# Patient Record
Sex: MALE | Race: WHITE | NOT HISPANIC OR LATINO | Employment: OTHER | URBAN - METROPOLITAN AREA
[De-identification: names, ages, dates, MRNs, and addresses within clinical notes are randomized per-mention and may not be internally consistent; named-entity substitution may affect disease eponyms.]

---

## 2018-12-12 ENCOUNTER — OFFICE VISIT (OUTPATIENT)
Dept: ORTHOPEDIC SURGERY | Facility: CLINIC | Age: 83
End: 2018-12-12

## 2018-12-12 VITALS — BODY MASS INDEX: 35.4 KG/M2 | WEIGHT: 220.24 LBS | OXYGEN SATURATION: 97 % | HEART RATE: 79 BPM | HEIGHT: 66 IN

## 2018-12-12 DIAGNOSIS — M17.11 PRIMARY OSTEOARTHRITIS OF RIGHT KNEE: Primary | ICD-10-CM

## 2018-12-12 PROCEDURE — 99203 OFFICE O/P NEW LOW 30 MIN: CPT | Performed by: ORTHOPAEDIC SURGERY

## 2018-12-12 PROCEDURE — 20610 DRAIN/INJ JOINT/BURSA W/O US: CPT | Performed by: ORTHOPAEDIC SURGERY

## 2018-12-12 RX ORDER — CALCIUM CARBONATE 200(500)MG
1 TABLET,CHEWABLE ORAL DAILY PRN
COMMUNITY
End: 2022-01-17 | Stop reason: HOSPADM

## 2018-12-12 RX ORDER — IPRATROPIUM BROMIDE 42 UG/1
2 SPRAY, METERED NASAL 2 TIMES DAILY
COMMUNITY
Start: 2018-12-07

## 2018-12-12 RX ORDER — RIVASTIGMINE 4.6MG/24HR
PATCH, TRANSDERMAL 24 HOURS TRANSDERMAL
COMMUNITY
Start: 2018-12-07 | End: 2019-08-14 | Stop reason: DRUGHIGH

## 2018-12-12 RX ORDER — ROPIVACAINE HYDROCHLORIDE 5 MG/ML
4 INJECTION, SOLUTION EPIDURAL; INFILTRATION; PERINEURAL
Status: COMPLETED | OUTPATIENT
Start: 2018-12-12 | End: 2018-12-12

## 2018-12-12 RX ORDER — TAMSULOSIN HYDROCHLORIDE 0.4 MG/1
1 CAPSULE ORAL NIGHTLY
COMMUNITY
End: 2020-11-19

## 2018-12-12 RX ORDER — LEVOCETIRIZINE DIHYDROCHLORIDE 5 MG/1
5 TABLET, FILM COATED ORAL EVERY EVENING
COMMUNITY
End: 2020-06-13 | Stop reason: HOSPADM

## 2018-12-12 RX ORDER — LOSARTAN POTASSIUM 50 MG/1
50 TABLET ORAL DAILY
Status: ON HOLD | COMMUNITY
End: 2022-01-14 | Stop reason: SDUPTHER

## 2018-12-12 RX ORDER — TRIAMCINOLONE ACETONIDE 40 MG/ML
40 INJECTION, SUSPENSION INTRA-ARTICULAR; INTRAMUSCULAR
Status: COMPLETED | OUTPATIENT
Start: 2018-12-12 | End: 2018-12-12

## 2018-12-12 RX ADMIN — TRIAMCINOLONE ACETONIDE 40 MG: 40 INJECTION, SUSPENSION INTRA-ARTICULAR; INTRAMUSCULAR at 15:56

## 2018-12-12 RX ADMIN — ROPIVACAINE HYDROCHLORIDE 4 ML: 5 INJECTION, SOLUTION EPIDURAL; INFILTRATION; PERINEURAL at 15:56

## 2018-12-12 NOTE — PROGRESS NOTES
[facility] Orthopaedic Surgery Clinic Note    Subjective     Chief Complaint   Patient presents with   • Right Knee - Pain        HPI    Marcos Acuña is a 87 y.o. male.  He presents today for evaluation of right knee pain.  Pain is been present for 6 months, following no particular injury.  Pain is worse with standing, no history of trauma.  He uses a walker for ambulation.  The pain is 8 out of 10.  No recent injection in his knee, although he may have had one about 6 months ago.      There is no problem list on file for this patient.    Past Medical History:   Diagnosis Date   • Diabetes (CMS/[facility])    • H/O blood clots    • Osteoarthritis       History reviewed. No pertinent surgical history.   Family History   Problem Relation Age of Onset   • Stroke Mother    • Heart attack Father      Social History     Socioeconomic History   • Marital status:      Spouse name: Not on file   • Number of children: Not on file   • Years of education: Not on file   • Highest education level: Not on file   Social Needs   • Financial resource strain: Not on file   • Food insecurity - worry: Not on file   • Food insecurity - inability: Not on file   • Transportation needs - medical: Not on file   • Transportation needs - non-medical: Not on file   Occupational History   • Not on file   Tobacco Use   • Smoking status: Former Smoker     Packs/day: 2.00   • Smokeless tobacco: Never Used   Substance and Sexual Activity   • Alcohol use: No     Frequency: Never   • Drug use: No   • Sexual activity: Defer   Other Topics Concern   • Not on file   Social History Narrative   • Not on file      Current Outpatient Medications on File Prior to Visit   Medication Sig Dispense Refill   • calcium carbonate (TUMS) 500 MG chewable tablet Chew 1 tablet Daily.     • EXELON 4.6 MG/24HR patch      • ipratropium (ATROVENT) 0.06 % nasal spray      • levocetirizine (XYZAL) 5 MG tablet Take 5 mg by mouth Every Evening.     • losartan (COZAAR) 50 MG  tablet Take 50 mg by mouth Daily.     • metFORMIN (GLUCOPHAGE) 500 MG tablet Take 500 mg by mouth 2 (Two) Times a Day With Meals.     • MIRABEGRON ER PO Take  by mouth.     • tamsulosin (FLOMAX) 0.4 MG capsule 24 hr capsule Take 1 capsule by mouth Every Night.     • Thyroid (LEVOTHYROXINE-LIOTHYRONINE PO) Take  by mouth.       No current facility-administered medications on file prior to visit.       No Known Allergies     Review of Systems   Constitutional: Negative for activity change, appetite change, chills, diaphoresis, fatigue, fever and unexpected weight change.   HENT: Negative for congestion, dental problem, drooling, ear discharge, ear pain, facial swelling, hearing loss, mouth sores, nosebleeds, postnasal drip, rhinorrhea, sinus pressure, sneezing, sore throat, tinnitus, trouble swallowing and voice change.    Eyes: Negative for photophobia, pain, discharge, redness, itching and visual disturbance.   Respiratory: Negative for apnea, cough, choking, chest tightness, shortness of breath, wheezing and stridor.    Cardiovascular: Negative for chest pain, palpitations and leg swelling.   Gastrointestinal: Negative for abdominal distention, abdominal pain, anal bleeding, blood in stool, constipation, diarrhea, nausea, rectal pain and vomiting.   Endocrine: Negative for cold intolerance, heat intolerance, polydipsia, polyphagia and polyuria.   Genitourinary: Negative for decreased urine volume, difficulty urinating, dysuria, enuresis, flank pain, frequency, genital sores, hematuria and urgency.   Musculoskeletal: Positive for back pain and joint swelling. Negative for arthralgias, gait problem, myalgias, neck pain and neck stiffness.        Joint Pain    Skin: Negative for color change, pallor, rash and wound.   Allergic/Immunologic: Negative for environmental allergies, food allergies and immunocompromised state.   Neurological: Negative for dizziness, tremors, seizures, syncope, facial asymmetry, speech  "difficulty, weakness, light-headedness, numbness and headaches.   Hematological: Negative for adenopathy. Does not bruise/bleed easily.   Psychiatric/Behavioral: Negative for agitation, behavioral problems, confusion, decreased concentration, dysphoric mood, hallucinations, self-injury, sleep disturbance and suicidal ideas. The patient is not nervous/anxious and is not hyperactive.         Objective      Physical Exam  Pulse 79   Ht 167.6 cm (66\")   Wt 99.9 kg (220 lb 3.8 oz)   SpO2 97%   BMI 35.55 kg/m²     Body mass index is 35.55 kg/m².    General:   Mental Status:  Alert   Appearance: Cooperative, in no acute distress   Build and Nutrition: Overweight male   Orientation: Alert and oriented to person, place and time   Posture: Normal   Gait: With a walker    Integument:   Right knee: No skin lesions, no rash, no ecchymosis    Neurologic:   Sensation:    Right foot: Intact to light touch on the dorsal and plantar aspect   Motor:  Right lower extremity: 5/5 quadriceps, hamstrings, ankle dorsiflexors, and ankle plantar flexors    Vascular:   Right lower extremity: 2+ dorsalis pedis pulse, prompt capillary refill    Lower Extremities:   Right Knee:    Tenderness:  Medial and lateral joint line tenderness    Effusion:  None    Swelling:  None    Crepitus:  Positive    Atrophy:  None    Range of motion:  Extension: 0°       Flexion: 110°  Instability:  No varus laxity, no valgus laxity, negative anterior drawer  Deformities:  Mild varus        Imaging/Studies  Imaging Results (last 24 hours)     Procedure Component Value Units Date/Time    XR Knee 4+ View Right [[id number]] Resulted:  12/12/18 1528     Updated:  12/12/18 1528    Narrative:       Right Knee Radiographs  Indication: right knee pain  Views: Standing AP's and skiers of both knees, with lateral and sunrise   views of the right knee    Comparison: no prior studies available    Findings:   Advanced arthritic changes, with bone-on-bone contact medial " compartment,   bipartite patella, tricompartmental degeneration, and varus alignment.  No   acute bony abnormalities.            Assessment and Plan     Marcos was seen today for pain.    Diagnoses and all orders for this visit:    Primary osteoarthritis of right knee  -     XR Knee 4+ View Right  -     Large Joint Arthrocentesis        Reviewed my findings with patient today.  He does have advanced right knee arthritis, and is not interested in discussing surgical intervention, nor is he is surgical candidate according to his wife.  He would like to have an injection today, and this was provided.  I will see him back in 4 months, but sooner for any problems.    Of note, he did have 100% relief just a few minutes from the injection.    Return in about 4 months (around 4/12/2019).      Medical Decision Making  Management Options : prescription/IM medicine  Data/Risk: radiology tests and independent visualization of imaging, lab tests, or EMG/NCV      Lew Deng MD  12/12/18  5:10 PM

## 2018-12-12 NOTE — PROGRESS NOTES
Procedure   Large Joint Arthrocentesis  Date/Time: 12/12/2018 3:56 PM  Consent given by: patient  Site marked: site marked  Timeout: Immediately prior to procedure a time out was called to verify the correct patient, procedure, equipment, support staff and site/side marked as required   Supporting Documentation  Indications: pain   Procedure Details  Location: knee -   Preparation: Patient was prepped and draped in the usual sterile fashion  Needle size: 22 G  Medications administered: 40 mg triamcinolone acetonide 40 MG/ML; 4 mL ropivacaine 0.5 %  Patient tolerance: patient tolerated the procedure well with no immediate complications

## 2019-04-10 ENCOUNTER — OFFICE VISIT (OUTPATIENT)
Dept: ORTHOPEDIC SURGERY | Facility: CLINIC | Age: 84
End: 2019-04-10

## 2019-04-10 VITALS — HEIGHT: 66 IN | HEART RATE: 77 BPM | BODY MASS INDEX: 34.37 KG/M2 | WEIGHT: 213.85 LBS | OXYGEN SATURATION: 98 %

## 2019-04-10 DIAGNOSIS — M17.11 PRIMARY OSTEOARTHRITIS OF RIGHT KNEE: Primary | ICD-10-CM

## 2019-04-10 PROCEDURE — 20610 DRAIN/INJ JOINT/BURSA W/O US: CPT | Performed by: ORTHOPAEDIC SURGERY

## 2019-04-10 RX ORDER — ROPIVACAINE HYDROCHLORIDE 5 MG/ML
4 INJECTION, SOLUTION EPIDURAL; INFILTRATION; PERINEURAL
Status: COMPLETED | OUTPATIENT
Start: 2019-04-10 | End: 2019-04-10

## 2019-04-10 RX ORDER — TRIAMCINOLONE ACETONIDE 40 MG/ML
40 INJECTION, SUSPENSION INTRA-ARTICULAR; INTRAMUSCULAR
Status: COMPLETED | OUTPATIENT
Start: 2019-04-10 | End: 2019-04-10

## 2019-04-10 RX ORDER — MEMANTINE HYDROCHLORIDE 28 MG/1
CAPSULE, EXTENDED RELEASE ORAL
COMMUNITY
Start: 2018-07-03 | End: 2020-06-08

## 2019-04-10 RX ORDER — LEVOTHYROXINE SODIUM 0.07 MG/1
75 TABLET ORAL DAILY
COMMUNITY
Start: 2019-02-18 | End: 2020-11-03 | Stop reason: SDUPTHER

## 2019-04-10 RX ADMIN — ROPIVACAINE HYDROCHLORIDE 4 ML: 5 INJECTION, SOLUTION EPIDURAL; INFILTRATION; PERINEURAL at 15:19

## 2019-04-10 RX ADMIN — TRIAMCINOLONE ACETONIDE 40 MG: 40 INJECTION, SUSPENSION INTRA-ARTICULAR; INTRAMUSCULAR at 15:19

## 2019-04-10 NOTE — PROGRESS NOTES
Procedure   Large Joint Arthrocentesis: R knee  Date/Time: 4/10/2019 3:19 PM  Consent given by: patient  Site marked: site marked  Timeout: Immediately prior to procedure a time out was called to verify the correct patient, procedure, equipment, support staff and site/side marked as required   Supporting Documentation  Indications: pain   Procedure Details  Location: knee - R knee  Preparation: Patient was prepped and draped in the usual sterile fashion  Needle size: 22 G  Approach: anterolateral  Medications administered: 4 mL ropivacaine 0.5 %; 40 mg triamcinolone acetonide 40 MG/ML  Patient tolerance: patient tolerated the procedure well with no immediate complications

## 2019-04-10 NOTE — PROGRESS NOTES
[facility] [place] [facility] [place]    Subjective     Chief Complaint   Patient presents with   • Follow-up     4 month follow up - Primary osteoarthritis of right knee        HPI    Marcos Acuña is a 87 y.o. male.  He follows up today for both his right knee.  Knees continue to bother him, and he had brief relief with the injections on his last visit.  He has not a surgical candidate.  Pain is currently 6 out of 10, which is dull and achy.  Symptoms have been present for approximately a year.  No history of trauma.      There is no problem list on file for this patient.    Past Medical History:   Diagnosis Date   • Diabetes (CMS/[facility])    • H/O blood clots    • Osteoarthritis       No past surgical history on file.   Family History   Problem Relation Age of Onset   • Stroke Mother    • Heart attack Father      Social History     Socioeconomic History   • Marital status:      Spouse name: Not on file   • Number of children: Not on file   • Years of education: Not on file   • Highest education level: Not on file   Tobacco Use   • Smoking status: Former Smoker     Packs/day: 2.00   • Smokeless tobacco: Never Used   Substance and Sexual Activity   • Alcohol use: No     Frequency: Never   • Drug use: No   • Sexual activity: Defer      Current Outpatient Medications on File Prior to Visit   Medication Sig Dispense Refill   • memantine (NAMENDA XR) 28 MG capsule sustained-release 24 hr extended release capsule memantine 28 mg capsule sprinkle,extended release 24hr     • calcium carbonate (TUMS) 500 MG chewable tablet Chew 1 tablet Daily.     • EXELON 4.6 MG/24HR patch      • ipratropium (ATROVENT) 0.06 % nasal spray      • levocetirizine (XYZAL) 5 MG tablet Take 5 mg by mouth Every Evening.     • levothyroxine (SYNTHROID, LEVOTHROID) 75 MCG tablet      • losartan (COZAAR) 50 MG tablet Take 50 mg by mouth Daily.     • metFORMIN (GLUCOPHAGE) 500 MG tablet Take 500 mg by mouth 2 (Two) Times a Day With Meals.     •  MIRABEGRON ER PO Take  by mouth.     • rivaroxaban (XARELTO) 10 MG tablet Xarelto 10 mg tablet     • tamsulosin (FLOMAX) 0.4 MG capsule 24 hr capsule Take 1 capsule by mouth Every Night.     • Thyroid (LEVOTHYROXINE-LIOTHYRONINE PO) Take  by mouth.       No current facility-administered medications on file prior to visit.       No Known Allergies     Review of Systems   Constitutional: Negative for activity change, appetite change, chills, diaphoresis, fatigue, fever and unexpected weight change.   HENT: Negative for congestion, dental problem, drooling, ear discharge, ear pain, facial swelling, hearing loss, mouth sores, nosebleeds, postnasal drip, rhinorrhea, sinus pressure, sneezing, sore throat, tinnitus, trouble swallowing and voice change.    Eyes: Negative for photophobia, pain, discharge, redness, itching and visual disturbance.   Respiratory: Negative for apnea, cough, choking, chest tightness, shortness of breath, wheezing and stridor.    Cardiovascular: Negative for chest pain, palpitations and leg swelling.   Gastrointestinal: Negative for abdominal distention, abdominal pain, anal bleeding, blood in stool, constipation, diarrhea, nausea, rectal pain and vomiting.   Endocrine: Negative for cold intolerance, heat intolerance, polydipsia, polyphagia and polyuria.   Genitourinary: Negative for decreased urine volume, difficulty urinating, dysuria, enuresis, flank pain, frequency, genital sores, hematuria and urgency.   Musculoskeletal: Negative for arthralgias, back pain, gait problem, joint swelling, myalgias, neck pain and neck stiffness.        Primary osteoarthritis of right knee   Skin: Negative for color change, pallor, rash and wound.   Allergic/Immunologic: Negative for environmental allergies, food allergies and immunocompromised state.   Neurological: Negative for dizziness, tremors, seizures, syncope, facial asymmetry, speech difficulty, weakness, light-headedness, numbness and headaches.  "  Hematological: Negative for adenopathy. Does not bruise/bleed easily.   Psychiatric/Behavioral: Negative for agitation, behavioral problems, confusion, decreased concentration, dysphoric mood, hallucinations, self-injury, sleep disturbance and suicidal ideas. The patient is not nervous/anxious and is not hyperactive.         Objective      Physical Exam  Pulse 77   Ht 167.6 cm (65.98\")   Wt 97 kg (213 lb 13.5 oz)   SpO2 98%   BMI 34.53 kg/m²     Body mass index is 34.53 kg/m².    General:   Mental Status:  Alert   Appearance: Cooperative, in no acute distress   Build and Nutrition: Overweight male   Orientation: Alert and oriented to person, place and time   Posture: Normal   Gait: With a walker    Integument:   Right knee: no skin lesions, no rash, no ecchymosis    Lower Extremities:   Right Knee:    Tenderness:  None    Effusion:  None    Swelling:  None    Crepitus:  Positive    Atrophy:  None    Range of motion:  Extension: 0°       Flexion: 110°  Instability:  No varus laxity, no valgus laxity, negative anterior drawer  Deformities:  Varus        Assessment and Plan     Marcos was seen today for follow-up.    Diagnoses and all orders for this visit:    Primary osteoarthritis of right knee  -     Large Joint Arthrocentesis: R knee        1. Primary osteoarthritis of right knee        I reviewed my findings with the patient today.  He would like to have his right knee injected today, and this was provided.  He is not a surgical candidate.  I will see him back in 4 months, but sooner for any problems.    Of note, he had 80% relief just a few minutes following the injection.    Return in about 4 months (around 8/10/2019).      Medical Decision Making  Management Options : prescription/IM medicine    Lew Deng MD  04/10/19  3:57 PM  "

## 2019-07-27 ENCOUNTER — LAB REQUISITION (OUTPATIENT)
Dept: LAB | Facility: HOSPITAL | Age: 84
End: 2019-07-27

## 2019-07-27 DIAGNOSIS — Z00.00 ROUTINE GENERAL MEDICAL EXAMINATION AT A HEALTH CARE FACILITY: ICD-10-CM

## 2019-07-27 LAB
ALBUMIN SERPL-MCNC: 3.3 G/DL (ref 3.5–5.2)
ALBUMIN/GLOB SERPL: 1.2 G/DL
ALP SERPL-CCNC: 79 U/L (ref 39–117)
ALT SERPL W P-5'-P-CCNC: 11 U/L (ref 1–41)
ANION GAP SERPL CALCULATED.3IONS-SCNC: 13 MMOL/L (ref 5–15)
AST SERPL-CCNC: 21 U/L (ref 1–40)
BASOPHILS # BLD AUTO: 0.11 10*3/MM3 (ref 0–0.2)
BASOPHILS NFR BLD AUTO: 1.5 % (ref 0–1.5)
BILIRUB SERPL-MCNC: 0.5 MG/DL (ref 0.2–1.2)
BUN BLD-MCNC: 11 MG/DL (ref 8–23)
BUN/CREAT SERPL: 16.4 (ref 7–25)
CALCIUM SPEC-SCNC: 9.5 MG/DL (ref 8.6–10.5)
CHLORIDE SERPL-SCNC: 102 MMOL/L (ref 98–107)
CHOLEST SERPL-MCNC: 175 MG/DL (ref 0–200)
CO2 SERPL-SCNC: 25 MMOL/L (ref 22–29)
CREAT BLD-MCNC: 0.67 MG/DL (ref 0.76–1.27)
DEPRECATED RDW RBC AUTO: 63.1 FL (ref 37–54)
EOSINOPHIL # BLD AUTO: 0.5 10*3/MM3 (ref 0–0.4)
EOSINOPHIL NFR BLD AUTO: 6.7 % (ref 0.3–6.2)
ERYTHROCYTE [DISTWIDTH] IN BLOOD BY AUTOMATED COUNT: 15.8 % (ref 12.3–15.4)
GFR SERPL CREATININE-BSD FRML MDRD: 112 ML/MIN/1.73
GLOBULIN UR ELPH-MCNC: 2.7 GM/DL
GLUCOSE BLD-MCNC: 168 MG/DL (ref 65–99)
HBA1C MFR BLD: 6.8 % (ref 4.8–5.6)
HCT VFR BLD AUTO: 38 % (ref 37.5–51)
HDLC SERPL-MCNC: 63 MG/DL (ref 40–60)
HGB BLD-MCNC: 12.8 G/DL (ref 13–17.7)
IMM GRANULOCYTES # BLD AUTO: 0.02 10*3/MM3 (ref 0–0.05)
IMM GRANULOCYTES NFR BLD AUTO: 0.3 % (ref 0–0.5)
LDLC SERPL CALC-MCNC: 93 MG/DL (ref 0–100)
LDLC/HDLC SERPL: 1.47 {RATIO}
LYMPHOCYTES # BLD AUTO: 2.64 10*3/MM3 (ref 0.7–3.1)
LYMPHOCYTES NFR BLD AUTO: 35.6 % (ref 19.6–45.3)
MCH RBC QN AUTO: 36.6 PG (ref 26.6–33)
MCHC RBC AUTO-ENTMCNC: 33.7 G/DL (ref 31.5–35.7)
MCV RBC AUTO: 108.6 FL (ref 79–97)
MONOCYTES # BLD AUTO: 0.51 10*3/MM3 (ref 0.1–0.9)
MONOCYTES NFR BLD AUTO: 6.9 % (ref 5–12)
NEUTROPHILS # BLD AUTO: 3.63 10*3/MM3 (ref 1.7–7)
NEUTROPHILS NFR BLD AUTO: 49 % (ref 42.7–76)
NRBC BLD AUTO-RTO: 0 /100 WBC (ref 0–0.2)
PLATELET # BLD AUTO: 183 10*3/MM3 (ref 140–450)
PMV BLD AUTO: 9.7 FL (ref 6–12)
POTASSIUM BLD-SCNC: 4 MMOL/L (ref 3.5–5.2)
PROT SERPL-MCNC: 6 G/DL (ref 6–8.5)
RBC # BLD AUTO: 3.5 10*6/MM3 (ref 4.14–5.8)
SODIUM BLD-SCNC: 140 MMOL/L (ref 136–145)
TRIGL SERPL-MCNC: 97 MG/DL (ref 0–150)
TSH SERPL DL<=0.05 MIU/L-ACNC: 2.04 MIU/ML (ref 0.27–4.2)
VLDLC SERPL-MCNC: 19.4 MG/DL
WBC NRBC COR # BLD: 7.41 10*3/MM3 (ref 3.4–10.8)

## 2019-07-27 PROCEDURE — 83036 HEMOGLOBIN GLYCOSYLATED A1C: CPT | Performed by: PHYSICAL MEDICINE & REHABILITATION

## 2019-07-27 PROCEDURE — 85025 COMPLETE CBC W/AUTO DIFF WBC: CPT | Performed by: PHYSICAL MEDICINE & REHABILITATION

## 2019-07-27 PROCEDURE — 84443 ASSAY THYROID STIM HORMONE: CPT | Performed by: PHYSICAL MEDICINE & REHABILITATION

## 2019-07-27 PROCEDURE — 80061 LIPID PANEL: CPT | Performed by: PHYSICAL MEDICINE & REHABILITATION

## 2019-07-27 PROCEDURE — 80053 COMPREHEN METABOLIC PANEL: CPT | Performed by: PHYSICAL MEDICINE & REHABILITATION

## 2019-08-14 ENCOUNTER — OFFICE VISIT (OUTPATIENT)
Dept: ORTHOPEDIC SURGERY | Facility: CLINIC | Age: 84
End: 2019-08-14

## 2019-08-14 VITALS — WEIGHT: 210 LBS | HEART RATE: 75 BPM | HEIGHT: 66 IN | OXYGEN SATURATION: 98 % | BODY MASS INDEX: 33.75 KG/M2

## 2019-08-14 DIAGNOSIS — M17.11 PRIMARY OSTEOARTHRITIS OF RIGHT KNEE: Primary | ICD-10-CM

## 2019-08-14 PROCEDURE — 99212 OFFICE O/P EST SF 10 MIN: CPT | Performed by: ORTHOPAEDIC SURGERY

## 2019-08-14 RX ORDER — MIRABEGRON 50 MG/1
50 TABLET, FILM COATED, EXTENDED RELEASE ORAL DAILY
COMMUNITY
Start: 2019-06-14 | End: 2020-11-24

## 2019-08-14 RX ORDER — RIVASTIGMINE 9.5 MG/24H
1 PATCH, EXTENDED RELEASE TRANSDERMAL DAILY
COMMUNITY
Start: 2019-07-26

## 2019-08-14 NOTE — PROGRESS NOTES
[facility] [place] [facility] [place]    Subjective     Chief Complaint   Patient presents with   • Right Knee - Follow-up     4 month f/u  Primary Osteoarthritis of Right Knee  Cortisone Injection given 04/10/2019        HPI    Marcos Acuña is a 88 y.o. male.  Follows up today for his right knee.  Continues to have pain in the knee, but following the last injection, his blood sugars were elevated, and he got a bit confused.  They would like to avoid that today.  His wife has some questions about Visco supplementation injections at this time.      There is no problem list on file for this patient.    Past Medical History:   Diagnosis Date   • Diabetes (CMS/[facility])    • H/O blood clots    • Osteoarthritis       History reviewed. No pertinent surgical history.   Family History   Problem Relation Age of Onset   • Stroke Mother    • Heart attack Father      Social History     Socioeconomic History   • Marital status:      Spouse name: Not on file   • Number of children: Not on file   • Years of education: Not on file   • Highest education level: Not on file   Tobacco Use   • Smoking status: Former Smoker     Packs/day: 2.00   • Smokeless tobacco: Never Used   Substance and Sexual Activity   • Alcohol use: No     Frequency: Never   • Drug use: No   • Sexual activity: Defer      Current Outpatient Medications on File Prior to Visit   Medication Sig Dispense Refill   • calcium carbonate (TUMS) 500 MG chewable tablet Chew 1 tablet Daily.     • EXELON 9.5 MG/24HR patch      • ipratropium (ATROVENT) 0.06 % nasal spray      • levocetirizine (XYZAL) 5 MG tablet Take 5 mg by mouth Every Evening.     • levothyroxine (SYNTHROID, LEVOTHROID) 75 MCG tablet      • losartan (COZAAR) 50 MG tablet Take 50 mg by mouth Daily.     • memantine (NAMENDA XR) 28 MG capsule sustained-release 24 hr extended release capsule memantine 28 mg capsule sprinkle,extended release 24hr     • metFORMIN (GLUCOPHAGE) 500 MG tablet Take 500 mg by  mouth 2 (Two) Times a Day With Meals.     • MYRBETRIQ 50 MG tablet sustained-release 24 hour 24 hr tablet      • rivaroxaban (XARELTO) 10 MG tablet Xarelto 10 mg tablet     • tamsulosin (FLOMAX) 0.4 MG capsule 24 hr capsule Take 1 capsule by mouth Every Night.     • Thyroid (LEVOTHYROXINE-LIOTHYRONINE PO) Take  by mouth.     • [DISCONTINUED] EXELON 4.6 MG/24HR patch      • [DISCONTINUED] MIRABEGRON ER PO Take  by mouth.       No current facility-administered medications on file prior to visit.       No Known Allergies     Review of Systems   Constitutional: Negative for activity change, appetite change, chills, diaphoresis, fatigue, fever and unexpected weight change.   HENT: Negative for congestion, dental problem, drooling, ear discharge, ear pain, facial swelling, hearing loss, mouth sores, nosebleeds, postnasal drip, rhinorrhea, sinus pressure, sneezing, sore throat, tinnitus, trouble swallowing and voice change.    Eyes: Negative for photophobia, pain, discharge, redness, itching and visual disturbance.   Respiratory: Negative for apnea, cough, choking, chest tightness, shortness of breath, wheezing and stridor.    Cardiovascular: Negative for chest pain, palpitations and leg swelling.   Gastrointestinal: Negative for abdominal distention, abdominal pain, anal bleeding, blood in stool, constipation, diarrhea, nausea, rectal pain and vomiting.   Endocrine: Negative for cold intolerance, heat intolerance, polydipsia, polyphagia and polyuria.   Genitourinary: Negative for decreased urine volume, difficulty urinating, dysuria, enuresis, flank pain, frequency, genital sores, hematuria and urgency.   Musculoskeletal: Positive for joint swelling. Negative for arthralgias, back pain, gait problem, myalgias, neck pain and neck stiffness.   Skin: Negative for color change, pallor, rash and wound.   Allergic/Immunologic: Positive for environmental allergies. Negative for food allergies and immunocompromised state.  "  Neurological: Negative for dizziness, tremors, seizures, syncope, facial asymmetry, speech difficulty, weakness, light-headedness, numbness and headaches.   Hematological: Negative for adenopathy. Bruises/bleeds easily.   Psychiatric/Behavioral: Positive for confusion and decreased concentration. Negative for agitation, behavioral problems, dysphoric mood, hallucinations, self-injury, sleep disturbance and suicidal ideas. The patient is not nervous/anxious and is not hyperactive.         Objective      Physical Exam  Pulse 75   Ht 167.6 cm (65.98\")   Wt 95.3 kg (210 lb)   SpO2 98%   BMI 33.91 kg/m²     Body mass index is 33.91 kg/m².    General:   Mental Status:  Alert   Appearance: Cooperative, in no acute distress   Build and Nutrition: Overweight male   Orientation: Alert and oriented to person, place and time   Posture: Normal   Gait: With a walker    Integument:   Right knee: No skin lesions, no rash, no ecchymosis    Lower Extremities:   Right Knee:    Tenderness:  None    Effusion:  None    Swelling: None    Crepitus:  Positive    Range of motion:  Extension: 0°       Flexion: 120°  Instability:  No varus laxity, no valgus laxity, negative anterior drawer  Deformities:  Varus      Assessment and Plan     Marcos was seen today for follow-up.    Diagnoses and all orders for this visit:    Primary osteoarthritis of right knee        1. Primary osteoarthritis of right knee        I reviewed my findings with the patient today.  Right knee continues to bother him, but he is not a good surgical candidate.  With his bone-on-bone in the knee, I do not believe that Visco supplementation injections would be particularly helpful.  With the elevated blood sugars that he had with the last steroid injection, I would like to avoid those also, as he had some confusion associated with that.  Unfortunately, I have no other options for his knee than conservative treatment.    Return if symptoms worsen or fail to " improve.          Lew Deng MD  08/14/19  1:54 PM

## 2020-06-08 ENCOUNTER — APPOINTMENT (OUTPATIENT)
Dept: GENERAL RADIOLOGY | Facility: HOSPITAL | Age: 85
End: 2020-06-08

## 2020-06-08 ENCOUNTER — HOSPITAL ENCOUNTER (OUTPATIENT)
Facility: HOSPITAL | Age: 85
Setting detail: SURGERY ADMIT
End: 2020-06-08
Attending: ORTHOPAEDIC SURGERY | Admitting: ORTHOPAEDIC SURGERY

## 2020-06-08 ENCOUNTER — APPOINTMENT (OUTPATIENT)
Dept: CT IMAGING | Facility: HOSPITAL | Age: 85
End: 2020-06-08

## 2020-06-08 ENCOUNTER — ANESTHESIA EVENT (OUTPATIENT)
Dept: PERIOP | Facility: HOSPITAL | Age: 85
End: 2020-06-08

## 2020-06-08 ENCOUNTER — HOSPITAL ENCOUNTER (INPATIENT)
Facility: HOSPITAL | Age: 85
LOS: 5 days | Discharge: NURSING, REHAB OR LONG-TERM CARE | End: 2020-06-13
Attending: EMERGENCY MEDICINE | Admitting: INTERNAL MEDICINE
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
## 2020-06-08 DIAGNOSIS — Z78.9 IMPAIRED MOBILITY AND ADLS: ICD-10-CM

## 2020-06-08 DIAGNOSIS — Z79.01 ANTICOAGULATED: ICD-10-CM

## 2020-06-08 DIAGNOSIS — S72.001A CLOSED FRACTURE OF RIGHT HIP, INITIAL ENCOUNTER (HCC): Primary | ICD-10-CM

## 2020-06-08 DIAGNOSIS — Z74.09 IMPAIRED MOBILITY AND ADLS: ICD-10-CM

## 2020-06-08 PROBLEM — L03.116 LEFT LEG CELLULITIS: Status: ACTIVE | Noted: 2017-07-06

## 2020-06-08 PROBLEM — M17.12 PRIMARY OSTEOARTHRITIS OF LEFT KNEE: Status: ACTIVE | Noted: 2017-11-27

## 2020-06-08 PROBLEM — Z09: Status: ACTIVE | Noted: 2018-02-09

## 2020-06-08 PROBLEM — I82.409 DVT (DEEP VENOUS THROMBOSIS) (HCC): Status: ACTIVE | Noted: 2017-07-06

## 2020-06-08 PROBLEM — Z86.718: Status: ACTIVE | Noted: 2018-02-09

## 2020-06-08 LAB
ABO GROUP BLD: NORMAL
ABO GROUP BLD: NORMAL
ALBUMIN SERPL-MCNC: 3.5 G/DL (ref 3.5–5.2)
ALBUMIN/GLOB SERPL: 1.4 G/DL
ALP SERPL-CCNC: 69 U/L (ref 39–117)
ALT SERPL W P-5'-P-CCNC: 14 U/L (ref 1–41)
ANION GAP SERPL CALCULATED.3IONS-SCNC: 12 MMOL/L (ref 5–15)
APTT PPP: 38.6 SECONDS (ref 24–37)
AST SERPL-CCNC: 25 U/L (ref 1–40)
BASOPHILS # BLD AUTO: 0.13 10*3/MM3 (ref 0–0.2)
BASOPHILS NFR BLD AUTO: 1.6 % (ref 0–1.5)
BILIRUB SERPL-MCNC: 0.5 MG/DL (ref 0.2–1.2)
BLD GP AB SCN SERPL QL: NEGATIVE
BUN BLD-MCNC: 11 MG/DL (ref 8–23)
BUN/CREAT SERPL: 12.1 (ref 7–25)
CALCIUM SPEC-SCNC: 8.5 MG/DL (ref 8.6–10.5)
CHLORIDE SERPL-SCNC: 104 MMOL/L (ref 98–107)
CO2 SERPL-SCNC: 24 MMOL/L (ref 22–29)
CREAT BLD-MCNC: 0.91 MG/DL (ref 0.76–1.27)
DEPRECATED RDW RBC AUTO: 56.2 FL (ref 37–54)
EOSINOPHIL # BLD AUTO: 0.27 10*3/MM3 (ref 0–0.4)
EOSINOPHIL NFR BLD AUTO: 3.4 % (ref 0.3–6.2)
ERYTHROCYTE [DISTWIDTH] IN BLOOD BY AUTOMATED COUNT: 17.2 % (ref 12.3–15.4)
GFR SERPL CREATININE-BSD FRML MDRD: 78 ML/MIN/1.73
GLOBULIN UR ELPH-MCNC: 2.5 GM/DL
GLUCOSE BLD-MCNC: 140 MG/DL (ref 65–99)
GLUCOSE BLDC GLUCOMTR-MCNC: 147 MG/DL (ref 70–130)
HCT VFR BLD AUTO: 38.7 % (ref 37.5–51)
HGB BLD-MCNC: 12.5 G/DL (ref 13–17.7)
IMM GRANULOCYTES # BLD AUTO: 0.06 10*3/MM3 (ref 0–0.05)
IMM GRANULOCYTES NFR BLD AUTO: 0.8 % (ref 0–0.5)
INR PPP: 1.93 (ref 0.85–1.16)
LYMPHOCYTES # BLD AUTO: 1.69 10*3/MM3 (ref 0.7–3.1)
LYMPHOCYTES NFR BLD AUTO: 21.4 % (ref 19.6–45.3)
MCH RBC QN AUTO: 28.9 PG (ref 26.6–33)
MCHC RBC AUTO-ENTMCNC: 32.3 G/DL (ref 31.5–35.7)
MCV RBC AUTO: 89.4 FL (ref 79–97)
MONOCYTES # BLD AUTO: 0.46 10*3/MM3 (ref 0.1–0.9)
MONOCYTES NFR BLD AUTO: 5.8 % (ref 5–12)
NEUTROPHILS # BLD AUTO: 5.28 10*3/MM3 (ref 1.7–7)
NEUTROPHILS NFR BLD AUTO: 67 % (ref 42.7–76)
NRBC BLD AUTO-RTO: 0 /100 WBC (ref 0–0.2)
NT-PROBNP SERPL-MCNC: 65.4 PG/ML (ref 5–1800)
PLATELET # BLD AUTO: 191 10*3/MM3 (ref 140–450)
PMV BLD AUTO: 10.4 FL (ref 6–12)
POTASSIUM BLD-SCNC: 4.4 MMOL/L (ref 3.5–5.2)
PROT SERPL-MCNC: 6 G/DL (ref 6–8.5)
PROTHROMBIN TIME: 21.7 SECONDS (ref 11.5–14)
RBC # BLD AUTO: 4.33 10*6/MM3 (ref 4.14–5.8)
RH BLD: POSITIVE
RH BLD: POSITIVE
SARS-COV-2 RNA PNL SPEC NAA+PROBE: NOT DETECTED
SODIUM BLD-SCNC: 140 MMOL/L (ref 136–145)
T&S EXPIRATION DATE: NORMAL
T4 FREE SERPL-MCNC: 1.31 NG/DL (ref 0.93–1.7)
TROPONIN T SERPL-MCNC: 0.01 NG/ML (ref 0–0.03)
TSH SERPL DL<=0.05 MIU/L-ACNC: 2.26 UIU/ML (ref 0.27–4.2)
WBC NRBC COR # BLD: 7.89 10*3/MM3 (ref 3.4–10.8)

## 2020-06-08 PROCEDURE — 25010000002 HYDROMORPHONE PER 4 MG: Performed by: EMERGENCY MEDICINE

## 2020-06-08 PROCEDURE — 99285 EMERGENCY DEPT VISIT HI MDM: CPT

## 2020-06-08 PROCEDURE — 85610 PROTHROMBIN TIME: CPT | Performed by: EMERGENCY MEDICINE

## 2020-06-08 PROCEDURE — 84484 ASSAY OF TROPONIN QUANT: CPT | Performed by: EMERGENCY MEDICINE

## 2020-06-08 PROCEDURE — 87635 SARS-COV-2 COVID-19 AMP PRB: CPT | Performed by: EMERGENCY MEDICINE

## 2020-06-08 PROCEDURE — 86900 BLOOD TYPING SEROLOGIC ABO: CPT

## 2020-06-08 PROCEDURE — 86900 BLOOD TYPING SEROLOGIC ABO: CPT | Performed by: EMERGENCY MEDICINE

## 2020-06-08 PROCEDURE — 70450 CT HEAD/BRAIN W/O DYE: CPT

## 2020-06-08 PROCEDURE — 85730 THROMBOPLASTIN TIME PARTIAL: CPT | Performed by: EMERGENCY MEDICINE

## 2020-06-08 PROCEDURE — 85025 COMPLETE CBC W/AUTO DIFF WBC: CPT | Performed by: EMERGENCY MEDICINE

## 2020-06-08 PROCEDURE — 86850 RBC ANTIBODY SCREEN: CPT | Performed by: EMERGENCY MEDICINE

## 2020-06-08 PROCEDURE — 25010000002 ONDANSETRON PER 1 MG: Performed by: EMERGENCY MEDICINE

## 2020-06-08 PROCEDURE — 71045 X-RAY EXAM CHEST 1 VIEW: CPT

## 2020-06-08 PROCEDURE — 84439 ASSAY OF FREE THYROXINE: CPT | Performed by: INTERNAL MEDICINE

## 2020-06-08 PROCEDURE — 84443 ASSAY THYROID STIM HORMONE: CPT | Performed by: INTERNAL MEDICINE

## 2020-06-08 PROCEDURE — 99222 1ST HOSP IP/OBS MODERATE 55: CPT | Performed by: INTERNAL MEDICINE

## 2020-06-08 PROCEDURE — 86901 BLOOD TYPING SEROLOGIC RH(D): CPT

## 2020-06-08 PROCEDURE — 86901 BLOOD TYPING SEROLOGIC RH(D): CPT | Performed by: EMERGENCY MEDICINE

## 2020-06-08 PROCEDURE — 93005 ELECTROCARDIOGRAM TRACING: CPT | Performed by: EMERGENCY MEDICINE

## 2020-06-08 PROCEDURE — 82962 GLUCOSE BLOOD TEST: CPT

## 2020-06-08 PROCEDURE — 3E0T3BZ INTRODUCTION OF ANESTHETIC AGENT INTO PERIPHERAL NERVES AND PLEXI, PERCUTANEOUS APPROACH: ICD-10-PCS | Performed by: EMERGENCY MEDICINE

## 2020-06-08 PROCEDURE — 73502 X-RAY EXAM HIP UNI 2-3 VIEWS: CPT

## 2020-06-08 PROCEDURE — 83880 ASSAY OF NATRIURETIC PEPTIDE: CPT | Performed by: EMERGENCY MEDICINE

## 2020-06-08 PROCEDURE — 80053 COMPREHEN METABOLIC PANEL: CPT | Performed by: EMERGENCY MEDICINE

## 2020-06-08 RX ORDER — OXYBUTYNIN CHLORIDE 10 MG/1
10 TABLET, EXTENDED RELEASE ORAL DAILY
Status: DISCONTINUED | OUTPATIENT
Start: 2020-06-08 | End: 2020-06-13 | Stop reason: HOSPADM

## 2020-06-08 RX ORDER — FAMOTIDINE 20 MG/1
20 TABLET, FILM COATED ORAL ONCE
Status: CANCELLED | OUTPATIENT
Start: 2020-06-08 | End: 2020-06-08

## 2020-06-08 RX ORDER — LOSARTAN POTASSIUM 50 MG/1
50 TABLET ORAL DAILY
Status: DISCONTINUED | OUTPATIENT
Start: 2020-06-08 | End: 2020-06-13 | Stop reason: HOSPADM

## 2020-06-08 RX ORDER — BISACODYL 5 MG/1
5 TABLET, DELAYED RELEASE ORAL DAILY PRN
Status: DISCONTINUED | OUTPATIENT
Start: 2020-06-08 | End: 2020-06-09

## 2020-06-08 RX ORDER — BISACODYL 10 MG
10 SUPPOSITORY, RECTAL RECTAL DAILY PRN
Status: DISCONTINUED | OUTPATIENT
Start: 2020-06-08 | End: 2020-06-09

## 2020-06-08 RX ORDER — SODIUM CHLORIDE 0.9 % (FLUSH) 0.9 %
10 SYRINGE (ML) INJECTION EVERY 12 HOURS SCHEDULED
Status: CANCELLED | OUTPATIENT
Start: 2020-06-08

## 2020-06-08 RX ORDER — CALCIUM CARBONATE 200(500)MG
1 TABLET,CHEWABLE ORAL DAILY PRN
Status: DISCONTINUED | OUTPATIENT
Start: 2020-06-08 | End: 2020-06-09

## 2020-06-08 RX ORDER — RIVASTIGMINE 9.5 MG/24H
1 PATCH, EXTENDED RELEASE TRANSDERMAL DAILY
Status: DISCONTINUED | OUTPATIENT
Start: 2020-06-08 | End: 2020-06-13 | Stop reason: HOSPADM

## 2020-06-08 RX ORDER — HYDROMORPHONE HYDROCHLORIDE 1 MG/ML
0.25 INJECTION, SOLUTION INTRAMUSCULAR; INTRAVENOUS; SUBCUTANEOUS ONCE
Status: COMPLETED | OUTPATIENT
Start: 2020-06-08 | End: 2020-06-08

## 2020-06-08 RX ORDER — SODIUM CHLORIDE, SODIUM LACTATE, POTASSIUM CHLORIDE, CALCIUM CHLORIDE 600; 310; 30; 20 MG/100ML; MG/100ML; MG/100ML; MG/100ML
9 INJECTION, SOLUTION INTRAVENOUS CONTINUOUS
Status: CANCELLED | OUTPATIENT
Start: 2020-06-08

## 2020-06-08 RX ORDER — SODIUM CHLORIDE 0.9 % (FLUSH) 0.9 %
10 SYRINGE (ML) INJECTION AS NEEDED
Status: CANCELLED | OUTPATIENT
Start: 2020-06-08

## 2020-06-08 RX ORDER — CYANOCOBALAMIN 1000 UG/ML
1000 INJECTION, SOLUTION INTRAMUSCULAR; SUBCUTANEOUS
COMMUNITY

## 2020-06-08 RX ORDER — SODIUM CHLORIDE 0.9 % (FLUSH) 0.9 %
10 SYRINGE (ML) INJECTION EVERY 12 HOURS SCHEDULED
Status: DISCONTINUED | OUTPATIENT
Start: 2020-06-08 | End: 2020-06-09

## 2020-06-08 RX ORDER — NICOTINE POLACRILEX 4 MG
15 LOZENGE BUCCAL
Status: DISCONTINUED | OUTPATIENT
Start: 2020-06-08 | End: 2020-06-13 | Stop reason: HOSPADM

## 2020-06-08 RX ORDER — FAMOTIDINE 10 MG/ML
20 INJECTION, SOLUTION INTRAVENOUS ONCE
Status: CANCELLED | OUTPATIENT
Start: 2020-06-08 | End: 2020-06-08

## 2020-06-08 RX ORDER — DEXTROSE MONOHYDRATE 25 G/50ML
25 INJECTION, SOLUTION INTRAVENOUS
Status: DISCONTINUED | OUTPATIENT
Start: 2020-06-08 | End: 2020-06-13 | Stop reason: HOSPADM

## 2020-06-08 RX ORDER — ONDANSETRON 2 MG/ML
4 INJECTION INTRAMUSCULAR; INTRAVENOUS ONCE
Status: COMPLETED | OUTPATIENT
Start: 2020-06-08 | End: 2020-06-08

## 2020-06-08 RX ORDER — LEVOTHYROXINE SODIUM 0.07 MG/1
75 TABLET ORAL DAILY
Status: DISCONTINUED | OUTPATIENT
Start: 2020-06-08 | End: 2020-06-13 | Stop reason: HOSPADM

## 2020-06-08 RX ORDER — TAMSULOSIN HYDROCHLORIDE 0.4 MG/1
0.4 CAPSULE ORAL NIGHTLY
Status: DISCONTINUED | OUTPATIENT
Start: 2020-06-08 | End: 2020-06-13 | Stop reason: HOSPADM

## 2020-06-08 RX ORDER — SODIUM CHLORIDE 0.9 % (FLUSH) 0.9 %
10 SYRINGE (ML) INJECTION AS NEEDED
Status: DISCONTINUED | OUTPATIENT
Start: 2020-06-08 | End: 2020-06-09

## 2020-06-08 RX ORDER — LIDOCAINE HYDROCHLORIDE 10 MG/ML
0.5 INJECTION, SOLUTION EPIDURAL; INFILTRATION; INTRACAUDAL; PERINEURAL ONCE AS NEEDED
Status: CANCELLED | OUTPATIENT
Start: 2020-06-08

## 2020-06-08 RX ADMIN — HYDROMORPHONE HYDROCHLORIDE 0.25 MG: 1 INJECTION, SOLUTION INTRAMUSCULAR; INTRAVENOUS; SUBCUTANEOUS at 12:05

## 2020-06-08 RX ADMIN — SODIUM CHLORIDE, PRESERVATIVE FREE 10 ML: 5 INJECTION INTRAVENOUS at 18:06

## 2020-06-08 RX ADMIN — ONDANSETRON 4 MG: 2 INJECTION INTRAMUSCULAR; INTRAVENOUS at 12:05

## 2020-06-08 RX ADMIN — RIVASTIGMINE TRANSDERMAL SYSTEM 1 PATCH: 9.5 PATCH, EXTENDED RELEASE TRANSDERMAL at 18:05

## 2020-06-08 NOTE — CONSULTS
"Marcos Acuña    Referring Provider: No ref. provider found  Reason for Consultation: Right hip fracture    Patient Care Team:  Abdirashid Mccabe MD as PCP - General (Cardiology)    Chief complaint: Right hip pain and an inability to ambulate      Subjective .     History of present illness: 89-year-old gentleman with dementia who fell and sustained injury to his right hip unable to ambulate thereafter.  He ordinarily uses a walker    Review of Systems  Review of Systems  Constitutional: Negative for chills and fever.   HENT: Negative for sneezing, sore throat and trouble swallowing.    Eyes: Negative for photophobia and visual disturbance.   Respiratory: Negative for choking, shortness of breath and wheezing.    Cardiovascular: Negative for chest pain, palpitations and leg swelling.   Gastrointestinal: Negative for abdominal pain, constipation, diarrhea, nausea and vomiting.   Genitourinary: Negative for difficulty urinating and dysuria.   Musculoskeletal: Negative for arthralgias and joint swelling.   Skin: Negative for rash and wound.   Allergic/Immunologic: Negative for immunocompromised state.   Neurological: Negative for dizziness and headaches.   Psychiatric/Behavioral: Negative for agitation and confusion.     History  Past Medical History:   Diagnosis Date   • Dementia (CMS/HCC)    • Diabetes (CMS/HCC)    • H/O blood clots    • Osteoarthritis      Past Surgical History:   Procedure Laterality Date   • BUNIONECTOMY     • RECTAL FISTULECTOMY     • SKIN CANCER EXCISION       Family History   Problem Relation Age of Onset   • Stroke Mother    • Heart attack Father          Objective     Vital Signs   /76 (BP Location: Left arm, Patient Position: Lying)   Pulse 87   Temp 97.7 °F (36.5 °C) (Oral)   Resp 16   Ht 170.2 cm (67\")   Wt 102 kg (225 lb)   SpO2 93%   BMI 35.24 kg/m²       Physical Exam:  General Appearance:    Alert, cooperative, in no acute distress   Head:    Normocephalic, without " obvious abnormality, atraumatic   Eyes:            Lids and lashes normal, conjunctivae and sclerae normal, no   icterus,   Ears:    Ears appear intact with no abnormalities noted   Throat:   No oral lesions, no thrush, oral mucosa moist   Neck:   No adenopathy, supple, trachea midline, no thyromegaly,       Lungs:     Clear to auscultation,respirations regular, even and                   unlabored    Heart:    Regular rhythm and normal rate, normal S1 and S2, no            murmur,       Abdomen:     Normal bowel sounds, no masses, no organomegaly, soft        non-tender, non-distended,       Extremities:  Right leg is flexed abducted externally rotated.  Able to move his toes.  Has pain to any active or passive motion of the right hip   Pulses:   Pulses palpable and equal bilaterally   Skin:   No bleeding, bruising or rash   Lymph nodes:   No palpable adenopathy in the cervical region                Lab results:  Lab Results (last 24 hours)     Procedure Component Value Units Date/Time    TSH [[id number]]  (Normal) Collected:  06/08/20 1221    Specimen:  Blood Updated:  06/08/20 1502     TSH 2.260 uIU/mL     T4, Free [[id number]]  (Normal) Collected:  06/08/20 1221    Specimen:  Blood Updated:  06/08/20 1502     Free T4 1.31 ng/dL     Narrative:       Results may be falsely increased if patient taking Biotin.      COVID-19,CEPHEID,URI IN-HOUSE(OR EMERGENT/ADD-ON),NP SWAB IN TRANSPORT MEDIA 3-4 HR TAT - Swab, Nasopharynx [[id number]]  (Normal) Collected:  06/08/20 1337    Specimen:  Swab from Nasopharynx Updated:  06/08/20 1444     COVID19 Not Detected    Protime-INR [[id number]]  (Abnormal) Collected:  06/08/20 1221    Specimen:  Blood Updated:  06/08/20 1258     Protime 21.7 Seconds      INR 1.93    aPTT [[id number]]  (Abnormal) Collected:  06/08/20 1221    Specimen:  Blood Updated:  06/08/20 1258     PTT 38.6 seconds     Narrative:       PTT = The equivalent PTT values for the therapeutic range of heparin levels at  0.3 to 0.5 U/ml are 55 to 70 seconds.    Comprehensive Metabolic Panel [[id number]]  (Abnormal) Collected:  06/08/20 1221    Specimen:  Blood Updated:  06/08/20 1254     Glucose 140 mg/dL      BUN 11 mg/dL      Creatinine 0.91 mg/dL      Sodium 140 mmol/L      Potassium 4.4 mmol/L      Chloride 104 mmol/L      CO2 24.0 mmol/L      Calcium 8.5 mg/dL      Total Protein 6.0 g/dL      Albumin 3.50 g/dL      ALT (SGPT) 14 U/L      AST (SGOT) 25 U/L      Alkaline Phosphatase 69 U/L      Total Bilirubin 0.5 mg/dL      eGFR Non African Amer 78 mL/min/1.73      Globulin 2.5 gm/dL      A/G Ratio 1.4 g/dL      BUN/Creatinine Ratio 12.1     Anion Gap 12.0 mmol/L     Narrative:       GFR Normal >60  Chronic Kidney Disease <60  Kidney Failure <15      Troponin [[id number]]  (Normal) Collected:  06/08/20 1221    Specimen:  Blood Updated:  06/08/20 1253     Troponin T 0.013 ng/mL     Narrative:       Troponin T Reference Range:  <= 0.03 ng/mL-   Negative for AMI  >0.03 ng/mL-     Abnormal for myocardial necrosis.  Clinicians would have to utilize clinical acumen, EKG, Troponin and serial changes to determine if it is an Acute Myocardial Infarction or myocardial injury due to an underlying chronic condition.       Results may be falsely decreased if patient taking Biotin.      BNP [[id number]]  (Normal) Collected:  06/08/20 1221    Specimen:  Blood Updated:  06/08/20 1252     proBNP 65.4 pg/mL     Narrative:       Among patients with dyspnea, NT-proBNP is highly sensitive for the detection of acute congestive heart failure. In addition NT-proBNP of <300 pg/ml effectively rules out acute congestive heart failure with 99% negative predictive value.    Results may be falsely decreased if patient taking Biotin.      CBC & Differential [[id number]] Collected:  06/08/20 1221    Specimen:  Blood Updated:  06/08/20 1243    Narrative:       The following orders were created for panel order CBC & Differential.  Procedure                                Abnormality         Status                     ---------                               -----------         ------                     CBC Auto Differential[[id number]]        Abnormal            Final result                 Please view results for these tests on the individual orders.    CBC Auto Differential [[id number]]  (Abnormal) Collected:  06/08/20 1221    Specimen:  Blood Updated:  06/08/20 1243     WBC 7.89 10*3/mm3      RBC 4.33 10*6/mm3      Hemoglobin 12.5 g/dL      Hematocrit 38.7 %      MCV 89.4 fL      MCH 28.9 pg      MCHC 32.3 g/dL      RDW 17.2 %      RDW-SD 56.2 fl      MPV 10.4 fL      Platelets 191 10*3/mm3      Neutrophil % 67.0 %      Lymphocyte % 21.4 %      Monocyte % 5.8 %      Eosinophil % 3.4 %      Basophil % 1.6 %      Immature Grans % 0.8 %      Neutrophils, Absolute 5.28 10*3/mm3      Lymphocytes, Absolute 1.69 10*3/mm3      Monocytes, Absolute 0.46 10*3/mm3      Eosinophils, Absolute 0.27 10*3/mm3      Basophils, Absolute 0.13 10*3/mm3      Immature Grans, Absolute 0.06 10*3/mm3      nRBC 0.0 /100 WBC           Radiology:  Imaging Results (Last 72 Hours)     Procedure Component Value Units Date/Time    CT Head Without Contrast [[id number]] Collected:  06/08/20 1352     Updated:  06/08/20 1456    Narrative:       EXAMINATION: CT HEAD WO CONTRAST- 06/08/2020     INDICATION: TRAUMA; S72.001A-Fracture of unspecified part of neck of  right femur, initial encounter for closed fracture; W19.XXXA-Unspecified  fall, initial encounter; Z79.01-Long term (current) use of  anticoagulants      TECHNIQUE: CT head without intravenous contrast     The radiation dose reduction device was turned on for each scan per the  ALARA (As Low as Reasonably Achievable) protocol.     COMPARISON: NONE     FINDINGS: Midline structures are symmetric without evidence of mass,  mass effect or midline shift demonstrating prominence of the sulci  towards the vertex of generalized atrophy and/or age-related volume  loss  without intra-axial hemorrhage or extra-axial fluid collection. Globes  and orbits unremarkable. Visualized paranasal sinuses and mastoid air  cells are grossly clear and well pneumatized with mild mucosal edema of  the ethmoid air cells. Calvarium intact.       Impression:       No acute intracranial abnormality specifically no acute  intra-axial hemorrhage or extra-axial fluid collection.     D:  06/08/2020  E:  06/08/2020       XR Hip With or Without Pelvis 2 - 3 View Right [[id number]] Collected:  06/08/20 1216     Updated:  06/08/20 1234    Narrative:       EXAMINATION: XR HIP W OR WO PELVIS 2-3 VIEW RIGHT-      INDICATION: Trauma.      COMPARISON: None.     FINDINGS: Single AP view of the pelvis along with AP and crosstable  lateral views right hip reveal acute minimally comminuted and mildly  lateral angulated subtrochanteric fracture right femur with right  femoral head remaining well located at the right hip.       Impression:       Acute minimally comminuted and mildly displaced with lateral  angulation subtrochanteric fracture right proximal femur. Right femoral  head remains well located at the right hip joint.     D:  06/08/2020  E:  06/08/2020       XR Chest 1 View [[id number]] Collected:  06/08/20 1212     Updated:  06/08/20 1232    Narrative:       EXAMINATION: XR CHEST 1 VW-      INDICATION: Fall.      COMPARISON: None.     FINDINGS: Cardiac size within normal limits. Pulmonary vascularity  within normal limits. No focal opacification or consolidation. No  pneumothorax or pleural effusion. Degenerative changes of the spine.           Impression:       No acute traumatic findings of the chest.     D:  06/08/2020  E:  06/08/2020             Results Review:   I reviewed the patient's new clinical results.  I reviewed the patient's new imaging results and agree with the interpretation.  I reviewed the patient's other test results and agree with the interpretation  I personally viewed and interpreted  the patient's EKG/Telemetry data  Discussed with Patient and wife.    Medication Review:  done      Closed fracture of right hip (CMS/HCC)    Benign essential hypertension    DVT (deep venous thrombosis) (CMS/[facility])    Hypothyroidism    Type 2 diabetes mellitus without complication (CMS/[facility])      Assessment/Plan   This is an 89-year-old gentleman limited household ambulator who fell and sustained injury to his right hip.  Has a right subtrochanteric hip fracture. Plan for long trochanteric femoral nail tomorrow.            Mejia Meza MD - 06/08/20, 16:49

## 2020-06-08 NOTE — ED PROVIDER NOTES
Subjective   History of Present Illness    Review of Systems       Past Medical History:   Diagnosis Date   • Dementia (CMS/HCC)    • Diabetes (CMS/HCC)    • H/O blood clots    • Osteoarthritis        No Known Allergies    Past Surgical History:   Procedure Laterality Date   • BUNIONECTOMY     • RECTAL FISTULECTOMY     • SKIN CANCER EXCISION         Family History   Problem Relation Age of Onset   • Stroke Mother    • Heart attack Father        Social History     Socioeconomic History   • Marital status:      Spouse name: Not on file   • Number of children: Not on file   • Years of education: Not on file   • Highest education level: Not on file   Tobacco Use   • Smoking status: Former Smoker     Packs/day: 2.00   • Smokeless tobacco: Never Used   Substance and Sexual Activity   • Alcohol use: Yes     Alcohol/week: 1.0 standard drinks     Types: 1 Glasses of wine per week     Frequency: Never   • Drug use: No   • Sexual activity: Defer         Objective   Physical Exam    Procedures         ED Course  ED Course as of Jun 08 1330 Mon Jun 08, 2020   1235 I spoke with Dr. Deng, the patient's orthopedist.  He is unable to get to the patient for operative fixation until tomorrow afternoon.  He suggests I call on call orthopedist.  I have called Dr. Bansal and am awaiting reply.    I spoke with the anesthesiologist on call for hip blocks.  He will come to the emergency department to perform the procedure.    [MS]   1242 I spoke with the hospitalist who will admit.    [MS]   1328 Dr. Jauregui performed a right hip block with Jacquelyn Olmstead nurse anesthetist.     [SL]      ED Course User Index  [MS] Jose E Jauregui MD  [SL] Tima Phelps           Recent Results (from the past 24 hour(s))   Comprehensive Metabolic Panel    Collection Time: 06/08/20 12:21 PM   Result Value Ref Range    Glucose 140 (H) 65 - 99 mg/dL    BUN 11 8 - 23 mg/dL    Creatinine 0.91 0.76 - 1.27 mg/dL    Sodium 140 136 - 145 mmol/L     Potassium 4.4 3.5 - 5.2 mmol/L    Chloride 104 98 - 107 mmol/L    CO2 24.0 22.0 - 29.0 mmol/L    Calcium 8.5 (L) 8.6 - 10.5 mg/dL    Total Protein 6.0 6.0 - 8.5 g/dL    Albumin 3.50 3.50 - 5.20 g/dL    ALT (SGPT) 14 1 - 41 U/L    AST (SGOT) 25 1 - 40 U/L    Alkaline Phosphatase 69 39 - 117 U/L    Total Bilirubin 0.5 0.2 - 1.2 mg/dL    eGFR Non African Amer 78 >60 mL/min/1.73    Globulin 2.5 gm/dL    A/G Ratio 1.4 g/dL    BUN/Creatinine Ratio 12.1 7.0 - 25.0    Anion Gap 12.0 5.0 - 15.0 mmol/L   Protime-INR    Collection Time: 06/08/20 12:21 PM   Result Value Ref Range    Protime 21.7 (H) 11.5 - 14.0 Seconds    INR 1.93 (H) 0.85 - 1.16   aPTT    Collection Time: 06/08/20 12:21 PM   Result Value Ref Range    PTT 38.6 (H) 24.0 - 37.0 seconds   Troponin    Collection Time: 06/08/20 12:21 PM   Result Value Ref Range    Troponin T 0.013 0.000 - 0.030 ng/mL   BNP    Collection Time: 06/08/20 12:21 PM   Result Value Ref Range    proBNP 65.4 5.0-1,800.0 pg/mL   CBC Auto Differential    Collection Time: 06/08/20 12:21 PM   Result Value Ref Range    WBC 7.89 3.40 - 10.80 10*3/mm3    RBC 4.33 4.14 - 5.80 10*6/mm3    Hemoglobin 12.5 (L) 13.0 - 17.7 g/dL    Hematocrit 38.7 37.5 - 51.0 %    MCV 89.4 79.0 - 97.0 fL    MCH 28.9 26.6 - 33.0 pg    MCHC 32.3 31.5 - 35.7 g/dL    RDW 17.2 (H) 12.3 - 15.4 %    RDW-SD 56.2 (H) 37.0 - 54.0 fl    MPV 10.4 6.0 - 12.0 fL    Platelets 191 140 - 450 10*3/mm3    Neutrophil % 67.0 42.7 - 76.0 %    Lymphocyte % 21.4 19.6 - 45.3 %    Monocyte % 5.8 5.0 - 12.0 %    Eosinophil % 3.4 0.3 - 6.2 %    Basophil % 1.6 (H) 0.0 - 1.5 %    Immature Grans % 0.8 (H) 0.0 - 0.5 %    Neutrophils, Absolute 5.28 1.70 - 7.00 10*3/mm3    Lymphocytes, Absolute 1.69 0.70 - 3.10 10*3/mm3    Monocytes, Absolute 0.46 0.10 - 0.90 10*3/mm3    Eosinophils, Absolute 0.27 0.00 - 0.40 10*3/mm3    Basophils, Absolute 0.13 0.00 - 0.20 10*3/mm3    Immature Grans, Absolute 0.06 (H) 0.00 - 0.05 10*3/mm3    nRBC 0.0 0.0 - 0.2 /100 WBC  "    Note: In addition to lab results from this visit, the labs listed above may include labs taken at another facility or during a different encounter within the last 24 hours. Please correlate lab times with ED admission and discharge times for further clarification of the services performed during this visit.    XR Hip With or Without Pelvis 2 - 3 View Right   Preliminary Result   Acute minimally comminuted and mildly displaced with lateral   angulation subtrochanteric fracture right proximal femur. Right femoral   head remains well located at the right hip joint.       D:  06/08/2020   E:  06/08/2020          XR Chest 1 View   Preliminary Result   No acute traumatic findings of the chest.       D:  06/08/2020   E:  06/08/2020          CT Head Without Contrast    (Results Pending)     Vitals:    06/08/20 1136 06/08/20 1138   BP:  150/96   BP Location:  Left arm   Patient Position:  Lying   Pulse:  78   Resp:  16   Temp:  98.4 °F (36.9 °C)   SpO2:  98%   Weight: 102 kg (225 lb)    Height: 170.2 cm (67\")      Medications   HYDROmorphone (DILAUDID) injection 0.25 mg (0.25 mg Intravenous Given 6/8/20 1205)   ondansetron (ZOFRAN) injection 4 mg (4 mg Intravenous Given 6/8/20 1205)     ECG/EMG Results (last 24 hours)     Procedure Component Value Units Date/Time    ECG 12 Lead [[id number]] Collected:  06/08/20 1229     Updated:  06/08/20 1231        ECG 12 Lead                                                 MDM    Final diagnoses:   Closed fracture of right hip, initial encounter (CMS/[facility])   Fall, initial encounter   Anticoagulated       Documentation assistance provided by lina Phelps.  Information recorded by the lina was done at my direction and has been verified and validated by me.     Tima Phelps  06/08/20 1329       Tima Phelps  06/08/20 1332       Jose E Jauregui MD  06/10/20 1350    "

## 2020-06-08 NOTE — ED PROVIDER NOTES
EMERGENCY DEPARTMENT ENCOUNTER    Room Number:  07/07  Date of encounter:  6/8/2020  PCP: Abdirashid Mccabe MD  Historian: Patient, wife      HPI:  Chief Complaint: Right hip pain    A complete HPI/ROS/PMH/PSH/SH/FH are unobtainable due to: N/A    Context: Marcos Acuña is a 89 y.o. male who presents to the ED c/o right hip pain status post fall.  The patient reports that he was getting a piece of paper or putting a piece of paper down when he stumbled and fell to the ground.  He complains of moderate, achy, right hip pain.  Denies headache, back and neck pain, chest and abdominal pain.  The patient was unable to ambulate and 911 was called.  Paramedics transported the patient via scoop stretcher.  He has not received any medications for pain relief prior to arrival in the emergency department.  The patient does take Xarelto and is uncertain whether he struck his head but denies head pain.        PAST MEDICAL HISTORY  Active Ambulatory Problems     Diagnosis Date Noted   • No Active Ambulatory Problems     Resolved Ambulatory Problems     Diagnosis Date Noted   • No Resolved Ambulatory Problems     Past Medical History:   Diagnosis Date   • Dementia (CMS/HCC)    • Diabetes (CMS/HCC)    • H/O blood clots    • Osteoarthritis          PAST SURGICAL HISTORY  Past Surgical History:   Procedure Laterality Date   • BUNIONECTOMY     • RECTAL FISTULECTOMY     • SKIN CANCER EXCISION           FAMILY HISTORY  Family History   Problem Relation Age of Onset   • Stroke Mother    • Heart attack Father          SOCIAL HISTORY  Social History     Socioeconomic History   • Marital status:      Spouse name: Not on file   • Number of children: Not on file   • Years of education: Not on file   • Highest education level: Not on file   Tobacco Use   • Smoking status: Former Smoker     Packs/day: 2.00   • Smokeless tobacco: Never Used   Substance and Sexual Activity   • Alcohol use: Yes     Alcohol/week: 1.0 standard drinks      Types: 1 Glasses of wine per week     Frequency: Never   • Drug use: No   • Sexual activity: Defer         ALLERGIES  Patient has no known allergies.        REVIEW OF SYSTEMS  Review of Systems     All systems reviewed and negative except for those discussed in HPI.       PHYSICAL EXAM    I have reviewed the triage vital signs and nursing notes.    ED Triage Vitals [06/08/20 1138]   Temp Heart Rate Resp BP SpO2   98.4 °F (36.9 °C) 78 16 150/96 98 %      Temp src Heart Rate Source Patient Position BP Location FiO2 (%)   -- Monitor Lying Left arm --       Physical Exam  GENERAL:  Well developed.  Appears in mild to moderate pain distress.  HENT: Nares patent.  No evidence of craniofacial trauma.  EYES: No scleral icterus  CV: Regular rhythm, regular rate.  2+ right dorsalis pedis pulse.  RESPIRATORY: Normal effort.  No audible wheezes, rales or rhonchi  ABDOMEN: Soft, nontender  MUSCULOSKELETAL: Right lower extremity is shortened by a roughly 4 cm and externally rotated.  Pelvis is stable.  A thorough head to toe exam was performed and no other evidence of trauma is found to the musculoskeletal system.  NEURO: Alert, moves all extremities, follows commands.  Fine touch and motor is intact in the right lower extremity.  Patient is unable to raise right lower extremity secondary to pain.  SKIN: Warm, dry, no rash visualized        LAB RESULTS  Recent Results (from the past 24 hour(s))   Comprehensive Metabolic Panel    Collection Time: 06/08/20 12:21 PM   Result Value Ref Range    Glucose 140 (H) 65 - 99 mg/dL    BUN 11 8 - 23 mg/dL    Creatinine 0.91 0.76 - 1.27 mg/dL    Sodium 140 136 - 145 mmol/L    Potassium 4.4 3.5 - 5.2 mmol/L    Chloride 104 98 - 107 mmol/L    CO2 24.0 22.0 - 29.0 mmol/L    Calcium 8.5 (L) 8.6 - 10.5 mg/dL    Total Protein 6.0 6.0 - 8.5 g/dL    Albumin 3.50 3.50 - 5.20 g/dL    ALT (SGPT) 14 1 - 41 U/L    AST (SGOT) 25 1 - 40 U/L    Alkaline Phosphatase 69 39 - 117 U/L    Total Bilirubin 0.5 0.2 -  1.2 mg/dL    eGFR Non African Amer 78 >60 mL/min/1.73    Globulin 2.5 gm/dL    A/G Ratio 1.4 g/dL    BUN/Creatinine Ratio 12.1 7.0 - 25.0    Anion Gap 12.0 5.0 - 15.0 mmol/L   Protime-INR    Collection Time: 06/08/20 12:21 PM   Result Value Ref Range    Protime 21.7 (H) 11.5 - 14.0 Seconds    INR 1.93 (H) 0.85 - 1.16   aPTT    Collection Time: 06/08/20 12:21 PM   Result Value Ref Range    PTT 38.6 (H) 24.0 - 37.0 seconds   Troponin    Collection Time: 06/08/20 12:21 PM   Result Value Ref Range    Troponin T 0.013 0.000 - 0.030 ng/mL   BNP    Collection Time: 06/08/20 12:21 PM   Result Value Ref Range    proBNP 65.4 5.0-1,800.0 pg/mL   Type & Screen    Collection Time: 06/08/20 12:21 PM   Result Value Ref Range    ABO Type O     RH type Positive     Antibody Screen Negative     T&S Expiration Date 6/11/2020 11:59:59 PM    CBC Auto Differential    Collection Time: 06/08/20 12:21 PM   Result Value Ref Range    WBC 7.89 3.40 - 10.80 10*3/mm3    RBC 4.33 4.14 - 5.80 10*6/mm3    Hemoglobin 12.5 (L) 13.0 - 17.7 g/dL    Hematocrit 38.7 37.5 - 51.0 %    MCV 89.4 79.0 - 97.0 fL    MCH 28.9 26.6 - 33.0 pg    MCHC 32.3 31.5 - 35.7 g/dL    RDW 17.2 (H) 12.3 - 15.4 %    RDW-SD 56.2 (H) 37.0 - 54.0 fl    MPV 10.4 6.0 - 12.0 fL    Platelets 191 140 - 450 10*3/mm3    Neutrophil % 67.0 42.7 - 76.0 %    Lymphocyte % 21.4 19.6 - 45.3 %    Monocyte % 5.8 5.0 - 12.0 %    Eosinophil % 3.4 0.3 - 6.2 %    Basophil % 1.6 (H) 0.0 - 1.5 %    Immature Grans % 0.8 (H) 0.0 - 0.5 %    Neutrophils, Absolute 5.28 1.70 - 7.00 10*3/mm3    Lymphocytes, Absolute 1.69 0.70 - 3.10 10*3/mm3    Monocytes, Absolute 0.46 0.10 - 0.90 10*3/mm3    Eosinophils, Absolute 0.27 0.00 - 0.40 10*3/mm3    Basophils, Absolute 0.13 0.00 - 0.20 10*3/mm3    Immature Grans, Absolute 0.06 (H) 0.00 - 0.05 10*3/mm3    nRBC 0.0 0.0 - 0.2 /100 WBC       Ordered the above labs and independently reviewed the results.        RADIOLOGY  Xr Chest 1 View    Result Date:  6/8/2020  EXAMINATION: XR CHEST 1 VW-  INDICATION: Fall.  COMPARISON: None.  FINDINGS: Cardiac size within normal limits. Pulmonary vascularity within normal limits. No focal opacification or consolidation. No pneumothorax or pleural effusion. Degenerative changes of the spine.         No acute traumatic findings of the chest.  D:  06/08/2020 E:  06/08/2020      Xr Hip With Or Without Pelvis 2 - 3 View Right    Result Date: 6/8/2020  EXAMINATION: XR HIP W OR WO PELVIS 2-3 VIEW RIGHT-  INDICATION: Trauma.  COMPARISON: None.  FINDINGS: Single AP view of the pelvis along with AP and crosstable lateral views right hip reveal acute minimally comminuted and mildly lateral angulated subtrochanteric fracture right femur with right femoral head remaining well located at the right hip.      Acute minimally comminuted and mildly displaced with lateral angulation subtrochanteric fracture right proximal femur. Right femoral head remains well located at the right hip joint.  D:  06/08/2020 E:  06/08/2020        I ordered and reviewed the above noted radiographic studies.    I viewed images of right hip which showed sub-trochanteric hip fracture per my independent interpretation.  See radiologist's dictation for official interpretation.        PROCEDURES    Nerve Block  Date/Time: 6/8/2020 1:42 PM  Performed by: Jose E Jauregui MD  Authorized by: Jose E Jauregui MD     Consent:     Consent obtained:  Verbal    Consent given by:  Patient    Risks discussed:  Allergic reaction, infection, nerve damage, swelling, bleeding, intravenous injection, pain and unsuccessful block    Alternatives discussed:  No treatment and delayed treatment  Indications:     Indications:  Pain relief  Location:     Body area:  Lower extremity    Lower extremity nerve:  Femoral  Pre-procedure details:     Skin preparation:  2% chlorhexidine    Preparation: Patient was prepped and draped in usual sterile fashion    Skin anesthesia (see MAR for exact  dosages):     Skin anesthesia method:  Local infiltration    Local anesthetic:  Lidocaine 1% w/o epi  Procedure details (see MAR for exact dosages):     Block needle gauge:  20 G    Guidance: ultrasound      Block anesthetic: Ropivacaine, 0.25%    Steroid injected:  None    Additive injected:  None    Injection procedure:  Anatomic landmarks identified, incremental injection and negative aspiration for blood    Paresthesia:  None  Post-procedure details:     Dressing:  None    Outcome:  Pain relieved    Patient tolerance of procedure:  Tolerated well, no immediate complications  Comments:      The BBRaun 360 degree echogenic needle was introduced in plane, in a lateral to medial direction at the level of the inguinal crease.  Under ultrasound guidance, the femoral artery and vein where located.  The needle was then directed below Fascia Iliacus towards the Femoral nerve.  NS was utilized to hydro dissect and vivian needle advancement towards the target structure.   LA was injected incrementally in 3-5 ml aliquots with negative aspirate.  LA spread was visualized around the nerve, negative intraneural injection, low injection pressures.  Thank you.            MEDICATIONS GIVEN IN ER    Medications   sodium chloride 0.9 % flush 10 mL (has no administration in time range)   sodium chloride 0.9 % flush 10 mL (has no administration in time range)   bisacodyl (DULCOLAX) EC tablet 5 mg (has no administration in time range)   bisacodyl (DULCOLAX) suppository 10 mg (has no administration in time range)   dextrose (GLUTOSE) oral gel 15 g (has no administration in time range)   dextrose (D50W) 25 g/ 50mL Intravenous Solution 25 g (has no administration in time range)   glucagon (human recombinant) (GLUCAGEN DIAGNOSTIC) injection 1 mg (has no administration in time range)   insulin lispro (humaLOG) injection 0-7 Units (has no administration in time range)   calcium carbonate (TUMS) chewable tablet 500 mg (200 mg elemental) (has  no administration in time range)   losartan (COZAAR) tablet 50 mg (has no administration in time range)   tamsulosin (FLOMAX) 24 hr capsule 0.4 mg (has no administration in time range)   rivastigmine (EXELON) 9.5 MG/24HR patch 1 patch (has no administration in time range)   levothyroxine (SYNTHROID, LEVOTHROID) tablet 75 mcg (has no administration in time range)   oxybutynin XL (DITROPAN-XL) 24 hr tablet 10 mg (has no administration in time range)   HYDROmorphone (DILAUDID) injection 0.25 mg (0.25 mg Intravenous Given 6/8/20 1205)   ondansetron (ZOFRAN) injection 4 mg (4 mg Intravenous Given 6/8/20 1205)         PROGRESS, DATA ANALYSIS, CONSULTS, AND MEDICAL DECISION MAKING    All labs have been independently reviewed by me.  All radiology studies have been reviewed by me and the radiologist dictating the report.   EKG's have been independently viewed and interpreted by me.      Differential diagnoses: Mechanical fall with right hip fracture.      ED Course as of Jun 08 1344   Mon Jun 08, 2020   1235 I spoke with Dr. Deng, the patient's orthopedist.  He is unable to get to the patient for operative fixation until tomorrow afternoon.  He suggests I call on call orthopedist.  I have called Dr. Bansal and am awaiting reply.    I spoke with the anesthesiologist on call for hip blocks.  He will come to the emergency department to perform the procedure.    [MS]   1242 I spoke with the hospitalist who will admit.    [MS]   1328 Dr. Jauregui performed a right hip block with Jacquelyn Olmstead, nurse anesthetist.     [SL]      ED Course User Index  [MS] Jose E Jauregui MD  [SL] Tima Phelps             AS OF 13:44 VITALS:    BP - 150/96  HR - 78  TEMP - 98.4 °F (36.9 °C)  O2 SATS - 98%        DIAGNOSIS  Final diagnoses:   Closed fracture of right hip, initial encounter (CMS/[facility])   Fall, initial encounter   Anticoagulated         DISPOSITION  Admission for operative repair.           Jose E Jauregui MD  06/08/20 1245        Jose E Jauregui MD  06/08/20 [id number]

## 2020-06-08 NOTE — H&P
[facility]  HISTORY AND PHYSICAL    Patient Name: Marcos Acuña  : 1931  MRN: [id number]  Primary Care Physician: Abdirashid Mccabe MD  Date of admission: 2020      Subjective   Subjective     Chief Complaint:  Fell at home    HPI:  Marcos Acuña is a 89 y.o.  With a past medical history of lower extremity DVT, and non-insulin-dependent diabetes who presented to the ED after a mechanical fall with subsequent right hip fracture.  Patient states that earlier this afternoon he was in his apartment and had some forms on his table, but accidentally grabbed to, he went to return 1 and when he turned around he fell onto his right hip and arm, did not hit his head, denies any loss of consciousness, denies any chest pain, palpitations, lightheadedness, or dizziness prior to fall.    Review of Systems   Constitutional: Negative for chills and fever.   HENT: Negative for sneezing, sore throat and trouble swallowing.    Eyes: Negative for photophobia and visual disturbance.   Respiratory: Negative for choking, shortness of breath and wheezing.    Cardiovascular: Negative for chest pain, palpitations and leg swelling.   Gastrointestinal: Negative for abdominal pain, constipation, diarrhea, nausea and vomiting.   Genitourinary: Negative for difficulty urinating and dysuria.   Musculoskeletal: Negative for arthralgias and joint swelling.   Skin: Negative for rash and wound.   Allergic/Immunologic: Negative for immunocompromised state.   Neurological: Negative for dizziness and headaches.   Psychiatric/Behavioral: Negative for agitation and confusion.     All other systems reviewed and are negative.     Personal History     Past Medical History:   Diagnosis Date   • Dementia (CMS/HCC)    • Diabetes (CMS/HCC)    • H/O blood clots    • Osteoarthritis        Past Surgical History:   Procedure Laterality Date   • BUNIONECTOMY     • RECTAL FISTULECTOMY     • SKIN CANCER EXCISION          Family History: family history includes Heart attack in his father; Stroke in his mother. Otherwise pertinent FHx was reviewed and unremarkable.     Social History:  reports that he has quit smoking. He smoked 2.00 packs per day. He has never used smokeless tobacco. He reports that he drinks about 1.0 standard drinks of alcohol per week. He reports that he does not use drugs.  Social History     Social History Narrative   • Not on file       Medications:  Available home medication information reviewed.    (Not in a hospital admission)    No Known Allergies    Objective   Objective     Vital Signs:   Temp:  [98.4 °F (36.9 °C)] 98.4 °F (36.9 °C)  Heart Rate:  [78] 78  Resp:  [16] 16  BP: (150)/(96) 150/96        Physical Exam   Constitutional: Awake, alert  Eyes: PERRLA, sclerae anicteric, no conjunctival injection  HENT: NCAT, mucous membranes moist  Neck: Supple, no thyromegaly, no lymphadenopathy, trachea midline  Respiratory: Clear to auscultation bilaterally, nonlabored respirations   Cardiovascular: RRR, no murmurs, no lower extremity edema, palpable pedal pulses bilaterally  Gastrointestinal: Positive bowel sounds, soft, nontender, nondistended  Musculoskeletal: no clubbing or cyanosis to extremities  Psychiatric: Appropriate affect, cooperative  Neurologic: Oriented x 3,  Cranial Nerves grossly intact to confrontation, speech clear, sensation to light touch intact in bilateral lower extremities   Skin: No rashes    Results Reviewed:  I have personally reviewed current lab and radiology data.    Results from last 7 days   Lab Units 06/08/20  1221   WBC 10*3/mm3 7.89   HEMOGLOBIN g/dL 12.5*   HEMATOCRIT % 38.7   PLATELETS 10*3/mm3 191   INR  1.93*     Results from last 7 days   Lab Units 06/08/20  1221   SODIUM mmol/L 140   POTASSIUM mmol/L 4.4   CHLORIDE mmol/L 104   CO2 mmol/L 24.0   BUN mg/dL 11   CREATININE mg/dL 0.91   GLUCOSE mg/dL 140*   CALCIUM mg/dL 8.5*   ALT (SGPT) U/L 14   AST (SGOT) U/L 25    TROPONIN T ng/mL 0.013   PROBNP pg/mL 65.4     Estimated Creatinine Clearance: 62.7 mL/min (by C-G formula based on SCr of 0.91 mg/dL).  Brief Urine Lab Results     None        Imaging Results (Last 24 Hours)     Procedure Component Value Units Date/Time    CT Head Without Contrast [[id number]] Resulted:  06/08/20 1328     Updated:  06/08/20 1328    XR Hip With or Without Pelvis 2 - 3 View Right [[id number]] Collected:  06/08/20 1216     Updated:  06/08/20 1234    Narrative:       EXAMINATION: XR HIP W OR WO PELVIS 2-3 VIEW RIGHT-      INDICATION: Trauma.      COMPARISON: None.     FINDINGS: Single AP view of the pelvis along with AP and crosstable  lateral views right hip reveal acute minimally comminuted and mildly  lateral angulated subtrochanteric fracture right femur with right  femoral head remaining well located at the right hip.       Impression:       Acute minimally comminuted and mildly displaced with lateral  angulation subtrochanteric fracture right proximal femur. Right femoral  head remains well located at the right hip joint.     D:  06/08/2020  E:  06/08/2020       XR Chest 1 View [[id number]] Collected:  06/08/20 1212     Updated:  06/08/20 1232    Narrative:       EXAMINATION: XR CHEST 1 VW-      INDICATION: Fall.      COMPARISON: None.     FINDINGS: Cardiac size within normal limits. Pulmonary vascularity  within normal limits. No focal opacification or consolidation. No  pneumothorax or pleural effusion. Degenerative changes of the spine.           Impression:       No acute traumatic findings of the chest.     D:  06/08/2020  E:  06/08/2020                Assessment/Plan   Assessment & Plan     Active Hospital Problems    Diagnosis POA   • Closed fracture of right hip (CMS/[facility]) [S72.001A] Yes       Mr. Marcos Acuña is an 89 yo gentleman With a past medical history of lower extremity DVT, and non-insulin-dependent diabetes who presented to the ED after a mechanical fall with subsequent right hip  fracture.    Mechanical fall  Right hip fracture  -XR of right hip showed acute minimally comminuted and mildly displaced with lateral angulation subtrochanteric fracture of the right proximal femur  -CT head was without acute intracranial abnormality  -Plan for Ortho consult and possible surgical intervention tomorrow-got nerve block in the ED  -Will need pre-procedure COVID screen    History of recurrent lower extremity DVT  -Xarelto on hold for surgery    Hypertension-continue losartan    Hypothyroidism-continue levothyroxine, check TSH and T4 and adjust as needed    BPH  -Continue tamsulosin and oxybutynin    T2DM- hold metformin, ssi while inpatient       COVID-19 Status Testing      Asymptomatic COVID testing has been performed on Marcos Acuña to determine status per current protocols in preparation for planned procedure and/or discharge disposition.     The patient does not have concerning clinical findings or high risk screen for COVID and this diagnosis is not clinically suspected.  However, due to prevalence of asymptomatic COVID infection testing was warranted to facilitate appropriate care.        COVID result:  No results found for: COVID19           DVT prophylaxis: Holding Xarelto for surgery      Admission Communication  Due to current limited visitation policies, an attempt will be made to update patient's identified best point-of-contact(s) at admission to discuss plan of care.   Contact: Sloane   Relation: Wife   Time of communication: At bedside   Notes (if applicable):          CODE STATUS:   Code Status and Medical Interventions:   Ordered at: 06/08/20 1329     Code Status:    CPR     Medical Interventions (Level of Support Prior to Arrest):    Full       Admission Status:  I believe this patient meets INPATIENT status due to right hip fracture in an elderly gentleman warranting orthopedic surgery intervention.  I feel patient’s risk for adverse outcomes and need for care warrant INPATIENT  evaluation and I predict the patient’s care encounter to likely last beyond 2 midnights.      Electronically signed by Ashley Guajardo MD, 06/08/20, 1:32 PM.

## 2020-06-08 NOTE — PROGRESS NOTES
Discharge Planning Assessment  [facility]     Patient Name: Marcos Acuña  MRN: [id number]  Today's Date: 6/8/2020    Admit Date: 6/8/2020    Discharge Needs Assessment     Row Name 06/08/20 1404       Living Environment    Lives With  spouse    Current Living Arrangements  home/apartment/condo    Primary Care Provided by  self    Provides Primary Care For  no one    Family Caregiver if Needed  spouse    Quality of Family Relationships  helpful;involved;supportive    Able to Return to Prior Arrangements  yes    Living Arrangement Comments  prefers [facility] if goes to rehab       Resource/Environmental Concerns    Resource/Environmental Concerns  none    Transportation Concerns  car, none       Transition Planning    Patient/Family Anticipates Transition to  home;inpatient rehabilitation facility    Patient/Family Anticipated Services at Transition  none    Transportation Anticipated  -- TBD       Discharge Needs Assessment    Readmission Within the Last 30 Days  no previous admission in last 30 days    Concerns to be Addressed  discharge planning spouse does not currently know d/c plan    Equipment Currently Used at Home  walker, rolling    Anticipated Changes Related to Illness  none    Outpatient/Agency/Support Group Needs  inpatient rehabilitation facility    Provided Post Acute Provider List?  Refused    Refused Provider List Comment  lives at [facility], therefore would like to go to [facility]        Discharge Plan     Row Name 06/08/20 1408       Plan    Plan  initial    Plan Comments  Pt and spouse reside at [facility]. He uses a walker for ambulation. Spouse manages all meds. States she does not know definitive plan for d/c, requests [facility] if rehab needed PCP is John Mccabe    Final Discharge Disposition Code  30 - still a patient        Destination      Coordination has not been started for this encounter.      Durable Medical Equipment      Coordination has not been started for  this encounter.      Dialysis/Infusion      Coordination has not been started for this encounter.      Home Medical Care      Coordination has not been started for this encounter.      Therapy      Coordination has not been started for this encounter.      Community Resources      Coordination has not been started for this encounter.          Demographic Summary    No documentation.       Functional Status     Row Name 06/08/20 1403       Functional Status    Usual Activity Tolerance  good    Current Activity Tolerance  fair       Functional Status, IADL    Medications  assistive person    Meal Preparation  assistive person    Housekeeping  assistive person    Laundry  assistive person    Shopping  assistive person       Mental Status    General Appearance WDL  WDL       Mental Status Summary    Recent Changes in Mental Status/Cognitive Functioning  no changes       Employment/    Employment Status  retired        Psychosocial    No documentation.       Abuse/Neglect    No documentation.       Legal    No documentation.       Substance Abuse    No documentation.       Patient Forms    No documentation.           Peggy Arce RN

## 2020-06-09 ENCOUNTER — APPOINTMENT (OUTPATIENT)
Dept: GENERAL RADIOLOGY | Facility: HOSPITAL | Age: 85
End: 2020-06-09

## 2020-06-09 ENCOUNTER — ANESTHESIA (OUTPATIENT)
Dept: PERIOP | Facility: HOSPITAL | Age: 85
End: 2020-06-09

## 2020-06-09 LAB
ANION GAP SERPL CALCULATED.3IONS-SCNC: 14 MMOL/L (ref 5–15)
BACTERIA UR QL AUTO: ABNORMAL /HPF
BASOPHILS # BLD MANUAL: 0 10*3/MM3 (ref 0–0.2)
BASOPHILS NFR BLD AUTO: 0 % (ref 0–1.5)
BILIRUB UR QL STRIP: NEGATIVE
BUN BLD-MCNC: 14 MG/DL (ref 8–23)
BUN/CREAT SERPL: 16.3 (ref 7–25)
CALCIUM SPEC-SCNC: 8.7 MG/DL (ref 8.6–10.5)
CHLORIDE SERPL-SCNC: 102 MMOL/L (ref 98–107)
CLARITY UR: CLEAR
CO2 SERPL-SCNC: 24 MMOL/L (ref 22–29)
COLOR UR: YELLOW
CREAT BLD-MCNC: 0.86 MG/DL (ref 0.76–1.27)
DEPRECATED RDW RBC AUTO: 55.7 FL (ref 37–54)
EOSINOPHIL # BLD MANUAL: 0 10*3/MM3 (ref 0–0.4)
EOSINOPHIL NFR BLD MANUAL: 0 % (ref 0.3–6.2)
ERYTHROCYTE [DISTWIDTH] IN BLOOD BY AUTOMATED COUNT: 17.2 % (ref 12.3–15.4)
GFR SERPL CREATININE-BSD FRML MDRD: 84 ML/MIN/1.73
GLUCOSE BLD-MCNC: 204 MG/DL (ref 65–99)
GLUCOSE BLDC GLUCOMTR-MCNC: 133 MG/DL (ref 70–130)
GLUCOSE BLDC GLUCOMTR-MCNC: 186 MG/DL (ref 70–130)
GLUCOSE BLDC GLUCOMTR-MCNC: 189 MG/DL (ref 70–130)
GLUCOSE UR STRIP-MCNC: ABNORMAL MG/DL
HCT VFR BLD AUTO: 36 % (ref 37.5–51)
HGB BLD-MCNC: 11.4 G/DL (ref 13–17.7)
HGB UR QL STRIP.AUTO: NEGATIVE
KETONES UR QL STRIP: ABNORMAL
LEUKOCYTE ESTERASE UR QL STRIP.AUTO: ABNORMAL
LYMPHOCYTES # BLD MANUAL: 1.03 10*3/MM3 (ref 0.7–3.1)
LYMPHOCYTES NFR BLD MANUAL: 11 % (ref 19.6–45.3)
LYMPHOCYTES NFR BLD MANUAL: 3 % (ref 5–12)
MCH RBC QN AUTO: 28.1 PG (ref 26.6–33)
MCHC RBC AUTO-ENTMCNC: 31.7 G/DL (ref 31.5–35.7)
MCV RBC AUTO: 88.7 FL (ref 79–97)
MONOCYTES # BLD AUTO: 0.28 10*3/MM3 (ref 0.1–0.9)
NEUTROPHILS # BLD AUTO: 8.06 10*3/MM3 (ref 1.7–7)
NEUTROPHILS NFR BLD MANUAL: 86 % (ref 42.7–76)
NITRITE UR QL STRIP: NEGATIVE
PH UR STRIP.AUTO: <=5 [PH] (ref 5–8)
PLAT MORPH BLD: NORMAL
PLATELET # BLD AUTO: 159 10*3/MM3 (ref 140–450)
PMV BLD AUTO: 10.6 FL (ref 6–12)
POTASSIUM BLD-SCNC: 4.1 MMOL/L (ref 3.5–5.2)
PROT UR QL STRIP: NEGATIVE
RBC # BLD AUTO: 4.06 10*6/MM3 (ref 4.14–5.8)
RBC # UR: ABNORMAL /HPF
RBC MORPH BLD: NORMAL
REF LAB TEST METHOD: ABNORMAL
SODIUM BLD-SCNC: 140 MMOL/L (ref 136–145)
SP GR UR STRIP: 1.02 (ref 1–1.03)
SQUAMOUS #/AREA URNS HPF: ABNORMAL /HPF
UROBILINOGEN UR QL STRIP: ABNORMAL
WBC MORPH BLD: NORMAL
WBC NRBC COR # BLD: 9.37 10*3/MM3 (ref 3.4–10.8)
WBC UR QL AUTO: ABNORMAL /HPF

## 2020-06-09 PROCEDURE — 82962 GLUCOSE BLOOD TEST: CPT

## 2020-06-09 PROCEDURE — 25010000002 PHENYLEPHRINE PER 1 ML: Performed by: NURSE ANESTHETIST, CERTIFIED REGISTERED

## 2020-06-09 PROCEDURE — 25010000002 NEOSTIGMINE 10 MG/10ML SOLUTION: Performed by: NURSE ANESTHETIST, CERTIFIED REGISTERED

## 2020-06-09 PROCEDURE — 76000 FLUOROSCOPY <1 HR PHYS/QHP: CPT

## 2020-06-09 PROCEDURE — 25010000002 DEXAMETHASONE PER 1 MG: Performed by: NURSE ANESTHETIST, CERTIFIED REGISTERED

## 2020-06-09 PROCEDURE — 25010000003 LIDOCAINE 1 % SOLUTION: Performed by: NURSE ANESTHETIST, CERTIFIED REGISTERED

## 2020-06-09 PROCEDURE — C1769 GUIDE WIRE: HCPCS | Performed by: ORTHOPAEDIC SURGERY

## 2020-06-09 PROCEDURE — 25010000002 ONDANSETRON PER 1 MG: Performed by: NURSE ANESTHETIST, CERTIFIED REGISTERED

## 2020-06-09 PROCEDURE — 63710000001 INSULIN LISPRO (HUMAN) PER 5 UNITS: Performed by: INTERNAL MEDICINE

## 2020-06-09 PROCEDURE — 0QS604Z REPOSITION RIGHT UPPER FEMUR WITH INTERNAL FIXATION DEVICE, OPEN APPROACH: ICD-10-PCS | Performed by: ORTHOPAEDIC SURGERY

## 2020-06-09 PROCEDURE — C1713 ANCHOR/SCREW BN/BN,TIS/BN: HCPCS | Performed by: ORTHOPAEDIC SURGERY

## 2020-06-09 PROCEDURE — 81001 URINALYSIS AUTO W/SCOPE: CPT | Performed by: EMERGENCY MEDICINE

## 2020-06-09 PROCEDURE — 25010000002 DEXAMETHASONE SODIUM PHOSPHATE 10 MG/ML SOLUTION: Performed by: ANESTHESIOLOGY

## 2020-06-09 PROCEDURE — 85027 COMPLETE CBC AUTOMATED: CPT | Performed by: INTERNAL MEDICINE

## 2020-06-09 PROCEDURE — 63710000001 INSULIN LISPRO (HUMAN) PER 5 UNITS: Performed by: ORTHOPAEDIC SURGERY

## 2020-06-09 PROCEDURE — 99232 SBSQ HOSP IP/OBS MODERATE 35: CPT | Performed by: INTERNAL MEDICINE

## 2020-06-09 PROCEDURE — 80048 BASIC METABOLIC PNL TOTAL CA: CPT | Performed by: INTERNAL MEDICINE

## 2020-06-09 PROCEDURE — 25010000002 PROPOFOL 10 MG/ML EMULSION: Performed by: NURSE ANESTHETIST, CERTIFIED REGISTERED

## 2020-06-09 PROCEDURE — 25010000003 CEFAZOLIN IN DEXTROSE 2-4 GM/100ML-% SOLUTION: Performed by: ORTHOPAEDIC SURGERY

## 2020-06-09 PROCEDURE — 85007 BL SMEAR W/DIFF WBC COUNT: CPT | Performed by: INTERNAL MEDICINE

## 2020-06-09 DEVICE — IMPLANTABLE DEVICE: Type: IMPLANTABLE DEVICE | Site: HIP | Status: FUNCTIONAL

## 2020-06-09 DEVICE — SCRW TFNA PERF TI 105MM GLD STRL: Type: IMPLANTABLE DEVICE | Site: HIP | Status: FUNCTIONAL

## 2020-06-09 DEVICE — SCRW LK STRDRV TI 5X52MM STRL: Type: IMPLANTABLE DEVICE | Site: HIP | Status: FUNCTIONAL

## 2020-06-09 RX ORDER — EPHEDRINE SULFATE 50 MG/ML
5 INJECTION, SOLUTION INTRAVENOUS ONCE AS NEEDED
Status: DISCONTINUED | OUTPATIENT
Start: 2020-06-09 | End: 2020-06-09 | Stop reason: HOSPADM

## 2020-06-09 RX ORDER — CEFAZOLIN SODIUM 2 G/100ML
2 INJECTION, SOLUTION INTRAVENOUS EVERY 8 HOURS
Status: COMPLETED | OUTPATIENT
Start: 2020-06-09 | End: 2020-06-10

## 2020-06-09 RX ORDER — MORPHINE SULFATE 4 MG/ML
4 INJECTION, SOLUTION INTRAMUSCULAR; INTRAVENOUS
Status: DISCONTINUED | OUTPATIENT
Start: 2020-06-09 | End: 2020-06-13 | Stop reason: HOSPADM

## 2020-06-09 RX ORDER — ESMOLOL HYDROCHLORIDE 10 MG/ML
INJECTION INTRAVENOUS AS NEEDED
Status: DISCONTINUED | OUTPATIENT
Start: 2020-06-09 | End: 2020-06-09 | Stop reason: SURG

## 2020-06-09 RX ORDER — FENTANYL CITRATE 50 UG/ML
50 INJECTION, SOLUTION INTRAMUSCULAR; INTRAVENOUS
Status: DISCONTINUED | OUTPATIENT
Start: 2020-06-09 | End: 2020-06-09 | Stop reason: HOSPADM

## 2020-06-09 RX ORDER — SODIUM CHLORIDE 0.9 % (FLUSH) 0.9 %
10 SYRINGE (ML) INJECTION AS NEEDED
Status: DISCONTINUED | OUTPATIENT
Start: 2020-06-09 | End: 2020-06-09 | Stop reason: HOSPADM

## 2020-06-09 RX ORDER — ACETAMINOPHEN 325 MG/1
650 TABLET ORAL EVERY 4 HOURS PRN
Status: DISCONTINUED | OUTPATIENT
Start: 2020-06-09 | End: 2020-06-13 | Stop reason: HOSPADM

## 2020-06-09 RX ORDER — LIDOCAINE HYDROCHLORIDE 10 MG/ML
INJECTION, SOLUTION INFILTRATION; PERINEURAL AS NEEDED
Status: DISCONTINUED | OUTPATIENT
Start: 2020-06-09 | End: 2020-06-09 | Stop reason: SURG

## 2020-06-09 RX ORDER — MAGNESIUM HYDROXIDE 1200 MG/15ML
LIQUID ORAL AS NEEDED
Status: DISCONTINUED | OUTPATIENT
Start: 2020-06-09 | End: 2020-06-09 | Stop reason: HOSPADM

## 2020-06-09 RX ORDER — NALOXONE HCL 0.4 MG/ML
0.4 VIAL (ML) INJECTION
Status: DISCONTINUED | OUTPATIENT
Start: 2020-06-09 | End: 2020-06-13 | Stop reason: HOSPADM

## 2020-06-09 RX ORDER — GLYCOPYRROLATE 0.2 MG/ML
INJECTION INTRAMUSCULAR; INTRAVENOUS AS NEEDED
Status: DISCONTINUED | OUTPATIENT
Start: 2020-06-09 | End: 2020-06-09 | Stop reason: SURG

## 2020-06-09 RX ORDER — SODIUM CHLORIDE 0.9 % (FLUSH) 0.9 %
10 SYRINGE (ML) INJECTION EVERY 12 HOURS SCHEDULED
Status: DISCONTINUED | OUTPATIENT
Start: 2020-06-09 | End: 2020-06-09 | Stop reason: HOSPADM

## 2020-06-09 RX ORDER — CEFAZOLIN SODIUM 2 G/100ML
2 INJECTION, SOLUTION INTRAVENOUS ONCE
Status: COMPLETED | OUTPATIENT
Start: 2020-06-09 | End: 2020-06-09

## 2020-06-09 RX ORDER — MORPHINE SULFATE 10 MG/ML
6 INJECTION INTRAMUSCULAR; INTRAVENOUS; SUBCUTANEOUS
Status: DISCONTINUED | OUTPATIENT
Start: 2020-06-09 | End: 2020-06-13 | Stop reason: HOSPADM

## 2020-06-09 RX ORDER — ONDANSETRON 2 MG/ML
4 INJECTION INTRAMUSCULAR; INTRAVENOUS ONCE AS NEEDED
Status: DISCONTINUED | OUTPATIENT
Start: 2020-06-09 | End: 2020-06-09 | Stop reason: HOSPADM

## 2020-06-09 RX ORDER — OXYCODONE HYDROCHLORIDE AND ACETAMINOPHEN 5; 325 MG/1; MG/1
1 TABLET ORAL EVERY 4 HOURS PRN
Status: DISCONTINUED | OUTPATIENT
Start: 2020-06-09 | End: 2020-06-13 | Stop reason: HOSPADM

## 2020-06-09 RX ORDER — DEXAMETHASONE SODIUM PHOSPHATE 10 MG/ML
INJECTION, SOLUTION INTRAMUSCULAR; INTRAVENOUS
Status: COMPLETED | OUTPATIENT
Start: 2020-06-09 | End: 2020-06-09

## 2020-06-09 RX ORDER — ATRACURIUM BESYLATE 10 MG/ML
INJECTION, SOLUTION INTRAVENOUS AS NEEDED
Status: DISCONTINUED | OUTPATIENT
Start: 2020-06-09 | End: 2020-06-09 | Stop reason: SURG

## 2020-06-09 RX ORDER — FAMOTIDINE 20 MG/1
20 TABLET, FILM COATED ORAL ONCE
Status: COMPLETED | OUTPATIENT
Start: 2020-06-09 | End: 2020-06-09

## 2020-06-09 RX ORDER — PROPOFOL 10 MG/ML
VIAL (ML) INTRAVENOUS AS NEEDED
Status: DISCONTINUED | OUTPATIENT
Start: 2020-06-09 | End: 2020-06-09 | Stop reason: SURG

## 2020-06-09 RX ORDER — ACETAMINOPHEN 650 MG/1
650 SUPPOSITORY RECTAL EVERY 4 HOURS PRN
Status: DISCONTINUED | OUTPATIENT
Start: 2020-06-09 | End: 2020-06-13 | Stop reason: HOSPADM

## 2020-06-09 RX ORDER — DOCUSATE SODIUM 100 MG/1
100 CAPSULE, LIQUID FILLED ORAL 2 TIMES DAILY PRN
Status: DISCONTINUED | OUTPATIENT
Start: 2020-06-09 | End: 2020-06-13 | Stop reason: HOSPADM

## 2020-06-09 RX ORDER — NEOSTIGMINE METHYLSULFATE 1 MG/ML
INJECTION, SOLUTION INTRAVENOUS AS NEEDED
Status: DISCONTINUED | OUTPATIENT
Start: 2020-06-09 | End: 2020-06-09 | Stop reason: SURG

## 2020-06-09 RX ORDER — ONDANSETRON 2 MG/ML
INJECTION INTRAMUSCULAR; INTRAVENOUS AS NEEDED
Status: DISCONTINUED | OUTPATIENT
Start: 2020-06-09 | End: 2020-06-09 | Stop reason: SURG

## 2020-06-09 RX ORDER — PROMETHAZINE HYDROCHLORIDE 12.5 MG/1
12.5 TABLET ORAL EVERY 6 HOURS PRN
Status: DISCONTINUED | OUTPATIENT
Start: 2020-06-09 | End: 2020-06-13 | Stop reason: HOSPADM

## 2020-06-09 RX ORDER — BUPIVACAINE HYDROCHLORIDE 2.5 MG/ML
INJECTION, SOLUTION EPIDURAL; INFILTRATION; INTRACAUDAL
Status: COMPLETED | OUTPATIENT
Start: 2020-06-09 | End: 2020-06-09

## 2020-06-09 RX ORDER — SODIUM CHLORIDE 9 MG/ML
50 INJECTION, SOLUTION INTRAVENOUS CONTINUOUS
Status: DISCONTINUED | OUTPATIENT
Start: 2020-06-09 | End: 2020-06-13 | Stop reason: HOSPADM

## 2020-06-09 RX ORDER — DEXAMETHASONE SODIUM PHOSPHATE 4 MG/ML
INJECTION, SOLUTION INTRA-ARTICULAR; INTRALESIONAL; INTRAMUSCULAR; INTRAVENOUS; SOFT TISSUE AS NEEDED
Status: DISCONTINUED | OUTPATIENT
Start: 2020-06-09 | End: 2020-06-09 | Stop reason: SURG

## 2020-06-09 RX ORDER — SODIUM CHLORIDE, SODIUM LACTATE, POTASSIUM CHLORIDE, CALCIUM CHLORIDE 600; 310; 30; 20 MG/100ML; MG/100ML; MG/100ML; MG/100ML
9 INJECTION, SOLUTION INTRAVENOUS CONTINUOUS
Status: DISCONTINUED | OUTPATIENT
Start: 2020-06-09 | End: 2020-06-09

## 2020-06-09 RX ADMIN — DEXAMETHASONE SODIUM PHOSPHATE 4 MG: 4 INJECTION, SOLUTION INTRAMUSCULAR; INTRAVENOUS at 13:29

## 2020-06-09 RX ADMIN — BUPIVACAINE HYDROCHLORIDE 30 ML: 2.5 INJECTION, SOLUTION EPIDURAL; INFILTRATION; INTRACAUDAL; PERINEURAL at 13:33

## 2020-06-09 RX ADMIN — PHENYLEPHRINE HYDROCHLORIDE 0.5 MCG/KG/MIN: 10 INJECTION INTRAVENOUS at 13:37

## 2020-06-09 RX ADMIN — NEOSTIGMINE 4 MG: 1 INJECTION INTRAVENOUS at 15:35

## 2020-06-09 RX ADMIN — PHENYLEPHRINE HYDROCHLORIDE 100 MCG: 10 INJECTION INTRAVENOUS at 13:29

## 2020-06-09 RX ADMIN — DEXAMETHASONE SODIUM PHOSPHATE 2 MG: 10 INJECTION INTRAMUSCULAR; INTRAVENOUS at 13:33

## 2020-06-09 RX ADMIN — SODIUM CHLORIDE, POTASSIUM CHLORIDE, SODIUM LACTATE AND CALCIUM CHLORIDE 9 ML/HR: 600; 310; 30; 20 INJECTION, SOLUTION INTRAVENOUS at 12:17

## 2020-06-09 RX ADMIN — ESMOLOL HYDROCHLORIDE 30 MG: 10 INJECTION, SOLUTION INTRAVENOUS at 13:29

## 2020-06-09 RX ADMIN — ONDANSETRON 4 MG: 2 INJECTION INTRAMUSCULAR; INTRAVENOUS at 15:35

## 2020-06-09 RX ADMIN — INSULIN LISPRO 3 UNITS: 100 INJECTION, SOLUTION INTRAVENOUS; SUBCUTANEOUS at 17:53

## 2020-06-09 RX ADMIN — CEFAZOLIN SODIUM 2 G: 2 INJECTION, SOLUTION INTRAVENOUS at 21:23

## 2020-06-09 RX ADMIN — PROPOFOL 50 MG: 10 INJECTION, EMULSION INTRAVENOUS at 14:16

## 2020-06-09 RX ADMIN — CEFAZOLIN SODIUM 2 G: 2 INJECTION, SOLUTION INTRAVENOUS at 13:25

## 2020-06-09 RX ADMIN — SODIUM CHLORIDE, PRESERVATIVE FREE 10 ML: 5 INJECTION INTRAVENOUS at 08:57

## 2020-06-09 RX ADMIN — SODIUM CHLORIDE, PRESERVATIVE FREE 10 ML: 5 INJECTION INTRAVENOUS at 12:17

## 2020-06-09 RX ADMIN — ATRACURIUM BESYLATE 10 MG: 10 INJECTION, SOLUTION INTRAVENOUS at 15:00

## 2020-06-09 RX ADMIN — TAMSULOSIN HYDROCHLORIDE 0.4 MG: 0.4 CAPSULE ORAL at 20:29

## 2020-06-09 RX ADMIN — FAMOTIDINE 20 MG: 20 TABLET, FILM COATED ORAL at 12:34

## 2020-06-09 RX ADMIN — GLYCOPYRROLATE 0.4 MG: 0.2 INJECTION INTRAMUSCULAR; INTRAVENOUS at 15:35

## 2020-06-09 RX ADMIN — INSULIN LISPRO 2 UNITS: 100 INJECTION, SOLUTION INTRAVENOUS; SUBCUTANEOUS at 08:57

## 2020-06-09 RX ADMIN — RIVASTIGMINE TRANSDERMAL SYSTEM 1 PATCH: 9.5 PATCH, EXTENDED RELEASE TRANSDERMAL at 17:52

## 2020-06-09 RX ADMIN — ATRACURIUM BESYLATE 50 MG: 10 INJECTION, SOLUTION INTRAVENOUS at 13:29

## 2020-06-09 RX ADMIN — LIDOCAINE HYDROCHLORIDE 50 MG: 10 INJECTION, SOLUTION INFILTRATION; PERINEURAL at 13:29

## 2020-06-09 RX ADMIN — SODIUM CHLORIDE 100 ML/HR: 9 INJECTION, SOLUTION INTRAVENOUS at 17:52

## 2020-06-09 RX ADMIN — PROPOFOL 120 MG: 10 INJECTION, EMULSION INTRAVENOUS at 13:29

## 2020-06-09 NOTE — ANESTHESIA POSTPROCEDURE EVALUATION
Patient: Marcos Acuña    Procedure Summary     Date:  06/09/20 Room / Location:   [facility] OR 09 / [facility] OR    Anesthesia Start:  1325 Anesthesia Stop:  1547    Procedure:  ADVANCED TROCHANTERIC FEMORAL NAIL (ATFN) RIGHT HIP/FEMUR (N/A Hip) Diagnosis:      Surgeon:  Mejia Meza MD Provider:  Osbaldo Noble MD    Anesthesia Type:  general ASA Status:  3          Anesthesia Type: general    Vitals  Vitals Value Taken Time   BP     Temp     Pulse 86 6/9/2020  3:47 PM   Resp     SpO2 91 % 6/9/2020  3:47 PM   Vitals shown include unvalidated device data.        Post Anesthesia Care and Evaluation    Patient location during evaluation: PACU  Patient participation: complete - patient participated  Level of consciousness: awake and alert  Pain score: 0  Pain management: adequate  Airway patency: patent  Anesthetic complications: No anesthetic complications  PONV Status: none  Cardiovascular status: hemodynamically stable and acceptable  Respiratory status: nonlabored ventilation, acceptable and nasal cannula  Hydration status: acceptable

## 2020-06-09 NOTE — BRIEF OP NOTE
HIP TROCHANTERIC NAILING WITH INTRAMEDULLARY HIP SCREW  Progress Note    Marcos Acuña  6/9/2020    Pre-op Diagnosis:   Right subtrochanteric hip fracture       Post-Op Diagnosis Codes:  Right subtrochanteric hip fracture right femoral neck fracture and right greater trochanter fracture    Procedure/CPT® Codes:      Procedure(s):  ADVANCED TROCHANTERIC FEMORAL NAIL (ATFN) RIGHT HIP/FEMUR    Surgeon(s):  Mejia Meza MD    Anesthesia: Choice    Staff:   Circulator: Rachael Granados RN; Helen Benites RN  Radiology Technologist: Zia Cartwright  Scrub Person: Uche Simon Olivia  Vendor Representative: Andre Kunz  Nursing Assistant: Jennifer Stock  Assistant: Mejia Ballesteros PA    Estimated Blood Loss: 300 ml    Urine Voided: * No values recorded between 6/9/2020  1:25 PM and 6/9/2020  3:42 PM *    Specimens:                None          Drains: None  [REMOVED] External Urinary Catheter (Removed)   Site Assessment Clean;Skin intact;Pink 6/9/2020  3:23 AM   Application/Removal external catheter applied;skin care provided 6/9/2020  3:23 AM   Collection Container Wall suction 6/9/2020  3:23 AM   Wall suction (mmHG) 100 mmHG 6/9/2020  3:23 AM   Output (mL) 100 mL 6/9/2020  3:23 AM       Findings: Found to have also a femoral neck fracture minimally displaced and greater trochanter fracture with some displacement    Complications: none      Mejia Meza MD     Date: 6/9/2020  Time: 15:49

## 2020-06-09 NOTE — ANESTHESIA PREPROCEDURE EVALUATION
Anesthesia Evaluation     Patient summary reviewed and Nursing notes reviewed   NPO Solid Status: > 8 hours  NPO Liquid Status: > 8 hours           Airway   Mallampati: II  TM distance: >3 FB  Neck ROM: full  No difficulty expected  Dental      Pulmonary    (+) a smoker Former, asthma (PRN MDI ),sleep apnea,   (-) COPD (atrovent ), recent URI  Cardiovascular     ECG reviewed    (+) hypertension, DVT (LLE),   (-) past MI, dysrhythmias (currently PAC's ), angina, cardiac stents    ROS comment: ECG NSR     Neuro/Psych  (+) numbness (CTR Right ), psychiatric history, dementia (rivastigmine patch ),     (-) seizures, CVA  GI/Hepatic/Renal/Endo    (+) obesity,   diabetes mellitus type 2, thyroid problem hypothyroidism  (-) morbid obesity, liver disease, no renal disease    Musculoskeletal         ROS comment: R HIP FX  Abdominal    Substance History      OB/GYN          Other   arthritis, blood dyscrasia (Polycythemia RV),   history of cancer (Basal cell ear )    ROS/Med Hx Other: xarelto  H of H    hypopituitarism                 Anesthesia Plan    ASA 3     general   (Risk for POCD   Aim MAP >65 BIS >45   H of H Mild dementia )  intravenous induction     Anesthetic plan, all risks, benefits, and alternatives have been provided, discussed and informed consent has been obtained with: patient.    Plan discussed with CRNA.

## 2020-06-09 NOTE — PLAN OF CARE
Remains confused and disoriented. V/S stable. Tele NSR. Removes Tele and gown frequently.Side rails up bed alarms on x2. NPO for surgery.Slept at brief intervals.

## 2020-06-09 NOTE — ANESTHESIA PROCEDURE NOTES
Airway  Urgency: elective    Date/Time: 6/9/2020 1:31 PM  Airway not difficult    General Information and Staff    Patient location during procedure: OR  CRNA: Marcos Canales CRNA    Indications and Patient Condition  Indications for airway management: airway protection    Preoxygenated: yes  MILS not maintained throughout  Mask difficulty assessment: 1 - vent by mask    Final Airway Details  Final airway type: endotracheal airway      Successful airway: ETT  Cuffed: yes   Successful intubation technique: direct laryngoscopy  Endotracheal tube insertion site: oral  Blade: Jordy  Blade size: 3  ETT size (mm): 7.0  Cormack-Lehane Classification: grade I - full view of glottis  Placement verified by: chest auscultation and capnometry   Cuff volume (mL): 8  Measured from: lips  ETT/EBT  to lips (cm): 20  Number of attempts at approach: 1  Assessment: lips, teeth, and gum same as pre-op and atraumatic intubation    Additional Comments  Negative epigastric sounds, Breath sound equal bilaterally with symmetric chest rise and fall

## 2020-06-09 NOTE — PLAN OF CARE
Pt. Had surgery today. He is not complaining or showing signs of pain. VSS, on room air, NSR per tele. Will continue to monitor.

## 2020-06-09 NOTE — PROGRESS NOTES
Continued Stay Note  [facility]     Patient Name: Marcos Acuña  MRN: [id number]  Today's Date: 6/9/2020    Admit Date: 6/8/2020    Discharge Plan     Row Name 06/09/20 1440       Plan    Plan  Plan is being developed    Provided Post Acute Provider List?  Refused    Refused Provider List Comment  Wants to go to [facility]    Patient/Family in Agreement with Plan  yes    Plan Comments  Patient currently in OR for hip repair. Lives at [facility] on [address].  Rehab referral called to Angy at the [facility].  Will need PT and OT evaluation.  Following    Final Discharge Disposition Code  30 - still a patient        Discharge Codes    No documentation.             Jazzmine Lui RN

## 2020-06-09 NOTE — OP NOTE
Operative note:    Preop diagnosis: Right subtrochanteric hip fracture    Postop diagnosis: Right subtrochanteric hip fracture, femoral neck fracture, greater trochanter fracture, lesser trochanter fracture    Procedure: Right long advanced trochanteric femoral nail 11 mm or 25 degrees 380 millimeters in length    Surgeon: Derrick    Assistant: Lesli    Indications: 89-year-old gentleman with dementia and limited ambulator who fell and sustained injury to his right hip the reason for the operation along with risks potential complications were explained to the patient they desire to proceed to proceed with surgery.  He was seen in preop anesthesia area went over the preop anesthesia, H&P, and consent, verify that information during a timeout and had marked his affected extremity in the preop anesthesia area.    Procedure    Patient was induced and maintained under general anesthesia placed on the fracture table and traction was applied to the right leg.  And that he was a large gentleman we tract placed traction slight adduction and some internal rotation to obtain the best reduction possible on AP and lateral views.    Then because of large size we made a incision approximately 4 inches long quite proximal to the tip of the greater trochanter taken down sharp through skin subcu tissue based a guidepin through it and drilled with the proximal reamer 15.5 mm over the guidepin down  To the proximal fragment and placed a long guidewire from the proximal into the distal fragment .  Reamed the entire length of the femur up to 12-1/2 mm to accommodate 11 mm diameter viral.  Placed that viral and then through  a second incision using the guide roughly at the level of the lesser trochanter and placed a guidepin up through approximately the center of the head and neck.  Drilled and placed 105 mm guide screw through.  Then attempted to lock that through the proximal portion of the viral had inability to do that we could not get  the screwdriver down.  Removed the viral and the guide do to the screw took the viral guide apparatus apart cleaned it and ensured that this time it all pieces fit together better put the viral and guide back were fortunate to get the new back in the identical place then locked the set screw through the proximal portion of the viral without difficulty after this attempt.  And abducted the leg and made us incision distal through the dynamic screw hole of the distal viral placed a 52 mm screw.  Ensured the proper rotation was apparent prior to locking the distal pins.  Reduction of the head neck fragment with the shaft was very good the greater trochanter piece was displaced slightly but will not injure weightbearing.  In order to have reduce that piece better would require much more surgery much larger incision which in a gentleman his size would be contraindicated.    Then copiously irrigated all incisions with Betadine saline solution closed the fascia proximally with 0 Vicryl subcu and all 3 incisions was closed with 0 and 2-0 Vicryl skin was closed with staples and a dressing of Xeroform 4 x 4's and tape was applied patient then was awakened taken recovery room in stable condition tolerated procedure without apparent complication.  This surgery had added degree of difficulties due to the patient's large size.     dictated by Derrick thank you

## 2020-06-09 NOTE — PROGRESS NOTES
[facility] Medicine Services  PROGRESS NOTE    Patient Name: Marcos Acuña  : 1931  MRN: [id number]    Date of Admission: 2020  Primary Care Physician: Abdirashid Mccabe MD    Subjective   Subjective     CC:  Hip fracture     HPI:  Pleasantly confused  Reports that his hip hurts  No family present, no other complaints.    Review of Systems  Unable to assess given mental status    Objective   Objective     Vital Signs:   Temp:  [97.7 °F (36.5 °C)-98.4 °F (36.9 °C)] 98.1 °F (36.7 °C)  Heart Rate:  [] 87  Resp:  [16-18] 18  BP: (103-166)/(64-96) 144/83        Physical Exam:  GEN- no acute distress noted, resting in bed, awake  HEENT- atraumatic, normocephlic, eomi  NECK- supple, trachea midline, no masses  RESP: ctab, normal effort  CV: no murmurs, s1/s2, rrr  MSK: no edema noted, right hip externally rotated, minimal bruising, mild TTP  NEURO: alert, oriented to self and place   SKIN: no rashes  PSYCH: appropriate mood and affect , pleasantly confused      Results Reviewed:  Results from last 7 days   Lab Units 20  0532 20  1221   WBC 10*3/mm3 9.37 7.89   HEMOGLOBIN g/dL 11.4* 12.5*   HEMATOCRIT % 36.0* 38.7   PLATELETS 10*3/mm3 159 191   INR   --  1.93*     Results from last 7 days   Lab Units 20  0532 20  1221   SODIUM mmol/L 140 140   POTASSIUM mmol/L 4.1 4.4   CHLORIDE mmol/L 102 104   CO2 mmol/L 24.0 24.0   BUN mg/dL 14 11   CREATININE mg/dL 0.86 0.91   GLUCOSE mg/dL 204* 140*   CALCIUM mg/dL 8.7 8.5*   ALT (SGPT) U/L  --  14   AST (SGOT) U/L  --  25   TROPONIN T ng/mL  --  0.013   PROBNP pg/mL  --  65.4     Estimated Creatinine Clearance: 66.3 mL/min (by C-G formula based on SCr of 0.86 mg/dL).    Microbiology Results Abnormal     Procedure Component Value - Date/Time    COVID-19,CEPHEID,URI IN-HOUSE(OR EMERGENT/ADD-ON),NP SWAB IN TRANSPORT MEDIA 3-4 HR TAT - Swab, Nasopharynx [[id number]]  (Normal) Collected:  20 1334    Lab Status:   Final result Specimen:  Swab from Nasopharynx Updated:  06/08/20 1444     COVID19 Not Detected          Imaging Results (Last 24 Hours)     Procedure Component Value Units Date/Time    CT Head Without Contrast [[id number]] Collected:  06/08/20 1352     Updated:  06/08/20 1907    Narrative:       EXAMINATION: CT HEAD WO CONTRAST- 06/08/2020     INDICATION: TRAUMA; S72.001A-Fracture of unspecified part of neck of  right femur, initial encounter for closed fracture; W19.XXXA-Unspecified  fall, initial encounter; Z79.01-Long term (current) use of  anticoagulants      TECHNIQUE: CT head without intravenous contrast     The radiation dose reduction device was turned on for each scan per the  ALARA (As Low as Reasonably Achievable) protocol.     COMPARISON: NONE     FINDINGS: Midline structures are symmetric without evidence of mass,  mass effect or midline shift demonstrating prominence of the sulci  towards the vertex of generalized atrophy and/or age-related volume loss  without intra-axial hemorrhage or extra-axial fluid collection. Globes  and orbits unremarkable. Visualized paranasal sinuses and mastoid air  cells are grossly clear and well pneumatized with mild mucosal edema of  the ethmoid air cells. Calvarium intact.       Impression:       No acute intracranial abnormality specifically no acute  intra-axial hemorrhage or extra-axial fluid collection.     D:  06/08/2020  E:  06/08/2020     This report was finalized on 6/8/2020 7:04 PM by Dr. Mele Brooks.       XR Chest 1 View [[id number]] Collected:  06/08/20 1212     Updated:  06/08/20 1906    Narrative:       EXAMINATION: XR CHEST 1 VW-      INDICATION: Fall.      COMPARISON: None.     FINDINGS: Cardiac size within normal limits. Pulmonary vascularity  within normal limits. No focal opacification or consolidation. No  pneumothorax or pleural effusion. Degenerative changes of the spine.           Impression:       No acute traumatic findings of the chest.     D:   06/08/2020  E:  06/08/2020     This report was finalized on 6/8/2020 7:03 PM by Dr. Mele Brooks.       XR Hip With or Without Pelvis 2 - 3 View Right [[id number]] Collected:  06/08/20 1216     Updated:  06/08/20 1906    Narrative:       EXAMINATION: XR HIP W OR WO PELVIS 2-3 VIEW RIGHT-      INDICATION: Trauma.      COMPARISON: None.     FINDINGS: Single AP view of the pelvis along with AP and crosstable  lateral views right hip reveal acute minimally comminuted and mildly  lateral angulated subtrochanteric fracture right femur with right  femoral head remaining well located at the right hip.       Impression:       Acute minimally comminuted and mildly displaced with lateral  angulation subtrochanteric fracture right proximal femur. Right femoral  head remains well located at the right hip joint.     D:  06/08/2020  E:  06/08/2020     This report was finalized on 6/8/2020 7:03 PM by Dr. Mele Brooks.                  I have reviewed the medications:  Scheduled Meds:  insulin lispro 0-7 Units Subcutaneous TID AC   levothyroxine 75 mcg Oral Daily   losartan 50 mg Oral Daily   oxybutynin XL 10 mg Oral Daily   rivastigmine 1 patch Transdermal Daily   sodium chloride 10 mL Intravenous Q12H   tamsulosin 0.4 mg Oral Nightly     Continuous Infusions:   PRN Meds:.•  bisacodyl  •  bisacodyl  •  calcium carbonate  •  dextrose  •  dextrose  •  glucagon (human recombinant)  •  sodium chloride    Assessment/Plan   Assessment & Plan     Active Hospital Problems    Diagnosis  POA   • Closed fracture of right hip (CMS/[facility]) [S72.001A]  Yes   • DVT (deep venous thrombosis) (CMS/[facility]) [I82.409]  Yes   • Benign essential hypertension [I10]  Yes   • Hypothyroidism [E03.9]  Yes   • Type 2 diabetes mellitus without complication (CMS/[facility]) [E11.9]  Yes      Resolved Hospital Problems   No resolved problems to display.        Brief Hospital Course to date:  Mr. Marcos Acuña is an 88 yo gentleman With a past medical history of lower extremity  DVT, and non-insulin-dependent diabetes who presented to the ED after a mechanical fall with subsequent right hip fracture.    This patient's problems and plans were partially entered by my partner and updated as appropriate by me 06/09/20.         Mechanical fall  Right hip fracture  -XR of right hip showed acute minimally comminuted and mildly displaced with lateral angulation subtrochanteric fracture of the right proximal femur  -CT head was without acute intracranial abnormality  -Dr Meza of orthopedics following, planning OR today 6/9  --PT/OT following OR  -Pre-procedure covid screening negative     History of recurrent lower extremity DVT  -Xarelto on hold for surgery     Hypertension-continue losartan     Hypothyroidism-continue levothyroxine, check TSH and T4 and adjust as needed     BPH  -Continue tamsulosin and oxybutynin     T2DM- hold metformin, ssi while inpatient     DVT Prophylaxis:  mech given OR    Disposition: I expect the patient to be discharged pending operative repair.     CODE STATUS:   Code Status and Medical Interventions:   Ordered at: 06/08/20 1329     Code Status:    CPR     Medical Interventions (Level of Support Prior to Arrest):    Full         Electronically signed by Angy Maciel MD, 06/09/20, 08:02.

## 2020-06-09 NOTE — ANESTHESIA PROCEDURE NOTES
Peripheral Block      Patient reassessed immediately prior to procedure    Patient location during procedure: pre-op  Start time: 6/9/2020 1:32 PM  Stop time: 6/9/2020 1:34 PM  Reason for block: procedure for pain and at surgeon's request  Performed by  CRNA: Marcos Canales CRNA  Preanesthetic Checklist  Completed: patient identified, site marked, surgical consent, pre-op evaluation, timeout performed, IV checked, risks and benefits discussed and monitors and equipment checked  Prep:  Pt Position: supine  Sterile barriers:cap, gloves and mask  Prep: ChloraPrep  Patient monitoring: blood pressure monitoring, continuous pulse oximetry and EKG  Procedure  Sedation:no  Performed under: local infiltration  Guidance:ultrasound guided  Images:still images obtained, printed/placed on chart    Laterality:right  Block Type:fascia iliaca catheter  Injection Technique:single-shot  Needle Type:echogenic and short-bevel  Needle Gauge:20 G  Resistance on Injection: none  Catheter size: 20g.    Medications Used: dexamethasone sodium phosphate injection, 2 mg  bupivacaine PF (MARCAINE) 0.25 % injection, 30 mL  Med admintered at 6/9/2020 1:33 PM      Medications  Preservative Free Saline:10ml    Post Assessment  Injection Assessment: negative aspiration for heme, no paresthesia on injection and incremental injection  Patient Tolerance:comfortable throughout block  Complications:no  Additional Notes  Procedure:                 Pt placed in supine position.   The insertion site was prepped in sterile fashion with Chlorapreop and clear plastic drapes.  Analgesia was provided by skin infiltration at insertion site with Lidocaine 1% 3mls.  A B-Santiago 18 g , 4 inch echogenic Touhy needle was advance In-plane under ultrasound guidance. The   Anterior superior Iliac crest was initially visualized and the probe was directed slightly medially and slightly towards the umbilicus.  The course of the needle was tracked over the sartorius muscle  through the fascia Iliacus and into the anterior portion of the Iliacus muscle.  Major vessels where identified and avoided as where structures of the peritoneal cavity.  LA injection was made incrementally in 1-5ml amounts spread was visualized superiorly below fascia iliacus.  Injection was completed with negative aspiration of blood and negative intravascular injection.  Injection pressures where normal or minimal resistance.

## 2020-06-10 ENCOUNTER — APPOINTMENT (OUTPATIENT)
Dept: GENERAL RADIOLOGY | Facility: HOSPITAL | Age: 85
End: 2020-06-10

## 2020-06-10 LAB
ANION GAP SERPL CALCULATED.3IONS-SCNC: 10 MMOL/L (ref 5–15)
BASOPHILS # BLD AUTO: 0.06 10*3/MM3 (ref 0–0.2)
BASOPHILS NFR BLD AUTO: 0.7 % (ref 0–1.5)
BUN BLD-MCNC: 13 MG/DL (ref 8–23)
BUN/CREAT SERPL: 15.9 (ref 7–25)
CALCIUM SPEC-SCNC: 8.2 MG/DL (ref 8.6–10.5)
CHLORIDE SERPL-SCNC: 106 MMOL/L (ref 98–107)
CO2 SERPL-SCNC: 24 MMOL/L (ref 22–29)
CREAT BLD-MCNC: 0.82 MG/DL (ref 0.76–1.27)
DEPRECATED RDW RBC AUTO: 62.8 FL (ref 37–54)
EOSINOPHIL # BLD AUTO: 0.02 10*3/MM3 (ref 0–0.4)
EOSINOPHIL NFR BLD AUTO: 0.2 % (ref 0.3–6.2)
ERYTHROCYTE [DISTWIDTH] IN BLOOD BY AUTOMATED COUNT: 17.9 % (ref 12.3–15.4)
GFR SERPL CREATININE-BSD FRML MDRD: 88 ML/MIN/1.73
GLUCOSE BLD-MCNC: 158 MG/DL (ref 65–99)
GLUCOSE BLDC GLUCOMTR-MCNC: 144 MG/DL (ref 70–130)
GLUCOSE BLDC GLUCOMTR-MCNC: 171 MG/DL (ref 70–130)
GLUCOSE BLDC GLUCOMTR-MCNC: 204 MG/DL (ref 70–130)
GLUCOSE BLDC GLUCOMTR-MCNC: 226 MG/DL (ref 70–130)
HCT VFR BLD AUTO: 33 % (ref 37.5–51)
HGB BLD-MCNC: 10.1 G/DL (ref 13–17.7)
IMM GRANULOCYTES # BLD AUTO: 0.07 10*3/MM3 (ref 0–0.05)
IMM GRANULOCYTES NFR BLD AUTO: 0.8 % (ref 0–0.5)
LYMPHOCYTES # BLD AUTO: 1.79 10*3/MM3 (ref 0.7–3.1)
LYMPHOCYTES NFR BLD AUTO: 19.5 % (ref 19.6–45.3)
MCH RBC QN AUTO: 29.3 PG (ref 26.6–33)
MCHC RBC AUTO-ENTMCNC: 30.6 G/DL (ref 31.5–35.7)
MCV RBC AUTO: 95.7 FL (ref 79–97)
MONOCYTES # BLD AUTO: 0.67 10*3/MM3 (ref 0.1–0.9)
MONOCYTES NFR BLD AUTO: 7.3 % (ref 5–12)
NEUTROPHILS # BLD AUTO: 6.58 10*3/MM3 (ref 1.7–7)
NEUTROPHILS NFR BLD AUTO: 71.5 % (ref 42.7–76)
NRBC BLD AUTO-RTO: 0 /100 WBC (ref 0–0.2)
PLATELET # BLD AUTO: 130 10*3/MM3 (ref 140–450)
PMV BLD AUTO: 11 FL (ref 6–12)
POTASSIUM BLD-SCNC: 4 MMOL/L (ref 3.5–5.2)
RBC # BLD AUTO: 3.45 10*6/MM3 (ref 4.14–5.8)
SODIUM BLD-SCNC: 140 MMOL/L (ref 136–145)
WBC NRBC COR # BLD: 9.19 10*3/MM3 (ref 3.4–10.8)

## 2020-06-10 PROCEDURE — 63710000001 INSULIN LISPRO (HUMAN) PER 5 UNITS: Performed by: ORTHOPAEDIC SURGERY

## 2020-06-10 PROCEDURE — 25010000002 PIPERACILLIN SOD-TAZOBACTAM PER 1 G: Performed by: INTERNAL MEDICINE

## 2020-06-10 PROCEDURE — 97110 THERAPEUTIC EXERCISES: CPT

## 2020-06-10 PROCEDURE — 99232 SBSQ HOSP IP/OBS MODERATE 35: CPT | Performed by: INTERNAL MEDICINE

## 2020-06-10 PROCEDURE — 82962 GLUCOSE BLOOD TEST: CPT

## 2020-06-10 PROCEDURE — 25010000003 CEFAZOLIN IN DEXTROSE 2-4 GM/100ML-% SOLUTION: Performed by: ORTHOPAEDIC SURGERY

## 2020-06-10 PROCEDURE — 80048 BASIC METABOLIC PNL TOTAL CA: CPT | Performed by: ORTHOPAEDIC SURGERY

## 2020-06-10 PROCEDURE — 97162 PT EVAL MOD COMPLEX 30 MIN: CPT

## 2020-06-10 PROCEDURE — 85025 COMPLETE CBC W/AUTO DIFF WBC: CPT | Performed by: ORTHOPAEDIC SURGERY

## 2020-06-10 PROCEDURE — 97166 OT EVAL MOD COMPLEX 45 MIN: CPT

## 2020-06-10 PROCEDURE — 97116 GAIT TRAINING THERAPY: CPT

## 2020-06-10 PROCEDURE — 87081 CULTURE SCREEN ONLY: CPT | Performed by: INTERNAL MEDICINE

## 2020-06-10 PROCEDURE — 25010000002 VANCOMYCIN 10 G RECONSTITUTED SOLUTION

## 2020-06-10 PROCEDURE — 87040 BLOOD CULTURE FOR BACTERIA: CPT | Performed by: INTERNAL MEDICINE

## 2020-06-10 PROCEDURE — 71045 X-RAY EXAM CHEST 1 VIEW: CPT

## 2020-06-10 RX ADMIN — ACETAMINOPHEN 650 MG: 325 TABLET, FILM COATED ORAL at 21:35

## 2020-06-10 RX ADMIN — RIVASTIGMINE TRANSDERMAL SYSTEM 1 PATCH: 9.5 PATCH, EXTENDED RELEASE TRANSDERMAL at 18:26

## 2020-06-10 RX ADMIN — SODIUM CHLORIDE 100 ML/HR: 9 INJECTION, SOLUTION INTRAVENOUS at 05:23

## 2020-06-10 RX ADMIN — CEFAZOLIN SODIUM 2 G: 2 INJECTION, SOLUTION INTRAVENOUS at 05:22

## 2020-06-10 RX ADMIN — OXYBUTYNIN CHLORIDE 10 MG: 10 TABLET, EXTENDED RELEASE ORAL at 10:07

## 2020-06-10 RX ADMIN — TAZOBACTAM SODIUM AND PIPERACILLIN SODIUM 3.38 G: 375; 3 INJECTION, SOLUTION INTRAVENOUS at 21:35

## 2020-06-10 RX ADMIN — TAMSULOSIN HYDROCHLORIDE 0.4 MG: 0.4 CAPSULE ORAL at 21:35

## 2020-06-10 RX ADMIN — VANCOMYCIN HYDROCHLORIDE 1500 MG: 10 INJECTION, POWDER, LYOPHILIZED, FOR SOLUTION INTRAVENOUS at 18:57

## 2020-06-10 RX ADMIN — TAZOBACTAM SODIUM AND PIPERACILLIN SODIUM 3.38 G: 375; 3 INJECTION, SOLUTION INTRAVENOUS at 18:26

## 2020-06-10 RX ADMIN — ACETAMINOPHEN 650 MG: 325 TABLET, FILM COATED ORAL at 14:13

## 2020-06-10 RX ADMIN — INSULIN LISPRO 3 UNITS: 100 INJECTION, SOLUTION INTRAVENOUS; SUBCUTANEOUS at 11:58

## 2020-06-10 RX ADMIN — LEVOTHYROXINE SODIUM 75 MCG: 75 TABLET ORAL at 10:08

## 2020-06-10 RX ADMIN — LOSARTAN POTASSIUM 50 MG: 50 TABLET, FILM COATED ORAL at 10:08

## 2020-06-10 RX ADMIN — ACETAMINOPHEN 650 MG: 325 TABLET, FILM COATED ORAL at 10:07

## 2020-06-10 RX ADMIN — INSULIN LISPRO 2 UNITS: 100 INJECTION, SOLUTION INTRAVENOUS; SUBCUTANEOUS at 18:37

## 2020-06-10 NOTE — PLAN OF CARE
Problem: Patient Care Overview  Goal: Plan of Care Review  Flowsheets (Taken 6/10/2020 6659)  Outcome Summary: OT IE complete. Pt alert, confused; follows simple commands. Pt OOB and UIC with Mod-Max assist x2. Dependent for LB dressing/toileting ADLs. Pt has caregivers for bathing/dressing at baseline. Spouse typically assists with toilet transfers and care RW level. OT will follow IP to advance ADL as able. Recommend SNF at d/c for best outcome.

## 2020-06-10 NOTE — PROGRESS NOTES
[facility]  PROGRESS NOTE    Patient Name: Marcos Acuña  : 1931  MRN: [id number]    Date of Admission: 2020  Primary Care Physician: Abdirashid Mccabe MD    Subjective   Subjective     CC:  Hip fracture    HPI:  Confused, taking off his clothes and gown. Now with fevers    Review of Systems  Limited ros secondary to mental status    Objective   Objective     Vital Signs:   Temp:  [97.8 °F (36.6 °C)-100.2 °F (37.9 °C)] 99.7 °F (37.6 °C)  Heart Rate:  [70-98] 86  Resp:  [16-20] 16  BP: (107-150)/() 112/52        Physical Exam:  Constitutional: No acute distress, awake, alert  HENT: NCAT, mucous membranes moist  Respiratory: Clear to auscultation bilaterally, respiratory effort normal   Cardiovascular: RRR, no murmurs  Gastrointestinal: Positive bowel sounds, soft  Musculoskeletal: No bilateral ankle edema  Psychiatric: not able to assess  Neurologic: confused  Skin: No rashes    Results Reviewed:  Results from last 7 days   Lab Units 06/10/20  0552 20  0532 20  1221   WBC 10*3/mm3 9.19 9.37 7.89   HEMOGLOBIN g/dL 10.1* 11.4* 12.5*   HEMATOCRIT % 33.0* 36.0* 38.7   PLATELETS 10*3/mm3 130* 159 191   INR   --   --  1.93*     Results from last 7 days   Lab Units 06/10/20  0552 20  0532 20  1221   SODIUM mmol/L 140 140 140   POTASSIUM mmol/L 4.0 4.1 4.4   CHLORIDE mmol/L 106 102 104   CO2 mmol/L 24.0 24.0 24.0   BUN mg/dL 13 14 11   CREATININE mg/dL 0.82 0.86 0.91   GLUCOSE mg/dL 158* 204* 140*   CALCIUM mg/dL 8.2* 8.7 8.5*   ALT (SGPT) U/L  --   --  14   AST (SGOT) U/L  --   --  25   TROPONIN T ng/mL  --   --  0.013   PROBNP pg/mL  --   --  65.4     Estimated Creatinine Clearance: 69.5 mL/min (by C-G formula based on SCr of 0.82 mg/dL).    Microbiology Results Abnormal     Procedure Component Value - Date/Time    COVID-19,CEPHEID,URI IN-HOUSE(OR EMERGENT/ADD-ON),NP SWAB IN TRANSPORT MEDIA 3-4 HR TAT - Swab, Nasopharynx [[id number]]   (Normal) Collected:  06/08/20 1337    Lab Status:  Final result Specimen:  Swab from Nasopharynx Updated:  06/08/20 1444     COVID19 Not Detected          Imaging Results (Last 24 Hours)     Procedure Component Value Units Date/Time    FL C Arm During Surgery [[id number]] Collected:  06/09/20 1552     Updated:  06/09/20 1705    Narrative:       EXAMINATION: FL C ARM DURING SURGERY- 06/09/2020     INDICATION: Troch Nail; S72.001A-Fracture of unspecified part of neck of  right femur, initial encounter for closed fracture; W19.XXXA-Unspecified  fall, initial encounter; Z79.01-Long term (current) use of  anticoagulants      TECHNIQUE: Intraoperative fluoroscopy for improved localization and  treatment planning     COMPARISON: NONE     FINDINGS: Intraoperative fluoroscopy with total fluoroscopic time usage  2 minutes 46 seconds and single representative image saved during right  trochanteric nailing.       Impression:       Intraoperative fluoroscopy utilized during right  trochanteric nailing.     D:  06/09/2020  E:  06/09/2020         This report was finalized on 6/9/2020 5:02 PM by Dr. Mele Brooks.                  I have reviewed the medications:  Scheduled Meds:  apixaban 2.5 mg Oral Q12H   insulin lispro 0-7 Units Subcutaneous TID AC   levothyroxine 75 mcg Oral Daily   losartan 50 mg Oral Daily   oxybutynin XL 10 mg Oral Daily   piperacillin-tazobactam 3.375 g Intravenous Once   piperacillin-tazobactam 3.375 g Intravenous Q8H   rivastigmine 1 patch Transdermal Daily   tamsulosin 0.4 mg Oral Nightly     Continuous Infusions:  Pharmacy to dose vancomycin     sodium chloride 100 mL/hr Last Rate: 100 mL/hr (06/10/20 0523)     PRN Meds:.•  acetaminophen **OR** acetaminophen  •  dextrose  •  dextrose  •  docusate sodium  •  glucagon (human recombinant)  •  Morphine **AND** naloxone  •  Morphine **AND** naloxone  •  oxyCODONE-acetaminophen  •  Pharmacy to dose vancomycin  •  promethazine    Assessment/Plan   Assessment &  Plan     Active Hospital Problems    Diagnosis  POA   • Closed fracture of right hip (CMS/[facility]) [S72.001A]  Yes   • DVT (deep venous thrombosis) (CMS/[facility]) [I82.409]  Yes   • Benign essential hypertension [I10]  Yes   • Hypothyroidism [E03.9]  Yes   • Type 2 diabetes mellitus without complication (CMS/[facility]) [E11.9]  Yes      Resolved Hospital Problems   No resolved problems to display.        Brief Hospital Course to date:  Marcos Acuña is a 89 y.o. male With a past medical history of lower extremity DVT, and non-insulin-dependent diabetes who presented to the ED after a mechanical fall with subsequent right hip fracture.     This patient's problems and plans were partially entered by my partner and updated as appropriate by me 06/10/20.         Mechanical fall  Right hip fracture  -XR of right hip showed acute minimally comminuted and mildly displaced with lateral angulation subtrochanteric fracture of the right proximal femur  -CT head was without acute intracranial abnormality  -Dr Meza of orthopedics following, s/p R femoral nail on 6/9  -- eliquis on hold secondary to bleeding through bandages  --PT/OT following OR  -Pre-procedure covid screening negative     History of recurrent lower extremity DVT  -Xarelto on hold for surgery     Hypertension-continue losartan     Hypothyroidism-continue levothyroxine, check TSH and T4 and adjust as needed     BPH  -Continue tamsulosin and oxybutynin     T2DM- hold metformin, ssi while inpatient     Fever  - check chest xray  - ua negative 6/9  - check blood cultures and MRSA screen  - start vanco and zosyn for now    DVT Prophylaxis:  eliquis held today    Disposition: I expect the patient to be discharged TBD, will need rehab    CODE STATUS:   Code Status and Medical Interventions:   Ordered at: 06/08/20 1329     Code Status:    CPR     Medical Interventions (Level of Support Prior to Arrest):    Full         Electronically signed by Stefanie Spicer DO, 06/10/20,  14:45.

## 2020-06-10 NOTE — PLAN OF CARE
Problem: Patient Care Overview  Goal: Plan of Care Review  Flowsheets (Taken 6/10/2020 1440)  Progress: improving (Pended)  Plan of Care Reviewed With: patient;significant other (Pended)  Outcome Summary: Patient had difficulty participating with PT today and required intermittent reorientation due to drowsiness. He was able to stand and ambulate 3 ft with a rolling walker and modAx2, but utilized an inefficient stepping pattern and R quadriceps weakness. A R knee immobilizer is recommended for future ambulation to enhance stability. Patient is appropriate for skilled PT services in order to promote functional independence. At this time, patient would benefit from skilled nursing facility following discharge. (Pended)

## 2020-06-10 NOTE — PROGRESS NOTES
Pharmacy Consult-Vancomycin Dosing  Marcos Acuña is a  89 y.o. male receiving vancomycin therapy.     Indication: Sepsis  Consulting Provider: Yvonne  Goal Trough: 10-15    Current Antimicrobial Therapy  Anti-Infectives (From admission, onward)      Ordered     Dose/Rate Route Frequency Start Stop    06/10/20 1457  vancomycin 1250 mg/250 mL 0.9% NS IVPB ([facility])     Ordering Provider:  Melly Fung, RPH    1,250 mg  over 90 Minutes Intravenous Every 24 Hours 06/11/20 0900 06/14/20 0859    06/10/20 1445  piperacillin-tazobactam (ZOSYN) 3.375 g in iso-osmotic dextrose 50 ml (premix)     Ordering Provider:  Stefanie Spicer DO    3.375 g  over 4 Hours Intravenous Every 8 Hours 06/10/20 2200 06/14/20 2159    06/10/20 1445  piperacillin-tazobactam (ZOSYN) 3.375 g in iso-osmotic dextrose 50 ml (premix)     Ordering Provider:  Stefanie Spicer DO    3.375 g  over 30 Minutes Intravenous Once 06/10/20 1545      06/10/20 1457  vancomycin 1500 mg/500 mL 0.9% NS IVPB ([facility])     Ordering Provider:  Melly Fung, RPH    1,500 mg  over 90 Minutes Intravenous Once 06/10/20 1545      06/10/20 1445  Pharmacy to dose vancomycin     Ordering Provider:  Stefanie Spicer DO     Does not apply Continuous PRN 06/10/20 1443 06/14/20 1442    06/09/20 1714  ceFAZolin in dextrose (ANCEF) IVPB solution 2 g     Ordering Provider:  Mejia Meza MD    2 g  over 30 Minutes Intravenous Every 8 Hours 06/09/20 2200 06/10/20 0552    06/09/20 1235  ceFAZolin in dextrose (ANCEF) IVPB solution 2 g     Ordering Provider:  Mejia Meza MD    2 g  over 30 Minutes Intravenous Once 06/09/20 1330 06/09/20 1325          Allergies  Allergies as of 06/08/2020    (No Known Allergies)     Labs  Results from last 7 days   Lab Units 06/10/20  0552 06/09/20  0532 06/08/20  1221   BUN mg/dL 13 14 11   CREATININE mg/dL 0.82 0.86 0.91     Results from last 7 days   Lab Units 06/10/20  0552 06/09/20  0532 06/08/20  1221   WBC 10*3/mm3 9.19 9.37 7.89  "    Evaluation of Dosing  Ht - 170.2 cm (67\")  Wt - 102 kg (225 lb)    Estimated Creatinine Clearance: 69.5 mL/min (by C-G formula based on SCr of 0.82 mg/dL).  Intake & Output (last 3 days)         06/07 0701 - 06/08 0700 06/08 0701 - 06/09 0700 06/09 0701 - 06/10 0700 06/10 0701 - 06/11 0700    P.O.   0 480    I.V. (mL/kg)   1950 (19.1)     IV Piggyback   100     Total Intake(mL/kg)   2050 (20.1) 480 (4.7)    Urine (mL/kg/hr)  100      Total Output  100      Net  -100 +2050 +480            Urine Unmeasured Occurrence  5 x 5 x 2 x    Stool Unmeasured Occurrence   2 x           Microbiology and Radiology  Microbiology Results (last 10 days)       Procedure Component Value - Date/Time    COVID-19,CEPHEID,URI IN-HOUSE(OR EMERGENT/ADD-ON),NP SWAB IN TRANSPORT MEDIA 3-4 HR TAT - Swab, Nasopharynx [[id number]]  (Normal) Collected:  06/08/20 1337    Lab Status:  Final result Specimen:  Swab from Nasopharynx Updated:  06/08/20 1444     COVID19 Not Detected          Evaluation of Level                           Assessment/Plan:  Initiate Vancomycin 20 mg/kg using AIBW.  Continue Vancomycin 15 mg/kg daily and check a level prior to the 4th dose.    Thanks,   Melly Fung PharmD BCPS  "

## 2020-06-10 NOTE — THERAPY EVALUATION
Patient Name: Marcos Acuña  : 1931    MRN: [id number]                              Today's Date: 6/10/2020       Admit Date: 2020    Visit Dx:     ICD-10-CM ICD-9-CM   1. Closed fracture of right hip, initial encounter (CMS/[facility]) S72.001A 820.8   2. Fall, initial encounter W19.XXXA E888.9   3. Anticoagulated Z79.01 V58.61   4. Impaired mobility and ADLs Z74.09 V49.89    Z78.9      Patient Active Problem List   Diagnosis   • Closed fracture of right hip (CMS/[facility])   • ACE-inhibitor cough   • Balance disorder   • Basal cell carcinoma of ear   • Benign essential hypertension   • Bilateral high frequency hearing loss   • Carpal tunnel syndrome of left wrist   • Chronic allergic rhinitis   • Dementia (CMS/[facility])   • DVT (deep venous thrombosis) (CMS/HCC)   • Encounter for follow-up of chronic deep vein thrombosis (DVT) of left lower extremity   • Erectile dysfunction   • ETD (eustachian tube dysfunction)   • History of basal cell carcinoma   • Hyperlipoproteinemia   • Hypertrophy of prostate without urinary obstruction and other lower urinary tract symptoms (LUTS)   • 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[facility])   • Hypothyroidism   • Left leg cellulitis   • Type 2 diabetes mellitus without complication (CMS/[facility])   • Primary osteoarthritis of left knee   • Polycythemia   • Osteopenia   • Onychomycosis   • Obstructive sleep apnea syndrome   • Obesity   • Polycythemia vera (CMS/[facility])     Past Medical History:   Diagnosis Date   • Dementia (CMS/HCC)    • Diabetes (CMS/HCC)    • H/O blood clots    • Osteoarthritis      Past Surgical History:   Procedure Laterality Date   • BUNIONECTOMY     • HIP TROCHANTERIC NAILING WITH INTRAMEDULLARY HIP SCREW N/A 2020    Procedure: ADVANCED TROCHANTERIC FEMORAL NAIL (ATFN) RIGHT HIP/FEMUR;  Surgeon: Mejia Meza MD;  Location: [facility];  Service: Orthopedics;  Laterality: N/A;   • RECTAL FISTULECTOMY     • SKIN CANCER EXCISION       General Information     Row Name 06/10/20 1412           PT Evaluation Time/Intention    Document Type  evaluation  (Pended)   -     Mode of Treatment  physical therapy  (Pended)   -     Row Name 06/10/20 1412          General Information    Patient Profile Reviewed?  yes  (Pended)   -     Prior Level of Function  min assist:;ADL's;all household mobility;dressing  (Pended)   -     Existing Precautions/Restrictions  fall;other (see comments);brace worn when out of bed  (Pended)  R knee immobilizer recommended for quadriceps weakness while ambulating. Recent fall 4 months ago.  -J     Barriers to Rehab  medically complex;cognitive status;impaired sensation  (Pended)   -     Row Name 06/10/20 1412          Relationship/Environment    Lives With  spouse  (Pended)   -[facility] Name 06/10/20 1412          Resource/Environmental Concerns    Current Living Arrangements  independent/assisted living facility  (Pended)   -[facility] Name 06/10/20 1412          Home Main Entrance    Number of Stairs, Main Entrance  none  (Pended)   -[facility] Name 06/10/20 1412          Stairs Within Home, Primary    Number of Stairs, Within Home, Primary  none  (Pended)   -[facility] Name 06/10/20 1412          Cognitive Assessment/Intervention- PT/OT    Orientation Status (Cognition)  oriented to;person  (Pended)   -     Cognitive Assessment/Intervention Comment  Intermittent drowsiness. Demonstrated inconsistent identification of place and time.  (Pended)   -[facility] Name 06/10/20 1412          Safety Issues, Functional Mobility    Safety Issues Affecting Function (Mobility)  ability to follow commands;insight into deficits/self awareness;sequencing abilities;safety precaution awareness;problem solving  (Pended)   -     Impairments Affecting Function (Mobility)  balance;cognition;coordination;endurance/activity tolerance;motor control;motor planning;pain;range of motion (ROM);sensation/sensory awareness;strength  (Pended)   -       User Key  (r) = Recorded By, (t) = Taken  By, (c) = Cosigned By    Initials Name Provider Type    JG Ary Pretty, PT Student PT Student        Mobility     Row Name 06/10/20 1422          Bed Mobility Assessment/Treatment    Bed Mobility Assessment/Treatment  rolling left;scooting/bridging;supine-sit  (Pended)   -JG     Rolling Left [place] (Bed Mobility)  verbal cues;2 person assist;dependent (less than 25% patient effort)  (Pended)   -JG     Scooting/Bridging [place] (Bed Mobility)  verbal cues;2 person assist;dependent (less than 25% patient effort)  (Pended)   -JG     Supine-Sit [place] (Bed Mobility)  verbal cues;other (see comments);dependent (less than 25% patient effort)  (Pended)  3 person assist  -JG     Assistive Device (Bed Mobility)  draw sheet;head of bed elevated  (Pended)   -JG     Comment (Bed Mobility)  Pt was limited due to cognitive deficits and discomfort when participating with bed mobility, so assistance was provided to mobilize trunk and bilateral lower extremities.   (Pended)   -JG     Row Name 06/10/20 1422          Sit-Stand Transfer    Sit-Stand D[place]gren (Transfers)  verbal cues;moderate assist (50% patient effort);2 person assist  (Pended)  Verbal cues for upright posture and weight shift onto R leg   -JG     Assistive Device (Sit-Stand Transfers)  walker, front-wheeled  (Pended)   -JG     Row Name 06/10/20 1422          Gait/Stairs Assessment/Training    Gait/Stairs Assessment/Training  gait/ambulation assistive device  (Pended)   -JG     Dahlgren Level (Gait)  2 person assist;moderate assist (50% patient effort)  (Pended)   -JG     Assistive Device (Gait)  walker, front-wheeled  (Pended)   -JG     Distance in Feet (Gait)  3 ft  (Pended)   -JG     Pattern (Gait)  step-to  (Pended)   -JG     Deviations/Abnormal Patterns (Gait)  bilateral deviations;antalgic;base of support, narrow;festinating/shuffling;gait speed decreased;right sided deviations  (Pended)   -JG     Right Sided Gait Deviations  knee  buckling, right side;weight shift ability decreased  (Pended)   -JG     Comment (Gait/Stairs)  Pt demonstrated difficulty transferring from a sit to stand position, requiring modAx2 for stability and verbal cues for an upright posture. When ambulating, he did require a block of the R knee to prevent buckling and tactile cues to take a step forward. Due to the atalgic gait pattern and decreased endurance, he was not able to ambulate far so a chair was pulled up behind him which he was provided verbal cues to hold the arm rest and descend slowly.  (Pended)   -JG     Row Name 06/10/20 1422          Mobility Assessment/Intervention    Extremity Weight-bearing Status  right lower extremity  (Pended)   -JG     Right Lower Extremity (Weight-bearing Status)  weight-bearing as tolerated (WBAT)  (Pended)   -JG       User Key  (r) = Recorded By, (t) = Taken By, (c) = Cosigned By    Initials Name Provider Type    JG Ary Pretty, PT Student PT Student        Obj/Interventions     Row Name 06/10/20 1432          General ROM    GENERAL ROM COMMENTS  Bilateral LE within functional limits  (Pended)   -JG     Row Name 06/10/20 1432          MMT (Manual Muscle Testing)    General MMT Comments  R LE: Quadricep 3-/5,  Tibialis Anterior & Gastrocnemius Neurally Intact. L LE: Quadriceps 5/5, Tibialis Anterior & Gastrocnemius Neurally Intact.  (Pended)   -JG     Row Name 06/10/20 1432          Therapeutic Exercise    Lower Extremity (Therapeutic Exercise)  LAQ (long arc quad), right  (Pended)   -JG     Lower Extremity Range of Motion (Therapeutic Exercise)  ankle dorsiflexion/plantar flexion, bilateral;hip flexion/extension, bilateral;knee flexion/extension, bilateral  (Pended)   -JG     Exercise Type (Therapeutic Exercise)  AAROM (active assistive range of motion);PROM (passive range of motion);isotonic contraction, concentric  (Pended)   -JG     Position (Therapeutic Exercise)  supine;seated  (Pended)   -JG     Sets/Reps  (Therapeutic Exercise)  10x  (Pended)   -JG     Expected Outcome (Therapeutic Exercise)  facilitate normal movement patterns;improve functional stability;improve neuromuscular control;improve motor control  (Pended)   -JG     Row Name 06/10/20 1432          Static Sitting Balance    Level of [place] (Unsupported Sitting, Static Balance)  minimal assist, 75% patient effort  (Pended)   -JG     Sitting Position (Unsupported Sitting, Static Balance)  sitting on edge of bed  (Pended)   -JG     Time Able to Maintain Position (Unsupported Sitting, Static Balance)  2 to 3 minutes  (Pended)   -JG     Comment (Unsupported Sitting, Static Balance)  Pt initially demonstrated increased weight shift in the L hip, but was able to shift his center of gravity to midline with minAx1 and maintain this with CGA.   (Pended)   -JG     Row Name 06/10/20 1432          Sensory Assessment/Intervention    Sensory General Assessment  --  (Pended)  Unable to test due to impaired cognition and inconsistent answers  -JG       User Key  (r) = Recorded By, (t) = Taken By, (c) = Cosigned By    Initials Name Provider Type    JG Ary Pretty, PT Student PT Student        Goals/Plan     Row Name 06/10/20 1451          Bed Mobility Goal 1 (PT)    Activity/Assistive Device (Bed Mobility Goal 1, PT)  supine to sit  (Pended)   -JG     [place] Level/Cues Needed (Bed Mobility Goal 1, PT)  1 person assist;moderate assist (50-74% patient effort)  (Pended)   -JG     Time Frame (Bed Mobility Goal 1, PT)  10 days;long term goal (LTG)  (Pended)   -JG     Row Name 06/10/20 1451          Transfer Goal 1 (PT)    Activity/Assistive Device (Transfer Goal 1, PT)  sit-to-stand/stand-to-sit;bed-to-chair/chair-to-bed  (Pended)   -JG     [place] Level/Cues Needed (Transfer Goal 1, PT)  minimum assist (75% or more patient effort);1 person assist  (Pended)   -JG     Time Frame (Transfer Goal 1, PT)  10 days;long term goal (LTG)  (Pended)   -JG     Row Name  06/10/20 1451          Gait Training Goal 1 (PT)    Activity/Assistive Device (Gait Training Goal 1, PT)  gait (walking locomotion);assistive device use  (Pended)   -JG     [place]arlan Level (Gait Training Goal 1, PT)  minimum assist (75% or more patient effort);1 person assist  (Pended)   -JG     Distance (Gait Goal 1, PT)  50 ft   (Pended)   -JG     Time Frame (Gait Training Goal 1, PT)  long term goal (LTG)  (Pended)   -JG     Row Name 06/10/20 1451          Patient Education Goal (PT)    Activity (Patient Education Goal, PT)  Patient and spouse will understand transfer strategies & HEP to utilize following discharge  (Pended)   -JG     [place]arlan/Cues/Accuracy (Memory Goal 2, PT)  demonstrates adequately  (Pended)   -JG     Time Frame (Patient Education Goal, PT)  long term goal (LTG);10 days  (Pended)   -JG       User Key  (r) = Recorded By, (t) = Taken By, (c) = Cosigned By    Initials Name Provider Type    JG Ary Pretty, PT Student PT Student        Clinical Impression     Row Name 06/10/20 1440          Pain Assessment    Additional Documentation  Pain Scale: FACES Pre/Post-Treatment (Group)  (Pended)   -JG     Row Name 06/10/20 1440          Pain Scale: FACES Pre/Post-Treatment    Pain: FACES Scale, Pretreatment  2-->hurts little bit  (Pended)   -JG     Pain: FACES Scale, Post-Treatment  2-->hurts little bit  (Pended)   -JG     Pre/Post Treatment Pain Comment  Pt indicated signs of discomfort with movement, but seemed relax when resting  (Pended)   -JG     Row Name 06/10/20 1440          Plan of Care Review    Plan of Care Reviewed With  patient;significant other  (Pended)   -JG     Progress  improving  (Pended)   -JG     Outcome Summary  Patient had difficulty participating with PT today and required intermittent reorientation due to drowsiness. He was able to stand and ambulate 3 ft with a rolling walker and modAx2, but utilized an inefficient stepping pattern and R quadriceps weakness. A R knee  immobilizer is recommended for future ambulation to enhance stability. Patient is appropriate for skilled PT services in order to promote functional independence. At this time, patient would benefit from skilled nursing facility following discharge  (Pended)   -REJIG     Row Name 06/10/20 1440          Physical Therapy Clinical Impression    Patient/Family Goals Statement (PT Clinical Impression)  Return to prior level of funtion  (Pended)   -JG     Criteria for Skilled Interventions Met (PT Clinical Impression)  yes  (Pended)   -JG     Rehab Potential (PT Clinical Summary)  fair, will monitor progress closely  (Pended)   -JG     Predicted Duration of Therapy (PT)  10 days  (Pended)   -JG     Row Name 06/10/20 1440          Vital Signs    Intra Systolic BP Rehab  114  (Pended)   -JG     Intra Treatment Diastolic BP  54  (Pended)   -JG     O2 Delivery Intra Treatment  room air  (Pended)   -JG     Intra Patient Position  Sitting  (Pended)   -JG     Row Name 06/10/20 1440          Positioning and Restraints    Pre-Treatment Position  in bed  (Pended)   -JG     Post Treatment Position  chair  (Pended)   -JG     In Chair  reclined;call light within reach;encouraged to call for assist;exit alarm on;with family/caregiver;on mechanical lift sling  (Pended)   -JG       User Key  (r) = Recorded By, (t) = Taken By, (c) = Cosigned By    Initials Name Provider Type    Ary Quiroz, PT Student PT Student        Outcome Measures     Row Name 06/10/20 1500          How much help from another person do you currently need...    Turning from your back to your side while in flat bed without using bedrails?  1  (Pended)   -JG     Moving from lying on back to sitting on the side of a flat bed without bedrails?  1  (Pended)   -JG     Moving to and from a bed to a chair (including a wheelchair)?  2  (Pended)   -JG     Standing up from a chair using your arms (e.g., wheelchair, bedside chair)?  2  (Pended)   -JG     Climbing 3-5 steps  with a railing?  1  (Pended)   -JG     To walk in hospital room?  2  (Pended)   -JG     AM-PAC 6 Clicks Score (PT)  9  (Pended)   -TB (r) REJIG (t)     Row Name 06/10/20 1500          Functional Assessment    Outcome Measure Options  AM-PAC 6 Clicks Basic Mobility (PT)  (Pended)   -ARASELI       User Key  (r) = Recorded By, (t) = Taken By, (c) = Cosigned By    Initials Name Provider Type    TB Connie Gibson, OT Occupational Therapist    Ary Quiroz, PT Student PT Student        Physical Therapy Education                 Title: PT OT SLP Therapies (In Progress)     Topic: Physical Therapy (Done)     Point: Mobility training (Done)     Description:   Instruct learner(s) on safety and technique for assisting patient out of bed, chair or wheelchair.  Instruct in the proper use of assistive devices, such as walker, crutches, cane or brace.              Patient Friendly Description:   It's important to get you on your feet again, but we need to do so in a way that is safe for you. Falling has serious consequences, and your personal safety is the most important thing of all.        When it's time to get out of bed, one of us or a family member will sit next to you on the bed to give you support.     If your doctor or nurse tells you to use a walker, crutches, a cane, or a brace, be sure you use it every time you get out of bed, even if you think you don't need it.    Learning Progress Summary           Patient Acceptance, E, VU,DU,NR by ARASELI at 6/10/2020 1330    Comment:  Pt and family was educated about transfer strategies.   Family Acceptance, E, VU,DU,NR by ARASELI at 6/10/2020 1330    Comment:  Pt and family was educated about transfer strategies.   Significant Other Acceptance, E, VU,DU,NR by ARASELI at 6/10/2020 1330    Comment:  Pt and family was educated about transfer strategies.                   Point: Home exercise program (Done)     Description:   Instruct learner(s) on appropriate technique for monitoring,  assisting and/or progressing patient with therapeutic exercises and activities.              Learning Progress Summary           Patient Acceptance, E, VU,DU,NR by  at 6/10/2020 1330    Comment:  Pt and family was educated about transfer strategies.   Family Acceptance, E, VU,DU,NR by  at 6/10/2020 1330    Comment:  Pt and family was educated about transfer strategies.   Significant Other Acceptance, E, VU,DU,NR by  at 6/10/2020 1330    Comment:  Pt and family was educated about transfer strategies.                   Point: Body mechanics (Done)     Description:   Instruct learner(s) on proper positioning and spine alignment for patient and/or caregiver during mobility tasks and/or exercises.              Learning Progress Summary           Patient Acceptance, E, VU,DU,NR by  at 6/10/2020 1330    Comment:  Pt and family was educated about transfer strategies.   Family Acceptance, E, VU,DU,NR by  at 6/10/2020 1330    Comment:  Pt and family was educated about transfer strategies.   Significant Other Acceptance, E, VU,DU,NR by  at 6/10/2020 1330    Comment:  Pt and family was educated about transfer strategies.                   Point: Precautions (Done)     Description:   Instruct learner(s) on prescribed precautions during mobility and gait tasks              Learning Progress Summary           Patient Acceptance, E, VU,DU,NR by  at 6/10/2020 1330    Comment:  Pt and family was educated about transfer strategies.   Family Acceptance, E, VU,DU,NR by  at 6/10/2020 1330    Comment:  Pt and family was educated about transfer strategies.   Significant Other Acceptance, E, VU,DU,NR by  at 6/10/2020 1330    Comment:  Pt and family was educated about transfer strategies.                               User Key     Initials Effective Dates Name Provider Type Discipline     05/14/20 -  Ary Pretty, PT Student PT Student PT              PT Recommendation and Plan  Planned Therapy Interventions (PT  Eval): (P) balance training, bed mobility training, gait training, home exercise program, motor coordination training, neuromuscular re-education, patient/family education, postural re-education, ROM (range of motion), strengthening, stretching, transfer training, wheelchair management/propulsion training  Outcome Summary/Treatment Plan (PT)  Anticipated Discharge Disposition (PT): (P) skilled nursing facility  Plan of Care Reviewed With: (P) patient, significant other  Progress: (P) improving  Outcome Summary: (P) Patient had difficulty participating with PT today and required intermittent reorientation due to drowsiness. He was able to stand and ambulate 3 ft with a rolling walker and modAx2, but utilized an inefficient stepping pattern and R quadriceps weakness. A R knee immobilizer is recommended for future ambulation to enhance stability. Patient is appropriate for skilled PT services in order to promote functional independence. At this time, patient would benefit from skilled nursing facility following discharge     Time Calculation:   PT Charges     Row Name 06/10/20 1330             Time Calculation    Start Time  1330  (Pended)   -JG      PT Received On  06/10/20  (Pended)   -J      PT Goal Re-Cert Due Date  06/20/20  (Pended)   -JG         Time Calculation- PT    Total Timed Code Minutes- PT  80 minute(s)  (Pended)   -JG         Timed Charges    70697 - PT Therapeutic Exercise Minutes  10  (Pended)   -JG      55692 - Gait Training Minutes   10  (Pended)   -JG        User Key  (r) = Recorded By, (t) = Taken By, (c) = Cosigned By    Initials Name Provider Type    JAry Phillips, PT Student PT Student        Therapy Charges for Today     Code Description Service Date Service Provider Modifiers Qty    84122453292 HC PT EVAL MOD COMPLEXITY 4 6/10/2020 Ary Pretty PT Student GP 1    62749147407 HC GAIT TRAINING EA 15 MIN 6/10/2020 Ary Pretty PT Student GP 1    [id number]  PT THER PROC EA  15 MIN 6/10/2020 Ary Pretty, PT Student GP 1          PT G-Codes  Outcome Measure Options: (P) AM-PAC 6 Clicks Basic Mobility (PT)  AM-PAC 6 Clicks Score (PT): (P) 9  AM-PAC 6 Clicks Score (OT): 10    Ary Pretty, PT Student  6/10/2020

## 2020-06-10 NOTE — PLAN OF CARE
Patient doing well this shift.  He is oriented to place and year, disoriented to place and situation.  Incontinent of bowel and bladder.  RIght hip gauze dressing is CDI after reinforcement from night shift.  Eliquis held today per Dr. Meza's orders. Temperature at lunch time was 99.7F, sepsis workup initiated by hospitalist. Zosyn and Vanc ordered, MRSA nares swab sent, CXR completed.  He has been afebrile the remainder of shift.  Worked well with therapy and sat up in chair.  CM working on rehab placement, will continue to monitor.

## 2020-06-10 NOTE — THERAPY EVALUATION
Acute Care - Occupational Therapy Initial Evaluation  [facility]     Patient Name: Marcos Acuña  : 1931  MRN: [id number]  Today's Date: 6/10/2020  Onset of Illness/Injury or Date of Surgery: 20  Date of Referral to OT: 20  Referring Physician: Derrick    Admit Date: 2020       ICD-10-CM ICD-9-CM   1. Closed fracture of right hip, initial encounter (CMS/[facility]) S72.001A 820.8   2. Fall, initial encounter W19.XXXA E888.9   3. Anticoagulated Z79.01 V58.61   4. 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   • Erectile dysfunction   • ETD (eustachian tube dysfunction)   • History of basal cell carcinoma   • Hyperlipoproteinemia   • Hypertrophy of prostate without urinary obstruction and other lower urinary tract symptoms (LUTS)   • 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[facility])   • Hypothyroidism   • Left leg cellulitis   • Type 2 diabetes mellitus without complication (CMS/[facility])   • Primary osteoarthritis of left knee   • Polycythemia   • Osteopenia   • Onychomycosis   • Obstructive sleep apnea syndrome   • Obesity   • Polycythemia vera (CMS/[facility])     Past Medical History:   Diagnosis Date   • Dementia (CMS/[facility])    • Diabetes (CMS/[facility])    • H/O blood clots    • Osteoarthritis      Past Surgical History:   Procedure Laterality Date   • BUNIONECTOMY     • HIP TROCHANTERIC NAILING WITH INTRAMEDULLARY HIP SCREW N/A 2020    Procedure: ADVANCED TROCHANTERIC FEMORAL NAIL (ATFN) RIGHT HIP/FEMUR;  Surgeon: Mejia Meza MD;  Location: [facility] OR;   Service: Orthopedics;  Laterality: N/A;   • RECTAL FISTULECTOMY     • SKIN CANCER EXCISION            OT ASSESSMENT FLOWSHEET (last 12 hours)      Occupational Therapy Evaluation     Row Name 06/10/20 1308                   OT Evaluation Time/Intention    Subjective Information  complains of;pain  -TB        Document Type  evaluation  -TB        Mode of Treatment  occupational therapy  -TB        Patient Effort  good  -TB        Symptoms Noted During/After Treatment  increased pain  -TB        Comment  alert, cooperative, following simple commands  -TB           General Information    Patient Profile Reviewed?  yes  -TB        Onset of Illness/Injury or Date of Surgery  06/08/20  -TB        Referring Physician  Derrick  -TB        Patient Observations  alert;cooperative;agree to therapy  -TB        Patient/Family Observations  Spouse and son present  -TB        General Observations of Patient  Pt in bed, room air  -TB        Prior Level of Function  mod assist:;ADL's;min assist:;transfer Caregivers for bathing/dressing, spouse assist for toileting  -TB        Equipment Currently Used at Home  shower chair;walker, rolling;grab bar;raised toilet  -TB        Pertinent History of Current Functional Problem  Pt to ED after mechanical fall; s/p R ATFN R femur  -TB        Existing Precautions/Restrictions  fall;right;hip h/o additional fall 4 months ago injuring spouse  -TB        Limitations/Impairments  safety/cognitive;hearing  -TB        Barriers to Rehab  cognitive status;previous functional deficit  -TB           Relationship/Environment    Primary Source of Support/Comfort  spouse;child(lovely)  -TB        Lives With  spouse  -TB        Family Caregiver if Needed  spouse;child(lovely), adult hired caregivers  -TB        Concerns About Impact on Relationships  Pt and spouse live at the [facility] and have hired caregivers to assist with bathing/dressing  -TB           Resource/Environmental Concerns    Current  "Living Arrangements  independent/assisted living facility  -TB           Home Main Entrance    Number of Stairs, Main Entrance  none  -TB           Stairs Within Home, Primary    Number of Stairs, Within Home, Primary  none  -TB           Cognitive Assessment/Intervention- PT/OT    Orientation Status (Cognition)  oriented to;person;place;verbal cues/prompts needed for orientation \"[place]\" for place  -TB        Follows Commands (Cognition)  75-90% accuracy;increased processing time needed;verbal cues/prompting required;repetition of directions required;physical/tactile prompts required  -TB        Safety Deficit (Cognitive)  ability to follow commands;awareness of need for assistance;insight into deficits/self awareness;judgment;problem solving;safety precautions awareness;safety precautions follow-through/compliance  -TB           Safety Issues, Functional Mobility    Safety Issues Affecting Function (Mobility)  ability to follow commands;awareness of need for assistance;insight into deficits/self awareness;judgment;problem solving;safety precaution awareness;safety precautions follow-through/compliance  -TB        Impairments Affecting Function (Mobility)  cognition;balance;pain;postural/trunk control;range of motion (ROM);strength  -TB           Mobility Assessment/Treatment    Extremity Weight-bearing Status  right lower extremity  -TB        Right Lower Extremity (Weight-bearing Status)  weight-bearing as tolerated (WBAT)  -TB           Bed Mobility Assessment/Treatment    Bed Mobility Assessment/Treatment  supine-sit  -TB        Supine-Sit [place] (Bed Mobility)  maximum assist (25% patient effort);2 person assist;verbal cues  -TB        Bed Mobility, Safety Issues  cognitive deficits limit understanding;decreased use of arms for pushing/pulling;decreased use of legs for bridging/pushing  -TB        Assistive Device (Bed Mobility)  draw sheet  -TB           Functional Mobility    Functional Mobility- " Comment  Defer to PT  -TB           Transfer Assessment/Treatment    Transfer Assessment/Treatment  sit-stand transfer;stand-sit transfer;bed-chair transfer  -TB           Bed-Chair Transfer    Bed-Chair [place] (Transfers)  maximum assist (25% patient effort);2 person assist;verbal cues  -TB        Assistive Device (Bed-Chair Transfers)  walker, front-wheeled  -TB           Sit-Stand Transfer    Sit-Stand [place] (Transfers)  maximum assist (25% patient effort);2 person assist;verbal cues  -TB        Assistive Device (Sit-Stand Transfers)  walker, front-wheeled  -TB           Stand-Sit Transfer    Stand-Sit [place] (Transfers)  maximum assist (25% patient effort);2 person assist;verbal cues  -TB        Assistive Device (Stand-Sit Transfers)  walker, front-wheeled  -TB           ADL Assessment/Intervention    BADL Assessment/Intervention  lower body dressing  -TB           Lower Body Dressing Assessment/Training    Lower Body Dressing [place] Level  don;socks;dependent (less than 25% patient effort)  -TB        Comment (Lower Body Dressing)  Pt has caregivers that complete at baseline  -TB           BADL Safety/Performance    Impairments, BADL Safety/Performance  cognition;balance;pain;range of motion;strength;trunk/postural control  -TB        Cognitive Impairments, BADL Safety/Performance  attention;awareness, need for assistance;insight into deficits/self awareness;judgment;problem solving/reasoning;safety precaution awareness;safety precaution follow-through Intermittent drowsiness  -TB        Skilled BADL Treatment/Intervention  BADL process/adaptation training  -TB           General ROM    GENERAL ROM COMMENTS  Significant B shoulder deficits, RUE>LUE  -TB           MMT (Manual Muscle Testing)    General MMT Comments  Moderate generalized weakness  -TB           Motor Assessment/Interventions    Additional Documentation  Balance (Group);Therapeutic Exercise (Group)  -TB            Therapeutic Exercise    Therapeutic Exercise  seated, upper extremities  -TB        Additional Documentation  Therapeutic Exercise (Row)  -TB           Upper Extremity Seated Therapeutic Exercise    Performed, Seated Upper Extremity (Therapeutic Exercise)  shoulder flexion/extension;shoulder abduction/adduction;shoulder horizontal abduction/adduction;elbow flexion/extension;digit flexion/extension  -TB        Exercise Type, Seated Upper Extremity (Therapeutic Exercise)  AAROM (active assistive range of motion)  -TB        Restrictions, Seated Upper Extremity (Therapeutic Exercise)  Significant B shoulder deficits, RUE>LUE  -TB           Balance    Balance  dynamic sitting balance;dynamic standing balance  -TB           Static Sitting Balance    Level of [place] (Unsupported Sitting, Static Balance)  contact guard assist  -TB        Sitting Position (Unsupported Sitting, Static Balance)  sitting on edge of bed  -TB           Dynamic Sitting Balance    Level of [place], Reaches Outside Midline (Sitting, Dynamic Balance)  moderate assist, 50 to 74% patient effort  -TB        Sitting Position, Reaches Outside Midline (Sitting, Dynamic Balance)  sitting on edge of bed  -TB        Comment, Reaches Outside Midline (Sitting, Dynamic Balance)  progressed to CGA; leans to L side to unwt R hip  -TB           Dynamic Standing Balance    Level of [place], Reaches Outside Midline (Standing, Dynamic Balance)  maximal assist, 25 to 49% patient effort;2 person assist  -TB        Time Able to Maintain Position, Reaches Outside Midline (Standing, Dynamic Balance)  1 to 2 minutes  -TB        Assistive Device Utilized (Supported Standing, Dynamic Balance)  walker, rolling  -TB        Comment, Reaches Outside Midline (Standing, Dynamic Balance)  transfer training initiated  -TB           Sensory Assessment/Intervention    Sensory General Assessment  no sensation deficits identified BUE intact  -TB           Positioning and  Restraints    Pre-Treatment Position  in bed  -TB        Post Treatment Position  chair  -TB        In Chair  notified nsg;reclined;call light within reach;encouraged to call for assist;exit alarm on;with family/caregiver;waffle cushion;on mechanical lift sling;legs elevated  -TB           Pain Assessment    Additional Documentation  Pain Scale: Word Pre/Post-Treatment (Group)  -TB           Pain Scale: Numbers Pre/Post-Treatment    Pain Location - Side  Right  -TB        Pain Location - Orientation  lower  -TB        Pain Location  extremity  -TB        Pain Intervention(s)  Ambulation/increased activity;Repositioned  -TB           Pain Scale: Word Pre/Post-Treatment    Pain: Word Scale, Pretreatment  2 - mild pain  -TB        Pain: Word Scale, Post-Treatment  4 - moderate pain  -TB        Pre/Post Treatment Pain Comment  Pt tolerated OOB activity and positioning  -TB           Wound 06/08/20 1958 Left posterior elbow Abrasion    Wound - Properties Group Date first assessed: 06/08/20  -OD Time first assessed: 1958  -OD Side: Left  -OD Orientation: posterior  -OD Location: elbow  -OD Primary Wound Type: Abrasion  -OD       Wound 06/08/20 1600 Left posterior hand Abrasion    Wound - Properties Group Date first assessed: 06/08/20  -OD Time first assessed: 1600  -OD Side: Left  -OD Orientation: posterior  -OD Location: hand  -OD Primary Wound Type: Abrasion  -OD       Wound 06/09/20 Right anterior hip Incision    Wound - Properties Group Date first assessed: 06/09/20  -TP Present on Hospital Admission: N  -TP Side: Right  -TP Orientation: anterior  -TP Location: hip  -TP Primary Wound Type: Incision  -TP       Wound 06/09/20 Right posterior hand Skin Tear    Wound - Properties Group Date first assessed: 06/09/20  -TP Present on Hospital Admission: N  -TP Side: Right  -TP Orientation: posterior  -TP Location: hand  -TP Primary Wound Type: Skin tear  -TP       Coping    Observed Emotional State  accepting;cooperative   -TB        Verbalized Emotional State  acceptance  -TB           Plan of Care Review    Plan of Care Reviewed With  patient;spouse;son  -TB           Clinical Impression (OT)    Date of Referral to OT  06/09/20  -TB        OT Diagnosis  Impaired mobility and ADL  -TB        Patient/Family Goals Statement (OT Eval)  pt wanting to improve mobility  -TB        Criteria for Skilled Therapeutic Interventions Met (OT Eval)  yes;treatment indicated  -TB        Rehab Potential (OT Eval)  fair, will monitor progress closely  -TB        Therapy Frequency (OT Eval)  daily  -TB        Care Plan Review (OT)  evaluation/treatment results reviewed;care plan/treatment goals reviewed;risks/benefits reviewed;patient/other agree to care plan  -TB        Care Plan Review, Other Participant (OT Eval)  spouse;son  -TB        Anticipated Equipment Needs at Discharge (OT)  -- Defer to SNF  -TB        Patient/Family Concerns, Equipment Needs at Discharge (OT)  Don't anticipate DME needs  -TB        Anticipated Discharge Disposition (OT)  skilled nursing facility  -TB           Vital Signs    Pre Systolic BP Rehab  -- RN cleared OT  -TB        O2 Delivery Post Treatment  room air  -TB        Pre Patient Position  Supine  -TB        Intra Patient Position  Standing  -TB        Post Patient Position  Sitting  -TB           Planned OT Interventions    Planned Therapy Interventions (OT Eval)  transfer/mobility retraining;occupation/activity based interventions;BADL retraining  -TB           OT Goals    Transfer Goal Selection (OT)  transfer, OT goal 1  -TB        Bathing Goal Selection (OT)  bathing, OT goal 1  -TB        Grooming Goal Selection (OT)  grooming, OT goal 1  -TB        Self-Feeding Goal Selection (OT)  self feeding, OT goal 1  -TB           Transfer Goal 1 (OT)    Activity/Assistive Device (Transfer Goal 1, OT)  walker, rolling;toilet  -TB        [place] Level/Cues Needed (Transfer Goal 1, OT)  moderate assist (50-74% patient  effort);2 person assist  -TB        Time Frame (Transfer Goal 1, OT)  by discharge  -TB        Barriers (Transfers Goal 1, OT)  cognition, pain, strength and balance  -TB        Progress/Outcome (Transfer Goal 1, OT)  goal ongoing  -TB           Bathing Goal 1 (OT)    Activity/Assistive Device (Bathing Goal 1, OT)  lower body bathing;long-handled sponge  -TB        [place] Level/Cues Needed (Bathing Goal 1, OT)  moderate assist (50-74% patient effort);verbal cues required;tactile cues required  -TB        Time Frame (Bathing Goal 1, OT)  by discharge  -TB        Barriers (Bathing Goal 1, OT)  cognition, pain, strength, balance  -TB        Progress/Outcomes (Bathing Goal 1, OT)  goal ongoing  -TB           Grooming Goal 1 (OT)    Activity/Device (Grooming Goal 1, OT)  wash face, hands;hair care  -TB        [place] (Grooming Goal 1, OT)  set-up required;verbal cues required  -TB        Time Frame (Grooming Goal 1, OT)  by discharge  -TB        Barriers (Grooming Goal 1, OT)  cognition, strength, ROM  -TB        Progress/Outcome (Grooming Goal 1, OT)  goal ongoing  -TB           Self-Feeding Goal 1 (OT)    Activity/Assistive Device (Self-Feeding Goal 1, OT)  scoop food and bring to mouth;liquids to mouth  -TB        [place] Level/Cues Needed (Self-Feeding Goal 1, OT)  minimum assist (75% or more patient effort);verbal cues required;tactile cues required  -TB        Time Frame (Self-Feeding Goal 1, OT)  by discharge  -TB        Barriers (Self-Feeding Goal 1, OT)  cognition, strength, ROM  -TB        Progress/Outcomes (Self-Feeding Goal 1, OT)  goal ongoing  -TB           Living Environment    Home Accessibility  -- No concerns; shower seat and raised commode with rails  -TB          User Key  (r) = Recorded By, (t) = Taken By, (c) = Cosigned By    Initials Name Effective Dates    TB Connie Gibson, OT 06/08/18 -     Demetria Dutta RN 08/30/18 -     TP Helen Benites RN 12/10/19 -           Occupational Therapy Education                 Title: PT OT SLP Therapies (In Progress)     Topic: Occupational Therapy (In Progress)     Point: ADL training (In Progress)     Description:   Instruct learner(s) on proper safety adaptation and remediation techniques during self care or transfers.   Instruct in proper use of assistive devices.              Learning Progress Summary           Patient Acceptance, E, NR by TB at 6/10/2020 1532    Comment:  Education initiated for benefits of OOB activity/therapy, role of OT, transfer training initiated and discussed recommendation for SNF/rehab at d/c.   Family Acceptance, E, NR by TB at 6/10/2020 1532    Comment:  Education initiated for benefits of OOB activity/therapy, role of OT, transfer training initiated and discussed recommendation for SNF/rehab at d/c.                               User Key     Initials Effective Dates Name Provider Type Discipline     06/08/18 -  Connie Gibson, GALLITO Occupational Therapist OT                  OT Recommendation and Plan  Outcome Summary/Treatment Plan (OT)  Anticipated Equipment Needs at Discharge (OT): (Defer to SNF)  Patient/Family Concerns, Equipment Needs at Discharge (OT): Don't anticipate DME needs  Anticipated Discharge Disposition (OT): skilled nursing facility  Planned Therapy Interventions (OT Eval): transfer/mobility retraining, occupation/activity based interventions, BADL retraining  Therapy Frequency (OT Eval): daily  Plan of Care Review  Plan of Care Reviewed With: patient, spouse, son  Plan of Care Reviewed With: patient, spouse, son  Outcome Summary: OT IE complete. Pt alert, confused; follows simple commands. Pt OOB and UIC with Mod-Max assist x2. Dependent for LB dressing/toileting ADLs. Pt has caregivers for bathing/dressing at baseline. Spouse typically assists with toilet transfers and care RW level. OT will follow IP to advance ADL as able. Recommend SNF at d/c for best outcome.    Outcome  Measures     Row Name 06/10/20 1345             How much help from another is currently needed...    Putting on and taking off regular lower body clothing?  1  -TB      Bathing (including washing, rinsing, and drying)  2  -TB      Toileting (which includes using toilet bed pan or urinal)  1  -TB      Putting on and taking off regular upper body clothing  2  -TB      Taking care of personal grooming (such as brushing teeth)  2  -TB      Eating meals  2  -TB      AM-PAC 6 Clicks Score (OT)  10  -TB         Functional Assessment    Outcome Measure Options  AM-PAC 6 Clicks Daily Activity (OT)  -TB        User Key  (r) = Recorded By, (t) = Taken By, (c) = Cosigned By    Initials Name Provider Type    Connie York OT Occupational Therapist          Time Calculation:   Time Calculation- OT     Row Name 06/10/20 1536 06/10/20 1330          Time Calculation- OT    OT Start Time  1345  -TB  --     OT Received On  06/10/20  -TB  --     OT Goal Re-Cert Due Date  06/20/20  -TB  --        Timed Charges    89471 - Gait Training Minutes   --  10  (Pended)   -ARASELI       User Key  (r) = Recorded By, (t) = Taken By, (c) = Cosigned By    Initials Name Provider Type    Connie York OT Occupational Therapist    Ary Quiroz, PT Student PT Student        Therapy Charges for Today     Code Description Service Date Service Provider Modifiers Qty    [id number] HC OT EVAL MOD COMPLEXITY 4 6/10/2020 Connie Gibson OT GO 1               Connie Gibson OT  6/10/2020

## 2020-06-10 NOTE — PROGRESS NOTES
According to sitter apparently was up all night.  Awake.  Moves toes and feet warm pulses present  Afebrile vital signs stable  Acceptable reduction on x-ray  First day postop long a TFN right subtrochanteric intertrochanteric greater troch lesser troch fractures  Had bleeding through bandages.  Has been on chronic Eliquis.  We will hold Eliquis for 1 day.    Mobilize to tolerance in future placement  Check labs.

## 2020-06-10 NOTE — PLAN OF CARE
Remains confused and disoriented. Slept at short intervals. C/O pain upon movement to reposition. Moderate edema noted right thigh. Small - moderate amt of bloody drainage noted on incision dressing of theUpper lateral thigh dressing reinforced with ABD pads.  V/S stable. Cont. to void incont. Reuses to keep Telemetry on. NSR noted on monitor when Telemetry is in use.

## 2020-06-11 LAB
ALBUMIN SERPL-MCNC: 2.6 G/DL (ref 3.5–5.2)
ANION GAP SERPL CALCULATED.3IONS-SCNC: 11 MMOL/L (ref 5–15)
BUN BLD-MCNC: 16 MG/DL (ref 8–23)
BUN/CREAT SERPL: 18.2 (ref 7–25)
CALCIUM SPEC-SCNC: 8.1 MG/DL (ref 8.6–10.5)
CHLORIDE SERPL-SCNC: 105 MMOL/L (ref 98–107)
CO2 SERPL-SCNC: 23 MMOL/L (ref 22–29)
CREAT BLD-MCNC: 0.88 MG/DL (ref 0.76–1.27)
DEPRECATED RDW RBC AUTO: 57.1 FL (ref 37–54)
ERYTHROCYTE [DISTWIDTH] IN BLOOD BY AUTOMATED COUNT: 17.2 % (ref 12.3–15.4)
GFR SERPL CREATININE-BSD FRML MDRD: 82 ML/MIN/1.73
GLUCOSE BLD-MCNC: 147 MG/DL (ref 65–99)
GLUCOSE BLDC GLUCOMTR-MCNC: 156 MG/DL (ref 70–130)
GLUCOSE BLDC GLUCOMTR-MCNC: 158 MG/DL (ref 70–130)
GLUCOSE BLDC GLUCOMTR-MCNC: 172 MG/DL (ref 70–130)
GLUCOSE BLDC GLUCOMTR-MCNC: 205 MG/DL (ref 70–130)
HCT VFR BLD AUTO: 28 % (ref 37.5–51)
HGB BLD-MCNC: 8.8 G/DL (ref 13–17.7)
MCH RBC QN AUTO: 28.3 PG (ref 26.6–33)
MCHC RBC AUTO-ENTMCNC: 31.4 G/DL (ref 31.5–35.7)
MCV RBC AUTO: 90 FL (ref 79–97)
PHOSPHATE SERPL-MCNC: 2.6 MG/DL (ref 2.5–4.5)
PLATELET # BLD AUTO: 135 10*3/MM3 (ref 140–450)
PMV BLD AUTO: 11.2 FL (ref 6–12)
POTASSIUM BLD-SCNC: 3.7 MMOL/L (ref 3.5–5.2)
RBC # BLD AUTO: 3.11 10*6/MM3 (ref 4.14–5.8)
SODIUM BLD-SCNC: 139 MMOL/L (ref 136–145)
WBC NRBC COR # BLD: 9.81 10*3/MM3 (ref 3.4–10.8)

## 2020-06-11 PROCEDURE — 25010000002 VANCOMYCIN 10 G RECONSTITUTED SOLUTION

## 2020-06-11 PROCEDURE — 85027 COMPLETE CBC AUTOMATED: CPT | Performed by: INTERNAL MEDICINE

## 2020-06-11 PROCEDURE — 82962 GLUCOSE BLOOD TEST: CPT

## 2020-06-11 PROCEDURE — 25010000002 PIPERACILLIN SOD-TAZOBACTAM PER 1 G: Performed by: INTERNAL MEDICINE

## 2020-06-11 PROCEDURE — 99232 SBSQ HOSP IP/OBS MODERATE 35: CPT | Performed by: INTERNAL MEDICINE

## 2020-06-11 PROCEDURE — 80069 RENAL FUNCTION PANEL: CPT | Performed by: INTERNAL MEDICINE

## 2020-06-11 PROCEDURE — 63710000001 INSULIN LISPRO (HUMAN) PER 5 UNITS: Performed by: ORTHOPAEDIC SURGERY

## 2020-06-11 PROCEDURE — 97530 THERAPEUTIC ACTIVITIES: CPT

## 2020-06-11 RX ADMIN — ACETAMINOPHEN 650 MG: 325 TABLET, FILM COATED ORAL at 14:18

## 2020-06-11 RX ADMIN — APIXABAN 2.5 MG: 2.5 TABLET, FILM COATED ORAL at 22:23

## 2020-06-11 RX ADMIN — ACETAMINOPHEN 650 MG: 325 TABLET, FILM COATED ORAL at 22:23

## 2020-06-11 RX ADMIN — TAZOBACTAM SODIUM AND PIPERACILLIN SODIUM 3.38 G: 375; 3 INJECTION, SOLUTION INTRAVENOUS at 22:23

## 2020-06-11 RX ADMIN — TAZOBACTAM SODIUM AND PIPERACILLIN SODIUM 3.38 G: 375; 3 INJECTION, SOLUTION INTRAVENOUS at 14:14

## 2020-06-11 RX ADMIN — INSULIN LISPRO 2 UNITS: 100 INJECTION, SOLUTION INTRAVENOUS; SUBCUTANEOUS at 11:44

## 2020-06-11 RX ADMIN — TAZOBACTAM SODIUM AND PIPERACILLIN SODIUM 3.38 G: 375; 3 INJECTION, SOLUTION INTRAVENOUS at 05:22

## 2020-06-11 RX ADMIN — OXYBUTYNIN CHLORIDE 10 MG: 10 TABLET, EXTENDED RELEASE ORAL at 08:29

## 2020-06-11 RX ADMIN — VANCOMYCIN HYDROCHLORIDE 1250 MG: 10 INJECTION, POWDER, LYOPHILIZED, FOR SOLUTION INTRAVENOUS at 08:44

## 2020-06-11 RX ADMIN — RIVASTIGMINE TRANSDERMAL SYSTEM 1 PATCH: 9.5 PATCH, EXTENDED RELEASE TRANSDERMAL at 18:39

## 2020-06-11 RX ADMIN — LOSARTAN POTASSIUM 50 MG: 50 TABLET, FILM COATED ORAL at 08:29

## 2020-06-11 RX ADMIN — APIXABAN 2.5 MG: 2.5 TABLET, FILM COATED ORAL at 08:29

## 2020-06-11 RX ADMIN — TAMSULOSIN HYDROCHLORIDE 0.4 MG: 0.4 CAPSULE ORAL at 22:23

## 2020-06-11 RX ADMIN — LEVOTHYROXINE SODIUM 75 MCG: 75 TABLET ORAL at 08:29

## 2020-06-11 RX ADMIN — INSULIN LISPRO 2 UNITS: 100 INJECTION, SOLUTION INTRAVENOUS; SUBCUTANEOUS at 08:30

## 2020-06-11 NOTE — THERAPY TREATMENT NOTE
Patient Name: Marcos Acuña  : 1931    MRN: [id number]                              Today's Date: 2020       Admit Date: 2020    Visit Dx:     ICD-10-CM ICD-9-CM   1. Closed fracture of right hip, initial encounter (CMS/[facility]) S72.001A 820.8   2. Fall, initial encounter W19.XXXA E888.9   3. Anticoagulated Z79.01 V58.61   4. 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   • Erectile dysfunction   • ETD (eustachian tube dysfunction)   • History of basal cell carcinoma   • Hyperlipoproteinemia   • Hypertrophy of prostate without urinary obstruction and other lower urinary tract symptoms (LUTS)   • 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[facility])   • Hypothyroidism   • Left leg cellulitis   • Type 2 diabetes mellitus without complication (CMS/[facility])   • Primary osteoarthritis of left knee   • Polycythemia   • Osteopenia   • Onychomycosis   • Obstructive sleep apnea syndrome   • Obesity   • Polycythemia vera (CMS/[facility])     Past Medical History:   Diagnosis Date   • Dementia (CMS/HCC)    • Diabetes (CMS/HCC)    • H/O blood clots    • Osteoarthritis      Past Surgical History:   Procedure Laterality Date   • BUNIONECTOMY     • HIP TROCHANTERIC NAILING WITH INTRAMEDULLARY HIP SCREW N/A 2020    Procedure: ADVANCED TROCHANTERIC FEMORAL NAIL (ATFN) RIGHT HIP/FEMUR;  Surgeon: Mejia Meza MD;  Location: [facility];  Service: Orthopedics;  Laterality: N/A;   • RECTAL FISTULECTOMY     • SKIN CANCER EXCISION       General Information     Row Name 20 1537  06/11/20 1529       PT Evaluation Time/Intention    Document Type  --  (Pended)   -  therapy note (daily note)  -SC    Mode of Treatment  --  (Pended)   -  physical therapy  -SC    Row Name 06/11/20 1537 06/11/20 1529       General Information    Patient Profile Reviewed?  --  (Pended)   -JG  yes  -SC    Existing Precautions/Restrictions  --  (Pended)   -J  fall;other (see comments);brace worn when out of bed assess for brace use  -SC    Row Name 06/11/20 1537 06/11/20 1529       Cognitive Assessment/Intervention- PT/OT    Orientation Status (Cognition)  --  (Pended)   -  oriented to;person  -SC    Cognitive Assessment/Intervention Comment  --  (Pended)   -  eyes closed 90% of time, patient still talkative, confused and aggitated at times.   -SC    Row Name 06/11/20 1529          Safety Issues, Functional Mobility    Safety Issues Affecting Function (Mobility)  ability to follow commands;awareness of need for assistance;insight into deficits/self awareness;judgment;problem solving;safety precaution awareness;sequencing abilities  -SC     Impairments Affecting Function (Mobility)  balance;cognition;coordination;endurance/activity tolerance;motor control;pain;postural/trunk control;motor planning;strength  -SC       User Key  (r) = Recorded By, (t) = Taken By, (c) = Cosigned By    Initials Name Provider Type    SC Glenda Gandara, PT Physical Therapist    Ary Quiroz, PT Student PT Student        Mobility     Row Name 06/11/20 1531          Bed Mobility Assessment/Treatment    Bed Mobility Assessment/Treatment  rolling right;rolling left  -SC     Rolling Left [place] (Bed Mobility)  dependent (less than 25% patient effort);verbal cues  -SC     Rolling Right [place] (Bed Mobility)  dependent (less than 25% patient effort);verbal cues  -SC     Scooting/Bridging [place] (Bed Mobility)  dependent (less than 25% patient effort) drawsheet assistance to get back into chair   -SC      Supine-Sit [place] (Bed Mobility)  -- deferred  -SC     Comment (Bed Mobility)  Working on rolling to don sling. Cues for reaching for railing. Patient able hold to the rail.   -SC     Row Name 06/11/20 1531          Transfer Assessment/Treatment    Comment (Transfers)  STS from edge of chair with cues for sequencing. Patient attempted but could not get to full standing . Demonstrated posterior lean and legs sliding forward  -SC     Row Name 06/11/20 1531          Bed-Chair Transfer    Bed-Chair [place] (Transfers)  dependent (less than 25% patient effort)  -SC     Assistive Device (Bed-Chair Transfers)  mechanical lift/aid  -SC     Row Name 06/11/20 1531          Gait/Stairs Assessment/Training    Comment (Gait/Stairs)  deferred- unable to get to full standing  -SC     Row Name 06/11/20 1531          Mobility Assessment/Intervention    Extremity Weight-bearing Status  right lower extremity  -SC     Right Lower Extremity (Weight-bearing Status)  weight-bearing as tolerated (WBAT)  -SC       User Key  (r) = Recorded By, (t) = Taken By, (c) = Cosigned By    Initials Name Provider Type    SC Glenda Gandara, PT Physical Therapist        Obj/Interventions     Row Name 06/11/20 1542          Therapeutic Exercise    Lower Extremity (Therapeutic Exercise)  LAQ (long arc quad), bilateral  -SC     Lower Extremity Range of Motion (Therapeutic Exercise)  hip flexion/extension, bilateral;hip abduction/adduction, bilateral;ankle dorsiflexion/plantar flexion, bilateral  -SC     Exercise Type (Therapeutic Exercise)  AAROM (active assistive range of motion)  -SC     Position (Therapeutic Exercise)  seated;supine  -SC     Sets/Reps (Therapeutic Exercise)  5-10  -SC     Expected Outcome (Therapeutic Exercise)  facilitate normal movement patterns  -SC     Comment (Therapeutic Exercise)  cues for tecnique. Patient resisting with some rom. Unable to do isometrics  -SC     Row Name 06/11/20 1542          Static Sitting Balance     Level of [place] (Unsupported Sitting, Static Balance)  moderate assist, 50 to 74% patient effort;2 person assist  -SC     Sitting Position (Unsupported Sitting, Static Balance)  sitting in chair  -SC     Time Able to Maintain Position (Unsupported Sitting, Static Balance)  2 to 3 minutes  -SC     Comment (Unsupported Sitting, Static Balance)  working on sustaining midline sitting. Initially with posterior pushing. Required mod assistance with sheet to get to midline. PT holding position to inhibit posterior pushig. Patient demonstrated improvement to sitting midline with contact guarding  -[facility] Name 06/11/20 1542          Dynamic Standing Balance    Level of [place], Reaches Outside Midline (Standing, Dynamic Balance)  dependent, less than 25% patient effort  -SC       User Key  (r) = Recorded By, (t) = Taken By, (c) = Cosigned By    Initials Name Provider Type    SC Glenda Gandara, PT Physical Therapist        Goals/Plan    No documentation.       Clinical Impression     [facility] Name 06/11/20 1547          Pain Assessment    Additional Documentation  Pain Scale: FACES Pre/Post-Treatment (Group)  -SC     Row Name 06/11/20 1547          Pain Scale: Numbers Pre/Post-Treatment    Pain Location - Side  Right  -SC     Pain Location - Orientation  lower  -SC     Pain Location  extremity  -SC     Pain Intervention(s)  Repositioned  -[facility] Name 06/11/20 154          Pain Scale: FACES Pre/Post-Treatment    Pain: FACES Scale, Pretreatment  2-->hurts little bit  -SC     Pain: FACES Scale, Post-Treatment  6-->hurts even more  -SC     Row Name 06/11/20 1545          Plan of Care Review    Plan of Care Reviewed With  patient;spouse;daughter  -SC     Progress  declining  -SC     Outcome Summary  Patient with increasing confusion and difficulty following directions. He was at times slighly aggitated. He needed encouragement to participate in exercise. He was unale to get to standing or ambulation today.  Recommend continued skilled PT  -SC     Row Name 06/11/20 1547          Physical Therapy Clinical Impression    Criteria for Skilled Interventions Met (PT Clinical Impression)  treatment indicated;yes  -SC     Rehab Potential (PT Clinical Summary)  fair, will monitor progress closely  -SC     Row Name 06/11/20 1547          Positioning and Restraints    Pre-Treatment Position  in bed  -SC     Post Treatment Position  chair  -SC     In Chair  reclined;sitting;call light within reach;encouraged to call for assist;exit alarm on;notified nsg;with family/caregiver  -SC       User Key  (r) = Recorded By, (t) = Taken By, (c) = Cosigned By    Initials Name Provider Type    Glenda Grissom, PT Physical Therapist        Outcome Measures     Row Name 06/11/20 9150          How much help from another person do you currently need...    Turning from your back to your side while in flat bed without using bedrails?  1  -SC     Moving from lying on back to sitting on the side of a flat bed without bedrails?  1  -SC     Moving to and from a bed to a chair (including a wheelchair)?  1  -SC     Standing up from a chair using your arms (e.g., wheelchair, bedside chair)?  2  -SC     Climbing 3-5 steps with a railing?  1  -SC     To walk in hospital room?  1  -SC     AM-PAC 6 Clicks Score (PT)  7  -SC       User Key  (r) = Recorded By, (t) = Taken By, (c) = Cosigned By    Initials Name Provider Type    Glenda Grissom, PT Physical Therapist        Physical Therapy Education                 Title: PT OT SLP Therapies (In Progress)     Topic: Physical Therapy (In Progress)     Point: Mobility training (In Progress)     Description:   Instruct learner(s) on safety and technique for assisting patient out of bed, chair or wheelchair.  Instruct in the proper use of assistive devices, such as walker, crutches, cane or brace.              Patient Friendly Description:   It's important to get you on your feet again, but we need to do so in a  way that is safe for you. Falling has serious consequences, and your personal safety is the most important thing of all.        When it's time to get out of bed, one of us or a family member will sit next to you on the bed to give you support.     If your doctor or nurse tells you to use a walker, crutches, a cane, or a brace, be sure you use it every time you get out of bed, even if you think you don't need it.    Learning Progress Summary           Patient Eager, E, NR,NL by SC at 6/11/2020 1551    Comment:  reviewed HEP    Acceptance, E, VU,DU,NR by REJI at 6/10/2020 1330    Comment:  Pt and family was educated about transfer strategies.   Family Acceptance, E, VU,DU,NR by REJI at 6/10/2020 1330    Comment:  Pt and family was educated about transfer strategies.   Significant Other Acceptance, E, VU,DU,NR by REJI at 6/10/2020 1330    Comment:  Pt and family was educated about transfer strategies.                   Point: Home exercise program (In Progress)     Description:   Instruct learner(s) on appropriate technique for monitoring, assisting and/or progressing patient with therapeutic exercises and activities.              Learning Progress Summary           Patient Eager, E, NR,NL by SC at 6/11/2020 1551    Comment:  reviewed HEP    Acceptance, E, VU,DU,NR by  at 6/10/2020 1330    Comment:  Pt and family was educated about transfer strategies.   Family Acceptance, E, VU,DU,NR by REJI at 6/10/2020 1330    Comment:  Pt and family was educated about transfer strategies.   Significant Other Acceptance, E, VU,DU,NR by  at 6/10/2020 1330    Comment:  Pt and family was educated about transfer strategies.                   Point: Body mechanics (In Progress)     Description:   Instruct learner(s) on proper positioning and spine alignment for patient and/or caregiver during mobility tasks and/or exercises.              Learning Progress Summary           Patient Eager, E, NR,NL by SC at 6/11/2020 1551    Comment:  reviewed  HEP    Acceptance, E, VU,DU,NR by J at 6/10/2020 1330    Comment:  Pt and family was educated about transfer strategies.   Family Acceptance, E, VU,DU,NR by J at 6/10/2020 1330    Comment:  Pt and family was educated about transfer strategies.   Significant Other Acceptance, E, VU,DU,NR by  at 6/10/2020 1330    Comment:  Pt and family was educated about transfer strategies.                   Point: Precautions (In Progress)     Description:   Instruct learner(s) on prescribed precautions during mobility and gait tasks              Learning Progress Summary           Patient Eager, E, NR,NL by SC at 6/11/2020 1551    Comment:  reviewed HEP    Acceptance, E, VU,DU,NR by J at 6/10/2020 1330    Comment:  Pt and family was educated about transfer strategies.   Family Acceptance, E, VU,DU,NR by  at 6/10/2020 1330    Comment:  Pt and family was educated about transfer strategies.   Significant Other Acceptance, E, VU,DU,NR by  at 6/10/2020 1330    Comment:  Pt and family was educated about transfer strategies.                               User Key     Initials Effective Dates Name Provider Type Discipline    SC 06/19/15 -  Glenda Gandara, PT Physical Therapist PT     05/14/20 -  Ary Pretty, PT Student PT Student PT              PT Recommendation and Plan     Outcome Summary/Treatment Plan (PT)  Anticipated Discharge Disposition (PT): skilled nursing facility  Plan of Care Reviewed With: patient  Progress: declining  Outcome Summary: Patient with increasing confusion and difficulty following directions. He was at times slighly aggitated. He needed encouragement to participate in exercise. He was unale to get to standing or ambulation today. Recommend continued skilled PT     Time Calculation:   PT Charges     Row Name 06/11/20 1448             Time Calculation    Start Time  1448  -SC      PT Received On  06/11/20  -SC      PT Goal Re-Cert Due Date  06/20/20  -SC         Time Calculation- PT    Total  Timed Code Minutes- PT  30 minute(s)  -SC         Timed Charges    97257 - PT Therapeutic Exercise Minutes  5  -SC      63665 - PT Therapeutic Activity Minutes  25  -SC        User Key  (r) = Recorded By, (t) = Taken By, (c) = Cosigned By    Initials Name Provider Type    SC Glenda Gandara PT Physical Therapist        Therapy Charges for Today     Code Description Service Date Service Provider Modifiers Qty    06085938143  PT THERAPEUTIC ACT EA 15 MIN 6/11/2020 Glenda Gandara PT GP 2    27049924842 HC PT THER SUPP EA 15 MIN 6/11/2020 Glenda Gandara PT GP 2          PT G-Codes  Outcome Measure Options: AM-PAC 6 Clicks Basic Mobility (PT)  AM-PAC 6 Clicks Score (PT): 7  AM-PAC 6 Clicks Score (OT): 10    Glenda Gandara PT  6/11/2020

## 2020-06-11 NOTE — PROGRESS NOTES
Continued Stay Note  [facility]     Patient Name: Marcos Acuña  MRN: [id number]  Today's Date: 6/11/2020    Admit Date: 6/8/2020    Discharge Plan     Row Name 06/11/20 1149       Plan    Plan  Short term rehab    Patient/Family in Agreement with Plan  yes    Plan Comments  Per Angy nieto/ the [facility], they are unable to accept patient due to his need for private caregivers/sitters. Due to Covid they have restrictions at their facilities on outside assistance being provided. Spoke w/ [facility] w/ [facility]. [facility] will allow private caregivers as long as they have a recent- within 72 hours, negative Covid result and are willing to have daily screening at the facility. Spoke w/ patient's son this morning and dicussed possible challenges of his father needing both rehab and private sitters during this time. Attempted to speak with him again to provide an update regarding the [facility] determination and the subsequent referral to [facility]. Did not receive an answer and the VM is full. Will try again later. CM following.     ADDENDUM: Per Florence w/ [facility], they are no longer allowing private caregivers at this time. [facility] is dev to offer a bed to patient without private caregivers. She is discussing the matter w/ patient's son, Jose, and will update CM if he is agreeable.     Final Discharge Disposition Code  03 - skilled nursing facility (SNF)        Discharge Codes    No documentation.             Sandy Espinal RN

## 2020-06-11 NOTE — PROGRESS NOTES
"    [facility] Medicine Services  PROGRESS NOTE    Patient Name: Marcos Acuña  : 1931  MRN: [id number]    Date of Admission: 2020  Primary Care Physician: Abdirashid Mccabe MD    Subjective   Subjective     CC:  Hip fx      HPI:  Confused, asking to speak to \"management\"    Review of Systems  Not able to obtain secondary to confusion    Objective   Objective     Vital Signs:   Temp:  [98.2 °F (36.8 °C)-99.2 °F (37.3 °C)] 98.2 °F (36.8 °C)  Heart Rate:  [74-90] 90  Resp:  [14-16] 16  BP: (105-170)/(52-82) 154/73        Physical Exam:  Constitutional: awake, agitated  HENT: NCAT, mucous membranes moist  Respiratory: Clear to auscultation bilaterally, respiratory effort normal   Cardiovascular: RRR, no murmurs  Gastrointestinal: Positive bowel sounds, soft, nontender, obese  Musculoskeletal: No bilateral ankle edema  Psychiatric: Appropriate affect, cooperative  Neurologic: not oriented, not following commands  Skin: No rashes    Results Reviewed:  Results from last 7 days   Lab Units 20  0552 06/10/20  0552 20  0532 20  1221   WBC 10*3/mm3 9.81 9.19 9.37 7.89   HEMOGLOBIN g/dL 8.8* 10.1* 11.4* 12.5*   HEMATOCRIT % 28.0* 33.0* 36.0* 38.7   PLATELETS 10*3/mm3 135* 130* 159 191   INR   --   --   --  1.93*     Results from last 7 days   Lab Units 2052 06/10/20  0552 20  0532 20  1221   SODIUM mmol/L 139 140 140 140   POTASSIUM mmol/L 3.7 4.0 4.1 4.4   CHLORIDE mmol/L 105 106 102 104   CO2 mmol/L 23.0 24.0 24.0 24.0   BUN mg/dL 16 13 14 11   CREATININE mg/dL 0.88 0.82 0.86 0.91   GLUCOSE mg/dL 147* 158* 204* 140*   CALCIUM mg/dL 8.1* 8.2* 8.7 8.5*   ALT (SGPT) U/L  --   --   --  14   AST (SGOT) U/L  --   --   --  25   TROPONIN T ng/mL  --   --   --  0.013   PROBNP pg/mL  --   --   --  65.4     Estimated Creatinine Clearance: 64.8 mL/min (by C-G formula based on SCr of 0.88 mg/dL).    Microbiology Results Abnormal     Procedure Component Value - " Date/Time    MRSA Screen Culture (Outpatient) - Swab, Nares [[id number]]  (Normal) Collected:  06/10/20 1851    Lab Status:  Preliminary result Specimen:  Swab from Nares Updated:  06/11/20 1135     MRSA Screen Cx No Methicillin Resistant Staphylococcus aureus isolated    COVID-19,CEPHEID,URI IN-HOUSE(OR EMERGENT/ADD-ON),NP SWAB IN TRANSPORT MEDIA 3-4 HR TAT - Swab, Nasopharynx [[id number]]  (Normal) Collected:  06/08/20 1337    Lab Status:  Final result Specimen:  Swab from Nasopharynx Updated:  06/08/20 1444     COVID19 Not Detected          Imaging Results (Last 24 Hours)     Procedure Component Value Units Date/Time    XR Chest 1 View [[id number]] Collected:  06/10/20 1645     Updated:  06/10/20 1719    Narrative:          EXAMINATION: XR CHEST 1 VW-06/10/2020:      INDICATION: Fever; S72.001A-Fracture of unspecified part of neck of  right femur, initial encounter for closed fracture; W19.XXXA-Unspecified  fall, initial encounter; Z79.01-Long term (current) use of  anticoagulants; Z74.09-Other reduced mobility; Z78.9-Other specified  health status.      COMPARISON: 06/08/2020.     FINDINGS: Portable chest reveals minimal increased markings seen in the  lung bases bilaterally with low lung volumes. Degenerative changes seen  within the spine. The heart is enlarged.           Impression:       Minimal increased markings seen in the lung bases  bilaterally with low lung volumes. Degenerative changes seen within the  spine.     D:  06/10/2020  E:  06/10/2020                     I have reviewed the medications:  Scheduled Meds:  [START ON 6/12/2020] Pharmacy Consult  Does not apply Daily   apixaban 2.5 mg Oral Q12H   insulin lispro 0-7 Units Subcutaneous TID AC   levothyroxine 75 mcg Oral Daily   losartan 50 mg Oral Daily   oxybutynin XL 10 mg Oral Daily   piperacillin-tazobactam 3.375 g Intravenous Q8H   rivastigmine 1 patch Transdermal Daily   tamsulosin 0.4 mg Oral Nightly   vancomycin 1,250 mg Intravenous Q24H      Continuous Infusions:  Pharmacy to dose vancomycin     sodium chloride 100 mL/hr Last Rate: 100 mL/hr (06/10/20 0523)     PRN Meds:.acetaminophen **OR** acetaminophen  •  dextrose  •  dextrose  •  docusate sodium  •  glucagon (human recombinant)  •  Morphine **AND** naloxone  •  Morphine **AND** naloxone  •  oxyCODONE-acetaminophen  •  Pharmacy to dose vancomycin  •  promethazine    Assessment/Plan   Assessment & Plan     Active Hospital Problems    Diagnosis  POA   • Closed fracture of right hip (CMS/[facility]) [S72.001A]  Yes   • DVT (deep venous thrombosis) (CMS/[facility]) [I82.409]  Yes   • Benign essential hypertension [I10]  Yes   • Hypothyroidism [E03.9]  Yes   • Type 2 diabetes mellitus without complication (CMS/[facility]) [E11.9]  Yes      Resolved Hospital Problems   No resolved problems to display.        Brief Hospital Course to date:  Marcos Acuña is a 89 y.o. male with a past medical history of lower extremity DVT, and non-insulin-dependent diabetes who presented to the ED after a mechanical fall with subsequent right hip fracture.     This patient's problems and plans were partially entered by my partner and updated as appropriate by me 06/11/20.         Mechanical fall  Right hip fracture  -XR of right hip showed acute minimally comminuted and mildly displaced with lateral angulation subtrochanteric fracture of the right proximal femur  -CT head was without acute intracranial abnormality  -Dr Meza of orthopedics following, s/p R femoral nail on 6/9  --PT/OT following OR  -Pre-procedure covid screening negative     History of recurrent lower extremity DVT  -Xarelto on hold for surgery, currently on eliquis     Hypertension-continue losartan     Hypothyroidism-continue levothyroxine, check TSH and T4 and adjust as needed     BPH  -Continue tamsulosin and oxybutynin     T2DM- hold metformin, ssi while inpatient      Fever  - chest xray with some atelectasis, add incentive spirometry  - ua negative 6/9  - BC  pending  - if cultures negative and afebrile will dc abx in the next 24-48 hours     DVT Prophylaxis:  eliquis     Disposition: I expect the patient to be discharged pending bed availabity    CODE STATUS:   Code Status and Medical Interventions:   Ordered at: 06/08/20 1329     Code Status:    CPR     Medical Interventions (Level of Support Prior to Arrest):    Full         Electronically signed by Stefanie Spicer DO, 06/11/20, 13:33.

## 2020-06-11 NOTE — NURSING NOTE
WOC nurse for Brennen report 15 or less    Spoke with ANNE MARIE Nguyen    No pressure injury skin issues noted, all skin interventions in place, including heel boots, repositioning and waffle or speciality bed.    Please consult WOC nurse if needed.    Pressure injury prevention orders placed per protocol.  Including floating heels with Heel boots, repositioning q 2 hour with use of wedge, seat cushion, HOB less than or equal to 30 degrees and moisture barrier cream on coccyx/buttocks as needed.     Heel boots come in 3 sizes Petit, Standard and Bariatric.  Use the measuring guide found in the heel boot packaging for guidelines for appropriate size.

## 2020-06-11 NOTE — PLAN OF CARE
Problem: Patient Care Overview  Goal: Plan of Care Review  Outcome: Ongoing (interventions implemented as appropriate)  Flowsheets (Taken 6/11/2020 1132)  Progress: improving  Plan of Care Reviewed With: patient  Note:   Patient denied pain during the night. Dressing in place with stained drainage noted on top dressing. Patient was confused during the night and was pulling off gown, tele monitor, and pulled out IV. Patient was incontinent and voiding frequently during the night, bottom was red but blanchable and z guard applied , excoriation noted to right groin area, waffle mattress in place.

## 2020-06-11 NOTE — PLAN OF CARE
Problem: Patient Care Overview  Goal: Plan of Care Review  Outcome: Ongoing (interventions implemented as appropriate)  Flowsheets (Taken 6/11/2020 1926)  Progress: no change  Plan of Care Reviewed With: patient  Outcome Summary: Patient remains confused orineted to self only. Pt agitated at times.  Has been turned Q2 refuses SCDS and Waffle boot. Dr Spicer notified pt continues to pull Iv out and tele leads stated it was ok to d/c cardiac monitoring and if pt continued to pull out IV the antibitoics would be d/c . pt encouraged to use IS however refused multiple times to do it. Vitals have been stable. Up to chair with PT. Will continue to monitor

## 2020-06-11 NOTE — PLAN OF CARE
Problem: Patient Care Overview  Goal: Plan of Care Review  Flowsheets  Taken 6/11/2020 1551  Progress: declining  Plan of Care Reviewed With: patient  Taken 6/11/2020 1547  Outcome Summary: Patient with increasing confusion and difficulty following directions. He was at times slightly agitated. He needed encouragement to participate in exercise. He was unable to get to standing or ambulation today. Recommend continued skilled PT

## 2020-06-12 LAB
ALBUMIN SERPL-MCNC: 2.3 G/DL (ref 3.5–5.2)
ANION GAP SERPL CALCULATED.3IONS-SCNC: 7 MMOL/L (ref 5–15)
BUN BLD-MCNC: 12 MG/DL (ref 8–23)
BUN/CREAT SERPL: 14.5 (ref 7–25)
CALCIUM SPEC-SCNC: 8 MG/DL (ref 8.6–10.5)
CHLORIDE SERPL-SCNC: 108 MMOL/L (ref 98–107)
CO2 SERPL-SCNC: 26 MMOL/L (ref 22–29)
CREAT BLD-MCNC: 0.83 MG/DL (ref 0.76–1.27)
DEPRECATED RDW RBC AUTO: 57.4 FL (ref 37–54)
ERYTHROCYTE [DISTWIDTH] IN BLOOD BY AUTOMATED COUNT: 17.3 % (ref 12.3–15.4)
GFR SERPL CREATININE-BSD FRML MDRD: 87 ML/MIN/1.73
GLUCOSE BLD-MCNC: 143 MG/DL (ref 65–99)
GLUCOSE BLDC GLUCOMTR-MCNC: 134 MG/DL (ref 70–130)
GLUCOSE BLDC GLUCOMTR-MCNC: 144 MG/DL (ref 70–130)
GLUCOSE BLDC GLUCOMTR-MCNC: 145 MG/DL (ref 70–130)
GLUCOSE BLDC GLUCOMTR-MCNC: 188 MG/DL (ref 70–130)
GLUCOSE BLDC GLUCOMTR-MCNC: 62 MG/DL (ref 70–130)
HCT VFR BLD AUTO: 25.1 % (ref 37.5–51)
HGB BLD-MCNC: 8 G/DL (ref 13–17.7)
MCH RBC QN AUTO: 28.8 PG (ref 26.6–33)
MCHC RBC AUTO-ENTMCNC: 31.9 G/DL (ref 31.5–35.7)
MCV RBC AUTO: 90.3 FL (ref 79–97)
MRSA SPEC QL CULT: NORMAL
PHOSPHATE SERPL-MCNC: 2.8 MG/DL (ref 2.5–4.5)
PLATELET # BLD AUTO: 149 10*3/MM3 (ref 140–450)
PMV BLD AUTO: 10 FL (ref 6–12)
POTASSIUM BLD-SCNC: 3.7 MMOL/L (ref 3.5–5.2)
RBC # BLD AUTO: 2.78 10*6/MM3 (ref 4.14–5.8)
SODIUM BLD-SCNC: 141 MMOL/L (ref 136–145)
WBC NRBC COR # BLD: 7.18 10*3/MM3 (ref 3.4–10.8)

## 2020-06-12 PROCEDURE — 82962 GLUCOSE BLOOD TEST: CPT

## 2020-06-12 PROCEDURE — 97530 THERAPEUTIC ACTIVITIES: CPT

## 2020-06-12 PROCEDURE — 85027 COMPLETE CBC AUTOMATED: CPT | Performed by: INTERNAL MEDICINE

## 2020-06-12 PROCEDURE — 63710000001 INSULIN LISPRO (HUMAN) PER 5 UNITS: Performed by: ORTHOPAEDIC SURGERY

## 2020-06-12 PROCEDURE — 99232 SBSQ HOSP IP/OBS MODERATE 35: CPT | Performed by: INTERNAL MEDICINE

## 2020-06-12 PROCEDURE — 80069 RENAL FUNCTION PANEL: CPT | Performed by: INTERNAL MEDICINE

## 2020-06-12 PROCEDURE — 25010000002 PIPERACILLIN SOD-TAZOBACTAM PER 1 G: Performed by: INTERNAL MEDICINE

## 2020-06-12 RX ADMIN — APIXABAN 2.5 MG: 2.5 TABLET, FILM COATED ORAL at 21:22

## 2020-06-12 RX ADMIN — LEVOTHYROXINE SODIUM 75 MCG: 75 TABLET ORAL at 09:25

## 2020-06-12 RX ADMIN — INSULIN LISPRO 2 UNITS: 100 INJECTION, SOLUTION INTRAVENOUS; SUBCUTANEOUS at 17:41

## 2020-06-12 RX ADMIN — TAMSULOSIN HYDROCHLORIDE 0.4 MG: 0.4 CAPSULE ORAL at 21:22

## 2020-06-12 RX ADMIN — RIVASTIGMINE TRANSDERMAL SYSTEM 1 PATCH: 9.5 PATCH, EXTENDED RELEASE TRANSDERMAL at 17:44

## 2020-06-12 RX ADMIN — ACETAMINOPHEN 650 MG: 325 TABLET, FILM COATED ORAL at 14:12

## 2020-06-12 RX ADMIN — OXYBUTYNIN CHLORIDE 10 MG: 10 TABLET, EXTENDED RELEASE ORAL at 09:25

## 2020-06-12 RX ADMIN — LOSARTAN POTASSIUM 50 MG: 50 TABLET, FILM COATED ORAL at 09:25

## 2020-06-12 RX ADMIN — ACETAMINOPHEN 650 MG: 325 TABLET, FILM COATED ORAL at 21:22

## 2020-06-12 RX ADMIN — TAZOBACTAM SODIUM AND PIPERACILLIN SODIUM 3.38 G: 375; 3 INJECTION, SOLUTION INTRAVENOUS at 06:02

## 2020-06-12 RX ADMIN — APIXABAN 2.5 MG: 2.5 TABLET, FILM COATED ORAL at 09:25

## 2020-06-12 NOTE — PLAN OF CARE
Problem: Patient Care Overview  Goal: Plan of Care Review  Flowsheets (Taken 6/12/2020 6153)  Progress: improving  Plan of Care Reviewed With: patient  Outcome Summary: Pt able to stand and pivot today to chair with MAX A x3, performed sup>sit with MAX A x2. He tolerated ther ex and participated well.

## 2020-06-12 NOTE — PLAN OF CARE
Problem: Patient Care Overview  Goal: Plan of Care Review  Flowsheets (Taken 6/12/2020 1922)  Progress: improving  Plan of Care Reviewed With: patient  Outcome Summary: patient remains alert and oriented to self. Vitals have been stable. Remains incontinent turned Q2. Asleep first part of shift has been resting comfortably s/s of pain with turning. Is able to rest comfortably soon after turning. Able to follow simple commands. Wife and daughter at bedside. Possible d/c tomorrow to [facility]

## 2020-06-12 NOTE — PROGRESS NOTES
Continued Stay Note  [facility]     Patient Name: Marcos Acuña  MRN: [id number]  Today's Date: 6/12/2020    Admit Date: 6/8/2020    Discharge Plan     Row Name 06/12/20 0849       Plan    Plan  Kathleen    Post Acute Provider List  Nursing Home    Patient/Family in Agreement with Plan  yes    Plan Comments  Per [facility] w/ [facility], they are able to accept patient pending insurance approval. D/w Jose, patient's son, via phone and he is agreeable w/ plan. Precert has been initiated and may take up to 24 hours to process. CM following.     Final Discharge Disposition Code  03 - skilled nursing facility (SNF)        Discharge Codes    No documentation.             Sandy Espinal RN

## 2020-06-12 NOTE — PROGRESS NOTES
[facility] Medicine Services  PROGRESS NOTE    Patient Name: Marcos Acuña  : 1931  MRN: [id number]    Date of Admission: 2020  Primary Care Physician: Abdirashid Mccabe MD    Subjective   Subjective     CC:  Hip fx    HPI:  Sleeping this morning, did not awaken secondary to agitation    Review of Systems  Not able to obtain    Objective   Objective     Vital Signs:   Temp:  [97.8 °F (36.6 °C)-99.1 °F (37.3 °C)] 98.6 °F (37 °C)  Heart Rate:  [62-87] 72  Resp:  [16-18] 18  BP: (113-150)/(53-85) 129/68        Physical Exam:  Constitutional: sleeping  HENT: NCAT, mucous membranes moist  Respiratory: Clear to auscultation bilaterally, respiratory effort normal   Cardiovascular: RRR, no murmurs  Gastrointestinal: Positive bowel sounds, soft, nontender, nondistended  Musculoskeletal: No bilateral ankle edema  Psychiatric: unable to assess  Neurologic:confused at baseline  Skin: No rashes    Results Reviewed:  Results from last 7 days   Lab Units 06/12/20  0829 06/11/20  0552 06/10/20  0552  20  1221   WBC 10*3/mm3 7.18 9.81 9.19   < > 7.89   HEMOGLOBIN g/dL 8.0* 8.8* 10.1*   < > 12.5*   HEMATOCRIT % 25.1* 28.0* 33.0*   < > 38.7   PLATELETS 10*3/mm3 149 135* 130*   < > 191   INR   --   --   --   --  1.93*    < > = values in this interval not displayed.     Results from last 7 days   Lab Units 20  0820  0552 06/10/20  0552  20  1221   SODIUM mmol/L 141 139 140   < > 140   POTASSIUM mmol/L 3.7 3.7 4.0   < > 4.4   CHLORIDE mmol/L 108* 105 106   < > 104   CO2 mmol/L 26.0 23.0 24.0   < > 24.0   BUN mg/dL 12 16 13   < > 11   CREATININE mg/dL 0.83 0.88 0.82   < > 0.91   GLUCOSE mg/dL 143* 147* 158*   < > 140*   CALCIUM mg/dL 8.0* 8.1* 8.2*   < > 8.5*   ALT (SGPT) U/L  --   --   --   --  14   AST (SGOT) U/L  --   --   --   --  25   TROPONIN T ng/mL  --   --   --   --  0.013   PROBNP pg/mL  --   --   --   --  65.4    < > = values in this interval not displayed.      Estimated Creatinine Clearance: 68.7 mL/min (by C-G formula based on SCr of 0.83 mg/dL).    Microbiology Results Abnormal     Procedure Component Value - Date/Time    MRSA Screen Culture (Outpatient) - Swab, Nares [[id number]]  (Normal) Collected:  06/10/20 1851    Lab Status:  Final result Specimen:  Swab from Nares Updated:  06/12/20 1221     MRSA Screen Cx No Methicillin Resistant Staphylococcus aureus isolated    Blood Culture - Blood, Arm, Left [[id number]] Collected:  06/10/20 1557    Lab Status:  Preliminary result Specimen:  Blood from Arm, Left Updated:  06/11/20 1701     Blood Culture No growth at 24 hours    COVID-19,CEPHEID,URI IN-HOUSE(OR EMERGENT/ADD-ON),NP SWAB IN TRANSPORT MEDIA 3-4 HR TAT - Swab, Nasopharynx [[id number]]  (Normal) Collected:  06/08/20 1337    Lab Status:  Final result Specimen:  Swab from Nasopharynx Updated:  06/08/20 1444     COVID19 Not Detected          Imaging Results (Last 24 Hours)     ** No results found for the last 24 hours. **               I have reviewed the medications:  Scheduled Meds:  apixaban 2.5 mg Oral Q12H   insulin lispro 0-7 Units Subcutaneous TID AC   levothyroxine 75 mcg Oral Daily   losartan 50 mg Oral Daily   oxybutynin XL 10 mg Oral Daily   piperacillin-tazobactam 3.375 g Intravenous Q8H   rivastigmine 1 patch Transdermal Daily   tamsulosin 0.4 mg Oral Nightly     Continuous Infusions:  sodium chloride 50 mL/hr Last Rate: 50 mL/hr (06/11/20 1356)     PRN Meds:.•  acetaminophen **OR** acetaminophen  •  dextrose  •  dextrose  •  docusate sodium  •  glucagon (human recombinant)  •  Morphine **AND** naloxone  •  Morphine **AND** naloxone  •  oxyCODONE-acetaminophen  •  promethazine    Assessment/Plan   Assessment & Plan     Active Hospital Problems    Diagnosis  POA   • Closed fracture of right hip (CMS/HCC) [S72.001A]  Yes   • DVT (deep venous thrombosis) (CMS/[facility]) [I82.409]  Yes   • Benign essential hypertension [I10]  Yes   • Hypothyroidism [E03.9]  Yes    • Type 2 diabetes mellitus without complication (CMS/[facility]) [E11.9]  Yes      Resolved Hospital Problems   No resolved problems to display.        Brief Hospital Course to date:  Marcos Acuña is a 89 y.o. male with a past medical history of lower extremity DVT, and non-insulin-dependent diabetes who presented to the ED after a mechanical fall with subsequent right hip fracture.     This patient's problems and plans were partially entered by my partner and updated as appropriate by me 06/12/20.         Mechanical fall  Right hip fracture  -XR of right hip showed acute minimally comminuted and mildly displaced with lateral angulation subtrochanteric fracture of the right proximal femur  -CT head was without acute intracranial abnormality  -Dr Meza of orthopedics following, s/p R femoral nail on 6/9  --PT/OT following OR  -Pre-procedure covid screening negative     History of recurrent lower extremity DVT  -continue eliquis, ok with ortho     Hypertension-continue losartan     Hypothyroidism-continue levothyroxine, TSH within range   BPH  -Continue tamsulosin and oxybutynin     T2DM- hold metformin, ssi while inpatient      Fever  - chest xray with some atelectasis, try incentive spirometry if able  - ua negative 6/9  - BC negative to date, dc abx and monitor for now       DVT Prophylaxis:  eliquis      Disposition: I expect the patient to be discharged to [facility] pending insurance pre-cert    CODE STATUS:   Code Status and Medical Interventions:   Ordered at: 06/08/20 1329     Code Status:    CPR     Medical Interventions (Level of Support Prior to Arrest):    Full         Electronically signed by Stefanie Spicer DO, 06/12/20, 13:13.

## 2020-06-12 NOTE — THERAPY TREATMENT NOTE
Acute Care - Physical Therapy Treatment Note  [facility]     Patient Name: Marcos Acuña  : 1931  MRN: [id number]  Today's Date: 2020  Onset of Illness/Injury or Date of Surgery: 20     Referring Physician: Derrick    Admit Date: 2020    Visit Dx:    ICD-10-CM ICD-9-CM   1. Closed fracture of right hip, initial encounter (CMS/[facility]) S72.001A 820.8   2. Fall, initial encounter W19.XXXA E888.9   3. Anticoagulated Z79.01 V58.61   4. 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   • Erectile dysfunction   • ETD (eustachian tube dysfunction)   • History of basal cell carcinoma   • Hyperlipoproteinemia   • Hypertrophy of prostate without urinary obstruction and other lower urinary tract symptoms (LUTS)   • 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[facility])   • Hypothyroidism   • Left leg cellulitis   • Type 2 diabetes mellitus without complication (CMS/[facility])   • Primary osteoarthritis of left knee   • Polycythemia   • Osteopenia   • Onychomycosis   • Obstructive sleep apnea syndrome   • Obesity   • Polycythemia vera (CMS/[facility])       Therapy Treatment        Wound 20 Left posterior elbow Abrasion (Active)   Dressing Appearance intact;dried drainage 2020  9:30 AM   Closure CHRIS 2020 10:23 PM   Base dry;pink 2020  9:30 AM   Drainage Amount small 2020  9:30 AM   Dressing Care, Wound low-adherent;foam 2020  9:30 AM       Wound 20 1600 Left posterior hand Abrasion (Active)   Dressing Appearance intact;dried drainage 2020   9:30 AM   Closure CHRIS 6/12/2020  9:30 AM   Base dry;scab;red 6/12/2020  9:30 AM   Drainage Amount none 6/12/2020  9:30 AM   Dressing Care, Wound border dressing 6/11/2020 10:23 PM       Wound 06/09/20 Right anterior hip Incision (Active)   Dressing Appearance dried drainage;intact 6/12/2020  9:30 AM   Closure CHRIS 6/12/2020  9:30 AM   Base dressing in place, unable to visualize 6/12/2020  9:30 AM   Drainage Amount small 6/12/2020  9:30 AM   Dressing Care, Wound gauze 6/12/2020  9:30 AM       Wound 06/09/20 Right posterior hand Skin Tear (Active)   Dressing Appearance open to air 6/12/2020  9:30 AM   Base dry;scab;pink 6/12/2020  9:30 AM           Physical Therapy Education                 Title: PT OT SLP Therapies (In Progress)     Topic: Physical Therapy (Done)     Point: Mobility training (Done)     Description:   Instruct learner(s) on safety and technique for assisting patient out of bed, chair or wheelchair.  Instruct in the proper use of assistive devices, such as walker, crutches, cane or brace.              Patient Friendly Description:   It's important to get you on your feet again, but we need to do so in a way that is safe for you. Falling has serious consequences, and your personal safety is the most important thing of all.        When it's time to get out of bed, one of us or a family member will sit next to you on the bed to give you support.     If your doctor or nurse tells you to use a walker, crutches, a cane, or a brace, be sure you use it every time you get out of bed, even if you think you don't need it.    Learning Progress Summary           Patient Acceptance, E, VU,NR by FORD at 6/12/2020 1627    Eager, E, NR,NL by SC at 6/11/2020 1551    Comment:  reviewed HEP    Acceptance, E, VU,ARPAN,NR by ARASELI at 6/10/2020 1330    Comment:  Pt and family was educated about transfer strategies.   Family Acceptance, E, HANS,ARPAN,NR by ARASELI at 6/10/2020 1330    Comment:  Pt and family was educated about transfer  strategies.   Significant Other Acceptance, E, VU,DU,NR by J at 6/10/2020 1330    Comment:  Pt and family was educated about transfer strategies.                   Point: Home exercise program (Done)     Description:   Instruct learner(s) on appropriate technique for monitoring, assisting and/or progressing patient with therapeutic exercises and activities.              Learning Progress Summary           Patient Acceptance, E, VU,NR by  at 6/12/2020 1627    Eager, E, NR,NL by SC at 6/11/2020 1551    Comment:  reviewed HEP    Acceptance, E, VU,DU,NR by J at 6/10/2020 1330    Comment:  Pt and family was educated about transfer strategies.   Family Acceptance, E, VU,DU,NR by J at 6/10/2020 1330    Comment:  Pt and family was educated about transfer strategies.   Significant Other Acceptance, E, VU,DU,NR by J at 6/10/2020 1330    Comment:  Pt and family was educated about transfer strategies.                   Point: Body mechanics (Done)     Description:   Instruct learner(s) on proper positioning and spine alignment for patient and/or caregiver during mobility tasks and/or exercises.              Learning Progress Summary           Patient Acceptance, E, VU,NR by FORD at 6/12/2020 1627    Eager, E, NR,NL by SC at 6/11/2020 1551    Comment:  reviewed HEP    Acceptance, E, VU,DU,NR by J at 6/10/2020 1330    Comment:  Pt and family was educated about transfer strategies.   Family Acceptance, E, VU,DU,NR by J at 6/10/2020 1330    Comment:  Pt and family was educated about transfer strategies.   Significant Other Acceptance, E, VU,DU,NR by J at 6/10/2020 1330    Comment:  Pt and family was educated about transfer strategies.                   Point: Precautions (Done)     Description:   Instruct learner(s) on prescribed precautions during mobility and gait tasks              Learning Progress Summary           Patient Acceptance, E, VU,NR by  at 6/12/2020 1627    Eager, E, NR,NL by SC at 6/11/2020 1551     Comment:  reviewed HEP    Acceptance, E, VU,ARPAN,NR by ARASELI at 6/10/2020 1330    Comment:  Pt and family was educated about transfer strategies.   Family Acceptance, E, HANS,ARPAN,NR by ARASELI at 6/10/2020 1330    Comment:  Pt and family was educated about transfer strategies.   Significant Other Acceptance, E, HANS,ARPAN,NR by ARASELI at 6/10/2020 1330    Comment:  Pt and family was educated about transfer strategies.                               User Key     Initials Effective Dates Name Provider Type Discipline    SC 06/19/15 -  Glenda Gandara, PT Physical Therapist PT    EJ 11/20/18 -  Isa Morrow PT Physical Therapist PT    JG 05/14/20 -  Ary Pretty PT Student PT Student PT                PT Recommendation and Plan  Anticipated Discharge Disposition (PT): skilled nursing facility  Outcome Summary/Treatment Plan (PT)  Anticipated Discharge Disposition (PT): skilled nursing facility  Plan of Care Reviewed With: patient  Progress: improving  Outcome Summary: Pt able to stand and pivot today to chair with MAX A x3, performed sup>sit with MAX A x2. He tolerated ther ex and participated well.   Outcome Measures     Row Name 06/10/20 1345             How much help from another is currently needed...    Putting on and taking off regular lower body clothing?  1  -TB      Bathing (including washing, rinsing, and drying)  2  -TB      Toileting (which includes using toilet bed pan or urinal)  1  -TB      Putting on and taking off regular upper body clothing  2  -TB      Taking care of personal grooming (such as brushing teeth)  2  -TB      Eating meals  2  -TB      AM-PAC 6 Clicks Score (OT)  10  -TB         Functional Assessment    Outcome Measure Options  AM-PAC 6 Clicks Daily Activity (OT)  -TB        User Key  (r) = Recorded By, (t) = Taken By, (c) = Cosigned By    Initials Name Provider Type    TB Connie Gibson, OT Occupational Therapist         Time Calculation:   PT Charges     Row Name 06/12/20 9105              Time Calculation    Start Time  1449  -EJ      PT Received On  06/12/20  -EJ      PT Goal Re-Cert Due Date  06/22/20  -EJ         Time Calculation- PT    Total Timed Code Minutes- PT  31 minute(s)  -EJ         Timed Charges    80342 - PT Therapeutic Exercise Minutes  4  -EJ      91684 - PT Therapeutic Activity Minutes  27  -EJ        User Key  (r) = Recorded By, (t) = Taken By, (c) = Cosigned By    Initials Name Provider Type     Isa Morrow, PT Physical Therapist        Therapy Charges for Today     Code Description Service Date Service Provider Modifiers Qty    [id number]  PT THERAPEUTIC ACT EA 15 MIN 6/12/2020 Isa Morrow, PT GP 2          PT G-Codes  Outcome Measure Options: AM-PAC 6 Clicks Basic Mobility (PT)  AM-PAC 6 Clicks Score (PT): 9  AM-PAC 6 Clicks Score (OT): 10    Isa Sweeney, PT  6/12/2020

## 2020-06-12 NOTE — PROGRESS NOTES
Case Management Discharge Note      Final Note: Per [place] nieto/ Kathleen, they have received insurance approval and are able to accept patient tomorrow. Nurse to call report to [phone], dc summary to be faxed to [phone]. [facility] scheduled for tomorrow, 6/13, at 1300. PCS is in the chart. Notified patient's son, Jose, via phone and he is agreeable w/ plan.     Provided Post Acute Provider List?: Refused  Post Acute Provider List: Nursing Home  Refused Provider List Comment: Wants to go to [facility]    Destination - Selection Complete      Service Provider Request Status Selected Services Address Phone Number Fax Number    [facility] Selected Skilled Nursing [address] [place] [address] [phone]           Transportation Services  Ambulance: [facility]    Final Discharge Disposition Code: 03 - skilled nursing facility (SNF)

## 2020-06-12 NOTE — PROGRESS NOTES
Patient remains very confused.  Unable to cooperate well with therapy or nursing staff.  Afebrile vital signs stable  Last hemoglobin 8.8  Distal motor sensory status intact  3 days postop right trochanteric femoral nail  Follow hemoglobin, restart Eliquis, plan placement which will be challenging with his dementia and need for a sitter.

## 2020-06-13 VITALS
RESPIRATION RATE: 16 BRPM | DIASTOLIC BLOOD PRESSURE: 77 MMHG | BODY MASS INDEX: 35.31 KG/M2 | TEMPERATURE: 98.6 F | HEIGHT: 67 IN | SYSTOLIC BLOOD PRESSURE: 135 MMHG | HEART RATE: 74 BPM | WEIGHT: 225 LBS | OXYGEN SATURATION: 95 %

## 2020-06-13 LAB
GLUCOSE BLDC GLUCOMTR-MCNC: 139 MG/DL (ref 70–130)
GLUCOSE BLDC GLUCOMTR-MCNC: 166 MG/DL (ref 70–130)

## 2020-06-13 PROCEDURE — 99239 HOSP IP/OBS DSCHRG MGMT >30: CPT | Performed by: INTERNAL MEDICINE

## 2020-06-13 PROCEDURE — 82962 GLUCOSE BLOOD TEST: CPT

## 2020-06-13 PROCEDURE — 63710000001 INSULIN LISPRO (HUMAN) PER 5 UNITS: Performed by: ORTHOPAEDIC SURGERY

## 2020-06-13 RX ORDER — PSEUDOEPHEDRINE HCL 30 MG
100 TABLET ORAL 2 TIMES DAILY PRN
Status: ON HOLD
Start: 2020-06-13 | End: 2022-05-22

## 2020-06-13 RX ORDER — ACETAMINOPHEN 325 MG/1
650 TABLET ORAL EVERY 4 HOURS PRN
Start: 2020-06-13

## 2020-06-13 RX ORDER — PROMETHAZINE HYDROCHLORIDE 12.5 MG/1
12.5 TABLET ORAL EVERY 6 HOURS PRN
Status: ON HOLD
Start: 2020-06-13 | End: 2022-05-22

## 2020-06-13 RX ADMIN — APIXABAN 2.5 MG: 2.5 TABLET, FILM COATED ORAL at 09:03

## 2020-06-13 RX ADMIN — ACETAMINOPHEN 650 MG: 325 TABLET, FILM COATED ORAL at 12:51

## 2020-06-13 RX ADMIN — LOSARTAN POTASSIUM 50 MG: 50 TABLET, FILM COATED ORAL at 09:03

## 2020-06-13 RX ADMIN — INSULIN LISPRO 2 UNITS: 100 INJECTION, SOLUTION INTRAVENOUS; SUBCUTANEOUS at 11:35

## 2020-06-13 RX ADMIN — ACETAMINOPHEN 650 MG: 325 TABLET, FILM COATED ORAL at 05:59

## 2020-06-13 RX ADMIN — SODIUM CHLORIDE 50 ML/HR: 9 INJECTION, SOLUTION INTRAVENOUS at 04:01

## 2020-06-13 RX ADMIN — LEVOTHYROXINE SODIUM 75 MCG: 75 TABLET ORAL at 09:03

## 2020-06-13 RX ADMIN — OXYBUTYNIN CHLORIDE 10 MG: 10 TABLET, EXTENDED RELEASE ORAL at 09:03

## 2020-06-13 NOTE — PROGRESS NOTES
Continued Stay Note  [facility]     Patient Name: Marcos Acuña  MRN: [id number]  Today's Date: 6/13/2020    Admit Date: 6/8/2020    Discharge Plan     Row Name 06/13/20 1108       Plan    Plan  [facility] Jackson Comments  DC summary faxed to [facility] fax number, [phone]. Please CM note from 6/12/20 for phone number for RN report. Thank you.     Final Discharge Disposition Code  03 - skilled nursing facility (SNF)        Discharge Codes    No documentation.       Expected Discharge Date and Time     Expected Discharge Date Expected Discharge Time    Jun 13, 2020             Mi Rivera, RN

## 2020-06-13 NOTE — DISCHARGE SUMMARY
[facility] Medicine Services  DISCHARGE SUMMARY    Patient Name: Marcos Acuña  : 1931  MRN: [id number]    Date of Admission: 2020 11:28 AM  Date of Discharge:  2020  Primary Care Physician: Abdirashid Mccabe MD    Consults     No orders found from 5/10/2020 to 2020.          Hospital Course     Presenting Problem:   Closed fracture of right hip, initial encounter (CMS/[facility]) [S72.001A]    Active Hospital Problems    Diagnosis  POA   • Closed fracture of right hip (CMS/[facility]) [S72.001A]  Yes   • DVT (deep venous thrombosis) (CMS/[facility]) [I82.409]  Yes   • Benign essential hypertension [I10]  Yes   • Hypothyroidism [E03.9]  Yes   • Type 2 diabetes mellitus without complication (CMS/[facility]) [E11.9]  Yes      Resolved Hospital Problems   No resolved problems to display.          Hospital Course:  Marcos Acuña is a 89 y.o. male  with a past medical history of lower extremity DVT, and non-insulin-dependent diabetes who presented to the ED after a mechanical fall with subsequent right hip fracture.     This patient's problems and plans were partially entered by my partner and updated as appropriate by me 20.         Mechanical fall  Right hip fracture  -XR of right hip showed acute minimally comminuted and mildly displaced with lateral angulation subtrochanteric fracture of the right proximal femur  -CT head was without acute intracranial abnormality  -Dr Meza of orthopedics following, s/p R femoral nail on   --PT/OT, dc to snf  -Pre-procedure covid screening negative     History of recurrent lower extremity DVT  -continue eliquis, ok with ortho     Hypertension-continue losartan     Hypothyroidism-continue levothyroxine, TSH within range   BPH  -Continue tamsulosin and oxybutynin     T2DM- hold metformin, ssi while inpatient      Fever - resolved  - chest xray with some atelectasis, incentive spirometry if able  - ua negative   - BC negative to date, off  abx        DVT Prophylaxis:  eliquis         Discharge Follow Up Recommendations for outpatient labs/diagnostics:   PCP in 1 week    Day of Discharge     HPI:   Less confused this morning. Pleasant no complaints. Discussed plan for discharge    Review of Systems  Gen- No fevers, chills  CV- No chest pain, palpitations  Resp- No cough, dyspnea  GI- No N/V/D, abd pain      Vital Signs:   Temp:  [97.7 °F (36.5 °C)-98.7 °F (37.1 °C)] 97.7 °F (36.5 °C)  Heart Rate:  [69-75] 69  Resp:  [16-18] 16  BP: (123-171)/(68-80) 123/68     Physical Exam:  Constitutional: No acute distress, awake, alert  HENT: NCAT, mucous membranes moist  Respiratory: Clear to auscultation bilaterally, respiratory effort normal   Cardiovascular: RRR, no murmurs  Gastrointestinal: Positive bowel sounds, soft, nontender, nondistended  Musculoskeletal: trace edema  Psychiatric: Pleasant, cooperative  Neurologic: Oriented to self, speech clear  Skin: No rashes    Pertinent  and/or Most Recent Results     Results from last 7 days   Lab Units 06/12/20  0829 06/11/20  0552 06/10/20  0552 06/09/20  0532 06/08/20  1221   WBC 10*3/mm3 7.18 9.81 9.19 9.37 7.89   HEMOGLOBIN g/dL 8.0* 8.8* 10.1* 11.4* 12.5*   HEMATOCRIT % 25.1* 28.0* 33.0* 36.0* 38.7   PLATELETS 10*3/mm3 149 135* 130* 159 191   SODIUM mmol/L 141 139 140 140 140   POTASSIUM mmol/L 3.7 3.7 4.0 4.1 4.4   CHLORIDE mmol/L 108* 105 106 102 104   CO2 mmol/L 26.0 23.0 24.0 24.0 24.0   BUN mg/dL 12 16 13 14 11   CREATININE mg/dL 0.83 0.88 0.82 0.86 0.91   GLUCOSE mg/dL 143* 147* 158* 204* 140*   CALCIUM mg/dL 8.0* 8.1* 8.2* 8.7 8.5*     Results from last 7 days   Lab Units 06/08/20  1221   BILIRUBIN mg/dL 0.5   ALK PHOS U/L 69   ALT (SGPT) U/L 14   AST (SGOT) U/L 25   PROTIME Seconds 21.7*   INR  1.93*   APTT seconds 38.6*           Invalid input(s): TG, LDLCALC, LDLREALC  Results from last 7 days   Lab Units 06/08/20  1221   TSH uIU/mL 2.260   PROBNP pg/mL 65.4   TROPONIN T ng/mL 0.013       Brief  Urine Lab Results  (Last result in the past 365 days)      Color   Clarity   Blood   Leuk Est   Nitrite   Protein   CREAT   Urine HCG        06/09/20 0332 Yellow Clear Negative Trace Negative Negative               Microbiology Results Abnormal     Procedure Component Value - Date/Time    Blood Culture - Blood, Arm, Left [[id number]] Collected:  06/10/20 1557    Lab Status:  Preliminary result Specimen:  Blood from Arm, Left Updated:  06/12/20 1701     Blood Culture No growth at 2 days    MRSA Screen Culture (Outpatient) - Swab, Nares [[id number]]  (Normal) Collected:  06/10/20 1851    Lab Status:  Final result Specimen:  Swab from Nares Updated:  06/12/20 1221     MRSA Screen Cx No Methicillin Resistant Staphylococcus aureus isolated    COVID-19,CEPHEID,URI IN-HOUSE(OR EMERGENT/ADD-ON),NP SWAB IN TRANSPORT MEDIA 3-4 HR TAT - Swab, Nasopharynx [[id number]]  (Normal) Collected:  06/08/20 1337    Lab Status:  Final result Specimen:  Swab from Nasopharynx Updated:  06/08/20 1444     COVID19 Not Detected          Imaging Results (All)     Procedure Component Value Units Date/Time    XR Chest 1 View [[id number]] Collected:  06/10/20 1645     Updated:  06/12/20 1856    Narrative:          EXAMINATION: XR CHEST 1 VW-06/10/2020:      INDICATION: Fever; S72.001A-Fracture of unspecified part of neck of  right femur, initial encounter for closed fracture; W19.XXXA-Unspecified  fall, initial encounter; Z79.01-Long term (current) use of  anticoagulants; Z74.09-Other reduced mobility; Z78.9-Other specified  health status.      COMPARISON: 06/08/2020.     FINDINGS: Portable chest reveals minimal increased markings seen in the  lung bases bilaterally with low lung volumes. Degenerative changes seen  within the spine. The heart is enlarged.           Impression:       Minimal increased markings seen in the lung bases  bilaterally with low lung volumes. Degenerative changes seen within the  spine.     D:  06/10/2020  E:  06/10/2020      This report was finalized on 6/12/2020 6:53 PM by Dr. Swetha Dimas MD.       FL C Arm During Surgery [[id number]] Collected:  06/09/20 1552     Updated:  06/09/20 1705    Narrative:       EXAMINATION: FL C ARM DURING SURGERY- 06/09/2020     INDICATION: Troch Nail; S72.001A-Fracture of unspecified part of neck of  right femur, initial encounter for closed fracture; W19.XXXA-Unspecified  fall, initial encounter; Z79.01-Long term (current) use of  anticoagulants      TECHNIQUE: Intraoperative fluoroscopy for improved localization and  treatment planning     COMPARISON: NONE     FINDINGS: Intraoperative fluoroscopy with total fluoroscopic time usage  2 minutes 46 seconds and single representative image saved during right  trochanteric nailing.       Impression:       Intraoperative fluoroscopy utilized during right  trochanteric nailing.     D:  06/09/2020  E:  06/09/2020         This report was finalized on 6/9/2020 5:02 PM by Dr. Mele Brooks.       CT Head Without Contrast [[id number]] Collected:  06/08/20 1352     Updated:  06/08/20 1907    Narrative:       EXAMINATION: CT HEAD WO CONTRAST- 06/08/2020     INDICATION: TRAUMA; S72.001A-Fracture of unspecified part of neck of  right femur, initial encounter for closed fracture; W19.XXXA-Unspecified  fall, initial encounter; Z79.01-Long term (current) use of  anticoagulants      TECHNIQUE: CT head without intravenous contrast     The radiation dose reduction device was turned on for each scan per the  ALARA (As Low as Reasonably Achievable) protocol.     COMPARISON: NONE     FINDINGS: Midline structures are symmetric without evidence of mass,  mass effect or midline shift demonstrating prominence of the sulci  towards the vertex of generalized atrophy and/or age-related volume loss  without intra-axial hemorrhage or extra-axial fluid collection. Globes  and orbits unremarkable. Visualized paranasal sinuses and mastoid air  cells are grossly clear and well  pneumatized with mild mucosal edema of  the ethmoid air cells. Calvarium intact.       Impression:       No acute intracranial abnormality specifically no acute  intra-axial hemorrhage or extra-axial fluid collection.     D:  06/08/2020  E:  06/08/2020     This report was finalized on 6/8/2020 7:04 PM by Dr. Mele Brooks.       XR Chest 1 View [[id number]] Collected:  06/08/20 1212     Updated:  06/08/20 1906    Narrative:       EXAMINATION: XR CHEST 1 VW-      INDICATION: Fall.      COMPARISON: None.     FINDINGS: Cardiac size within normal limits. Pulmonary vascularity  within normal limits. No focal opacification or consolidation. No  pneumothorax or pleural effusion. Degenerative changes of the spine.           Impression:       No acute traumatic findings of the chest.     D:  06/08/2020  E:  06/08/2020     This report was finalized on 6/8/2020 7:03 PM by Dr. Mele Brooks.       XR Hip With or Without Pelvis 2 - 3 View Right [[id number]] Collected:  06/08/20 1216     Updated:  06/08/20 1906    Narrative:       EXAMINATION: XR HIP W OR WO PELVIS 2-3 VIEW RIGHT-      INDICATION: Trauma.      COMPARISON: None.     FINDINGS: Single AP view of the pelvis along with AP and crosstable  lateral views right hip reveal acute minimally comminuted and mildly  lateral angulated subtrochanteric fracture right femur with right  femoral head remaining well located at the right hip.       Impression:       Acute minimally comminuted and mildly displaced with lateral  angulation subtrochanteric fracture right proximal femur. Right femoral  head remains well located at the right hip joint.     D:  06/08/2020  E:  06/08/2020     This report was finalized on 6/8/2020 7:03 PM by Dr. Mele Brooks.                            Plan for Follow-up of Pending Labs/Results: BC negative times 48 hours   Order Current Status    Blood Culture - Blood, Arm, Left Preliminary result        Discharge Details        Discharge Medications      New  Medications      Instructions Start Date   acetaminophen 325 MG tablet  Commonly known as:  TYLENOL   650 mg, Oral, Every 4 Hours PRN      apixaban 2.5 MG tablet tablet  Commonly known as:  ELIQUIS   2.5 mg, Oral, Every 12 Hours Scheduled      docusate sodium 100 MG capsule   100 mg, Oral, 2 Times Daily PRN      promethazine 12.5 MG tablet  Commonly known as:  PHENERGAN   12.5 mg, Oral, Every 6 Hours PRN         Continue These Medications      Instructions Start Date   calcium carbonate 500 MG chewable tablet  Commonly known as:  TUMS   1 tablet, Oral, Daily PRN      cyanocobalamin 1000 MCG/ML injection   1,000 mcg, Intramuscular, Every 28 Days      Exelon 9.5 MG/24HR patch  Generic drug:  rivastigmine   1 patch, Transdermal, Daily      ipratropium 0.06 % nasal spray  Commonly known as:  ATROVENT   2 sprays, Nasal, 2 times daily      levothyroxine 75 MCG tablet  Commonly known as:  SYNTHROID, LEVOTHROID   75 mcg, Oral, Daily      losartan 50 MG tablet  Commonly known as:  COZAAR   50 mg, Oral, Daily      metFORMIN 500 MG tablet  Commonly known as:  GLUCOPHAGE   1,000 mg, Oral, 2 Times Daily With Meals      Myrbetriq 50 MG tablet sustained-release 24 hour 24 hr tablet  Generic drug:  Mirabegron ER   50 mg, Oral, Daily      tamsulosin 0.4 MG capsule 24 hr capsule  Commonly known as:  FLOMAX   1 capsule, Oral, Nightly         Stop These Medications    levocetirizine 5 MG tablet  Commonly known as:  XYZAL     Xarelto 10 MG tablet  Generic drug:  rivaroxaban            No Known Allergies      Discharge Disposition:  Skilled Nursing Facility (DC - External)    Diet:  Hospital:  Diet Order   Procedures   • Diet Regular       Activity:  as tolerated    Restrictions or Other Recommendations:         CODE STATUS:    Code Status and Medical Interventions:   Ordered at: 06/08/20 1329     Code Status:    CPR     Medical Interventions (Level of Support Prior to Arrest):    Full       No future appointments.        Time Spent on  Discharge:  39 minutes    Electronically signed by Stefanie Spicer DO, 06/13/20, 10:37 AM.

## 2020-06-13 NOTE — PLAN OF CARE
Patient remains oriented to self only. VSS. Incontinent of Bowel and Bladder. Turned Q2. He has slept very well between care rounds right hip dressing with small dried stain. NV intact. Tylenol given at bedtime po. Only complains of pain with movement. His wife called for an update at bedtime last evening. Plan is to transfer to [facility] today by ambulance which is set up for 1 pm.

## 2020-06-13 NOTE — DISCHARGE PLACEMENT REQUEST
"Gail Rubio (89 y.o. Male)     From [facility],   [phone]      Date of Birth Social Security Number Address Home Phone MRN    [id number]  [id number]  [place] [id number] [phone] [id number]    Buddhist Marital Status          None        Admission Date Admission Type Admitting Provider Attending Provider Department, Room/Bed    20 Emergency Stefanie Spicer DO Carroll, Meghan C, DO [facility] 3H, S386/1    Discharge Date Discharge Disposition Discharge Destination         Skilled Nursing Facility (DC - External)              Attending Provider:  Stefanie Spicer DO    Allergies:  No Known Allergies    Isolation:  None   Infection:  None   Code Status:  CPR    Ht:  170.2 cm (67\")   Wt:  102 kg (225 lb)    Admission Cmt:  None   Principal Problem:  None                Active Insurance as of 2020     Primary Coverage     Payor Plan Insurance Group Employer/Plan Group    HUMANA MEDICARE REPLACEMENT HUMANA MEDICARE REPLACEMENT [id number]     Payor Plan Address Payor Plan Phone Number Payor Plan Fax Number Effective Dates    [address] [address] [phone]  2018 - None Entered    [facility] [id number]       Subscriber Name Subscriber Birth Date Member ID       GAIL RUBIO 1931 [id number]                 Emergency Contacts      (Rel.) Home Phone Work Phone Mobile Phone    Jose Rubio (Son) -- -- [phone]    GOLDENKWAME REESE (Grandchild) -- -- [phone]    RubioSloane owens (Spouse) -- -- [phone]                 Discharge Summary      Stefanie Spicer DO at [address] [place] [address] Medicine Services  DISCHARGE SUMMARY    Patient Name: Gail Rubio  : 1931  MRN: [id number]    Date of Admission: 2020 11:28 AM  Date of Discharge:  2020  Primary Care Physician: Abdirashid Mccabe MD    Consults     No orders found from 5/10/2020 to 2020.          Hospital Course "     Presenting Problem:   Closed fracture of right hip, initial encounter (CMS/[facility]) [S72.001A]    Active Hospital Problems    Diagnosis  POA   • Closed fracture of right hip (CMS/[facility]) [S72.001A]  Yes   • DVT (deep venous thrombosis) (CMS/[facility]) [I82.409]  Yes   • Benign essential hypertension [I10]  Yes   • Hypothyroidism [E03.9]  Yes   • Type 2 diabetes mellitus without complication (CMS/[facility]) [E11.9]  Yes      Resolved Hospital Problems   No resolved problems to display.          Hospital Course:  Marcos Acuña is a 89 y.o. male  with a past medical history of lower extremity DVT, and non-insulin-dependent diabetes who presented to the ED after a mechanical fall with subsequent right hip fracture.     This patient's problems and plans were partially entered by my partner and updated as appropriate by me 06/13/20.         Mechanical fall  Right hip fracture  -XR of right hip showed acute minimally comminuted and mildly displaced with lateral angulation subtrochanteric fracture of the right proximal femur  -CT head was without acute intracranial abnormality  -Dr Meza of orthopedics following, s/p R femoral nail on 6/9  --PT/OT, dc to snf  -Pre-procedure covid screening negative     History of recurrent lower extremity DVT  -continue eliquis, ok with ortho     Hypertension-continue losartan     Hypothyroidism-continue levothyroxine, TSH within range   BPH  -Continue tamsulosin and oxybutynin     T2DM- hold metformin, ssi while inpatient      Fever - resolved  - chest xray with some atelectasis, incentive spirometry if able  - ua negative 6/9  - BC negative to date, off abx        DVT Prophylaxis:  eliquis         Discharge Follow Up Recommendations for outpatient labs/diagnostics:   PCP in 1 week    Day of Discharge     HPI:   Less confused this morning. Pleasant no complaints. Discussed plan for discharge    Review of Systems  Gen- No fevers, chills  CV- No chest pain, palpitations  Resp- No cough, dyspnea  GI- No  N/V/D, abd pain      Vital Signs:   Temp:  [97.7 °F (36.5 °C)-98.7 °F (37.1 °C)] 97.7 °F (36.5 °C)  Heart Rate:  [69-75] 69  Resp:  [16-18] 16  BP: (123-171)/(68-80) 123/68     Physical Exam:  Constitutional: No acute distress, awake, alert  HENT: NCAT, mucous membranes moist  Respiratory: Clear to auscultation bilaterally, respiratory effort normal   Cardiovascular: RRR, no murmurs  Gastrointestinal: Positive bowel sounds, soft, nontender, nondistended  Musculoskeletal: trace edema  Psychiatric: Pleasant, cooperative  Neurologic: Oriented to self, speech clear  Skin: No rashes    Pertinent  and/or Most Recent Results     Results from last 7 days   Lab Units 06/12/20  0829 06/11/20  0552 06/10/20  0552 06/09/20  0532 06/08/20  1221   WBC 10*3/mm3 7.18 9.81 9.19 9.37 7.89   HEMOGLOBIN g/dL 8.0* 8.8* 10.1* 11.4* 12.5*   HEMATOCRIT % 25.1* 28.0* 33.0* 36.0* 38.7   PLATELETS 10*3/mm3 149 135* 130* 159 191   SODIUM mmol/L 141 139 140 140 140   POTASSIUM mmol/L 3.7 3.7 4.0 4.1 4.4   CHLORIDE mmol/L 108* 105 106 102 104   CO2 mmol/L 26.0 23.0 24.0 24.0 24.0   BUN mg/dL 12 16 13 14 11   CREATININE mg/dL 0.83 0.88 0.82 0.86 0.91   GLUCOSE mg/dL 143* 147* 158* 204* 140*   CALCIUM mg/dL 8.0* 8.1* 8.2* 8.7 8.5*     Results from last 7 days   Lab Units 06/08/20  1221   BILIRUBIN mg/dL 0.5   ALK PHOS U/L 69   ALT (SGPT) U/L 14   AST (SGOT) U/L 25   PROTIME Seconds 21.7*   INR  1.93*   APTT seconds 38.6*           Invalid input(s): TG, LDLCALC, LDLREALC  Results from last 7 days   Lab Units 06/08/20  1221   TSH uIU/mL 2.260   PROBNP pg/mL 65.4   TROPONIN T ng/mL 0.013       Brief Urine Lab Results  (Last result in the past 365 days)      Color   Clarity   Blood   Leuk Est   Nitrite   Protein   CREAT   Urine HCG        06/09/20 0332 Yellow Clear Negative Trace Negative Negative               Microbiology Results Abnormal     Procedure Component Value - Date/Time    Blood Culture - Blood, Arm, Left [[id number]] Collected:  06/10/20  1557    Lab Status:  Preliminary result Specimen:  Blood from Arm, Left Updated:  06/12/20 1701     Blood Culture No growth at 2 days    MRSA Screen Culture (Outpatient) - Swab, Nares [[id number]]  (Normal) Collected:  06/10/20 1851    Lab Status:  Final result Specimen:  Swab from Nares Updated:  06/12/20 1221     MRSA Screen Cx No Methicillin Resistant Staphylococcus aureus isolated    COVID-19,CEPHEID,URI IN-HOUSE(OR EMERGENT/ADD-ON),NP SWAB IN TRANSPORT MEDIA 3-4 HR TAT - Swab, Nasopharynx [[id number]]  (Normal) Collected:  06/08/20 1337    Lab Status:  Final result Specimen:  Swab from Nasopharynx Updated:  06/08/20 1444     COVID19 Not Detected          Imaging Results (All)     Procedure Component Value Units Date/Time    XR Chest 1 View [[id number]] Collected:  06/10/20 1645     Updated:  06/12/20 1856    Narrative:          EXAMINATION: XR CHEST 1 VW-06/10/2020:      INDICATION: Fever; S72.001A-Fracture of unspecified part of neck of  right femur, initial encounter for closed fracture; W19.XXXA-Unspecified  fall, initial encounter; Z79.01-Long term (current) use of  anticoagulants; Z74.09-Other reduced mobility; Z78.9-Other specified  health status.      COMPARISON: 06/08/2020.     FINDINGS: Portable chest reveals minimal increased markings seen in the  lung bases bilaterally with low lung volumes. Degenerative changes seen  within the spine. The heart is enlarged.           Impression:       Minimal increased markings seen in the lung bases  bilaterally with low lung volumes. Degenerative changes seen within the  spine.     D:  06/10/2020  E:  06/10/2020     This report was finalized on 6/12/2020 6:53 PM by Dr. Swetha Dimas MD.       FL C Arm During Surgery [[id number]] Collected:  06/09/20 1552     Updated:  06/09/20 1705    Narrative:       EXAMINATION: FL C ARM DURING SURGERY- 06/09/2020     INDICATION: Troch Nail; S72.001A-Fracture of unspecified part of neck of  right femur, initial encounter for  closed fracture; W19.XXXA-Unspecified  fall, initial encounter; Z79.01-Long term (current) use of  anticoagulants      TECHNIQUE: Intraoperative fluoroscopy for improved localization and  treatment planning     COMPARISON: NONE     FINDINGS: Intraoperative fluoroscopy with total fluoroscopic time usage  2 minutes 46 seconds and single representative image saved during right  trochanteric nailing.       Impression:       Intraoperative fluoroscopy utilized during right  trochanteric nailing.     D:  06/09/2020  E:  06/09/2020         This report was finalized on 6/9/2020 5:02 PM by Dr. Mele Brooks.       CT Head Without Contrast [[id number]] Collected:  06/08/20 1352     Updated:  06/08/20 1907    Narrative:       EXAMINATION: CT HEAD WO CONTRAST- 06/08/2020     INDICATION: TRAUMA; S72.001A-Fracture of unspecified part of neck of  right femur, initial encounter for closed fracture; W19.XXXA-Unspecified  fall, initial encounter; Z79.01-Long term (current) use of  anticoagulants      TECHNIQUE: CT head without intravenous contrast     The radiation dose reduction device was turned on for each scan per the  ALARA (As Low as Reasonably Achievable) protocol.     COMPARISON: NONE     FINDINGS: Midline structures are symmetric without evidence of mass,  mass effect or midline shift demonstrating prominence of the sulci  towards the vertex of generalized atrophy and/or age-related volume loss  without intra-axial hemorrhage or extra-axial fluid collection. Globes  and orbits unremarkable. Visualized paranasal sinuses and mastoid air  cells are grossly clear and well pneumatized with mild mucosal edema of  the ethmoid air cells. Calvarium intact.       Impression:       No acute intracranial abnormality specifically no acute  intra-axial hemorrhage or extra-axial fluid collection.     D:  06/08/2020  E:  06/08/2020     This report was finalized on 6/8/2020 7:04 PM by Dr. Mele Brooks.       XR Chest 1 View [[id number]]  Collected:  06/08/20 1212     Updated:  06/08/20 1906    Narrative:       EXAMINATION: XR CHEST 1 VW-      INDICATION: Fall.      COMPARISON: None.     FINDINGS: Cardiac size within normal limits. Pulmonary vascularity  within normal limits. No focal opacification or consolidation. No  pneumothorax or pleural effusion. Degenerative changes of the spine.           Impression:       No acute traumatic findings of the chest.     D:  06/08/2020  E:  06/08/2020     This report was finalized on 6/8/2020 7:03 PM by Dr. Mele Brooks.       XR Hip With or Without Pelvis 2 - 3 View Right [[id number]] Collected:  06/08/20 1216     Updated:  06/08/20 1906    Narrative:       EXAMINATION: XR HIP W OR WO PELVIS 2-3 VIEW RIGHT-      INDICATION: Trauma.      COMPARISON: None.     FINDINGS: Single AP view of the pelvis along with AP and crosstable  lateral views right hip reveal acute minimally comminuted and mildly  lateral angulated subtrochanteric fracture right femur with right  femoral head remaining well located at the right hip.       Impression:       Acute minimally comminuted and mildly displaced with lateral  angulation subtrochanteric fracture right proximal femur. Right femoral  head remains well located at the right hip joint.     D:  06/08/2020  E:  06/08/2020     This report was finalized on 6/8/2020 7:03 PM by Dr. Mele Brooks.                            Plan for Follow-up of Pending Labs/Results: BC negative times 48 hours   Order Current Status    Blood Culture - Blood, Arm, Left Preliminary result        Discharge Details        Discharge Medications      New Medications      Instructions Start Date   acetaminophen 325 MG tablet  Commonly known as:  TYLENOL   650 mg, Oral, Every 4 Hours PRN      apixaban 2.5 MG tablet tablet  Commonly known as:  ELIQUIS   2.5 mg, Oral, Every 12 Hours Scheduled      docusate sodium 100 MG capsule   100 mg, Oral, 2 Times Daily PRN      promethazine 12.5 MG tablet  Commonly known as:   PHENERGAN   12.5 mg, Oral, Every 6 Hours PRN         Continue These Medications      Instructions Start Date   calcium carbonate 500 MG chewable tablet  Commonly known as:  TUMS   1 tablet, Oral, Daily PRN      cyanocobalamin 1000 MCG/ML injection   1,000 mcg, Intramuscular, Every 28 Days      Exelon 9.5 MG/24HR patch  Generic drug:  rivastigmine   1 patch, Transdermal, Daily      ipratropium 0.06 % nasal spray  Commonly known as:  ATROVENT   2 sprays, Nasal, 2 times daily      levothyroxine 75 MCG tablet  Commonly known as:  SYNTHROID, LEVOTHROID   75 mcg, Oral, Daily      losartan 50 MG tablet  Commonly known as:  COZAAR   50 mg, Oral, Daily      metFORMIN 500 MG tablet  Commonly known as:  GLUCOPHAGE   1,000 mg, Oral, 2 Times Daily With Meals      Myrbetriq 50 MG tablet sustained-release 24 hour 24 hr tablet  Generic drug:  Mirabegron ER   50 mg, Oral, Daily      tamsulosin 0.4 MG capsule 24 hr capsule  Commonly known as:  FLOMAX   1 capsule, Oral, Nightly         Stop These Medications    levocetirizine 5 MG tablet  Commonly known as:  XYZAL     Xarelto 10 MG tablet  Generic drug:  rivaroxaban            No Known Allergies      Discharge Disposition:  Skilled Nursing Facility (DC - External)    Diet:  Hospital:  Diet Order   Procedures   • Diet Regular       Activity:  as tolerated    Restrictions or Other Recommendations:         CODE STATUS:    Code Status and Medical Interventions:   Ordered at: 06/08/20 1329     Code Status:    CPR     Medical Interventions (Level of Support Prior to Arrest):    Full       No future appointments.        Time Spent on Discharge:  39 minutes    Electronically signed by Stefanie Spicer DO, 06/13/20, 10:37 AM.      Electronically signed by Stefanie Spicer DO at 06/13/20 1041       Discharge Order (From admission, onward)     Start     Ordered    06/13/20 1036  Discharge patient  Once     Expected Discharge Date:  06/13/20    Discharge Disposition:  Skilled Nursing Facility  (DC - External)    Physician of Record for Attribution - Please select from Treatment Team:  ROS DENNY [[id number]]    Review needed by CMO to determine Physician of Record:  No       Question Answer Comment   Physician of Record for Attribution - Please select from Treatment Team ROS DENNY    Review needed by CMO to determine Physician of Record No        06/13/20 1036

## 2020-06-15 LAB — BACTERIA SPEC AEROBE CULT: NORMAL

## 2020-09-09 ENCOUNTER — APPOINTMENT (OUTPATIENT)
Dept: CT IMAGING | Facility: HOSPITAL | Age: 85
End: 2020-09-09

## 2020-09-09 ENCOUNTER — HOSPITAL ENCOUNTER (OUTPATIENT)
Facility: HOSPITAL | Age: 85
Setting detail: OBSERVATION
Discharge: HOME | End: 2020-09-13
Attending: EMERGENCY MEDICINE | Admitting: INTERNAL MEDICINE

## 2020-09-09 ENCOUNTER — APPOINTMENT (OUTPATIENT)
Dept: GENERAL RADIOLOGY | Facility: HOSPITAL | Age: 85
End: 2020-09-09

## 2020-09-09 DIAGNOSIS — R41.82 ALTERED MENTAL STATUS, UNSPECIFIED ALTERED MENTAL STATUS TYPE: Primary | ICD-10-CM

## 2020-09-09 DIAGNOSIS — R77.8 ELEVATED TROPONIN: ICD-10-CM

## 2020-09-09 PROBLEM — R05.9 COUGH: Status: ACTIVE | Noted: 2020-09-09

## 2020-09-09 PROBLEM — E87.1 HYPONATREMIA: Status: ACTIVE | Noted: 2020-09-09

## 2020-09-09 LAB
ALBUMIN SERPL-MCNC: 3 G/DL (ref 3.5–5.2)
ALBUMIN/GLOB SERPL: 0.8 G/DL
ALP SERPL-CCNC: 127 U/L (ref 39–117)
ALT SERPL W P-5'-P-CCNC: 11 U/L (ref 1–41)
ANION GAP SERPL CALCULATED.3IONS-SCNC: 8 MMOL/L (ref 5–15)
AST SERPL-CCNC: 17 U/L (ref 1–40)
BACTERIA UR QL AUTO: ABNORMAL /HPF
BASOPHILS # BLD AUTO: 0.12 10*3/MM3 (ref 0–0.2)
BASOPHILS NFR BLD AUTO: 1.3 % (ref 0–1.5)
BILIRUB SERPL-MCNC: 0.5 MG/DL (ref 0–1.2)
BILIRUB UR QL STRIP: NEGATIVE
BUN SERPL-MCNC: 11 MG/DL (ref 8–23)
BUN/CREAT SERPL: 13.6 (ref 7–25)
CALCIUM SPEC-SCNC: 8.5 MG/DL (ref 8.6–10.5)
CHLORIDE SERPL-SCNC: 99 MMOL/L (ref 98–107)
CLARITY UR: CLEAR
CO2 SERPL-SCNC: 28 MMOL/L (ref 22–29)
COLOR UR: YELLOW
CREAT SERPL-MCNC: 0.81 MG/DL (ref 0.76–1.27)
DEPRECATED RDW RBC AUTO: 49.1 FL (ref 37–54)
EOSINOPHIL # BLD AUTO: 0.84 10*3/MM3 (ref 0–0.4)
EOSINOPHIL NFR BLD AUTO: 9.4 % (ref 0.3–6.2)
ERYTHROCYTE [DISTWIDTH] IN BLOOD BY AUTOMATED COUNT: 16.7 % (ref 12.3–15.4)
GFR SERPL CREATININE-BSD FRML MDRD: 90 ML/MIN/1.73
GLOBULIN UR ELPH-MCNC: 3.6 GM/DL
GLUCOSE SERPL-MCNC: 72 MG/DL (ref 65–99)
GLUCOSE UR STRIP-MCNC: ABNORMAL MG/DL
HCT VFR BLD AUTO: 34.7 % (ref 37.5–51)
HGB BLD-MCNC: 11 G/DL (ref 13–17.7)
HGB UR QL STRIP.AUTO: NEGATIVE
HOLD SPECIMEN: NORMAL
HOLD SPECIMEN: NORMAL
HYALINE CASTS UR QL AUTO: ABNORMAL /LPF
IMM GRANULOCYTES # BLD AUTO: 0.02 10*3/MM3 (ref 0–0.05)
IMM GRANULOCYTES NFR BLD AUTO: 0.2 % (ref 0–0.5)
KETONES UR QL STRIP: NEGATIVE
LEUKOCYTE ESTERASE UR QL STRIP.AUTO: ABNORMAL
LYMPHOCYTES # BLD AUTO: 2.68 10*3/MM3 (ref 0.7–3.1)
LYMPHOCYTES NFR BLD AUTO: 29.8 % (ref 19.6–45.3)
MAGNESIUM SERPL-MCNC: 2.2 MG/DL (ref 1.6–2.4)
MCH RBC QN AUTO: 25.7 PG (ref 26.6–33)
MCHC RBC AUTO-ENTMCNC: 31.7 G/DL (ref 31.5–35.7)
MCV RBC AUTO: 81.1 FL (ref 79–97)
MONOCYTES # BLD AUTO: 0.65 10*3/MM3 (ref 0.1–0.9)
MONOCYTES NFR BLD AUTO: 7.2 % (ref 5–12)
NEUTROPHILS NFR BLD AUTO: 4.67 10*3/MM3 (ref 1.7–7)
NEUTROPHILS NFR BLD AUTO: 52.1 % (ref 42.7–76)
NITRITE UR QL STRIP: NEGATIVE
NRBC BLD AUTO-RTO: 0 /100 WBC (ref 0–0.2)
PH UR STRIP.AUTO: 6 [PH] (ref 5–8)
PLATELET # BLD AUTO: 219 10*3/MM3 (ref 140–450)
PMV BLD AUTO: 9.6 FL (ref 6–12)
POTASSIUM SERPL-SCNC: 4 MMOL/L (ref 3.5–5.2)
PROT SERPL-MCNC: 6.6 G/DL (ref 6–8.5)
PROT UR QL STRIP: NEGATIVE
RBC # BLD AUTO: 4.28 10*6/MM3 (ref 4.14–5.8)
RBC # UR: ABNORMAL /HPF
REF LAB TEST METHOD: ABNORMAL
SODIUM SERPL-SCNC: 135 MMOL/L (ref 136–145)
SP GR UR STRIP: 1.01 (ref 1–1.03)
SQUAMOUS #/AREA URNS HPF: ABNORMAL /HPF
TROPONIN T SERPL-MCNC: 0.03 NG/ML (ref 0–0.03)
TROPONIN T SERPL-MCNC: 0.05 NG/ML (ref 0–0.03)
UROBILINOGEN UR QL STRIP: ABNORMAL
WBC # BLD AUTO: 8.98 10*3/MM3 (ref 3.4–10.8)
WBC UR QL AUTO: ABNORMAL /HPF
WHOLE BLOOD HOLD SPECIMEN: NORMAL
WHOLE BLOOD HOLD SPECIMEN: NORMAL

## 2020-09-09 PROCEDURE — 85025 COMPLETE CBC W/AUTO DIFF WBC: CPT

## 2020-09-09 PROCEDURE — G0378 HOSPITAL OBSERVATION PER HR: HCPCS

## 2020-09-09 PROCEDURE — 70450 CT HEAD/BRAIN W/O DYE: CPT

## 2020-09-09 PROCEDURE — 84484 ASSAY OF TROPONIN QUANT: CPT | Performed by: EMERGENCY MEDICINE

## 2020-09-09 PROCEDURE — 81001 URINALYSIS AUTO W/SCOPE: CPT

## 2020-09-09 PROCEDURE — 99220 PR INITIAL OBSERVATION CARE/DAY 70 MINUTES: CPT | Performed by: INTERNAL MEDICINE

## 2020-09-09 PROCEDURE — 80053 COMPREHEN METABOLIC PANEL: CPT | Performed by: EMERGENCY MEDICINE

## 2020-09-09 PROCEDURE — 93005 ELECTROCARDIOGRAM TRACING: CPT | Performed by: EMERGENCY MEDICINE

## 2020-09-09 PROCEDURE — 99285 EMERGENCY DEPT VISIT HI MDM: CPT

## 2020-09-09 PROCEDURE — 83735 ASSAY OF MAGNESIUM: CPT | Performed by: EMERGENCY MEDICINE

## 2020-09-09 PROCEDURE — P9612 CATHETERIZE FOR URINE SPEC: HCPCS

## 2020-09-09 PROCEDURE — 96374 THER/PROPH/DIAG INJ IV PUSH: CPT

## 2020-09-09 PROCEDURE — 71250 CT THORAX DX C-: CPT

## 2020-09-09 PROCEDURE — 71045 X-RAY EXAM CHEST 1 VIEW: CPT

## 2020-09-09 PROCEDURE — 84484 ASSAY OF TROPONIN QUANT: CPT | Performed by: PHYSICIAN ASSISTANT

## 2020-09-09 PROCEDURE — 93005 ELECTROCARDIOGRAM TRACING: CPT | Performed by: PHYSICIAN ASSISTANT

## 2020-09-09 RX ORDER — OXYBUTYNIN CHLORIDE 5 MG/1
10 TABLET, EXTENDED RELEASE ORAL DAILY
Status: DISCONTINUED | OUTPATIENT
Start: 2020-09-10 | End: 2020-09-13 | Stop reason: HOSPADM

## 2020-09-09 RX ORDER — TAMSULOSIN HYDROCHLORIDE 0.4 MG/1
0.4 CAPSULE ORAL NIGHTLY
Status: DISCONTINUED | OUTPATIENT
Start: 2020-09-09 | End: 2020-09-13 | Stop reason: HOSPADM

## 2020-09-09 RX ORDER — NICOTINE POLACRILEX 4 MG
15 LOZENGE BUCCAL
Status: DISCONTINUED | OUTPATIENT
Start: 2020-09-09 | End: 2020-09-13 | Stop reason: HOSPADM

## 2020-09-09 RX ORDER — AMOXICILLIN 250 MG
2 CAPSULE ORAL NIGHTLY
Status: DISCONTINUED | OUTPATIENT
Start: 2020-09-09 | End: 2020-09-13 | Stop reason: HOSPADM

## 2020-09-09 RX ORDER — RIVASTIGMINE 9.5 MG/24H
1 PATCH, EXTENDED RELEASE TRANSDERMAL DAILY
Status: DISCONTINUED | OUTPATIENT
Start: 2020-09-10 | End: 2020-09-13 | Stop reason: HOSPADM

## 2020-09-09 RX ORDER — ONDANSETRON 2 MG/ML
4 INJECTION INTRAMUSCULAR; INTRAVENOUS EVERY 6 HOURS PRN
Status: DISCONTINUED | OUTPATIENT
Start: 2020-09-09 | End: 2020-09-13 | Stop reason: HOSPADM

## 2020-09-09 RX ORDER — DEXTROSE MONOHYDRATE 25 G/50ML
25 INJECTION, SOLUTION INTRAVENOUS
Status: DISCONTINUED | OUTPATIENT
Start: 2020-09-09 | End: 2020-09-13 | Stop reason: HOSPADM

## 2020-09-09 RX ORDER — POTASSIUM CHLORIDE 7.45 MG/ML
10 INJECTION INTRAVENOUS
Status: DISCONTINUED | OUTPATIENT
Start: 2020-09-09 | End: 2020-09-13 | Stop reason: HOSPADM

## 2020-09-09 RX ORDER — SODIUM CHLORIDE 9 MG/ML
50 INJECTION, SOLUTION INTRAVENOUS CONTINUOUS
Status: DISCONTINUED | OUTPATIENT
Start: 2020-09-09 | End: 2020-09-13 | Stop reason: HOSPADM

## 2020-09-09 RX ORDER — POTASSIUM CHLORIDE 750 MG/1
40 CAPSULE, EXTENDED RELEASE ORAL AS NEEDED
Status: DISCONTINUED | OUTPATIENT
Start: 2020-09-09 | End: 2020-09-13 | Stop reason: HOSPADM

## 2020-09-09 RX ORDER — ACETAMINOPHEN 325 MG/1
650 TABLET ORAL EVERY 4 HOURS PRN
Status: DISCONTINUED | OUTPATIENT
Start: 2020-09-09 | End: 2020-09-13 | Stop reason: HOSPADM

## 2020-09-09 RX ORDER — MAGNESIUM SULFATE HEPTAHYDRATE 40 MG/ML
4 INJECTION, SOLUTION INTRAVENOUS AS NEEDED
Status: DISCONTINUED | OUTPATIENT
Start: 2020-09-09 | End: 2020-09-13 | Stop reason: HOSPADM

## 2020-09-09 RX ORDER — LOSARTAN POTASSIUM 25 MG/1
50 TABLET ORAL DAILY
Status: DISCONTINUED | OUTPATIENT
Start: 2020-09-10 | End: 2020-09-13 | Stop reason: HOSPADM

## 2020-09-09 RX ORDER — MAGNESIUM SULFATE HEPTAHYDRATE 40 MG/ML
2 INJECTION, SOLUTION INTRAVENOUS AS NEEDED
Status: DISCONTINUED | OUTPATIENT
Start: 2020-09-09 | End: 2020-09-13 | Stop reason: HOSPADM

## 2020-09-09 RX ORDER — ASPIRIN 81 MG/1
81 TABLET, CHEWABLE ORAL DAILY
Status: DISCONTINUED | OUTPATIENT
Start: 2020-09-10 | End: 2020-09-13 | Stop reason: HOSPADM

## 2020-09-09 RX ORDER — SODIUM CHLORIDE 0.9 % (FLUSH) 0.9 %
10 SYRINGE (ML) INJECTION EVERY 12 HOURS SCHEDULED
Status: DISCONTINUED | OUTPATIENT
Start: 2020-09-09 | End: 2020-09-13 | Stop reason: HOSPADM

## 2020-09-09 RX ORDER — POTASSIUM CHLORIDE 1.5 G/1.77G
40 POWDER, FOR SOLUTION ORAL AS NEEDED
Status: DISCONTINUED | OUTPATIENT
Start: 2020-09-09 | End: 2020-09-13 | Stop reason: HOSPADM

## 2020-09-09 RX ORDER — LEVOTHYROXINE SODIUM 0.07 MG/1
75 TABLET ORAL DAILY
Status: DISCONTINUED | OUTPATIENT
Start: 2020-09-10 | End: 2020-09-13 | Stop reason: HOSPADM

## 2020-09-09 RX ORDER — SODIUM CHLORIDE 0.9 % (FLUSH) 0.9 %
10 SYRINGE (ML) INJECTION AS NEEDED
Status: DISCONTINUED | OUTPATIENT
Start: 2020-09-09 | End: 2020-09-13 | Stop reason: HOSPADM

## 2020-09-09 RX ORDER — ONDANSETRON 4 MG/1
4 TABLET, FILM COATED ORAL EVERY 6 HOURS PRN
Status: DISCONTINUED | OUTPATIENT
Start: 2020-09-09 | End: 2020-09-13 | Stop reason: HOSPADM

## 2020-09-09 RX ADMIN — SODIUM CHLORIDE, PRESERVATIVE FREE 10 ML: 5 INJECTION INTRAVENOUS at 21:02

## 2020-09-09 RX ADMIN — SODIUM CHLORIDE 100 ML/HR: 9 INJECTION, SOLUTION INTRAVENOUS at 20:57

## 2020-09-09 NOTE — ED PROVIDER NOTES
Subjective   Pt is a 88 yo female presenting to ED by EMS with complaints of confusion.  History provided by wife accompanying patient in ED. PMHx significant for Dementia, DVT (Eliquis), DM (non insulin), HTN, BPH, Hypothyroidism, and OA. Pt lives with wife in independent living at [facility]. Wife explains yesterday she and the caregivers noticed increased confusion. Today worsening confusion with lethargy and wanting to sleep all day. Wife reports he often acts like this with UTIs. She denies recent antibiotics. Pt had a non productive cough 5 days ago and has been taking Robitussin with relief of cough. Pt does have hx of dementia but wife states typically more alert, talkative and can answer questions. He typically walks with assistance and uses a wheelchair but today too weak to sit up in a chair. No reports of complaints of headache, vision changes, numbness, tingling, limb specific weakness, slurred speech or facial droop. No reports of fever, chest pain, SOB, N/V/D, abdominal pain, new joint pain, recent fall or urinary sx. Pt last admitted in June with right hip fracture. No reports of known Covid exposure.       History provided by:  Medical records and spouse      Review of Systems   Unable to perform ROS: Mental status change (Dementia. hx from wife)   Constitutional: Positive for fatigue.   Respiratory: Positive for cough. Negative for shortness of breath.    Cardiovascular: Negative for chest pain and leg swelling.   Gastrointestinal: Negative for abdominal pain, diarrhea and vomiting.   Genitourinary: Negative for difficulty urinating.   Musculoskeletal: Negative for arthralgias and back pain.   Neurological: Positive for weakness (generalized). Negative for dizziness, syncope, speech difficulty, numbness and headaches.   Psychiatric/Behavioral: Positive for confusion.       Past Medical History:   Diagnosis Date   • Dementia (CMS/[facility])    • Diabetes (CMS/[facility])    • H/O blood clots    •  Osteoarthritis        Allergies   Allergen Reactions   • Contrast Dye Unknown - Low Severity       Past Surgical History:   Procedure Laterality Date   • BUNIONECTOMY     • HIP TROCHANTERIC NAILING WITH INTRAMEDULLARY HIP SCREW N/A 6/9/2020    Procedure: ADVANCED TROCHANTERIC FEMORAL NAIL (ATFN) RIGHT HIP/FEMUR;  Surgeon: Mejia Meza MD;  Location: [facility];  Service: Orthopedics;  Laterality: N/A;   • RECTAL FISTULECTOMY     • SKIN CANCER EXCISION         Family History   Problem Relation Age of Onset   • Stroke Mother    • Heart attack Father        Social History     Socioeconomic History   • Marital status:      Spouse name: Not on file   • Number of children: Not on file   • Years of education: Not on file   • Highest education level: Not on file   Tobacco Use   • Smoking status: Former Smoker     Packs/day: 2.00   • Smokeless tobacco: Never Used   Substance and Sexual Activity   • Alcohol use: Yes     Alcohol/week: 1.0 standard drinks     Types: 1 Glasses of wine per week     Frequency: Never   • Drug use: No   • Sexual activity: Defer           Objective   Physical Exam   Constitutional: He appears well-developed. He does not appear ill. No distress.   HENT:   Head: Atraumatic.   Mouth/Throat: Oropharynx is clear and moist.   Eyes: Pupils are equal, round, and reactive to light. EOM are normal.   Neck: Normal range of motion. Neck supple.   Cardiovascular: Normal rate and regular rhythm.   Pulmonary/Chest: Effort normal and breath sounds normal. No respiratory distress.   Abdominal: Soft. There is no tenderness.   Neurological: He is disoriented.   Equal  bilateral and moves feet on command. Generalized weakness and unable to sit up in bed without assistance.   CN appear intact, no facial droop. Pt lethargic and confused   Skin: Skin is warm. Capillary refill takes less than 2 seconds.   Nursing note and vitals reviewed.      Procedures           ED Course  ED Course as of Sep 09 1944    Wed Sep 09, 2020   1944 Troponin T(!!): 0.048 [RT]      ED Course User Index  [RT] Nedra Logan PA    Discussed results with patient and wife. Agreeable with admission for further evaluation of AMS and elevated Trop.     Discussed patient with Dr. Brown who is agreeable with ED course and admission.     Reviewed old records.     Discussed admission with hospitalist Dr. Marrero         Recent Results (from the past 24 hour(s))   Comprehensive Metabolic Panel    Collection Time: 09/09/20  4:47 PM   Result Value Ref Range    Glucose 72 65 - 99 mg/dL    BUN 11 8 - 23 mg/dL    Creatinine 0.81 0.76 - 1.27 mg/dL    Sodium 135 (L) 136 - 145 mmol/L    Potassium 4.0 3.5 - 5.2 mmol/L    Chloride 99 98 - 107 mmol/L    CO2 28.0 22.0 - 29.0 mmol/L    Calcium 8.5 (L) 8.6 - 10.5 mg/dL    Total Protein 6.6 6.0 - 8.5 g/dL    Albumin 3.00 (L) 3.50 - 5.20 g/dL    ALT (SGPT) 11 1 - 41 U/L    AST (SGOT) 17 1 - 40 U/L    Alkaline Phosphatase 127 (H) 39 - 117 U/L    Total Bilirubin 0.5 0.0 - 1.2 mg/dL    eGFR Non African Amer 90 >60 mL/min/1.73    Globulin 3.6 gm/dL    A/G Ratio 0.8 g/dL    BUN/Creatinine Ratio 13.6 7.0 - 25.0    Anion Gap 8.0 5.0 - 15.0 mmol/L   Troponin    Collection Time: 09/09/20  4:47 PM   Result Value Ref Range    Troponin T 0.048 (C) 0.000 - 0.030 ng/mL   Magnesium    Collection Time: 09/09/20  4:47 PM   Result Value Ref Range    Magnesium 2.2 1.6 - 2.4 mg/dL   Light Blue Top    Collection Time: 09/09/20  4:47 PM   Result Value Ref Range    Extra Tube hold for add-on    Green Top (Gel)    Collection Time: 09/09/20  4:47 PM   Result Value Ref Range    Extra Tube Hold for add-ons.    Lavender Top    Collection Time: 09/09/20  4:47 PM   Result Value Ref Range    Extra Tube hold for add-on    Gold Top - SST    Collection Time: 09/09/20  4:47 PM   Result Value Ref Range    Extra Tube Hold for add-ons.    CBC Auto Differential    Collection Time: 09/09/20  4:47 PM   Result Value Ref Range    WBC 8.98 3.40 - 10.80  10*3/mm3    RBC 4.28 4.14 - 5.80 10*6/mm3    Hemoglobin 11.0 (L) 13.0 - 17.7 g/dL    Hematocrit 34.7 (L) 37.5 - 51.0 %    MCV 81.1 79.0 - 97.0 fL    MCH 25.7 (L) 26.6 - 33.0 pg    MCHC 31.7 31.5 - 35.7 g/dL    RDW 16.7 (H) 12.3 - 15.4 %    RDW-SD 49.1 37.0 - 54.0 fl    MPV 9.6 6.0 - 12.0 fL    Platelets 219 140 - 450 10*3/mm3    Neutrophil % 52.1 42.7 - 76.0 %    Lymphocyte % 29.8 19.6 - 45.3 %    Monocyte % 7.2 5.0 - 12.0 %    Eosinophil % 9.4 (H) 0.3 - 6.2 %    Basophil % 1.3 0.0 - 1.5 %    Immature Grans % 0.2 0.0 - 0.5 %    Neutrophils, Absolute 4.67 1.70 - 7.00 10*3/mm3    Lymphocytes, Absolute 2.68 0.70 - 3.10 10*3/mm3    Monocytes, Absolute 0.65 0.10 - 0.90 10*3/mm3    Eosinophils, Absolute 0.84 (H) 0.00 - 0.40 10*3/mm3    Basophils, Absolute 0.12 0.00 - 0.20 10*3/mm3    Immature Grans, Absolute 0.02 0.00 - 0.05 10*3/mm3    nRBC 0.0 0.0 - 0.2 /100 WBC   Urinalysis With Microscopic If Indicated (No Culture) - Urine, Catheter In/Out    Collection Time: 09/09/20  4:48 PM   Result Value Ref Range    Color, UA Yellow Yellow, Straw    Appearance, UA Clear Clear    pH, UA 6.0 5.0 - 8.0    Specific Gravity, UA 1.015 1.001 - 1.030    Glucose,  mg/dL (Trace) (A) Negative    Ketones, UA Negative Negative    Bilirubin, UA Negative Negative    Blood, UA Negative Negative    Protein, UA Negative Negative    Leuk Esterase, UA Trace (A) Negative    Nitrite, UA Negative Negative    Urobilinogen, UA 1.0 E.U./dL 0.2 - 1.0 E.U./dL   Urinalysis, Microscopic Only - Urine, Catheter In/Out    Collection Time: 09/09/20  4:48 PM   Result Value Ref Range    RBC, UA 3-6 (A) None Seen, 0-2 /HPF    WBC, UA 0-2 None Seen, 0-2 /HPF    Bacteria, UA None Seen None Seen, Trace /HPF    Squamous Epithelial Cells, UA 0-2 None Seen, 0-2 /HPF    Hyaline Casts, UA None Seen 0 - 6 /LPF    Methodology Automated Microscopy      Note: In addition to lab results from this visit, the labs listed above may include labs taken at another facility or  "during a different encounter within the last 24 hours. Please correlate lab times with ED admission and discharge times for further clarification of the services performed during this visit.    CT Head Without Contrast   Final Result   No acute intracranial abnormality. Unchanged volume loss and   periventricular white matter changes of chronic small vessel ischemia.       Mucosal thickening and fluid opacification of the left sphenoid sinus   and ethmoid air cells. Correlate with any clinical evidence of   sinusitis.           This report was finalized on 9/9/2020 6:06 PM by Abdirashid Luna.          XR Chest 1 View   Final Result   Hypoventilatory changes without evidence of acute disease in   the chest       This report was finalized on 9/9/2020 6:07 PM by Abdirashid Luna.          CT Chest Without Contrast    (Results Pending)     Vitals:    09/09/20 1640 09/09/20 1700 09/09/20 1730 09/09/20 1800   BP: 136/78 137/72 131/69 149/80   BP Location: Right arm      Patient Position: Lying      Pulse: 76 82 73 75   Resp: 16      Temp: 98.3 °F (36.8 °C)      SpO2: 98% 96% 97% 97%   Weight: 107 kg (236 lb)      Height: 170.2 cm (67\")        Medications - No data to display  ECG/EMG Results (last 24 hours)     Procedure Component Value Units Date/Time    ECG 12 Lead [[id number]] Collected:  09/09/20 1709     Updated:  09/09/20 1707        ECG 12 Lead         ECG 12 Lead    (Results Pending)         COVID-19 RISK SCREEN     1. Has the patient had close contact without PPE with a lab confirmed COVID-19 (+) person or a person under investigation (PUI) for COVID-19 infection?  -- No     2. Has the patient had respiratory symptoms, worsened/new cough and/or SOA, unexplained fever, or sudden loss of smell and/or taste in the past 7 days? --  No    3. Does the patient have baseline higher exposure risk such as working in healthcare field, currently residing in healthcare facility, or ongoing hemodialysis?  --  Yes               "                         MDM    Final diagnoses:   Altered mental status, unspecified altered mental status type   Elevated troponin            Nedra Logan PA  09/09/20 1944

## 2020-09-10 ENCOUNTER — APPOINTMENT (OUTPATIENT)
Dept: CARDIOLOGY | Facility: HOSPITAL | Age: 85
End: 2020-09-10

## 2020-09-10 LAB
ALBUMIN SERPL-MCNC: 2.9 G/DL (ref 3.5–5.2)
ALBUMIN/GLOB SERPL: 0.9 G/DL
ALP SERPL-CCNC: 121 U/L (ref 39–117)
ALT SERPL W P-5'-P-CCNC: 9 U/L (ref 1–41)
ANION GAP SERPL CALCULATED.3IONS-SCNC: 11 MMOL/L (ref 5–15)
ANION GAP SERPL CALCULATED.3IONS-SCNC: 11 MMOL/L (ref 5–15)
AST SERPL-CCNC: 16 U/L (ref 1–40)
B PARAPERT DNA SPEC QL NAA+PROBE: NOT DETECTED
B PERT DNA SPEC QL NAA+PROBE: NOT DETECTED
BH CV ECHO MEAS - AO MAX PG (FULL): 15.6 MMHG
BH CV ECHO MEAS - AO MAX PG: 18.9 MMHG
BH CV ECHO MEAS - AO MEAN PG (FULL): 9.2 MMHG
BH CV ECHO MEAS - AO MEAN PG: 10.7 MMHG
BH CV ECHO MEAS - AO ROOT AREA (BSA CORRECTED): 1.4
BH CV ECHO MEAS - AO ROOT AREA: 6.7 CM^2
BH CV ECHO MEAS - AO ROOT DIAM: 2.9 CM
BH CV ECHO MEAS - AO V2 MAX: 217.2 CM/SEC
BH CV ECHO MEAS - AO V2 MEAN: 156.4 CM/SEC
BH CV ECHO MEAS - AO V2 VTI: 42.9 CM
BH CV ECHO MEAS - AVA(I,A): 1.1 CM^2
BH CV ECHO MEAS - AVA(I,D): 1.1 CM^2
BH CV ECHO MEAS - AVA(V,A): 1.1 CM^2
BH CV ECHO MEAS - AVA(V,D): 1.1 CM^2
BH CV ECHO MEAS - BSA(HAYCOCK): 2.1 M^2
BH CV ECHO MEAS - BSA: 2 M^2
BH CV ECHO MEAS - BZI_BMI: 31.6 KILOGRAMS/M^2
BH CV ECHO MEAS - BZI_METRIC_HEIGHT: 170.2 CM
BH CV ECHO MEAS - BZI_METRIC_WEIGHT: 91.6 KG
BH CV ECHO MEAS - EDV(CUBED): 101.1 ML
BH CV ECHO MEAS - EDV(TEICH): 100.3 ML
BH CV ECHO MEAS - EF(CUBED): 78.6 %
BH CV ECHO MEAS - EF(TEICH): 70.9 %
BH CV ECHO MEAS - ESV(CUBED): 21.6 ML
BH CV ECHO MEAS - ESV(TEICH): 29.2 ML
BH CV ECHO MEAS - FS: 40.2 %
BH CV ECHO MEAS - IVS/LVPW: 0.93
BH CV ECHO MEAS - IVSD: 0.93 CM
BH CV ECHO MEAS - LA DIMENSION: 3.9 CM
BH CV ECHO MEAS - LA/AO: 1.4
BH CV ECHO MEAS - LAD MAJOR: 5.4 CM
BH CV ECHO MEAS - LAT PEAK E' VEL: 9 CM/SEC
BH CV ECHO MEAS - LATERAL E/E' RATIO: 7.6
BH CV ECHO MEAS - LV MASS(C)D: 154.2 GRAMS
BH CV ECHO MEAS - LV MASS(C)DI: 76 GRAMS/M^2
BH CV ECHO MEAS - LV MAX PG: 3.2 MMHG
BH CV ECHO MEAS - LV MEAN PG: 1.5 MMHG
BH CV ECHO MEAS - LV V1 MAX: 89.8 CM/SEC
BH CV ECHO MEAS - LV V1 MEAN: 54.1 CM/SEC
BH CV ECHO MEAS - LV V1 VTI: 16.4 CM
BH CV ECHO MEAS - LVIDD: 4.7 CM
BH CV ECHO MEAS - LVIDS: 2.8 CM
BH CV ECHO MEAS - LVOT AREA (M): 2.8 CM^2
BH CV ECHO MEAS - LVOT AREA: 2.8 CM^2
BH CV ECHO MEAS - LVOT DIAM: 1.9 CM
BH CV ECHO MEAS - LVPWD: 1 CM
BH CV ECHO MEAS - MED PEAK E' VEL: 5.5 CM/SEC
BH CV ECHO MEAS - MEDIAL E/E' RATIO: 12.4
BH CV ECHO MEAS - MV A MAX VEL: 71.6 CM/SEC
BH CV ECHO MEAS - MV DEC TIME: 0.24 SEC
BH CV ECHO MEAS - MV E MAX VEL: 69.6 CM/SEC
BH CV ECHO MEAS - MV E/A: 0.97
BH CV ECHO MEAS - PA MAX PG: 6.2 MMHG
BH CV ECHO MEAS - PA V2 MAX: 124.3 CM/SEC
BH CV ECHO MEAS - RAP SYSTOLE: 3 MMHG
BH CV ECHO MEAS - RVSP: 23 MMHG
BH CV ECHO MEAS - SI(AO): 141.3 ML/M^2
BH CV ECHO MEAS - SI(CUBED): 39.2 ML/M^2
BH CV ECHO MEAS - SI(LVOT): 22.3 ML/M^2
BH CV ECHO MEAS - SI(TEICH): 35 ML/M^2
BH CV ECHO MEAS - SV(AO): 287 ML
BH CV ECHO MEAS - SV(CUBED): 79.5 ML
BH CV ECHO MEAS - SV(LVOT): 45.3 ML
BH CV ECHO MEAS - SV(TEICH): 71.1 ML
BH CV ECHO MEAS - TR MAX PG: 20 MMHG
BH CV ECHO MEAS - TR MAX VEL: 220.4 CM/SEC
BH CV ECHO MEASUREMENTS AVERAGE E/E' RATIO: 9.6
BH CV VAS BP LEFT ARM: NORMAL MMHG
BH CV XLRA - RV BASE: 3.2 CM
BH CV XLRA - RV LENGTH: 7.8 CM
BH CV XLRA - RV MID: 3.5 CM
BILIRUB SERPL-MCNC: 0.4 MG/DL (ref 0–1.2)
BUN SERPL-MCNC: 11 MG/DL (ref 8–23)
BUN SERPL-MCNC: 9 MG/DL (ref 8–23)
BUN/CREAT SERPL: 12.5 (ref 7–25)
BUN/CREAT SERPL: 15.3 (ref 7–25)
C PNEUM DNA NPH QL NAA+NON-PROBE: NOT DETECTED
CALCIUM SPEC-SCNC: 8.5 MG/DL (ref 8.6–10.5)
CALCIUM SPEC-SCNC: 8.7 MG/DL (ref 8.6–10.5)
CHLORIDE SERPL-SCNC: 100 MMOL/L (ref 98–107)
CHLORIDE SERPL-SCNC: 102 MMOL/L (ref 98–107)
CO2 SERPL-SCNC: 24 MMOL/L (ref 22–29)
CO2 SERPL-SCNC: 25 MMOL/L (ref 22–29)
CREAT SERPL-MCNC: 0.72 MG/DL (ref 0.76–1.27)
CREAT SERPL-MCNC: 0.72 MG/DL (ref 0.76–1.27)
DEPRECATED RDW RBC AUTO: 48 FL (ref 37–54)
ERYTHROCYTE [DISTWIDTH] IN BLOOD BY AUTOMATED COUNT: 16.7 % (ref 12.3–15.4)
FLUAV H1 2009 PAND RNA NPH QL NAA+PROBE: NOT DETECTED
FLUAV H1 HA GENE NPH QL NAA+PROBE: NOT DETECTED
FLUAV H3 RNA NPH QL NAA+PROBE: NOT DETECTED
FLUAV SUBTYP SPEC NAA+PROBE: NOT DETECTED
FLUBV RNA ISLT QL NAA+PROBE: NOT DETECTED
GFR SERPL CREATININE-BSD FRML MDRD: 103 ML/MIN/1.73
GFR SERPL CREATININE-BSD FRML MDRD: 103 ML/MIN/1.73
GLOBULIN UR ELPH-MCNC: 3.2 GM/DL
GLUCOSE BLDC GLUCOMTR-MCNC: 109 MG/DL (ref 70–130)
GLUCOSE BLDC GLUCOMTR-MCNC: 118 MG/DL (ref 70–130)
GLUCOSE BLDC GLUCOMTR-MCNC: 119 MG/DL (ref 70–130)
GLUCOSE BLDC GLUCOMTR-MCNC: 134 MG/DL (ref 70–130)
GLUCOSE SERPL-MCNC: 127 MG/DL (ref 65–99)
GLUCOSE SERPL-MCNC: 131 MG/DL (ref 65–99)
HADV DNA SPEC NAA+PROBE: NOT DETECTED
HBA1C MFR BLD: 6.1 % (ref 4.8–5.6)
HCOV 229E RNA SPEC QL NAA+PROBE: NOT DETECTED
HCOV HKU1 RNA SPEC QL NAA+PROBE: NOT DETECTED
HCOV NL63 RNA SPEC QL NAA+PROBE: NOT DETECTED
HCOV OC43 RNA SPEC QL NAA+PROBE: NOT DETECTED
HCT VFR BLD AUTO: 33.8 % (ref 37.5–51)
HGB BLD-MCNC: 10.7 G/DL (ref 13–17.7)
HMPV RNA NPH QL NAA+NON-PROBE: NOT DETECTED
HPIV1 RNA SPEC QL NAA+PROBE: NOT DETECTED
HPIV2 RNA SPEC QL NAA+PROBE: NOT DETECTED
HPIV3 RNA NPH QL NAA+PROBE: NOT DETECTED
HPIV4 P GENE NPH QL NAA+PROBE: NOT DETECTED
LV EF 2D ECHO EST: 60 %
M PNEUMO IGG SER IA-ACNC: NOT DETECTED
MAGNESIUM SERPL-MCNC: 2 MG/DL (ref 1.6–2.4)
MAXIMAL PREDICTED HEART RATE: 131 BPM
MCH RBC QN AUTO: 25.4 PG (ref 26.6–33)
MCHC RBC AUTO-ENTMCNC: 31.7 G/DL (ref 31.5–35.7)
MCV RBC AUTO: 80.1 FL (ref 79–97)
PHOSPHATE SERPL-MCNC: 2.7 MG/DL (ref 2.5–4.5)
PLATELET # BLD AUTO: 229 10*3/MM3 (ref 140–450)
PMV BLD AUTO: 9.9 FL (ref 6–12)
POTASSIUM SERPL-SCNC: 3.7 MMOL/L (ref 3.5–5.2)
POTASSIUM SERPL-SCNC: 3.9 MMOL/L (ref 3.5–5.2)
PROT SERPL-MCNC: 6.1 G/DL (ref 6–8.5)
RBC # BLD AUTO: 4.22 10*6/MM3 (ref 4.14–5.8)
RHINOVIRUS RNA SPEC NAA+PROBE: NOT DETECTED
RSV RNA NPH QL NAA+NON-PROBE: NOT DETECTED
SARS-COV-2 RNA NPH QL NAA+NON-PROBE: NOT DETECTED
SODIUM SERPL-SCNC: 136 MMOL/L (ref 136–145)
SODIUM SERPL-SCNC: 137 MMOL/L (ref 136–145)
STRESS TARGET HR: 111 BPM
WBC # BLD AUTO: 8.75 10*3/MM3 (ref 3.4–10.8)

## 2020-09-10 PROCEDURE — 82746 ASSAY OF FOLIC ACID SERUM: CPT | Performed by: PSYCHIATRY & NEUROLOGY

## 2020-09-10 PROCEDURE — 93306 TTE W/DOPPLER COMPLETE: CPT

## 2020-09-10 PROCEDURE — 99204 OFFICE O/P NEW MOD 45 MIN: CPT | Performed by: PSYCHIATRY & NEUROLOGY

## 2020-09-10 PROCEDURE — G0378 HOSPITAL OBSERVATION PER HR: HCPCS

## 2020-09-10 PROCEDURE — 93010 ELECTROCARDIOGRAM REPORT: CPT | Performed by: INTERNAL MEDICINE

## 2020-09-10 PROCEDURE — 82962 GLUCOSE BLOOD TEST: CPT

## 2020-09-10 PROCEDURE — 25010000002 LORAZEPAM PER 2 MG: Performed by: NURSE PRACTITIONER

## 2020-09-10 PROCEDURE — 82607 VITAMIN B-12: CPT | Performed by: PSYCHIATRY & NEUROLOGY

## 2020-09-10 PROCEDURE — 85027 COMPLETE CBC AUTOMATED: CPT | Performed by: INTERNAL MEDICINE

## 2020-09-10 PROCEDURE — 83735 ASSAY OF MAGNESIUM: CPT | Performed by: NURSE PRACTITIONER

## 2020-09-10 PROCEDURE — 99225 PR SBSQ OBSERVATION CARE/DAY 25 MINUTES: CPT | Performed by: INTERNAL MEDICINE

## 2020-09-10 PROCEDURE — 93005 ELECTROCARDIOGRAM TRACING: CPT | Performed by: NURSE PRACTITIONER

## 2020-09-10 PROCEDURE — 0202U NFCT DS 22 TRGT SARS-COV-2: CPT | Performed by: NURSE PRACTITIONER

## 2020-09-10 PROCEDURE — 80053 COMPREHEN METABOLIC PANEL: CPT | Performed by: INTERNAL MEDICINE

## 2020-09-10 PROCEDURE — 25010000002 LEVETIRACETAM IN NACL 0.82% 500 MG/100ML SOLUTION: Performed by: PSYCHIATRY & NEUROLOGY

## 2020-09-10 PROCEDURE — 84100 ASSAY OF PHOSPHORUS: CPT | Performed by: NURSE PRACTITIONER

## 2020-09-10 PROCEDURE — 83036 HEMOGLOBIN GLYCOSYLATED A1C: CPT | Performed by: INTERNAL MEDICINE

## 2020-09-10 PROCEDURE — 96372 THER/PROPH/DIAG INJ SC/IM: CPT

## 2020-09-10 PROCEDURE — 97166 OT EVAL MOD COMPLEX 45 MIN: CPT

## 2020-09-10 PROCEDURE — 97162 PT EVAL MOD COMPLEX 30 MIN: CPT | Performed by: PHYSICAL THERAPIST

## 2020-09-10 PROCEDURE — 51798 US URINE CAPACITY MEASURE: CPT

## 2020-09-10 RX ORDER — LORAZEPAM 2 MG/ML
0.25 INJECTION INTRAMUSCULAR ONCE
Status: COMPLETED | OUTPATIENT
Start: 2020-09-10 | End: 2020-09-10

## 2020-09-10 RX ORDER — LEVETIRACETAM 5 MG/ML
500 INJECTION INTRAVASCULAR EVERY 12 HOURS SCHEDULED
Status: DISCONTINUED | OUTPATIENT
Start: 2020-09-10 | End: 2020-09-11

## 2020-09-10 RX ORDER — NICOTINE POLACRILEX 4 MG
15 LOZENGE BUCCAL
Status: DISCONTINUED | OUTPATIENT
Start: 2020-09-10 | End: 2020-09-10 | Stop reason: SDUPTHER

## 2020-09-10 RX ORDER — DEXTROSE MONOHYDRATE 25 G/50ML
25 INJECTION, SOLUTION INTRAVENOUS
Status: DISCONTINUED | OUTPATIENT
Start: 2020-09-10 | End: 2020-09-10 | Stop reason: SDUPTHER

## 2020-09-10 RX ADMIN — RIVASTIGMINE 1 PATCH: 9.5 PATCH TRANSDERMAL at 08:16

## 2020-09-10 RX ADMIN — SODIUM CHLORIDE, PRESERVATIVE FREE 10 ML: 5 INJECTION INTRAVENOUS at 22:38

## 2020-09-10 RX ADMIN — SODIUM CHLORIDE, PRESERVATIVE FREE 10 ML: 5 INJECTION INTRAVENOUS at 08:16

## 2020-09-10 RX ADMIN — LOSARTAN POTASSIUM 50 MG: 25 TABLET, FILM COATED ORAL at 08:15

## 2020-09-10 RX ADMIN — LORAZEPAM 0.25 MG: 2 INJECTION INTRAMUSCULAR; INTRAVENOUS at 01:00

## 2020-09-10 RX ADMIN — ASPIRIN 81 MG 81 MG: 81 TABLET ORAL at 08:15

## 2020-09-10 RX ADMIN — OXYBUTYNIN CHLORIDE 10 MG: 5 TABLET, EXTENDED RELEASE ORAL at 08:15

## 2020-09-10 RX ADMIN — LEVETIRACETAM 500 MG: 5 INJECTION INTRAVENOUS at 22:34

## 2020-09-10 NOTE — THERAPY EVALUATION
Acute Care - Occupational Therapy Initial Evaluation  [facility]     Patient Name: Marcos Acuña  : 1931  MRN: [id number]  Today's Date: 9/10/2020     Date of Referral to OT: 20  Referring Physician: DO Janes    Admit Date: 2020       ICD-10-CM ICD-9-CM   1. Altered mental status, unspecified altered mental status type R41.82 780.97   2. Elevated troponin R79.89 790.6     Patient Active Problem List   Diagnosis   • Closed fracture of right hip (CMS/HCC)   • ACE-inhibitor cough   • Balance disorder   • Basal cell carcinoma of ear   • Benign essential hypertension   • Bilateral high frequency hearing loss   • Carpal tunnel syndrome of left wrist   • Chronic allergic rhinitis   • Dementia (CMS/HCC)   • DVT (deep venous thrombosis) (CMS/[facility])   • Encounter for follow-up of chronic deep vein thrombosis (DVT) of left lower extremity   • Erectile dysfunction   • ETD (eustachian tube dysfunction)   • History of basal cell carcinoma   • Hyperlipoproteinemia   • Hypertrophy of prostate without urinary obstruction and other lower urinary tract symptoms (LUTS)   • 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HCC)   • Hypothyroidism   • Left leg cellulitis   • Type 2 diabetes mellitus without complication (CMS/[facility])   • Primary osteoarthritis of left knee   • Polycythemia   • Osteopenia   • Onychomycosis   • Obstructive sleep apnea syndrome   • Obesity   • Polycythemia vera (CMS/HCC)   • Altered mental status   • Cough   • Elevated troponin   • Hyponatremia     Past Medical History:   Diagnosis Date   • Dementia (CMS/HCC)    • Diabetes (CMS/[facility])    • H/O blood clots    • Osteoarthritis      Past Surgical History:   Procedure Laterality Date   • BUNIONECTOMY     • HIP TROCHANTERIC NAILING WITH INTRAMEDULLARY HIP SCREW N/A 2020    Procedure: ADVANCED TROCHANTERIC FEMORAL NAIL (ATFN) RIGHT HIP/FEMUR;  Surgeon: Mejia Meza MD;  Location: [facility];  Service: Orthopedics;  Laterality: N/A;   • RECTAL FISTULECTOMY     •  SKIN CANCER EXCISION            OT ASSESSMENT FLOWSHEET (last 12 hours)      Occupational Therapy Evaluation     Row Name 09/10/20 1040                   OT Evaluation Time/Intention    Document Type  evaluation  -AC        Mode of Treatment  occupational therapy  -AC        Patient Effort  poor  -AC           General Information    Patient Profile Reviewed?  yes  -AC        Referring Physician  DO Janes  -AC        Prior Level of Function  -- unable to determine, last admit reports min A t/f, mod A ADL  -AC        Equipment Currently Used at Home  shower chair;walker, rolling;grab bar;raised toilet;wheelchair per last admit  -AC        Pertinent History of Current Functional Problem  Pt with confusion and lethargy.  Pt with dementia, h/o falls, R femur nailing June 2020  -AC        Existing Precautions/Restrictions  fall confusion  -AC        Limitations/Impairments  safety/cognitive;hearing  -AC        Barriers to Rehab  previous functional deficit;cognitive status;hearing deficit  -AC           Relationship/Environment    Concerns About Impact on Relationships  unable to determine; per last admit June 2020 pt lived at [facility] with wife and had caregivers assist  -AC           Resource/Environmental Concerns    Current Living Arrangements  -- unable to determine  -AC           Cognitive Assessment/Interventions    Additional Documentation  Cognitive Assessment/Intervention (Group)  -AC           Cognitive Assessment/Intervention- PT/OT    Orientation Status (Cognition)  oriented to;person stated first name  -AC        Follows Commands (Cognition)  does not follow one step commands  -AC        Safety Deficit (Cognitive)  severe deficit;at risk behavior observed;insight into deficits/self awareness;judgment;problem solving;safety precautions awareness;safety precautions follow-through/compliance  -AC           Safety Issues, Functional Mobility    Safety Issues Affecting Function (Mobility)  ability to follow  commands;at risk behavior observed;insight into deficits/self awareness;judgment;problem solving;safety precaution awareness;safety precautions follow-through/compliance;sequencing abilities  -        Impairments Affecting Function (Mobility)  balance;cognition;strength  -AC           Bed Mobility Assessment/Treatment    Bed Mobility Assessment/Treatment  rolling left;rolling right;supine-sit;scooting/bridging;sit-supine  -AC        Rolling Left [place] (Bed Mobility)  verbal cues;dependent (less than 25% patient effort)  -AC        Rolling Right [place] (Bed Mobility)  verbal cues;dependent (less than 25% patient effort)  -AC        Scooting/Bridging [place] (Bed Mobility)  dependent (less than 25% patient effort);2 person assist  -AC        Supine-Sit [place] (Bed Mobility)  verbal cues;maximum assist (25% patient effort)  -AC        Sit-Supine [place] (Bed Mobility)  verbal cues;dependent (less than 25% patient effort)  -           Functional Mobility    Functional Mobility- Ind. Level  not appropriate to assess  -           Transfer Assessment/Treatment    Transfer Assessment/Treatment  sit-stand transfer;stand-sit transfer  -        Comment (Transfers)  pt not following directions for use of walker, or to hold onto therapists arms;  pt attempted to stand 2 times with partial clearance of bottom from bed  -           Sit-Stand Transfer    Sit-Stand [place] (Transfers)  verbal cues;dependent (less than 25% patient effort)  -AC        Assistive Device (Sit-Stand Transfers)  -- gt belt  -AC           Stand-Sit Transfer    Stand-Sit [place] (Transfers)  verbal cues;dependent (less than 25% patient effort)  -        Assistive Device (Stand-Sit Transfers)  -- gt belt  -AC           ADL Assessment/Intervention    BADL Assessment/Intervention  upper body dressing  -           Upper Body Dressing Assessment/Training    Upper Body Dressing [place] Level  don;ilan  hospital gown  -AC        Upper Body Dressing Position  supine  -AC        Comment (Upper Body Dressing)  pt dependent to don gown, independent to doff gown right after therapist placed gown on pt  -AC           BADL Safety/Performance    Impairments, BADL Safety/Performance  balance;cognition;strength  -AC           General ROM    GENERAL ROM COMMENTS  pt actively moving both arms, right more than left, pt resistant with therapist's attempt to range  -AC           MMT (Manual Muscle Testing)    General MMT Comments  unable to formally assess d/t cognition; at elast 3+/5  -AC           Positioning and Restraints    Pre-Treatment Position  in bed  -AC        Post Treatment Position  bed  -AC        In Bed  supine;call light within reach;encouraged to call for assist;exit alarm on  -AC           Pain Assessment    Additional Documentation  Pain Scale: FACES Pre/Post-Treatment (Group)  -AC           Pain Scale: Word Pre/Post-Treatment    Pain: Word Scale, Pretreatment  0 - no pain  -AC        Pain: Word Scale, Post-Treatment  0 - no pain  -AC           Wound 09/09/20 2033 Bilateral anterior groin MASD (Moisture associated skin damage)    Wound - Properties Group Date first assessed: 09/09/20  -HS Time first assessed: 2033  -HS Side: Bilateral  -HS Orientation: anterior  -HS Location: groin  -HS Primary Wound Type: MASD  -HS       Wound 09/09/20 2033 Bilateral perineum Abrasion    Wound - Properties Group Date first assessed: 09/09/20  -HS Time first assessed: 2033  -HS Side: Bilateral  -HS Location: perineum  -HS Primary Wound Type: Abrasion  -HS       Wound 09/09/20 2033 anterior other (see notes) MASD (Moisture associated skin damage)    Wound - Properties Group Date first assessed: 09/09/20  -HS Time first assessed: 2033 -HS Orientation: anterior  -HS Location: other (see notes)  -HS, [place]  Primary Wound Type: MASD  -HS       Plan of Care Review    Plan of Care Reviewed With  patient  -AC        Outcome Summary   OT eval complete. Pt confused and not following direction.  Pt dependent to don hospital gown,  independent to doff.    Pt max A supine to sit, dependent to roll R/L,  dependent x 2 sit to supine and to scoot to HOB.  Pt dependent with 2 attempts to stand with bottom partially clearing bed.   Pt's participation is limited by confusion and weakness.  OT will attempt to follow 3 x/wk, and will d/c if pt unable to increase level of participation.  Recommend SNF for rehab if pt able to increase level of participation at acute care setting.   -AC           Clinical Impression (OT)    Date of Referral to OT  09/09/20  -AC        OT Diagnosis  ADL decline  -AC        Criteria for Skilled Therapeutic Interventions Met (OT Eval)  yes;treatment indicated  -AC        Rehab Potential (OT Eval)  fair, will monitor progress closely  -AC        Therapy Frequency (OT Eval)  3 times/wk  -AC        Care Plan Review (OT)  evaluation/treatment results reviewed  -AC        Anticipated Discharge Disposition (OT)  skilled nursing facility if pt able to increase level of participation in acute care   -AC           Vital Signs    Pre Systolic BP Rehab  151  -AC        Pre Treatment Diastolic BP  75  -AC        Post Systolic BP Rehab  163  -AC        Post Treatment Diastolic BP  81  -AC        Pretreatment Heart Rate (beats/min)  86  -AC        Posttreatment Heart Rate (beats/min)  84  -AC        Pre SpO2 (%)  98  -AC        O2 Delivery Pre Treatment  room air  -AC        Post SpO2 (%)  98  -AC        O2 Delivery Post Treatment  room air  -AC        Pre Patient Position  Supine  -AC        Intra Patient Position  Sitting  -AC        Post Patient Position  Supine  -AC           Planned OT Interventions    Planned Therapy Interventions (OT Eval)  activity tolerance training;BADL retraining;functional balance retraining;occupation/activity based interventions;patient/caregiver education/training;strengthening exercise;transfer/mobility  retraining  -AC           OT Goals    Bed Mobility Goal Selection (OT)  bed mobility, OT goal 1  -AC        Transfer Goal Selection (OT)  transfer, OT goal 1  -AC        Grooming Goal Selection (OT)  grooming, OT goal 1  -AC        Self-Feeding Goal Selection (OT)  self feeding, OT goal 1  -AC        Additional Documentation  Grooming Goal Selection (OT) (Row);Self-Feeding Goal Selection (OT) (Row)  -AC           Bed Mobility Goal 1 (OT)    Activity/Assistive Device (Bed Mobility Goal 1, OT)  rolling to left;rolling to right;sit to supine;supine to sit  -AC        [place] Level/Cues Needed (Bed Mobility Goal 1, OT)  moderate assist (50-74% patient effort)  -AC        Time Frame (Bed Mobility Goal 1, OT)  by discharge  -AC        Progress/Outcomes (Bed Mobility Goal 1, OT)  goal ongoing  -AC           Transfer Goal 1 (OT)    Activity/Assistive Device (Transfer Goal 1, OT)  bed-to-chair/chair-to-bed;toilet  -AC        [place] Level/Cues Needed (Transfer Goal 1, OT)  maximum assist (25-49% patient effort)  -AC        Time Frame (Transfer Goal 1, OT)  by discharge  -AC        Progress/Outcome (Transfer Goal 1, OT)  goal ongoing  -AC           Grooming Goal 1 (OT)    Activity/Device (Grooming Goal 1, OT)  wash face, hands  -AC        [place] (Grooming Goal 1, OT)  moderate assist (50-74% patient effort)  -AC        Time Frame (Grooming Goal 1, OT)  by discharge  -AC        Progress/Outcome (Grooming Goal 1, OT)  goal ongoing  -AC           Self-Feeding Goal 1 (OT)    Activity/Assistive Device (Self-Feeding Goal 1, OT)  liquids to mouth;scoop food and bring to mouth AE prn  -AC        [place] Level/Cues Needed (Self-Feeding Goal 1, OT)  moderate assist (50-74% patient effort)  -AC        Time Frame (Self-Feeding Goal 1, OT)  by discharge  -AC        Progress/Outcomes (Self-Feeding Goal 1, OT)  goal ongoing  -AC          User Key  (r) = Recorded By, (t) = Taken By, (c) = Cosigned By    Initials Name  Effective Dates     Melly Copeland, OT 06/23/15 -     HS Meghna Rdz RN 06/22/20 -          Occupational Therapy Education                 Title: PT OT SLP Therapies (In Progress)     Topic: Occupational Therapy (In Progress)     Point: ADL training (In Progress)     Description:   Instruct learner(s) on proper safety adaptation and remediation techniques during self care or transfers.   Instruct in proper use of assistive devices.              Learning Progress Summary           Patient Nonacceptance, E, NL by  at 9/10/2020 1040    Comment:  benefits of activity, role of OT                   Point: Home exercise program (Not Started)     Description:   Instruct learner(s) on appropriate technique for monitoring, assisting and/or progressing therapeutic exercises/activities.              Learner Progress:   Not documented in this visit.          Point: Precautions (Not Started)     Description:   Instruct learner(s) on prescribed precautions during self-care and functional transfers.              Learner Progress:   Not documented in this visit.          Point: Body mechanics (Not Started)     Description:   Instruct learner(s) on proper positioning and spine alignment during self-care, functional mobility activities and/or exercises.              Learner Progress:   Not documented in this visit.                      User Key     Initials Effective Dates Name Provider Type Discipline     06/23/15 -  Melly Copeland, OT Occupational Therapist OT                  OT Recommendation and Plan  Outcome Summary/Treatment Plan (OT)  Anticipated Discharge Disposition (OT): skilled nursing facility(if pt able to increase level of participation in acute care )  Planned Therapy Interventions (OT Eval): activity tolerance training, BADL retraining, functional balance retraining, occupation/activity based interventions, patient/caregiver education/training, strengthening exercise, transfer/mobility retraining  Therapy  Frequency (OT Eval): 3 times/wk  Plan of Care Review  Plan of Care Reviewed With: patient  Plan of Care Reviewed With: patient  Outcome Summary: OT eval complete. Pt confused and not following direction.  Pt dependent to don hospital gown,  independent to doff.    Pt max A supine to sit, dependent to roll R/L,  dependent x 2 sit to supine and to scoot to HOB.  Pt dependent with 2 attempts to stand with bottom partially clearing bed.   Pt's participation is limited by confusion and weakness.  OT will attempt to follow 3 x/wk, and will d/c if pt unable to increase level of participation.  Recommend SNF for rehab if pt able to increase level of participation at acute care setting.     Outcome Measures     Row Name 09/10/20 1040             How much help from another is currently needed...    Putting on and taking off regular lower body clothing?  1  -AC      Bathing (including washing, rinsing, and drying)  1  -AC      Toileting (which includes using toilet bed pan or urinal)  1  -AC      Putting on and taking off regular upper body clothing  2  -AC      Taking care of personal grooming (such as brushing teeth)  1  -AC      Eating meals  1  -AC      AM-PAC 6 Clicks Score (OT)  7  -AC         Functional Assessment    Outcome Measure Options  AM-PAC 6 Clicks Daily Activity (OT)  -AC        User Key  (r) = Recorded By, (t) = Taken By, (c) = Cosigned By    Initials Name Provider Type    Melly Loomis OT Occupational Therapist          Time Calculation:   Time Calculation- OT     Row Name 09/10/20 1040             Time Calculation- OT    OT Start Time  1040  -      OT Received On  09/10/20  -      OT Goal Re-Cert Due Date  09/20/20  -        User Key  (r) = Recorded By, (t) = Taken By, (c) = Cosigned By    Initials Name Provider Type    Melly Loomis, OT Occupational Therapist        Therapy Charges for Today     Code Description Service Date Service Provider Modifiers Qty    [id number]  OT EVAL MOD  COMPLEXITY 4 9/10/2020 Melly Copeland, OT GO 1               Melly Copeland, OT  9/10/2020

## 2020-09-10 NOTE — THERAPY EVALUATION
Patient Name: Marcos Acuña  : 1931    MRN: [id number]                              Today's Date: 9/10/2020       Admit Date: 2020    Visit Dx:     ICD-10-CM ICD-9-CM   1. Altered mental status, unspecified altered mental status type R41.82 780.97   2. Elevated troponin R79.89 790.6     Patient Active Problem List   Diagnosis   • Closed fracture of right hip (CMS/[facility])   • ACE-inhibitor cough   • Balance disorder   • Basal cell carcinoma of ear   • Benign essential hypertension   • Bilateral high frequency hearing loss   • Carpal tunnel syndrome of left wrist   • Chronic allergic rhinitis   • Dementia (CMS/[facility])   • DVT (deep venous thrombosis) (CMS/[facility])   • Encounter for follow-up of chronic deep vein thrombosis (DVT) of left lower extremity   • Erectile dysfunction   • ETD (eustachian tube dysfunction)   • History of basal cell carcinoma   • Hyperlipoproteinemia   • Hypertrophy of prostate without urinary obstruction and other lower urinary tract symptoms (LUTS)   • 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[facility])   • Hypothyroidism   • Left leg cellulitis   • Type 2 diabetes mellitus without complication (CMS/[facility])   • Primary osteoarthritis of left knee   • Polycythemia   • Osteopenia   • Onychomycosis   • Obstructive sleep apnea syndrome   • Obesity   • Polycythemia vera (CMS/[facility])   • Altered mental status   • Cough   • Elevated troponin   • Hyponatremia     Past Medical History:   Diagnosis Date   • Dementia (CMS/[facility])    • Diabetes (CMS/[facility])    • H/O blood clots    • Osteoarthritis      Past Surgical History:   Procedure Laterality Date   • BUNIONECTOMY     • HIP TROCHANTERIC NAILING WITH INTRAMEDULLARY HIP SCREW N/A 2020    Procedure: ADVANCED TROCHANTERIC FEMORAL NAIL (ATFN) RIGHT HIP/FEMUR;  Surgeon: Mejia Meza MD;  [facility];  Service: Orthopedics;  Laterality: N/A;   • RECTAL FISTULECTOMY     • SKIN CANCER EXCISION       General Information     Row Name 09/10/20 1427          PT Evaluation  Time/Intention    Document Type  evaluation  -LM     Mode of Treatment  individual therapy;physical therapy  -LM     Row Name 09/10/20 1427          General Information    Patient Profile Reviewed?  yes  -LM     Prior Level of Function  mod assist:;max assist:;transfer Spoke w/ wife directly - pt uses w/c for mobility.  24/7 CGs. Caregiver assists w/ standing, & pivoting over to chair.  Therapy comes twice/wk & he does ambulate using rw & assist.  Pt doesnt ambulate if therapy isnt present; They also have a mayuri lift.  -LM     Existing Precautions/Restrictions  fall;other (see comments) Dementia; R Hip Fx with ORIF in June of 2020  -LM     Barriers to Rehab  previous functional deficit;cognitive status;hearing deficit  -LM     Row Name 09/10/20 1427          Relationship/Environment    Lives With  spouse  -LM     Row Name 09/10/20 1427          Resource/Environmental Concerns    Current Living Arrangements  independent/assisted living facility [facility]  -LM     Row Name 09/10/20 1427          Home Main Entrance    Number of Stairs, Main Entrance  none  -LM     Row Name 09/10/20 1427          Stairs Within Home, Primary    Number of Stairs, Within Home, Primary  none  -LM     Row Name 09/10/20 1427          Cognitive Assessment/Intervention- PT/OT    Orientation Status (Cognition)  oriented to;person;disoriented to;place;situation;time  -LM     Cognitive Assessment/Intervention Comment  Pt knew name and birthday.  Stated it was August of 1920 and that he was at a [facility] in [place].  -     Row Name 09/10/20 1427          Safety Issues, Functional Mobility    Safety Issues Affecting Function (Mobility)  ability to follow commands;at risk behavior observed;awareness of need for assistance;insight into deficits/self awareness;judgment;problem solving;safety precaution awareness;safety precautions follow-through/compliance;sequencing abilities  -LM     Impairments Affecting Function  (Mobility)  balance;cognition;endurance/activity tolerance;pain;range of motion (ROM);strength  -     Comment, Safety Issues/Impairments (Mobility)  Pt did follow commands - initially he did not, but once PT yelled right by his ear, pt followed all commands.  Pt is extremely [place].  Wife states he has hearing aides at home, but they are not here.  -       User Key  (r) = Recorded By, (t) = Taken By, (c) = Cosigned By    Initials Name Provider Type    LM Joycelyn Owen, JUNIOR Physical Therapist        Mobility     Row Name 09/10/20 1427          Bed Mobility Assessment/Treatment    Bed Mobility Assessment/Treatment  rolling left;rolling right;scooting/bridging;supine-sit;sit-supine  -LM     Rolling Left [place] (Bed Mobility)  maximum assist (25% patient effort);1 person assist  -LM     Rolling Right [place] (Bed Mobility)  maximum assist (25% patient effort);1 person assist  -LM     Scooting/Bridging [place] (Bed Mobility)  dependent (less than 25% patient effort);2 person assist  -LM     Supine-Sit [place] (Bed Mobility)  maximum assist (25% patient effort);1 person assist;verbal cues  -LM     Sit-Supine [place] (Bed Mobility)  maximum assist (25% patient effort);2 person assist  -LM     Assistive Device (Bed Mobility)  bed rails;draw sheet;head of bed elevated  -     Comment (Bed Mobility)  Pt rolled bilaterally once back in supine to reposition drawsheet/chucks.  -     Row Name 09/10/20 1427          Transfer Assessment/Treatment    Comment (Transfers)  Stood x 2 with MaxAx1 + Nacho from a second person.  Pt leans and puts most of his weight on the LLE (pt fx'd hip and had ORIF in June 2020 of the RLE).  If standing on the L side of pt, MaxA provided.  R side of pt only required Nacho.  Pt educated on widening WINTER.  -     Row Name 09/10/20 1427          Bed-Chair Transfer    Bed-Chair [place] (Transfers)  unable to assess  -     Row Name 09/10/20 1427          Sit-Stand Transfer     Sit-Stand [place] (Transfers)  maximum assist (25% patient effort);1 person assist  -LM     Assistive Device (Sit-Stand Transfers)  walker, front-wheeled  -LM     Row Name 09/10/20 1427          Gait/Stairs Assessment/Training    [place] Level (Gait)  unable to assess  -LM       User Key  (r) = Recorded By, (t) = Taken By, (c) = Cosigned By    Initials Name Provider Type    LM Joycelyn Owen, JUNIOR Physical Therapist        Obj/Interventions     Row Name 09/10/20 1427          General ROM    GENERAL ROM COMMENTS  LLE AROM WFL; RLE - Decreased hip flex - AAROM WFL; Knee/Ankle AROM WFL  -LM     Row Name 09/10/20 1427          MMT (Manual Muscle Testing)    General MMT Comments  LLE grossly 4/5 throughout; RLE - Hip flex - 2+/5; Knee flex/ext - at least 3/5 (no resistance given); Ankle DF - 4/5  -LM     Row Name 09/10/20 1427          Static Sitting Balance    Level of [place] (Unsupported Sitting, Static Balance)  contact guard assist  -LM     Sitting Position (Unsupported Sitting, Static Balance)  sitting on edge of bed  -LM     Time Able to Maintain Position (Unsupported Sitting, Static Balance)  more than 5 minutes  -LM     Comment (Unsupported Sitting, Static Balance)  Progressed to SBA  -LM     Row Name 09/10/20 1427          Static Standing Balance    Level of [place] (Supported Standing, Static Balance)  maximal assist, 25 to 49% patient effort;1 person assist + Nacho from 2nd person  -LM     Time Able to Maintain Position (Supported Standing, Static Balance)  30 to 45 seconds  -LM     Assistive Device Utilized (Supported Standing, Static Balance)  walker, rolling  -LM       User Key  (r) = Recorded By, (t) = Taken By, (c) = Cosigned By    Initials Name Provider Type    LM Joycelyn Owen, JUNIOR Physical Therapist        Goals/Plan     Row Name 09/10/20 1427          Bed Mobility Goal 1 (PT)    Activity/Assistive Device (Bed Mobility Goal 1, PT)  sit to supine/supine to sit  -LM     [place]  Level/Cues Needed (Bed Mobility Goal 1, PT)  minimum assist (75% or more patient effort);1 person assist  -LM     Time Frame (Bed Mobility Goal 1, PT)  long term goal (LTG);2 weeks  -LM     Row Name 09/10/20 1427          Transfer Goal 1 (PT)    Activity/Assistive Device (Transfer Goal 1, PT)  bed-to-chair/chair-to-bed  -LM     [place] Level/Cues Needed (Transfer Goal 1, PT)  minimum assist (75% or more patient effort);1 person assist  -LM     Time Frame (Transfer Goal 1, PT)  long term goal (LTG);2 weeks  -LM     Row Name 09/10/20 1427          Gait Training Goal 1 (PT)    Activity/Assistive Device (Gait Training Goal 1, PT)  gait (walking locomotion);assistive device use  -LM     [place] Level (Gait Training Goal 1, PT)  moderate assist (50-74% patient effort);1 person assist  -LM     Distance (Gait Goal 1, PT)  20 feet  -LM     Time Frame (Gait Training Goal 1, PT)  long term goal (LTG);2 weeks  -LM       User Key  (r) = Recorded By, (t) = Taken By, (c) = Cosigned By    Initials Name Provider Type    LM Joycelyn Owen, PT Physical Therapist        Clinical Impression     Row Name 09/10/20 1427          Pain Assessment    Additional Documentation  Pain Scale: FACES Pre/Post-Treatment (Group)  -[facility] Name 09/10/20 1427          Pain Scale: FACES Pre/Post-Treatment    Pain: FACES Scale, Pretreatment  0-->no hurt  -LM     Pain: FACES Scale, Post-Treatment  0-->no hurt  -LM     Row Name 09/10/20 1427          Plan of Care Review    Plan of Care Reviewed With  patient;spouse  -     Row Name 09/10/20 1427          Physical Therapy Clinical Impression    Criteria for Skilled Interventions Met (PT Clinical Impression)  yes;treatment indicated  -LM     Rehab Potential (PT Clinical Summary)  fair, will monitor progress closely  -     Row Name 09/10/20 1427          Vital Signs    Pre Systolic BP Rehab  130  -LM     Pre Treatment Diastolic BP  66  -LM     Pretreatment Heart Rate (beats/min)  81  -LM      Posttreatment Heart Rate (beats/min)  84  -LM     Pre SpO2 (%)  97  -LM     O2 Delivery Pre Treatment  room air  -LM     Post SpO2 (%)  97  -LM     O2 Delivery Post Treatment  room air  -LM     Pre Patient Position  Supine  -LM     Post Patient Position  Supine  -LM     Row Name 09/10/20 1427          Positioning and Restraints    Pre-Treatment Position  in bed  -LM     Post Treatment Position  bed  -LM     In Bed  supine;call light within reach;encouraged to call for assist;exit alarm on;with family/caregiver;notified nsg  -LM       User Key  (r) = Recorded By, (t) = Taken By, (c) = Cosigned By    Initials Name Provider Type    Joycelyn Chambers PT Physical Therapist        Outcome Measures     Row Name 09/10/20 1427          How much help from another person do you currently need...    Turning from your back to your side while in flat bed without using bedrails?  2  -LM     Moving from lying on back to sitting on the side of a flat bed without bedrails?  2  -LM     Moving to and from a bed to a chair (including a wheelchair)?  2  -LM     Standing up from a chair using your arms (e.g., wheelchair, bedside chair)?  2  -LM     Climbing 3-5 steps with a railing?  1  -LM     To walk in hospital room?  1  -LM     AM-PAC 6 Clicks Score (PT)  10  -LM     Row Name 09/10/20 1427          Functional Assessment    Outcome Measure Options  AM-PAC 6 Clicks Basic Mobility (PT)  -LM       User Key  (r) = Recorded By, (t) = Taken By, (c) = Cosigned By    Initials Name Provider Type    Joycelyn Chambers PT Physical Therapist        Physical Therapy Education                 Title: PT OT SLP Therapies (In Progress)     Topic: Physical Therapy (In Progress)     Point: Mobility training (In Progress)     Description:   Instruct learner(s) on safety and technique for assisting patient out of bed, chair or wheelchair.  Instruct in the proper use of assistive devices, such as walker, crutches, cane or brace.              Patient Friendly  Description:   It's important to get you on your feet again, but we need to do so in a way that is safe for you. Falling has serious consequences, and your personal safety is the most important thing of all.        When it's time to get out of bed, one of us or a family member will sit next to you on the bed to give you support.     If your doctor or nurse tells you to use a walker, crutches, a cane, or a brace, be sure you use it every time you get out of bed, even if you think you don't need it.    Learning Progress Summary           Patient Acceptance, E, NR by  at 9/10/2020 1524                   Point: Home exercise program (Not Started)     Description:   Instruct learner(s) on appropriate technique for monitoring, assisting and/or progressing patient with therapeutic exercises and activities.              Learner Progress:   Not documented in this visit.          Point: Body mechanics (Not Started)     Description:   Instruct learner(s) on proper positioning and spine alignment for patient and/or caregiver during mobility tasks and/or exercises.              Learner Progress:   Not documented in this visit.          Point: Precautions (In Progress)     Description:   Instruct learner(s) on prescribed precautions during mobility and gait tasks              Learning Progress Summary           Patient Acceptance, E, NR by  at 9/10/2020 1524                               User Key     Initials Effective Dates Name Provider Type Discipline    MARIELA 07/24/19 -  Joycelyn Owen, JUNIOR Physical Therapist PT              PT Recommendation and Plan  Planned Therapy Interventions (PT Eval): balance training, bed mobility training, gait training, home exercise program, motor coordination training, neuromuscular re-education, patient/family education, postural re-education, ROM (range of motion), strengthening, stretching, transfer training, wheelchair management/propulsion training  Outcome Summary/Treatment Plan  (PT)  Anticipated Discharge Disposition (PT): home with 24/7 care, home with home health, skilled nursing facility(SNF if pt/spouse agreeable; Home with 24/7 caregivers and HH PT if not agreeable)  Plan of Care Reviewed With: patient, spouse  Outcome Summary: PT evaluation completed on this date.  Pt pleasantly confused throughout eval.  Initially pt wasn't following commands - however, pt is extremely [place] - once PT spoke very loudly right by pt's ear, pt followed all commands.  Pt transferred supine-->sit with MaxAx1, stood x 2 with MaxAx1 + Nacho from 2nd person, and transferred sit-->supine with MaxAx2.  Pt leans heavily towards the L side when standing (wife reports he fx'd and had ORIF of his R femur in June 2020).  Skilled PT services warranted to improve mobility and safety.  Pt would benefit from rehab - however, wife has 24/7 caregivers and thinks the pt would do better at home with HH coming.  They do have a mayuri lift.  Therefore, recommend home with 24/7 care and HH PT.     Time Calculation:   PT Charges     Row Name 09/10/20 1427             Time Calculation    Start Time  1427  -LM      PT Received On  09/10/20  -LM      PT Goal Re-Cert Due Date  09/20/20  -LM        User Key  (r) = Recorded By, (t) = Taken By, (c) = Cosigned By    Initials Name Provider Type    LM Joycelyn Owen, JUNIOR Physical Therapist        Therapy Charges for Today     Code Description Service Date Service Provider Modifiers Qty    [id number] HC PT EVAL MOD COMPLEXITY 4 9/10/2020 Joycelyn Owen PT GP 1          PT G-Codes  Outcome Measure Options: AM-PAC 6 Clicks Basic Mobility (PT)  AM-PAC 6 Clicks Score (PT): 10  AM-PAC 6 Clicks Score (OT): 7    Joycelyn Owen PT  9/10/2020

## 2020-09-10 NOTE — H&P
[facility] Medicine Services  HISTORY AND PHYSICAL    Patient Name: Marcos Acuña  : 1931  MRN: [id number]  Primary Care Physician: Abdirashid Mccabe MD  Date of admission: 2020      Subjective   Subjective     Chief Complaint: AMS    HPI:  Marcos Acuña is a 89 y.o. male sent in by his wife for worsening mental status and increased lethargy. She tells me that he has moderate dementia at baseline and it is fairly typical for him to be disoriented to his surroundings. She reports that he was is his usual health last night when he went to bed but seemed a little more tired than usual. He ate breakfast as usual but then as the day progressed became more lethargic. Reports he had a very mild dry cough about 5 days ago which lasted less than 1 day and resolved spontaneously. No f/c, loss of taste/smell, SOA. No exposure to COVID. At time of my arrival he is up in bed smiling. He has no complaints. Denies pain. Keeps asking me about his blood circulation.    Review of Systems   Gen- No fevers, chills  CV- No chest pain, palpitations  Resp- No cough, dyspnea  GI- No N/V/D, abd pain    All other systems reviewed and are negative.     Personal History     Past Medical History:   Diagnosis Date   • Dementia (CMS/HCC)    • Diabetes (CMS/[facility])    • H/O blood clots    • Osteoarthritis        Past Surgical History:   Procedure Laterality Date   • BUNIONECTOMY     • HIP TROCHANTERIC NAILING WITH INTRAMEDULLARY HIP SCREW N/A 2020    Procedure: ADVANCED TROCHANTERIC FEMORAL NAIL (ATFN) RIGHT HIP/FEMUR;  Surgeon: Mejia Meza MD;  Location: [facility];  Service: Orthopedics;  Laterality: N/A;   • RECTAL FISTULECTOMY     • SKIN CANCER EXCISION         Family History: family history includes Heart attack in his father; Stroke in his mother. Otherwise pertinent FHx was reviewed and unremarkable.     Social History:  reports that he has quit smoking. He smoked 2.00 packs per day. He  has never used smokeless tobacco. He reports that he drinks about 1.0 standard drinks of alcohol per week. He reports that he does not use drugs.  Social History     Social History Narrative   • Not on file       Medications:  Available home medication information reviewed.  Medications Prior to Admission   Medication Sig Dispense Refill Last Dose   • acetaminophen (TYLENOL) 325 MG tablet Take 2 tablets by mouth Every 4 (Four) Hours As Needed for Mild Pain .      • apixaban (ELIQUIS) 2.5 MG tablet tablet Take 1 tablet by mouth Every 12 (Twelve) Hours. Indications: Prophylaxis of Venous Thromboembolism 60 tablet     • calcium carbonate (TUMS) 500 MG chewable tablet Chew 1 tablet Daily As Needed.   Past Week at Unknown time   • cyanocobalamin 1000 MCG/ML injection Inject 1,000 mcg into the appropriate muscle as directed by prescriber Every 28 (Twenty-Eight) Days.   Past Month at Unknown time   • docusate sodium 100 MG capsule Take 100 mg by mouth 2 (Two) Times a Day As Needed (constipation).      • EXELON 9.5 MG/24HR patch Place 1 patch on the skin as directed by provider Daily.   6/7/2020 at Unknown time   • ipratropium (ATROVENT) 0.06 % nasal spray 2 sprays into the nostril(s) as directed by provider 2 (two) times a day.   6/8/2020 at Unknown time   • levothyroxine (SYNTHROID, LEVOTHROID) 75 MCG tablet Take 75 mcg by mouth Daily.   6/8/2020 at Unknown time   • losartan (COZAAR) 50 MG tablet Take 50 mg by mouth Daily.   6/8/2020 at Unknown time   • metFORMIN (GLUCOPHAGE) 500 MG tablet Take 1,000 mg by mouth 2 (Two) Times a Day With Meals.   6/8/2020 at Unknown time   • MYRBETRIQ 50 MG tablet sustained-release 24 hour 24 hr tablet Take 50 mg by mouth Daily.   6/8/2020 at Unknown time   • promethazine (PHENERGAN) 12.5 MG tablet Take 1 tablet by mouth Every 6 (Six) Hours As Needed for Nausea or Vomiting.      • tamsulosin (FLOMAX) 0.4 MG capsule 24 hr capsule Take 1 capsule by mouth Every Night.   6/7/2020 at Unknown  time       Allergies   Allergen Reactions   • Contrast Dye Unknown - Low Severity       Objective   Objective     Vital Signs:   Temp:  [98.3 °F (36.8 °C)] 98.3 °F (36.8 °C)  Heart Rate:  [73-82] 75  Resp:  [16] 16  BP: (131-149)/(69-80) 149/80        Physical Exam   Constitutional: Awake, alert  Eyes: PERRLA, sclerae anicteric, no conjunctival injection  HENT: NCAT, mucous membranes moist  Neck: Supple, no thyromegaly, no lymphadenopathy, trachea midline  Respiratory: Clear to auscultation bilaterally, nonlabored respirations   Cardiovascular: RRR, no murmurs, rubs, or gallops, palpable pedal pulses bilaterally  Gastrointestinal: Positive bowel sounds, soft, nontender, nondistended  Musculoskeletal: No bilateral ankle edema, no clubbing or cyanosis to extremities  Psychiatric: Appropriate affect, cooperative  Neurologic: Interactive, answers simple questions appropriately, quite disoriented, strength symmetric in all extremities, Cranial Nerves grossly intact to confrontation, speech clear  Skin: No rashes    Results Reviewed:  I have personally reviewed current lab and radiology data.    Results from last 7 days   Lab Units 09/09/20  1647   WBC 10*3/mm3 8.98   HEMOGLOBIN g/dL 11.0*   HEMATOCRIT % 34.7*   PLATELETS 10*3/mm3 219     Results from last 7 days   Lab Units 09/09/20 2015 09/09/20  1647   SODIUM mmol/L  --  135*   POTASSIUM mmol/L  --  4.0   CHLORIDE mmol/L  --  99   CO2 mmol/L  --  28.0   BUN mg/dL  --  11   CREATININE mg/dL  --  0.81   GLUCOSE mg/dL  --  72   CALCIUM mg/dL  --  8.5*   ALT (SGPT) U/L  --  11   AST (SGOT) U/L  --  17   TROPONIN T ng/mL 0.033* 0.048*     Estimated Creatinine Clearance: 72.1 mL/min (by C-G formula based on SCr of 0.81 mg/dL).  Brief Urine Lab Results  (Last result in the past 365 days)      Color   Clarity   Blood   Leuk Est   Nitrite   Protein   CREAT   Urine HCG        09/09/20 1648 Yellow Clear Negative Trace Negative Negative             CT chest personally reviewed  without acute airspace disease. Agree with interpretation.  Imaging Results (Last 24 Hours)     Procedure Component Value Units Date/Time    CT Chest Without Contrast [[id number]] Collected:  09/09/20 2014     Updated:  09/09/20 2016    Narrative:       CT Chest WO    INDICATION:   Acute onset of cough and confusion    TECHNIQUE:   CT of the thorax without IV contrast. Coronal and sagittal reconstructions were obtained.  Radiation dose reduction techniques included automated exposure control or exposure modulation based on body size. Count of known CT and cardiac nuc med studies  performed in previous 12 months: 1.     COMPARISON:   No pertinent prior study    FINDINGS:  The heart size is mildly enlarged. No pericardial fluid. Coronary artery calcifications. No acute aortic findings noted on this noncontrasted study. No threshold mediastinal or hilar adenopathy.    The lungs are free of acute infiltrates. There is mild dependent atelectasis in the posterior and inferior lung fields. No pleural fluid. No pneumothorax. No endobronchial lesions.    Limited imaging of the upper abdomen shows no clearly acute findings.      Impression:       Mild cardiomegaly with atherosclerosis of the coronary arteries.    No clearly acute infiltrates. Mild bibasilar dependent atelectasis    Signer Name: Cristhian Mccarthy MD   Signed: 9/9/2020 8:14 PM   Workstation Name: [id number]    [facility]    XR Chest 1 View [[id number]] Collected:  09/09/20 1806     Updated:  09/09/20 1810    Narrative:       EXAMINATION: XR CHEST 1 VW-      INDICATION: CONFUSION      COMPARISON: 6/10/2020     FINDINGS: No new focal consolidation. Unchanged bilateral lung base  atelectasis. No pleural effusion or pneumothorax. Unchanged heart and  mediastinal contours.       Impression:       Hypoventilatory changes without evidence of acute disease in  the chest     This report was finalized on 9/9/2020 6:07 PM by Abdirashid Luna.       CT  Head Without Contrast [[id number]] Collected:  09/09/20 1804     Updated:  09/09/20 1809    Narrative:       EXAMINATION: CT HEAD WO CONTRAST-      INDICATION: Confusion/delirium, altered LOC, unexplained     TECHNIQUE: Axial noncontrast CT of the head with multiplanar  reconstruction.     The radiation dose reduction device was turned on for each scan per the  ALARA (As Low as Reasonably Achievable) protocol.     COMPARISON: 6/8/2020     FINDINGS: No intracranial hemorrhage, mass or mass effect. Unchanged  diffuse volume loss and moderate periventricular white matter changes of  chronic small vessel ischemia. Unchanged ventricular size and  configuration. The orbits are normal. Left sphenoid and ethmoid air cell  opacification is noted.       Impression:       No acute intracranial abnormality. Unchanged volume loss and  periventricular white matter changes of chronic small vessel ischemia.     Mucosal thickening and fluid opacification of the left sphenoid sinus  and ethmoid air cells. Correlate with any clinical evidence of  sinusitis.        This report was finalized on 9/9/2020 6:06 PM by Abdirashid Luna.                Assessment/Plan   Assessment & Plan     Active Hospital Problems    Diagnosis POA   • **Altered mental status [R41.82] Yes   • Cough [R05] Yes   • Elevated troponin [R79.89] Yes   • Hyponatremia [E87.1] Yes   • Dementia (CMS/HCC) [F03.90] Yes   • Benign essential hypertension [I10] Yes   • Hypothyroidism [E03.9] Yes   • Obesity [E66.9] Yes   • Type 2 diabetes mellitus without complication (CMS/HCC) [E11.9] Yes     89 y/o male with moderate dementia presenting with AMS/lethargy.    AMS superimiposed on moderate dementia  Hyponatremia  Dehydration  --Long d/w wife via phone. His lab, infectious, imaging workup is unremarkable. He has had no medication changes recently which may have caused his symptoms. Minimal concern for COVID infection, will swab with respiratory pcr.  --He appears a little  dehydrated. Will hydrate overnight and observe his response.  --PT/OT. CM.    Minimally elevated troponin  --Suspect due to dehydration as above rather than ACS. Per wife he never complained of chest pain and EKG without ischemic changes. Start asa 81 mg.  --Check echo in am.    Hx of recurrent DVT  --Will hold Eliquis and start PPX Lovenox in event he would require some sort of procedure.    Hypertension    DM2  --FS AC/HS. SSI.    DVT prophylaxis:  Lovenox    CODE STATUS: Full code  Code Status and Medical Interventions:   Ordered at: 09/09/20 1936     Code Status:    CPR     Medical Interventions (Level of Support Prior to Arrest):    Full       Admission Status:  I believe this patient meets OBSERVATION status, however if further evaluation or treatment plans warrant, status may change.  Based upon current information, I predict patient's care encounter to be less than or equal to 2 midnights.    Electronically signed by Ambar Marrero II, DO, 09/09/20, 8:54 PM.

## 2020-09-10 NOTE — CONSULTS
Referring Provider: Dr. Spicer  Reason for Consultation: [facility]    Patient Care Team:  Abdirashid Mccabe MD as PCP - General (Cardiology)    Chief complaint acute encephalopathy    Subjective .     History of present illness: 89-year-old man with PMH notable for dementia on Exelon, DVT on Eliquis, DM 2, HTN admitted via the ED where he presented yesterday via EMS for worsening confusion of baseline.  His wife reported that 2 days ago, she and patient's caregivers noted increased confusion which continued to worsen yesterday and patient became more lethargic.  His wife reports that he often behaves this way with UTIs.  She described a nonproductive cough which had been relieved by Robitussin.  At baseline she reported that he is alert, talkative and can answer some questions, ambulates with assistance at times uses a wheelchair at others, but yesterday was unable to sit up in a chair, although she had noticed no unilateral weakness and he had not complained of headache or vision difficulty.  No known fevers.   Patient was last admitted in June with right hip fracture.  No history obtainable from patient.    Review of Systems  Review of systems could not be obtained due to   patient confusion.     History  Past Medical History:   Diagnosis Date   • Dementia (CMS/HCC)    • Diabetes (CMS/HCC)    • H/O blood clots    • Osteoarthritis    ,   Past Surgical History:   Procedure Laterality Date   • BUNIONECTOMY     • HIP TROCHANTERIC NAILING WITH INTRAMEDULLARY HIP SCREW N/A 6/9/2020    Procedure: ADVANCED TROCHANTERIC FEMORAL NAIL (ATFN) RIGHT HIP/FEMUR;  Surgeon: Mejia Meza MD;  Location: [facility];  Service: Orthopedics;  Laterality: N/A;   • RECTAL FISTULECTOMY     • SKIN CANCER EXCISION     ,   Family History   Problem Relation Age of Onset   • Stroke Mother    • Heart attack Father    ,   Social History     Tobacco Use   • Smoking status: Former Smoker     Packs/day: 2.00   • Smokeless tobacco: Never  Used   Substance Use Topics   • Alcohol use: Yes     Alcohol/week: 1.0 standard drinks     Types: 1 Glasses of wine per week     Frequency: Never   • Drug use: No   ,   Medications Prior to Admission   Medication Sig Dispense Refill Last Dose   • acetaminophen (TYLENOL) 325 MG tablet Take 2 tablets by mouth Every 4 (Four) Hours As Needed for Mild Pain .      • apixaban (ELIQUIS) 2.5 MG tablet tablet Take 1 tablet by mouth Every 12 (Twelve) Hours. Indications: Prophylaxis of Venous Thromboembolism 60 tablet     • calcium carbonate (TUMS) 500 MG chewable tablet Chew 1 tablet Daily As Needed.   Past Week at Unknown time   • cyanocobalamin 1000 MCG/ML injection Inject 1,000 mcg into the appropriate muscle as directed by prescriber Every 28 (Twenty-Eight) Days.   Past Month at Unknown time   • docusate sodium 100 MG capsule Take 100 mg by mouth 2 (Two) Times a Day As Needed (constipation).      • EXELON 9.5 MG/24HR patch Place 1 patch on the skin as directed by provider Daily.   6/7/2020 at Unknown time   • ipratropium (ATROVENT) 0.06 % nasal spray 2 sprays into the nostril(s) as directed by provider 2 (two) times a day.   6/8/2020 at Unknown time   • levothyroxine (SYNTHROID, LEVOTHROID) 75 MCG tablet Take 75 mcg by mouth Daily.   6/8/2020 at Unknown time   • losartan (COZAAR) 50 MG tablet Take 50 mg by mouth Daily.   6/8/2020 at Unknown time   • metFORMIN (GLUCOPHAGE) 500 MG tablet Take 1,000 mg by mouth 2 (Two) Times a Day With Meals.   6/8/2020 at Unknown time   • MYRBETRIQ 50 MG tablet sustained-release 24 hour 24 hr tablet Take 50 mg by mouth Daily.   6/8/2020 at Unknown time   • promethazine (PHENERGAN) 12.5 MG tablet Take 1 tablet by mouth Every 6 (Six) Hours As Needed for Nausea or Vomiting.      • tamsulosin (FLOMAX) 0.4 MG capsule 24 hr capsule Take 1 capsule by mouth Every Night.   6/7/2020 at Unknown time   , Scheduled Meds:    aspirin 81 mg Oral Daily   enoxaparin 40 mg Subcutaneous Q AM   insulin lispro  "0-7 Units Subcutaneous TID AC   levothyroxine 75 mcg Oral Daily   losartan 50 mg Oral Daily   oxybutynin XL 10 mg Oral Daily   rivastigmine 1 patch Transdermal Daily   sennosides-docusate 2 tablet Oral Nightly   sodium chloride 10 mL Intravenous Q12H   tamsulosin 0.4 mg Oral Nightly   , Continuous Infusions:    sodium chloride 100 mL/hr Last Rate: 100 mL/hr (09/10/20 1400)   , PRN Meds:  •  acetaminophen  •  dextrose  •  dextrose  •  glucagon (human recombinant)  •  magnesium sulfate **OR** magnesium sulfate **OR** magnesium sulfate  •  ondansetron **OR** ondansetron  •  potassium chloride **OR** potassium chloride **OR** potassium chloride  •  sodium chloride and Allergies:  Contrast dye    Objective     Vital Signs   Blood pressure 143/76, pulse 83, temperature 98 °F (36.7 °C), temperature source Oral, resp. rate 17, height 170.2 cm (67.01\"), weight 91.6 kg (202 lb), SpO2 97 %.    Physical Exam:   Elderly white man lying in the hospital bed with eyes closed, holding onto a cup, periodically coughing and reaching for things that are not there.  Initially does not respond to voice but eventually does regard and clearly states \"we have to make sure this is really clean.\"  Also makes one other statement unrelated to questions.  Does not answer questions or follow any verbal commands.  Inattentive.  Unable to assess memory and fund of knowledge.  No dysarthria.  Pupils 2 mm and reactive, unable to adequately assess visual fields, gaze conjugate and spontaneously crosses midline bilaterally.  No facial asymmetry appreciated.  Spontaneously shrug shoulders.  Motor: Unable to cooperate with manual muscle testing but makes purposeful antigravity movements with bilateral upper extremities, e.g. moving a cup, picking up a spoon, handing the cup to me and reaching for things that are not there.  Spontaneously shifts lower extremities in the bed  Muscle tone not increased; no abnormal involuntary movements  Unable to cooperate " with coordination testing  Responds to light touch bilaterally  Reflex exam difficult but no asymmetry appreciated, hypoactive, no response to plantar stim  Neck supple, no carotid bruit appreciated  Heart RRR no murmur appreciated  Lungs clear anteriorly, normal respiratory effort  Abdomen soft, obese, NT, ND with positive bowel sounds  Extremities with trace edema, no gnosis  Skin with no rashes on exposed skin        Results Review:   I reviewed the patient's new clinical results.  I reviewed the patient's new imaging results and agree with the interpretation.  I reviewed the patient's other test results and agree with the interpretation  Labs reviewed  Respiratory panel PCR negative  BMP with glucose 127 creatinine 0.72 calcium 8.5 otherwise normal  CBC this morning with white count 8.7 H&H 10.7 and 34 platelets 229  A1c 6.1  Troponin slightly elevated 0.033, initial up to 0.048  UA with trace leukocytes 100 glucose 3-6 red cells  CMP on admission showed alk phos 127 calcium 8.5 sodium 135 otherwise normal  On 6/8/2020 TSH and free T4 normal  Head CT images reviewed, agree diffuse atrophy, no acute abnormality      Assessment/Plan       Altered mental status    Benign essential hypertension    Dementia (CMS/HCC)    Hypothyroidism    Type 2 diabetes mellitus without complication (CMS/HCC)    Obesity    Cough    Elevated troponin    Hyponatremia    Acute encephalopathy:  89-year-old man with dementia requiring caregiver, with recent cough and worsened confusion over baseline.  Strongly suspect metabolic etiology, i.e. systemic illness.  No evidence of stroke on exam which although limited shows no lateralized or focal abnormalities.  Doubt seizure but will check EEG.  We will also check additional labs.    I discussed the patient's findings and my recommendations with nursing staff    Amarilys Horowitz MD  09/10/20  17:11

## 2020-09-10 NOTE — PROGRESS NOTES
[facility] Medicine Services  PROGRESS NOTE    Patient Name: Marcos Acuña  : 1931  MRN: [id number]    Date of Admission: 2020  Primary Care Physician: Abdirashid Mccabe MD    Subjective   Subjective     CC:  AMS    HPI:  Confused, when I talk to him he starts looking at the ceiling and not able to answer any questions.     Review of Systems  Not able to obtain    Objective   Objective     Vital Signs:   Temp:  [98 °F (36.7 °C)-98.3 °F (36.8 °C)] 98.3 °F (36.8 °C)  Heart Rate:  [72-87] 84  Resp:  [16-19] 19  BP: (130-151)/(66-89) 130/66        Physical Exam:  Constitutional: No acute distress, confused, not following commands or asking questions  HENT: NCAT, mucous membranes moist  Respiratory: Clear to auscultation bilaterally, respiratory effort normal   Cardiovascular: RRR, no murmurs  Gastrointestinal: Positive bowel sounds, soft, nontender, obese  Musculoskeletal: trace edema  Neurologic: disoriented, confused, speaking in complete sentences but not in relation to any questions that I asked him  Skin: No rashes    Results Reviewed:  Results from last 7 days   Lab Units 09/10/20  0353 20  1647   WBC 10*3/mm3 8.75 8.98   HEMOGLOBIN g/dL 10.7* 11.0*   HEMATOCRIT % 33.8* 34.7*   PLATELETS 10*3/mm3 229 219     Results from last 7 days   Lab Units 09/10/20  0353 20  1647   SODIUM mmol/L 136  --  135*   POTASSIUM mmol/L 3.7  --  4.0   CHLORIDE mmol/L 100  --  99   CO2 mmol/L 25.0  --  28.0   BUN mg/dL 11  --  11   CREATININE mg/dL 0.72*  --  0.81   GLUCOSE mg/dL 127*  --  72   CALCIUM mg/dL 8.5*  --  8.5*   ALT (SGPT) U/L  --   --  11   AST (SGOT) U/L  --   --  17   TROPONIN T ng/mL  --  0.033* 0.048*     Estimated Creatinine Clearance: 67.6 mL/min (A) (by C-G formula based on SCr of 0.72 mg/dL (L)).    Microbiology Results Abnormal     Procedure Component Value - Date/Time    COVID PRE-OP / PRE-PROCEDURE SCREENING ORDER (NO ISOLATION) - Swab,  Nasopharynx [[id number]] Collected:  09/10/20 0710    Lab Status:  Final result Specimen:  Swab from Nasopharynx Updated:  09/10/20 0805    Narrative:       The following orders were created for panel order COVID PRE-OP / PRE-PROCEDURE SCREENING ORDER (NO ISOLATION) - Swab, Nasopharynx.  Procedure                               Abnormality         Status                     ---------                               -----------         ------                     Respiratory Panel PCR w/...[[id number]]  Normal              Final result                 Please view results for these tests on the individual orders.    Respiratory Panel PCR w/COVID-19(SARS-CoV-2) [facility] In-House, NP Swab in UTM/VTM, 3-4 HR TAT - Swab, Nasopharynx [[id number]]  (Normal) Collected:  09/10/20 0710    Lab Status:  Final result Specimen:  Swab from Nasopharynx Updated:  09/10/20 0805     ADENOVIRUS, PCR Not Detected     Coronavirus 229E Not Detected     Coronavirus HKU1 Not Detected     Coronavirus NL63 Not Detected     Coronavirus OC43 Not Detected     COVID19 Not Detected     Human Metapneumovirus Not Detected     Human Rhinovirus/Enterovirus Not Detected     Influenza A PCR Not Detected     Influenza A H1 Not Detected     Influenza A H1 2009 PCR Not Detected     Influenza A H3 Not Detected     Influenza B PCR Not Detected     Parainfluenza Virus 1 Not Detected     Parainfluenza Virus 2 Not Detected     Parainfluenza Virus 3 Not Detected     Parainfluenza Virus 4 Not Detected     RSV, PCR Not Detected     Bordetella pertussis pcr Not Detected     Bordetella parapertussis PCR Not Detected     Chlamydophila pneumoniae PCR Not Detected     Mycoplasma pneumo by PCR Not Detected    Narrative:       Fact sheet for providers: https://docs.[employer]/wp-content/uploads/FOR1900-3646-IL4.1-EUA-Provider-Fact-Sheet-3.pdf    Fact sheet for patients:  https://docs.[employer]/wp-content/uploads/QQG9751-3990-RE1.1-EUA-Patient-Fact-Sheet-1.pdf          Imaging Results (Last 24 Hours)     Procedure Component Value Units Date/Time    CT Chest Without Contrast [[id number]] Collected:  09/09/20 2014     Updated:  09/09/20 2016    Narrative:       CT Chest WO    INDICATION:   Acute onset of cough and confusion    TECHNIQUE:   CT of the thorax without IV contrast. Coronal and sagittal reconstructions were obtained.  Radiation dose reduction techniques included automated exposure control or exposure modulation based on body size. Count of known CT and cardiac nuc med studies  performed in previous 12 months: 1.     COMPARISON:   No pertinent prior study    FINDINGS:  The heart size is mildly enlarged. No pericardial fluid. Coronary artery calcifications. No acute aortic findings noted on this noncontrasted study. No threshold mediastinal or hilar adenopathy.    The lungs are free of acute infiltrates. There is mild dependent atelectasis in the posterior and inferior lung fields. No pleural fluid. No pneumothorax. No endobronchial lesions.    Limited imaging of the upper abdomen shows no clearly acute findings.      Impression:       Mild cardiomegaly with atherosclerosis of the coronary arteries.    No clearly acute infiltrates. Mild bibasilar dependent atelectasis    Signer Name: Cristhian Mccarthy MD   Signed: 9/9/2020 8:14 PM   Workstation Name: [id number]    [facility]    XR Chest 1 View [[id number]] Collected:  09/09/20 1806     Updated:  09/09/20 1810    Narrative:       EXAMINATION: XR CHEST 1 VW-      INDICATION: CONFUSION      COMPARISON: 6/10/2020     FINDINGS: No new focal consolidation. Unchanged bilateral lung base  atelectasis. No pleural effusion or pneumothorax. Unchanged heart and  mediastinal contours.       Impression:       Hypoventilatory changes without evidence of acute disease in  the chest     This report was finalized on  9/9/2020 6:07 PM by Abdirashid Luna.       CT Head Without Contrast [[id number]] Collected:  09/09/20 1804     Updated:  09/09/20 1809    Narrative:       EXAMINATION: CT HEAD WO CONTRAST-      INDICATION: Confusion/delirium, altered LOC, unexplained     TECHNIQUE: Axial noncontrast CT of the head with multiplanar  reconstruction.     The radiation dose reduction device was turned on for each scan per the  ALARA (As Low as Reasonably Achievable) protocol.     COMPARISON: 6/8/2020     FINDINGS: No intracranial hemorrhage, mass or mass effect. Unchanged  diffuse volume loss and moderate periventricular white matter changes of  chronic small vessel ischemia. Unchanged ventricular size and  configuration. The orbits are normal. Left sphenoid and ethmoid air cell  opacification is noted.       Impression:       No acute intracranial abnormality. Unchanged volume loss and  periventricular white matter changes of chronic small vessel ischemia.     Mucosal thickening and fluid opacification of the left sphenoid sinus  and ethmoid air cells. Correlate with any clinical evidence of  sinusitis.        This report was finalized on 9/9/2020 6:06 PM by Abdirashid Luna.                  I have reviewed the medications:  Scheduled Meds:  aspirin 81 mg Oral Daily   enoxaparin 40 mg Subcutaneous Q AM   insulin lispro 0-7 Units Subcutaneous TID AC   insulin lispro 0-9 Units Subcutaneous TID AC   levothyroxine 75 mcg Oral Daily   losartan 50 mg Oral Daily   oxybutynin XL 10 mg Oral Daily   rivastigmine 1 patch Transdermal Daily   sennosides-docusate 2 tablet Oral Nightly   sodium chloride 10 mL Intravenous Q12H   tamsulosin 0.4 mg Oral Nightly     Continuous Infusions:  sodium chloride 100 mL/hr Last Rate: 100 mL/hr (09/10/20 1023)     PRN Meds:.•  acetaminophen  •  dextrose  •  dextrose  •  glucagon (human recombinant)  •  magnesium sulfate **OR** magnesium sulfate **OR** magnesium sulfate  •  ondansetron **OR** ondansetron  •   potassium chloride **OR** potassium chloride **OR** potassium chloride  •  sodium chloride    Assessment/Plan   Assessment & Plan     Active Hospital Problems    Diagnosis  POA   • **Altered mental status [R41.82]  Yes   • Cough [R05]  Yes   • Elevated troponin [R79.89]  Yes   • Hyponatremia [E87.1]  Yes   • Dementia (CMS/HCC) [F03.90]  Yes   • Benign essential hypertension [I10]  Yes   • Hypothyroidism [E03.9]  Yes   • Obesity [E66.9]  Yes   • Type 2 diabetes mellitus without complication (CMS/HCC) [E11.9]  Yes      Resolved Hospital Problems   No resolved problems to display.        Brief Hospital Course to date:  Marcos Acuña is a 89 y.o. male with history of dementia and recurrent UTIs presents with worsening mental status and confusion    AMS superimiposed on moderate dementia  - check TSH in AM, CT brain negative  - doubt he would be able to tolerate MRI  - UA negative    Hyponatremia  - improved with resuscitation       Minimally elevated troponin  --Suspect due to dehydration as above rather than ACS. Per wife he never complained of chest pain and EKG without ischemic changes. Start asa 81 mg.  --ECHO with EF 55-60%     Hx of recurrent DVT  -- Eliquis on hold, continue lovenox full dose for anti-coagulation     Hypertension     DM2  --FS AC/HS.   --SSI.      DVT Prophylaxis:  lovenox      Disposition: I expect the patient to be discharged TBD, pending improvement in mental status    CODE STATUS:   Code Status and Medical Interventions:   Ordered at: 09/09/20 1936     Code Status:    CPR     Medical Interventions (Level of Support Prior to Arrest):    Full         Electronically signed by Stefanie Spicer DO, 09/10/20, 13:03.

## 2020-09-10 NOTE — PROGRESS NOTES
Discharge Planning Assessment  [facility]     Patient Name: Marcos Acuña  MRN: [id number]  Today's Date: 9/10/2020    Admit Date: 9/9/2020    Discharge Needs Assessment     Row Name 09/10/20 1525       Living Environment    Lives With  spouse    Name(s) of Who Lives With Patient  Pt resides at [facility] independent living with spouse, Sloane    Current Living Arrangements  independent/assisted living facility    Primary Care Provided by  spouse/significant other;other (see comments) private caregivers    Provides Primary Care For  no one, unable/limited ability to care for self    Family Caregiver if Needed  child(lovely), adult;spouse;other (see comments) Private caregivers    Quality of Family Relationships  helpful;supportive;involved    Able to Return to Prior Arrangements  yes    Living Arrangement Comments  Pt confuses and unable to speak with pt.  Spoke with pt's spouse, Sloane, on cell.  Pt resides at [facility] with spouse.. Spouse reports pt has 24/7 caregivers.         Resource/Environmental Concerns    Resource/Environmental Concerns  none       Transition Planning    Patient/Family Anticipates Transition to  home with family    Patient/Family Anticipated Services at Transition      Transportation Anticipated  health plan transportation       Discharge Needs Assessment    Readmission Within the Last 30 Days  no previous admission in last 30 days    Concerns to be Addressed  discharge planning    Equipment Currently Used at Home  wheelchair    Anticipated Changes Related to Illness  inability to care for self    Equipment Needed After Discharge  wheelchair, manual    Outpatient/Agency/Support Group Needs  other (see comments) [facility] independent living and has 24/7 caregivers    Discharge Facility/Level of Care Needs  independent living facility with caregivers    Discharge Coordination/Progress  Per pt's spouse, pt has [employer] Medicare Replacement insurance with  no recent changes in insurance. Pt has prescriptio coverage  and uses [facility].  Spouse reports she gives pt his meds and fixes his meals. She reports caregivers assists with bathing and other ADLs. Plan is home with caregivers. Denies any discharge needs. CM will cont to follow.         Discharge Plan     Row Name 09/10/20 1532       Plan    Plan  discharge plan     Patient/Family in Agreement with Plan  yes    Plan Comments  Plan is home with caregivers.  Per spouse, pt will need transportation home.   CM will cont to follow.     Final Discharge Disposition Code  01 - home or self-care        Destination      Coordination has not been started for this encounter.      Durable Medical Equipment      Coordination has not been started for this encounter.      Dialysis/Infusion      Coordination has not been started for this encounter.      Home Medical Care      Coordination has not been started for this encounter.      Therapy      Coordination has not been started for this encounter.      Community Resources      Coordination has not been started for this encounter.        Expected Discharge Date and Time     Expected Discharge Date Expected Discharge Time    Sep 12, 2020         Demographic Summary     Row Name 09/10/20 1523       General Information    General Information Comments  Pt's PCP is Abdirashid Mccabe       Contact Information    Permission Granted to Share Info With      Contact Information Obtained for      Contact Information Comments  Sloane Acuña(spouse): [phone] or Jose Acuña(son); [phone]        Functional Status    No documentation.       Psychosocial    No documentation.       Abuse/Neglect    No documentation.       Legal    No documentation.       Substance Abuse    No documentation.       Patient Forms    No documentation.           Selam Smyth RN

## 2020-09-11 ENCOUNTER — APPOINTMENT (OUTPATIENT)
Dept: CT IMAGING | Facility: HOSPITAL | Age: 85
End: 2020-09-11

## 2020-09-11 ENCOUNTER — APPOINTMENT (OUTPATIENT)
Dept: NEUROLOGY | Facility: HOSPITAL | Age: 85
End: 2020-09-11

## 2020-09-11 ENCOUNTER — APPOINTMENT (OUTPATIENT)
Dept: GENERAL RADIOLOGY | Facility: HOSPITAL | Age: 85
End: 2020-09-11

## 2020-09-11 LAB
ALBUMIN SERPL-MCNC: 2.9 G/DL (ref 3.5–5.2)
AMMONIA BLD-SCNC: 47 UMOL/L (ref 16–60)
ANION GAP SERPL CALCULATED.3IONS-SCNC: 9 MMOL/L (ref 5–15)
ARTERIAL PATENCY WRIST A: ABNORMAL
ATMOSPHERIC PRESS: ABNORMAL MM[HG]
BASE EXCESS BLDA CALC-SCNC: 1.8 MMOL/L (ref 0–2)
BASOPHILS # BLD AUTO: 0.13 10*3/MM3 (ref 0–0.2)
BASOPHILS NFR BLD AUTO: 1.7 % (ref 0–1.5)
BDY SITE: ABNORMAL
BODY TEMPERATURE: 37 C
BUN SERPL-MCNC: 9 MG/DL (ref 8–23)
BUN/CREAT SERPL: 11.7 (ref 7–25)
CALCIUM SPEC-SCNC: 8.4 MG/DL (ref 8.6–10.5)
CHLORIDE SERPL-SCNC: 105 MMOL/L (ref 98–107)
CO2 BLDA-SCNC: 27.2 MMOL/L (ref 22–33)
CO2 SERPL-SCNC: 26 MMOL/L (ref 22–29)
COHGB MFR BLD: 0.7 % (ref 0–2)
CREAT SERPL-MCNC: 0.77 MG/DL (ref 0.76–1.27)
D-LACTATE SERPL-SCNC: 1.4 MMOL/L (ref 0.5–2)
DEPRECATED RDW RBC AUTO: 49.6 FL (ref 37–54)
EOSINOPHIL # BLD AUTO: 0.72 10*3/MM3 (ref 0–0.4)
EOSINOPHIL NFR BLD AUTO: 9.6 % (ref 0.3–6.2)
EPAP: 0
ERYTHROCYTE [DISTWIDTH] IN BLOOD BY AUTOMATED COUNT: 16.5 % (ref 12.3–15.4)
FOLATE SERPL-MCNC: 5.44 NG/ML (ref 4.78–24.2)
GFR SERPL CREATININE-BSD FRML MDRD: 95 ML/MIN/1.73
GLUCOSE BLDC GLUCOMTR-MCNC: 101 MG/DL (ref 70–130)
GLUCOSE BLDC GLUCOMTR-MCNC: 119 MG/DL (ref 70–130)
GLUCOSE BLDC GLUCOMTR-MCNC: 89 MG/DL (ref 70–130)
GLUCOSE BLDC GLUCOMTR-MCNC: 91 MG/DL (ref 70–130)
GLUCOSE BLDC GLUCOMTR-MCNC: 92 MG/DL (ref 70–130)
GLUCOSE SERPL-MCNC: 116 MG/DL (ref 65–99)
HCO3 BLDA-SCNC: 26 MMOL/L (ref 20–26)
HCT VFR BLD AUTO: 33.6 % (ref 37.5–51)
HCT VFR BLD CALC: 31.8 %
HGB BLD-MCNC: 10.6 G/DL (ref 13–17.7)
HGB BLDA-MCNC: 10.4 G/DL (ref 13.5–17.5)
IMM GRANULOCYTES # BLD AUTO: 0.03 10*3/MM3 (ref 0–0.05)
IMM GRANULOCYTES NFR BLD AUTO: 0.4 % (ref 0–0.5)
INHALED O2 CONCENTRATION: 21 %
IPAP: 0
LYMPHOCYTES # BLD AUTO: 1.68 10*3/MM3 (ref 0.7–3.1)
LYMPHOCYTES NFR BLD AUTO: 22.5 % (ref 19.6–45.3)
MCH RBC QN AUTO: 26 PG (ref 26.6–33)
MCHC RBC AUTO-ENTMCNC: 31.5 G/DL (ref 31.5–35.7)
MCV RBC AUTO: 82.4 FL (ref 79–97)
METHGB BLD QL: 0.4 % (ref 0–1.5)
MODALITY: ABNORMAL
MONOCYTES # BLD AUTO: 0.59 10*3/MM3 (ref 0.1–0.9)
MONOCYTES NFR BLD AUTO: 7.9 % (ref 5–12)
NEUTROPHILS NFR BLD AUTO: 4.33 10*3/MM3 (ref 1.7–7)
NEUTROPHILS NFR BLD AUTO: 57.9 % (ref 42.7–76)
NOTE: ABNORMAL
NRBC BLD AUTO-RTO: 0 /100 WBC (ref 0–0.2)
OXYHGB MFR BLDV: 94 % (ref 94–99)
PAW @ PEAK INSP FLOW SETTING VENT: 0 CMH2O
PCO2 BLDA: 38.3 MM HG (ref 35–45)
PCO2 TEMP ADJ BLD: 38.3 MM HG (ref 35–48)
PH BLDA: 7.44 PH UNITS (ref 7.35–7.45)
PH, TEMP CORRECTED: 7.44 PH UNITS
PHOSPHATE SERPL-MCNC: 3.1 MG/DL (ref 2.5–4.5)
PLATELET # BLD AUTO: 227 10*3/MM3 (ref 140–450)
PMV BLD AUTO: 9.8 FL (ref 6–12)
PO2 BLDA: 73.2 MM HG (ref 83–108)
PO2 TEMP ADJ BLD: 73.2 MM HG (ref 83–108)
POTASSIUM SERPL-SCNC: 3.7 MMOL/L (ref 3.5–5.2)
PROCALCITONIN SERPL-MCNC: 0.1 NG/ML (ref 0–0.25)
RBC # BLD AUTO: 4.08 10*6/MM3 (ref 4.14–5.8)
SODIUM SERPL-SCNC: 140 MMOL/L (ref 136–145)
TOTAL RATE: 0 BREATHS/MINUTE
TSH SERPL DL<=0.05 MIU/L-ACNC: 3.66 UIU/ML (ref 0.27–4.2)
VIT B12 BLD-MCNC: 679 PG/ML (ref 211–946)
WBC # BLD AUTO: 7.48 10*3/MM3 (ref 3.4–10.8)

## 2020-09-11 PROCEDURE — 99214 OFFICE O/P EST MOD 30 MIN: CPT | Performed by: PSYCHIATRY & NEUROLOGY

## 2020-09-11 PROCEDURE — 84145 PROCALCITONIN (PCT): CPT | Performed by: NURSE PRACTITIONER

## 2020-09-11 PROCEDURE — 25010000002 ENOXAPARIN PER 10 MG: Performed by: INTERNAL MEDICINE

## 2020-09-11 PROCEDURE — 96372 THER/PROPH/DIAG INJ SC/IM: CPT

## 2020-09-11 PROCEDURE — 71045 X-RAY EXAM CHEST 1 VIEW: CPT

## 2020-09-11 PROCEDURE — G0378 HOSPITAL OBSERVATION PER HR: HCPCS

## 2020-09-11 PROCEDURE — 80069 RENAL FUNCTION PANEL: CPT | Performed by: INTERNAL MEDICINE

## 2020-09-11 PROCEDURE — 95819 EEG AWAKE AND ASLEEP: CPT

## 2020-09-11 PROCEDURE — 99225 PR SBSQ OBSERVATION CARE/DAY 25 MINUTES: CPT | Performed by: INTERNAL MEDICINE

## 2020-09-11 PROCEDURE — 70450 CT HEAD/BRAIN W/O DYE: CPT

## 2020-09-11 PROCEDURE — 36600 WITHDRAWAL OF ARTERIAL BLOOD: CPT

## 2020-09-11 PROCEDURE — 84443 ASSAY THYROID STIM HORMONE: CPT | Performed by: INTERNAL MEDICINE

## 2020-09-11 PROCEDURE — 83605 ASSAY OF LACTIC ACID: CPT | Performed by: NURSE PRACTITIONER

## 2020-09-11 PROCEDURE — 85025 COMPLETE CBC W/AUTO DIFF WBC: CPT | Performed by: NURSE PRACTITIONER

## 2020-09-11 PROCEDURE — 82140 ASSAY OF AMMONIA: CPT | Performed by: PSYCHIATRY & NEUROLOGY

## 2020-09-11 PROCEDURE — 82962 GLUCOSE BLOOD TEST: CPT

## 2020-09-11 PROCEDURE — 25010000002 LEVETIRACETAM IN NACL 0.82% 500 MG/100ML SOLUTION: Performed by: PSYCHIATRY & NEUROLOGY

## 2020-09-11 PROCEDURE — 82805 BLOOD GASES W/O2 SATURATION: CPT

## 2020-09-11 RX ORDER — HALOPERIDOL 5 MG/ML
0.5 INJECTION INTRAMUSCULAR EVERY 6 HOURS PRN
Status: DISCONTINUED | OUTPATIENT
Start: 2020-09-11 | End: 2020-09-13 | Stop reason: HOSPADM

## 2020-09-11 RX ADMIN — SODIUM CHLORIDE 100 ML/HR: 9 INJECTION, SOLUTION INTRAVENOUS at 16:57

## 2020-09-11 RX ADMIN — LEVETIRACETAM 500 MG: 5 INJECTION INTRAVENOUS at 09:42

## 2020-09-11 RX ADMIN — SODIUM CHLORIDE, PRESERVATIVE FREE 10 ML: 5 INJECTION INTRAVENOUS at 09:38

## 2020-09-11 RX ADMIN — SODIUM CHLORIDE, PRESERVATIVE FREE 10 ML: 5 INJECTION INTRAVENOUS at 21:35

## 2020-09-11 RX ADMIN — RIVASTIGMINE 1 PATCH: 9.5 PATCH TRANSDERMAL at 09:38

## 2020-09-11 RX ADMIN — TAMSULOSIN HYDROCHLORIDE 0.4 MG: 0.4 CAPSULE ORAL at 21:35

## 2020-09-11 RX ADMIN — ENOXAPARIN SODIUM 90 MG: 100 INJECTION SUBCUTANEOUS at 18:28

## 2020-09-11 RX ADMIN — DOCUSATE SODIUM 50MG AND SENNOSIDES 8.6MG 2 TABLET: 8.6; 5 TABLET, FILM COATED ORAL at 21:35

## 2020-09-11 NOTE — PROGRESS NOTES
[facility] Medicine Services  PROGRESS NOTE    Patient Name: Marcos Acuña  : 1931  MRN: [id number]    Date of Admission: 2020  Primary Care Physician: Abdirashid Mccabe MD    Subjective   Subjective     CC:  AMS    HPI:  Patient not arousable during my exam today, discussed with Dr. Horowitz to check for LP however wife is currently refusing    Review of Systems  Not able to obtain    Objective   Objective     Vital Signs:   Temp:  [97.5 °F (36.4 °C)-99 °F (37.2 °C)] 97.5 °F (36.4 °C)  Heart Rate:  [66-90] 67  Resp:  [11-18] 11  BP: (103-143)/(62-90) 129/83        Physical Exam:  Constitutional: Unarousable, moves to sternal rub but does not open eyes or speak  HENT: NCAT, mucous membranes moist  Respiratory: Clear to auscultation bilaterally, respiratory effort normal   Cardiovascular: RRR, no murmurs  Gastrointestinal: Positive bowel sounds, soft, nontender, nondistended  Musculoskeletal: Trace edema  Psychiatric: Not able to obtain  Neurologic: Lethargic, unarousable  Skin: No rashes    Results Reviewed:  Results from last 7 days   Lab Units 09/11/20  0311 09/10/20  0353 09/09/20  1647   WBC 10*3/mm3 7.48 8.75 8.98   HEMOGLOBIN g/dL 10.6* 10.7* 11.0*   HEMATOCRIT % 33.6* 33.8* 34.7*   PLATELETS 10*3/mm3 227 229 219   PROCALCITONIN ng/mL 0.10  --   --      Results from last 7 days   Lab Units 09/11/20  0311 09/10/20  2237 09/10/20  0353 20  1647   SODIUM mmol/L 140 137 136  --  135*   POTASSIUM mmol/L 3.7 3.9 3.7  --  4.0   CHLORIDE mmol/L 105 102 100  --  99   CO2 mmol/L 26.0 24.0 25.0  --  28.0   BUN mg/dL 9 9 11  --  11   CREATININE mg/dL 0.77 0.72* 0.72*  --  0.81   GLUCOSE mg/dL 116* 131* 127*  --  72   CALCIUM mg/dL 8.4* 8.7 8.5*  --  8.5*   ALT (SGPT) U/L  --  9  --   --  11   AST (SGOT) U/L  --  16  --   --  17   TROPONIN T ng/mL  --   --   --  0.033* 0.048*     Estimated Creatinine Clearance: 67.6 mL/min (by C-G formula based on SCr of 0.77  mg/dL).    Microbiology Results Abnormal     Procedure Component Value - Date/Time    COVID PRE-OP / PRE-PROCEDURE SCREENING ORDER (NO ISOLATION) - Swab, Nasopharynx [[id number]] Collected:  09/10/20 0710    Lab Status:  Final result Specimen:  Swab from Nasopharynx Updated:  09/10/20 0805    Narrative:       The following orders were created for panel order COVID PRE-OP / PRE-PROCEDURE SCREENING ORDER (NO ISOLATION) - Swab, Nasopharynx.  Procedure                               Abnormality         Status                     ---------                               -----------         ------                     Respiratory Panel PCR w/...[[id number]]  Normal              Final result                 Please view results for these tests on the individual orders.    Respiratory Panel PCR w/COVID-19(SARS-CoV-2) [facility] In-House, NP Swab in UTM/VTM, 3-4 HR TAT - Swab, Nasopharynx [[id number]]  (Normal) Collected:  09/10/20 0710    Lab Status:  Final result Specimen:  Swab from Nasopharynx Updated:  09/10/20 0805     ADENOVIRUS, PCR Not Detected     Coronavirus 229E Not Detected     Coronavirus HKU1 Not Detected     Coronavirus NL63 Not Detected     Coronavirus OC43 Not Detected     COVID19 Not Detected     Human Metapneumovirus Not Detected     Human Rhinovirus/Enterovirus Not Detected     Influenza A PCR Not Detected     Influenza A H1 Not Detected     Influenza A H1 2009 PCR Not Detected     Influenza A H3 Not Detected     Influenza B PCR Not Detected     Parainfluenza Virus 1 Not Detected     Parainfluenza Virus 2 Not Detected     Parainfluenza Virus 3 Not Detected     Parainfluenza Virus 4 Not Detected     RSV, PCR Not Detected     Bordetella pertussis pcr Not Detected     Bordetella parapertussis PCR Not Detected     Chlamydophila pneumoniae PCR Not Detected     Mycoplasma pneumo by PCR Not Detected    Narrative:       Fact sheet for providers:  https://docs.[employer]/wp-content/uploads/AYB6873-9475-OB1.1-EUA-Provider-Fact-Sheet-3.pdf    Fact sheet for patients: https://docs.[employer]/wp-content/uploads/OTV0163-5826-RI1.1-EUA-Patient-Fact-Sheet-1.pdf          Imaging Results (Last 24 Hours)     Procedure Component Value Units Date/Time    XR Chest 1 View [[id number]] Collected:  09/11/20 0330     Updated:  09/11/20 0332    Narrative:       CR Chest 1 Vw    INDICATION:   Cough and mental status changes today.     COMPARISON:    9/9/2020    FINDINGS:  Single portable AP view(s) of the chest.    Heart remains enlarged. Mild infiltrate or atelectasis is noted in the lung bases, similar to prior. Lungs otherwise are clear. No pneumothorax. Pulmonary vascularity is normal.       Impression:       No significant change. There is mild atelectasis or infiltrate in the lung bases.    Signer Name: Sajan Jones MD   Signed: 9/11/2020 3:30 AM   Workstation Name: [facility]    [facility]    CT Head Without Contrast [[id number]] Collected:  09/11/20 0322     Updated:  09/11/20 0325    Narrative:       CT Head WO    HISTORY:   Confusion and delirium worse than baseline today.    TECHNIQUE:   Axial unenhanced head CT with multiplanar reformats. Radiation dose reduction techniques included automated exposure control or exposure modulation based on body size. Count of known CT and cardiac nuc med studies performed in previous 12 months: 3.     COMPARISON:   9/9/2020    FINDINGS:   Generalized atrophy is unchanged. Ventricular size and configuration are stable. Chronic small vessel ischemic changes are present in the white matter. No acute infarct or hemorrhage is identified. There are no masses.  Atherosclerotic calcifications are  present in the carotid siphons. There is chronic mucosal thickening in the paranasal sinuses. A fluid level in the left sphenoid sinus may reflect acute disease. There are bilateral mastoid effusions. No skull  fracture.      Impression:         1. No acute findings in the brain. Stable chronic changes.  2. Chronic changes in the paranasal sinuses with possible acute left ethmoid sinus disease.  3. Bilateral mastoid effusions.    Signer Name: Sajan Jones MD   Signed: 9/11/2020 3:22 AM   Workstation Name: [email or web address]    [facility]          Results for orders placed during the hospital encounter of 09/09/20   Adult Transthoracic Echo Complete W/ Cont if Necessary Per Protocol    Narrative · There is mild calcification of the aortic valve mainly affecting the   non, left and right coronary cusp(s).  · Peak velocity of the flow distal to the aortic valve is 217.2 cm/s.  · Aortic valve maximum pressure gradient is 18.9 mmHg.  · Aortic valve area is 1.1 cm2.  · Mild aortic valve stenosis is present.  · Aortic valve mean pressure gradient is 10.7 mmHg.  · Estimated EF = 60%.  · Left ventricular systolic function is normal.          I have reviewed the medications:  Scheduled Meds:  aspirin 81 mg Oral Daily   insulin lispro 0-7 Units Subcutaneous TID AC   levETIRAcetam 500 mg Intravenous Q12H   levothyroxine 75 mcg Oral Daily   losartan 50 mg Oral Daily   oxybutynin XL 10 mg Oral Daily   rivastigmine 1 patch Transdermal Daily   sennosides-docusate 2 tablet Oral Nightly   sodium chloride 10 mL Intravenous Q12H   tamsulosin 0.4 mg Oral Nightly     Continuous Infusions:  hold 1 each    sodium chloride 100 mL/hr Last Rate: 100 mL/hr (09/11/20 0510)     PRN Meds:.•  hold  •  acetaminophen  •  dextrose  •  dextrose  •  glucagon (human recombinant)  •  magnesium sulfate **OR** magnesium sulfate **OR** magnesium sulfate  •  ondansetron **OR** ondansetron  •  potassium chloride **OR** potassium chloride **OR** potassium chloride  •  sodium chloride    Assessment/Plan   Assessment & Plan     Active Hospital Problems    Diagnosis  POA   • **Altered mental status [R41.82]  Yes   • Cough [R05]  Yes   • Elevated  troponin [R79.89]  Yes   • Hyponatremia [E87.1]  Yes   • Dementia (CMS/HCC) [F03.90]  Yes   • Benign essential hypertension [I10]  Yes   • Hypothyroidism [E03.9]  Yes   • Obesity [E66.9]  Yes   • Type 2 diabetes mellitus without complication (CMS/HCC) [E11.9]  Yes      Resolved Hospital Problems   No resolved problems to display.        Brief Hospital Course to date:  Marcos Acuña is a 89 y.o. male with history of dementia and recurrent UTIs presents with worsening mental status and confusion     AMS superimiposed on moderate dementia  -No infectious source identified, wife is refusing LP to evaluate for encephalitis  -Work-up so far unremarkable, likely related to some type of systemic illness but unable to identify thus far  - UA negative     Hyponatremia  - improved with resuscitation       Hx of recurrent DVT  -- Anticoagulation on hold secondary to possible LP     Hypertension     DM2  --FS AC/HS.   --SSI         DVT Prophylaxis: Chemical      Disposition: I expect the patient to be discharged TBD  CODE STATUS:   Code Status and Medical Interventions:   Ordered at: 09/09/20 1936     Code Status:    CPR     Medical Interventions (Level of Support Prior to Arrest):    Full         Electronically signed by Stefanie Spicer DO, 09/11/20, 14:50.

## 2020-09-11 NOTE — PROGRESS NOTES
Continued Stay Note  [facility]     Patient Name: Marcos Acuña  MRN: [id number]  Today's Date: 9/11/2020    Admit Date: 9/9/2020    Discharge Plan     Row Name 09/11/20 1613       Plan    Plan  discharge plan    Plan Comments  Per discussion in multidisciplinary rounds, pt not medically ready for discharge and unarousable.  Discharge plan TBD.  CM will follow up Monday.     Final Discharge Disposition Code  30 - still a patient        Discharge Codes    No documentation.       Expected Discharge Date and Time     Expected Discharge Date Expected Discharge Time    Sep 14, 2020             Selam Smyth RN

## 2020-09-11 NOTE — SIGNIFICANT NOTE
"Called per nursing secondary to pt worsened altered mental status. Earlier in the shift, pt found to have \"stiffening\" episodes. Neurology was contacted and ordered 500mg Keppra BID. Pt is scheduled for EEG in am. Pt also removing tele and multiple IV lines. Soft mitt ordered.   Pt with confused conversation and not following commands per day team documentation.   As the night progressed, the pt has continued to mental decline. Nursing notes increased lethargy. Pt will no longer respond to questions. Does respond to pain.   Upon evaluation. Pt refusing to open eyes. Forcefully resists when lids are opened. Will not follow commands. Moving all extremities spontaneously. He does respond to stimuli but no intelligible words. Congested cough noted. Nursing reports this has worsened throughout night.     -ABG ordered and reviewed   -recent CT head negative, will repeat  -CXR pending   -CBC, lactic, and procal pending   -CMP obtained earlier in shift and reviewed (without significant change)     Suspect this may partially be due to Keppra dose he received in addition to his initial altered mental status. Also could be related to seizure activity. Neurology following.     "

## 2020-09-11 NOTE — NURSING NOTE
Pt unable to give consent for procedure, RN called spouse for consent and explained lumbar puncture procedure to her. Pt spouse wants to hold off on procedure for now and will call back if she changes her mind.

## 2020-09-12 ENCOUNTER — APPOINTMENT (OUTPATIENT)
Dept: GENERAL RADIOLOGY | Facility: HOSPITAL | Age: 85
End: 2020-09-12

## 2020-09-12 LAB
ALBUMIN SERPL-MCNC: 2.7 G/DL (ref 3.5–5.2)
ANION GAP SERPL CALCULATED.3IONS-SCNC: 9 MMOL/L (ref 5–15)
BACTERIA UR QL AUTO: NORMAL /HPF
BASOPHILS # BLD AUTO: 0.13 10*3/MM3 (ref 0–0.2)
BASOPHILS NFR BLD AUTO: 1.6 % (ref 0–1.5)
BILIRUB UR QL STRIP: NEGATIVE
BUN SERPL-MCNC: 9 MG/DL (ref 8–23)
BUN/CREAT SERPL: 12.2 (ref 7–25)
CALCIUM SPEC-SCNC: 8.4 MG/DL (ref 8.6–10.5)
CHLORIDE SERPL-SCNC: 107 MMOL/L (ref 98–107)
CLARITY UR: ABNORMAL
CO2 SERPL-SCNC: 25 MMOL/L (ref 22–29)
COLOR UR: YELLOW
CREAT SERPL-MCNC: 0.74 MG/DL (ref 0.76–1.27)
DEPRECATED RDW RBC AUTO: 51 FL (ref 37–54)
EOSINOPHIL # BLD AUTO: 0.43 10*3/MM3 (ref 0–0.4)
EOSINOPHIL NFR BLD AUTO: 5.3 % (ref 0.3–6.2)
ERYTHROCYTE [DISTWIDTH] IN BLOOD BY AUTOMATED COUNT: 16.8 % (ref 12.3–15.4)
GFR SERPL CREATININE-BSD FRML MDRD: 100 ML/MIN/1.73
GLUCOSE BLDC GLUCOMTR-MCNC: 105 MG/DL (ref 70–130)
GLUCOSE BLDC GLUCOMTR-MCNC: 115 MG/DL (ref 70–130)
GLUCOSE BLDC GLUCOMTR-MCNC: 86 MG/DL (ref 70–130)
GLUCOSE BLDC GLUCOMTR-MCNC: 92 MG/DL (ref 70–130)
GLUCOSE BLDC GLUCOMTR-MCNC: 98 MG/DL (ref 70–130)
GLUCOSE SERPL-MCNC: 86 MG/DL (ref 65–99)
GLUCOSE UR STRIP-MCNC: NEGATIVE MG/DL
HCT VFR BLD AUTO: 34.7 % (ref 37.5–51)
HGB BLD-MCNC: 10.6 G/DL (ref 13–17.7)
HGB UR QL STRIP.AUTO: NEGATIVE
HYALINE CASTS UR QL AUTO: NORMAL /LPF
IMM GRANULOCYTES # BLD AUTO: 0.02 10*3/MM3 (ref 0–0.05)
IMM GRANULOCYTES NFR BLD AUTO: 0.2 % (ref 0–0.5)
KETONES UR QL STRIP: ABNORMAL
LEUKOCYTE ESTERASE UR QL STRIP.AUTO: ABNORMAL
LYMPHOCYTES # BLD AUTO: 2.09 10*3/MM3 (ref 0.7–3.1)
LYMPHOCYTES NFR BLD AUTO: 25.8 % (ref 19.6–45.3)
MCH RBC QN AUTO: 25.5 PG (ref 26.6–33)
MCHC RBC AUTO-ENTMCNC: 30.5 G/DL (ref 31.5–35.7)
MCV RBC AUTO: 83.6 FL (ref 79–97)
MONOCYTES # BLD AUTO: 0.65 10*3/MM3 (ref 0.1–0.9)
MONOCYTES NFR BLD AUTO: 8 % (ref 5–12)
NEUTROPHILS NFR BLD AUTO: 4.78 10*3/MM3 (ref 1.7–7)
NEUTROPHILS NFR BLD AUTO: 59.1 % (ref 42.7–76)
NITRITE UR QL STRIP: NEGATIVE
NRBC BLD AUTO-RTO: 0 /100 WBC (ref 0–0.2)
PH UR STRIP.AUTO: <=5 [PH] (ref 5–8)
PHOSPHATE SERPL-MCNC: 3.1 MG/DL (ref 2.5–4.5)
PLATELET # BLD AUTO: 225 10*3/MM3 (ref 140–450)
PMV BLD AUTO: 9.6 FL (ref 6–12)
POTASSIUM SERPL-SCNC: 3.5 MMOL/L (ref 3.5–5.2)
PROT UR QL STRIP: NEGATIVE
RBC # BLD AUTO: 4.15 10*6/MM3 (ref 4.14–5.8)
RBC # UR: NORMAL /HPF
REF LAB TEST METHOD: NORMAL
SODIUM SERPL-SCNC: 141 MMOL/L (ref 136–145)
SP GR UR STRIP: 1.02 (ref 1–1.03)
SQUAMOUS #/AREA URNS HPF: NORMAL /HPF
UROBILINOGEN UR QL STRIP: ABNORMAL
WBC # BLD AUTO: 8.1 10*3/MM3 (ref 3.4–10.8)
WBC UR QL AUTO: NORMAL /HPF

## 2020-09-12 PROCEDURE — 82962 GLUCOSE BLOOD TEST: CPT

## 2020-09-12 PROCEDURE — 85025 COMPLETE CBC W/AUTO DIFF WBC: CPT | Performed by: INTERNAL MEDICINE

## 2020-09-12 PROCEDURE — 81001 URINALYSIS AUTO W/SCOPE: CPT | Performed by: INTERNAL MEDICINE

## 2020-09-12 PROCEDURE — 96366 THER/PROPH/DIAG IV INF ADDON: CPT

## 2020-09-12 PROCEDURE — 99214 OFFICE O/P EST MOD 30 MIN: CPT | Performed by: PSYCHIATRY & NEUROLOGY

## 2020-09-12 PROCEDURE — 96375 TX/PRO/DX INJ NEW DRUG ADDON: CPT

## 2020-09-12 PROCEDURE — 96365 THER/PROPH/DIAG IV INF INIT: CPT

## 2020-09-12 PROCEDURE — 96376 TX/PRO/DX INJ SAME DRUG ADON: CPT

## 2020-09-12 PROCEDURE — 25010000002 CEFTRIAXONE PER 250 MG: Performed by: INTERNAL MEDICINE

## 2020-09-12 PROCEDURE — G0378 HOSPITAL OBSERVATION PER HR: HCPCS

## 2020-09-12 PROCEDURE — 51798 US URINE CAPACITY MEASURE: CPT

## 2020-09-12 PROCEDURE — 96368 THER/DIAG CONCURRENT INF: CPT

## 2020-09-12 PROCEDURE — 99225 PR SBSQ OBSERVATION CARE/DAY 25 MINUTES: CPT | Performed by: INTERNAL MEDICINE

## 2020-09-12 PROCEDURE — 25010000002 ENOXAPARIN PER 10 MG: Performed by: INTERNAL MEDICINE

## 2020-09-12 PROCEDURE — 80069 RENAL FUNCTION PANEL: CPT | Performed by: INTERNAL MEDICINE

## 2020-09-12 PROCEDURE — 25010000002 HALOPERIDOL LACTATE PER 5 MG: Performed by: PSYCHIATRY & NEUROLOGY

## 2020-09-12 PROCEDURE — 96372 THER/PROPH/DIAG INJ SC/IM: CPT

## 2020-09-12 PROCEDURE — 25010000003 POTASSIUM CHLORIDE 10 MEQ/100ML SOLUTION: Performed by: INTERNAL MEDICINE

## 2020-09-12 PROCEDURE — 71045 X-RAY EXAM CHEST 1 VIEW: CPT

## 2020-09-12 RX ORDER — POTASSIUM CHLORIDE 7.45 MG/ML
10 INJECTION INTRAVENOUS
Status: COMPLETED | OUTPATIENT
Start: 2020-09-12 | End: 2020-09-12

## 2020-09-12 RX ORDER — AMOXICILLIN AND CLAVULANATE POTASSIUM 875; 125 MG/1; MG/1
1 TABLET, FILM COATED ORAL EVERY 12 HOURS SCHEDULED
Status: DISCONTINUED | OUTPATIENT
Start: 2020-09-12 | End: 2020-09-12

## 2020-09-12 RX ADMIN — HALOPERIDOL LACTATE 0.5 MG: 5 INJECTION, SOLUTION INTRAMUSCULAR at 20:33

## 2020-09-12 RX ADMIN — POTASSIUM CHLORIDE 10 MEQ: 7.46 INJECTION, SOLUTION INTRAVENOUS at 17:20

## 2020-09-12 RX ADMIN — TAMSULOSIN HYDROCHLORIDE 0.4 MG: 0.4 CAPSULE ORAL at 20:34

## 2020-09-12 RX ADMIN — RIVASTIGMINE 1 PATCH: 9.5 PATCH TRANSDERMAL at 10:26

## 2020-09-12 RX ADMIN — POTASSIUM CHLORIDE 10 MEQ: 7.46 INJECTION, SOLUTION INTRAVENOUS at 14:18

## 2020-09-12 RX ADMIN — HALOPERIDOL LACTATE 0.5 MG: 5 INJECTION, SOLUTION INTRAMUSCULAR at 11:47

## 2020-09-12 RX ADMIN — POTASSIUM CHLORIDE 10 MEQ: 7.46 INJECTION, SOLUTION INTRAVENOUS at 18:12

## 2020-09-12 RX ADMIN — ENOXAPARIN SODIUM 90 MG: 100 INJECTION SUBCUTANEOUS at 17:21

## 2020-09-12 RX ADMIN — POTASSIUM CHLORIDE 10 MEQ: 7.46 INJECTION, SOLUTION INTRAVENOUS at 15:18

## 2020-09-12 RX ADMIN — DOCUSATE SODIUM 50MG AND SENNOSIDES 8.6MG 2 TABLET: 8.6; 5 TABLET, FILM COATED ORAL at 20:34

## 2020-09-12 RX ADMIN — LEVOTHYROXINE SODIUM 75 MCG: 75 TABLET ORAL at 05:23

## 2020-09-12 RX ADMIN — CEFTRIAXONE SODIUM 1 G: 1 INJECTION, POWDER, FOR SOLUTION INTRAMUSCULAR; INTRAVENOUS at 14:14

## 2020-09-12 RX ADMIN — SODIUM CHLORIDE, PRESERVATIVE FREE 10 ML: 5 INJECTION INTRAVENOUS at 10:26

## 2020-09-12 RX ADMIN — ENOXAPARIN SODIUM 90 MG: 100 INJECTION SUBCUTANEOUS at 05:23

## 2020-09-12 RX ADMIN — SODIUM CHLORIDE 50 ML/HR: 9 INJECTION, SOLUTION INTRAVENOUS at 17:20

## 2020-09-12 NOTE — PROGRESS NOTES
"Neurology       Patient Care Team:  Abdirashid Mccabe MD as PCP - General (Cardiology)    Chief complaint encephalopathy      Subjective .     History: Nursing notes patient confused overnight but following commands well when spoken to in a very loud voice.  Pulled out IV but new one was placed.  Sleeping heavily again this morning.  Now on Rocephin.  Patient reports he is feeling much better but feels like he has a cold.  His wife at the bedside feels he is doing well as well.     ROS: No fever or chest pain    Objective     Vital Signs   Blood pressure 164/83, pulse 69, temperature 99.2 °F (37.3 °C), temperature source Axillary, resp. rate 20, height 170.2 cm (67.01\"), weight 91.6 kg (202 lb), SpO2 95 %.    Physical Exam:              Neuro: Elderly white man lying sleeping, wakes to voice and then is alert and appropriate.  Mildly confused e.g. regarding location, but able to tell me where he is from and where he grew up and his former profession ().  Speech fluent and non-aphasic.  Names his wife at the bedside.  Pupils equal and reactive EOMI face symmetric   equal    Results Review:              Labs reviewed, repeat UA with 40 ketones, small leukocytes otherwise unremarkable  CBC white count 8.1 H&H 10 and 34 platelets 225  Renal function panel with glucose 86 creatinine 0.74 calcium 8.4 albumin 2.7 otherwise normal  On 9/11, TSH, ammonia and pro calcitonin all normal.    Repeat chest x-ray stable with mild central vascular congestion but no new focal consolidation, overt edema or pleural effusion  Repeat head CT yesterday with effusion in mastoids, ethmoid sinus as previously noted      Assessment/Plan     89-year-old man with dementia requiring caregiver, with recent cough, sleepiness and worsened confusion over baseline.  1.  Encephalopathy-  Patient with dementia and fluctuating course this admission, unarousable yesterday morning after receiving Keppra and Ativan.    Keppra " "discontinued.  Given lack of identifiable systemic illness, plan was made for spinal fluid exam but his wife refused.  Head CT showed mastoiditis and possible acute ethmoid sinusitis, and Rocephin has been started.    Neurologic exam much improved today.  Now alert and appropriate.  Would expect some degree of ongoing confusion given underlying dementia.  Would avoid Ativan.  Can use low-dose Haldol if needed.     2.  \"Stiffening\" episodes-less likely to be seizure and given probable sedation, have discontinued Keppra. We can continue to monitor for seizures and restart if needed.  Patient's wife would like to take him home tomorrow and this would be okay from neurology perspective.  We will sign off, please call if needed further this admission.    I discussed the patients findings and my recommendations with patient, family and primary care team    Amarilys Horowitz MD  09/12/20  15:07 EDT    "

## 2020-09-12 NOTE — PROGRESS NOTES
[facility] Medicine Services  PROGRESS NOTE    Patient Name: Marcos Acuña  : 1931  MRN: [id number]    Date of Admission: 2020  Primary Care Physician: Abdirashid Mccabe MD    Subjective   Subjective     CC:  AMS     HPI:  Overnight he was awake and talking with the nurses, taking his pills. Answering questions but then this morning he is back to sleeping     Review of Systems  Not able to obtain    Objective   Objective     Vital Signs:   Temp:  [98.3 °F (36.8 °C)-99.5 °F (37.5 °C)] 99.2 °F (37.3 °C)  Heart Rate:  [69-80] 69  Resp:  [12-20] 20  BP: (145-176)/(56-87) 164/83        Physical Exam:  Constitutional: sleeping, difficult to awaken  HENT: NCAT, mucous membranes moist  Respiratory: Clear to auscultation bilaterally, respiratory effort normal   Cardiovascular: RRR, no murmurs  Gastrointestinal: Positive bowel sounds, soft, nontender, obese  Musculoskeletal: trace edema  Neurologic: disoriented, sleepy  Skin: No rashes    Results Reviewed:  Results from last 7 days   Lab Units 20  0558 09/11/20  0311 09/10/20  0353   WBC 10*3/mm3 8.10 7.48 8.75   HEMOGLOBIN g/dL 10.6* 10.6* 10.7*   HEMATOCRIT % 34.7* 33.6* 33.8*   PLATELETS 10*3/mm3 225 227 229   PROCALCITONIN ng/mL  --  0.10  --      Results from last 7 days   Lab Units 20  0556 09/11/20  0311 09/10/20  2237  20  1647   SODIUM mmol/L 141 140 137   < >  --  135*   POTASSIUM mmol/L 3.5 3.7 3.9   < >  --  4.0   CHLORIDE mmol/L 107 105 102   < >  --  99   CO2 mmol/L 25.0 26.0 24.0   < >  --  28.0   BUN mg/dL 9 9 9   < >  --  11   CREATININE mg/dL 0.74* 0.77 0.72*   < >  --  0.81   GLUCOSE mg/dL 86 116* 131*   < >  --  72   CALCIUM mg/dL 8.4* 8.4* 8.7   < >  --  8.5*   ALT (SGPT) U/L  --   --  9  --   --  11   AST (SGOT) U/L  --   --  16  --   --  17   TROPONIN T ng/mL  --   --   --   --  0.033* 0.048*    < > = values in this interval not displayed.     Estimated Creatinine Clearance: 67.6  mL/min (A) (by C-G formula based on SCr of 0.74 mg/dL (L)).    Microbiology Results Abnormal     Procedure Component Value - Date/Time    COVID PRE-OP / PRE-PROCEDURE SCREENING ORDER (NO ISOLATION) - Swab, Nasopharynx [[id number]]  (Normal) Collected: 09/10/20 0710    Lab Status: Final result Specimen: Swab from Nasopharynx Updated: 09/10/20 0805    Narrative:      The following orders were created for panel order COVID PRE-OP / PRE-PROCEDURE SCREENING ORDER (NO ISOLATION) - Swab, Nasopharynx.  Procedure                               Abnormality         Status                     ---------                               -----------         ------                     Respiratory Panel PCR w/...[[id number]]  Normal              Final result                 Please view results for these tests on the individual orders.    Respiratory Panel PCR w/COVID-19(SARS-CoV-2) [facility] In-House, NP Swab in UTM/VTM, 3-4 HR TAT - Swab, Nasopharynx [[id number]]  (Normal) Collected: 09/10/20 0710    Lab Status: Final result Specimen: Swab from Nasopharynx Updated: 09/10/20 0805     ADENOVIRUS, PCR Not Detected     Coronavirus 229E Not Detected     Coronavirus HKU1 Not Detected     Coronavirus NL63 Not Detected     Coronavirus OC43 Not Detected     COVID19 Not Detected     Human Metapneumovirus Not Detected     Human Rhinovirus/Enterovirus Not Detected     Influenza A PCR Not Detected     Influenza A H1 Not Detected     Influenza A H1 2009 PCR Not Detected     Influenza A H3 Not Detected     Influenza B PCR Not Detected     Parainfluenza Virus 1 Not Detected     Parainfluenza Virus 2 Not Detected     Parainfluenza Virus 3 Not Detected     Parainfluenza Virus 4 Not Detected     RSV, PCR Not Detected     Bordetella pertussis pcr Not Detected     Bordetella parapertussis PCR Not Detected     Chlamydophila pneumoniae PCR Not Detected     Mycoplasma pneumo by PCR Not Detected    Narrative:      Fact sheet for providers:  https://docs.[employer]/wp-content/uploads/IDW4955-3929-UJ6.1-EUA-Provider-Fact-Sheet-3.pdf    Fact sheet for patients: https://docs.[employer]/wp-content/uploads/SKX9688-4321-FG7.1-EUA-Patient-Fact-Sheet-1.pdf          Imaging Results (Last 24 Hours)     Procedure Component Value Units Date/Time    XR Chest 1 View [[id number]] Collected: 09/12/20 1034     Updated: 09/12/20 1034    Narrative:         EXAMINATION: XR CHEST 1 VW-      INDICATION: fever; R41.82-Altered mental status, unspecified;  R79.89-Other specified abnormal findings of blood chemistry      COMPARISON: Chest x-ray 09/11/2020     FINDINGS: Cardiac size midline enlarged with mild central vascular  congestion. No new focal consolidation, overt edema or pleural effusion.       Impression:      Cardiac size midline enlarged with mild central vascular  congestion. No new focal consolidation, overt edema or pleural effusion.                Results for orders placed during the hospital encounter of 09/09/20   Adult Transthoracic Echo Complete W/ Cont if Necessary Per Protocol    Narrative · There is mild calcification of the aortic valve mainly affecting the   non, left and right coronary cusp(s).  · Peak velocity of the flow distal to the aortic valve is 217.2 cm/s.  · Aortic valve maximum pressure gradient is 18.9 mmHg.  · Aortic valve area is 1.1 cm2.  · Mild aortic valve stenosis is present.  · Aortic valve mean pressure gradient is 10.7 mmHg.  · Estimated EF = 60%.  · Left ventricular systolic function is normal.          I have reviewed the medications:  Scheduled Meds:aspirin, 81 mg, Oral, Daily  cefTRIAXone, 1 g, Intravenous, Q24H  enoxaparin, 1 mg/kg, Subcutaneous, Q12H  insulin lispro, 0-7 Units, Subcutaneous, TID AC  levothyroxine, 75 mcg, Oral, Daily  losartan, 50 mg, Oral, Daily  oxybutynin XL, 10 mg, Oral, Daily  rivastigmine, 1 patch, Transdermal, Daily  sennosides-docusate, 2 tablet, Oral, Nightly  sodium chloride, 10 mL, Intravenous,  Q12H  tamsulosin, 0.4 mg, Oral, Nightly      Continuous Infusions:sodium chloride, 100 mL/hr, Last Rate: 100 mL/hr (09/12/20 0642)      PRN Meds:.•  acetaminophen  •  dextrose  •  dextrose  •  glucagon (human recombinant)  •  haloperidol lactate  •  magnesium sulfate **OR** magnesium sulfate **OR** magnesium sulfate  •  ondansetron **OR** ondansetron  •  potassium chloride **OR** potassium chloride **OR** potassium chloride  •  sodium chloride    Assessment/Plan   Assessment & Plan     Active Hospital Problems    Diagnosis  POA   • **Altered mental status [R41.82]  Yes   • Cough [R05]  Yes   • Elevated troponin [R79.89]  Yes   • Hyponatremia [E87.1]  Yes   • Dementia (CMS/HCC) [F03.90]  Yes   • Benign essential hypertension [I10]  Yes   • Hypothyroidism [E03.9]  Yes   • Obesity [E66.9]  Yes   • Type 2 diabetes mellitus without complication (CMS/HCC) [E11.9]  Yes      Resolved Hospital Problems   No resolved problems to display.        Brief Hospital Course to date:  Marcos Acuña is a 89 y.o. male  with history of dementia and recurrent UTIs presents with worsening mental status and confusion     AMS superimiposed on moderate dementia  -No infectious source identified, wife is refusing LP to evaluate for encephalitis  - repeat UA and chest xray again negative, times 2  - will start IV rocephin as CT head does show some sinusitis, he is unable to take oral pills this morning    Hyponatremia  - improved with resuscitation  - decrease NS to 50 cc/hr  - replace K        Hx of recurrent DVT  -- ok to restart lovenox, can resume eliquis at discharge      Hypertension     DM2  --FS AC/HS.   --SSI    DVT Prophylaxis:  lovenox      Disposition: I expect the patient to be discharged possibly tomorrow    CODE STATUS:   Code Status and Medical Interventions:   Ordered at: 09/09/20 1936     Code Status:    CPR     Medical Interventions (Level of Support Prior to Arrest):    Full         Electronically signed by Stefanie Spicer,  DO, 09/12/20, 12:13 EDT.

## 2020-09-13 VITALS
TEMPERATURE: 98.6 F | SYSTOLIC BLOOD PRESSURE: 155 MMHG | BODY MASS INDEX: 31.71 KG/M2 | RESPIRATION RATE: 19 BRPM | OXYGEN SATURATION: 98 % | HEART RATE: 96 BPM | WEIGHT: 202 LBS | DIASTOLIC BLOOD PRESSURE: 74 MMHG | HEIGHT: 67 IN

## 2020-09-13 LAB
ALBUMIN SERPL-MCNC: 2.8 G/DL (ref 3.5–5.2)
ANION GAP SERPL CALCULATED.3IONS-SCNC: 11 MMOL/L (ref 5–15)
BASOPHILS # BLD AUTO: 0.1 10*3/MM3 (ref 0–0.2)
BASOPHILS NFR BLD AUTO: 1.2 % (ref 0–1.5)
BUN SERPL-MCNC: 7 MG/DL (ref 8–23)
BUN/CREAT SERPL: 10.3 (ref 7–25)
CALCIUM SPEC-SCNC: 8.6 MG/DL (ref 8.6–10.5)
CHLORIDE SERPL-SCNC: 108 MMOL/L (ref 98–107)
CO2 SERPL-SCNC: 21 MMOL/L (ref 22–29)
CREAT SERPL-MCNC: 0.68 MG/DL (ref 0.76–1.27)
DEPRECATED RDW RBC AUTO: 50 FL (ref 37–54)
EOSINOPHIL # BLD AUTO: 0.52 10*3/MM3 (ref 0–0.4)
EOSINOPHIL NFR BLD AUTO: 6.3 % (ref 0.3–6.2)
ERYTHROCYTE [DISTWIDTH] IN BLOOD BY AUTOMATED COUNT: 16.7 % (ref 12.3–15.4)
GFR SERPL CREATININE-BSD FRML MDRD: 110 ML/MIN/1.73
GLUCOSE BLDC GLUCOMTR-MCNC: 109 MG/DL (ref 70–130)
GLUCOSE SERPL-MCNC: 94 MG/DL (ref 65–99)
HCT VFR BLD AUTO: 33.5 % (ref 37.5–51)
HGB BLD-MCNC: 10.4 G/DL (ref 13–17.7)
IMM GRANULOCYTES # BLD AUTO: 0.04 10*3/MM3 (ref 0–0.05)
IMM GRANULOCYTES NFR BLD AUTO: 0.5 % (ref 0–0.5)
LYMPHOCYTES # BLD AUTO: 1.62 10*3/MM3 (ref 0.7–3.1)
LYMPHOCYTES NFR BLD AUTO: 19.8 % (ref 19.6–45.3)
MCH RBC QN AUTO: 25.6 PG (ref 26.6–33)
MCHC RBC AUTO-ENTMCNC: 31 G/DL (ref 31.5–35.7)
MCV RBC AUTO: 82.3 FL (ref 79–97)
MONOCYTES # BLD AUTO: 0.57 10*3/MM3 (ref 0.1–0.9)
MONOCYTES NFR BLD AUTO: 7 % (ref 5–12)
NEUTROPHILS NFR BLD AUTO: 5.34 10*3/MM3 (ref 1.7–7)
NEUTROPHILS NFR BLD AUTO: 65.2 % (ref 42.7–76)
NRBC BLD AUTO-RTO: 0 /100 WBC (ref 0–0.2)
PHOSPHATE SERPL-MCNC: 2.3 MG/DL (ref 2.5–4.5)
PLATELET # BLD AUTO: 242 10*3/MM3 (ref 140–450)
PMV BLD AUTO: 9.3 FL (ref 6–12)
POTASSIUM SERPL-SCNC: 3.6 MMOL/L (ref 3.5–5.2)
RBC # BLD AUTO: 4.07 10*6/MM3 (ref 4.14–5.8)
SODIUM SERPL-SCNC: 140 MMOL/L (ref 136–145)
WBC # BLD AUTO: 8.19 10*3/MM3 (ref 3.4–10.8)

## 2020-09-13 PROCEDURE — 82962 GLUCOSE BLOOD TEST: CPT

## 2020-09-13 PROCEDURE — 96376 TX/PRO/DX INJ SAME DRUG ADON: CPT

## 2020-09-13 PROCEDURE — G0378 HOSPITAL OBSERVATION PER HR: HCPCS

## 2020-09-13 PROCEDURE — 99217 PR OBSERVATION CARE DISCHARGE MANAGEMENT: CPT | Performed by: INTERNAL MEDICINE

## 2020-09-13 PROCEDURE — 85025 COMPLETE CBC W/AUTO DIFF WBC: CPT | Performed by: INTERNAL MEDICINE

## 2020-09-13 PROCEDURE — 25010000002 HALOPERIDOL LACTATE PER 5 MG: Performed by: PSYCHIATRY & NEUROLOGY

## 2020-09-13 PROCEDURE — 80069 RENAL FUNCTION PANEL: CPT | Performed by: INTERNAL MEDICINE

## 2020-09-13 RX ORDER — AMOXICILLIN AND CLAVULANATE POTASSIUM 875; 125 MG/1; MG/1
1 TABLET, FILM COATED ORAL 2 TIMES DAILY
Qty: 12 TABLET | Refills: 0 | Status: SHIPPED | OUTPATIENT
Start: 2020-09-13 | End: 2020-09-19

## 2020-09-13 RX ORDER — HYDROXYZINE HYDROCHLORIDE 25 MG/1
25 TABLET, FILM COATED ORAL ONCE
Status: COMPLETED | OUTPATIENT
Start: 2020-09-13 | End: 2020-09-13

## 2020-09-13 RX ADMIN — OXYBUTYNIN CHLORIDE 10 MG: 5 TABLET, EXTENDED RELEASE ORAL at 10:11

## 2020-09-13 RX ADMIN — LOSARTAN POTASSIUM 50 MG: 25 TABLET, FILM COATED ORAL at 10:11

## 2020-09-13 RX ADMIN — HALOPERIDOL LACTATE 0.5 MG: 5 INJECTION, SOLUTION INTRAMUSCULAR at 02:17

## 2020-09-13 RX ADMIN — HYDROXYZINE HYDROCHLORIDE 25 MG: 25 TABLET, FILM COATED ORAL at 00:19

## 2020-09-13 RX ADMIN — RIVASTIGMINE 1 PATCH: 9.5 PATCH TRANSDERMAL at 10:17

## 2020-09-13 RX ADMIN — ASPIRIN 81 MG 81 MG: 81 TABLET ORAL at 10:11

## 2020-09-13 NOTE — PROGRESS NOTES
Case Management Discharge Note      Final Note: Per MD, patient is medically ready for discharge home and will need transportation by ambulance. Ambulance arranged for today at 16:30 (spoke to Kiet, # [id number]). PCS is on the chart. Spoke to patient's wife, Sloane, by phone to inform her of the transport time. She verbalizes understanding that insurance may not cover the cost. She will be home to let the [facility] crew into the apartment.         Selected Continued Care - Admitted Since 9/9/2020     Destination    No services have been selected for the patient.              Durable Medical Equipment    No services have been selected for the patient.              Dialysis/Infusion    No services have been selected for the patient.              Home Medical Care    No services have been selected for the patient.              Therapy    No services have been selected for the patient.              Community Resources    No services have been selected for the patient.                Selected Continued Care - Prior Encounters Includes selections from prior encounters from 6/11/2020 to 9/13/2020    Discharged on 6/13/2020 Admission date: 6/8/2020 - Discharge disposition: Skilled Nursing Facility (DC - External)    Destination     Service Provider Selected Services Address Phone Fax    [facility]  Skilled Nursing [address] [id number] [phone]                    Transportation Services  Ambulance: [facility](#[id number])    Final Discharge Disposition Code: 01 - home or self-care

## 2020-09-13 NOTE — DISCHARGE SUMMARY
[facility] Medicine Services  DISCHARGE SUMMARY    Patient Name: Marcos Acuña  : 1931  MRN: [id number]    Date of Admission: 2020  4:43 PM  Date of Discharge:  2020  Primary Care Physician: Abdirashid Mccabe MD    Consults     Date and Time Order Name Status Description    9/10/2020 1302 Inpatient Neurology Consult General Completed           Hospital Course     Presenting Problem:   Altered mental status, unspecified altered mental status type [R41.82]  Altered mental status, unspecified altered mental status type [R41.82]  Altered mental status, unspecified altered mental status type [R41.82]    Active Hospital Problems    Diagnosis  POA   • **Altered mental status [R41.82]  Yes   • Cough [R05]  Yes   • Elevated troponin [R79.89]  Yes   • Hyponatremia [E87.1]  Yes   • Dementia (CMS/HCC) [F03.90]  Yes   • Benign essential hypertension [I10]  Yes   • Hypothyroidism [E03.9]  Yes   • Obesity [E66.9]  Yes   • Type 2 diabetes mellitus without complication (CMS/HCC) [E11.9]  Yes      Resolved Hospital Problems   No resolved problems to display.          Hospital Course:  Marcos Acuña is a 89 y.o. male with history of dementia and recurrent UTIs presents with worsening mental status and confusion     AMS superimiposed on moderate dementia  -No infectious source identified, wife is refusing LP to evaluate for encephalitis  - repeat UA and chest xray again negative, times 2  - will start IV rocephin as CT head does show some sinusitis, changed to augmentin BID for 6 more days     Hyponatremia  - resolved      Hx of recurrent DVT  -- ok to restart lovenox, resume eliquis at discharge      Hypertension     DM2  --resume home meds      Discharge Follow Up Recommendations for outpatient labs/diagnostics:   PCP in 1 week    Day of Discharge     HPI:   More awake this morning, closer to baseline, oriented to self only    Review of Systems  Limited ros secondary to dementia    Vital  Signs:   Temp:  [98.6 °F (37 °C)] 98.6 °F (37 °C)  Heart Rate:  [80-96] 96  Resp:  [19] 19  BP: (148-172)/(73-81) 155/74     Physical Exam:  Constitutional: more awake today, was up all night and answering questions  HENT: NCAT, mucous membranes moist  Respiratory: Clear to auscultation bilaterally, respiratory effort normal   Cardiovascular: RRR, no murmurs  Gastrointestinal: Positive bowel sounds, soft, nontender, obese  Musculoskeletal: trace edema  Neurologic: Oriented x 1 at baseline  Skin: No rashes    Pertinent  and/or Most Recent Results     Results from last 7 days   Lab Units 09/13/20  1018 09/12/20  0558 09/12/20  0556 09/11/20  0311 09/10/20  2237 09/10/20  0353 09/09/20  1647   WBC 10*3/mm3 8.19 8.10  --  7.48  --  8.75 8.98   HEMOGLOBIN g/dL 10.4* 10.6*  --  10.6*  --  10.7* 11.0*   HEMATOCRIT % 33.5* 34.7*  --  33.6*  --  33.8* 34.7*   PLATELETS 10*3/mm3 242 225  --  227  --  229 219   SODIUM mmol/L 140  --  141 140 137 136 135*   POTASSIUM mmol/L 3.6  --  3.5 3.7 3.9 3.7 4.0   CHLORIDE mmol/L 108*  --  107 105 102 100 99   CO2 mmol/L 21.0*  --  25.0 26.0 24.0 25.0 28.0   BUN mg/dL 7*  --  9 9 9 11 11   CREATININE mg/dL 0.68*  --  0.74* 0.77 0.72* 0.72* 0.81   GLUCOSE mg/dL 94  --  86 116* 131* 127* 72   CALCIUM mg/dL 8.6  --  8.4* 8.4* 8.7 8.5* 8.5*     Results from last 7 days   Lab Units 09/10/20  2237 09/09/20  1647   BILIRUBIN mg/dL 0.4 0.5   ALK PHOS U/L 121* 127*   ALT (SGPT) U/L 9 11   AST (SGOT) U/L 16 17           Invalid input(s): TG, LDLCALC, LDLREALC  Results from last 7 days   Lab Units 09/11/20  0311 09/10/20  0353 09/09/20 2015 09/09/20  1647   TSH uIU/mL 3.660  --   --   --    HEMOGLOBIN A1C %  --  6.10*  --   --    TROPONIN T ng/mL  --   --  0.033* 0.048*   PROCALCITONIN ng/mL 0.10  --   --   --    LACTATE mmol/L 1.4  --   --   --        Brief Urine Lab Results  (Last result in the past 365 days)      Color   Clarity   Blood   Leuk Est   Nitrite   Protein   CREAT   Urine HCG         09/12/20 0948 Yellow Cloudy Negative Small (1+) Negative Negative               Microbiology Results Abnormal     Procedure Component Value - Date/Time    COVID PRE-OP / PRE-PROCEDURE SCREENING ORDER (NO ISOLATION) - Swab, Nasopharynx [[id number]]  (Normal) Collected: 09/10/20 0710    Lab Status: Final result Specimen: Swab from Nasopharynx Updated: 09/10/20 0805    Narrative:      The following orders were created for panel order COVID PRE-OP / PRE-PROCEDURE SCREENING ORDER (NO ISOLATION) - Swab, Nasopharynx.  Procedure                               Abnormality         Status                     ---------                               -----------         ------                     Respiratory Panel PCR w/...[[id number]]  Normal              Final result                 Please view results for these tests on the individual orders.    Respiratory Panel PCR w/COVID-19(SARS-CoV-2) [facility]ON/[facility] In-House, NP Swab in UTM/VTM, 3-4 HR TAT - Swab, Nasopharynx [[id number]]  (Normal) Collected: 09/10/20 0710    Lab Status: Final result Specimen: Swab from Nasopharynx Updated: 09/10/20 0805     ADENOVIRUS, PCR Not Detected     Coronavirus 229E Not Detected     Coronavirus HKU1 Not Detected     Coronavirus NL63 Not Detected     Coronavirus OC43 Not Detected     COVID19 Not Detected     Human Metapneumovirus Not Detected     Human Rhinovirus/Enterovirus Not Detected     Influenza A PCR Not Detected     Influenza A H1 Not Detected     Influenza A H1 2009 PCR Not Detected     Influenza A H3 Not Detected     Influenza B PCR Not Detected     Parainfluenza Virus 1 Not Detected     Parainfluenza Virus 2 Not Detected     Parainfluenza Virus 3 Not Detected     Parainfluenza Virus 4 Not Detected     RSV, PCR Not Detected     Bordetella pertussis pcr Not Detected     Bordetella parapertussis PCR Not Detected     Chlamydophila pneumoniae PCR Not Detected     Mycoplasma pneumo by PCR Not Detected    Narrative:      Fact sheet for  providers: https://docs.[employer])/wp-content/uploads/MHW4005-2803-LH1.1-EUA-Provider-Fact-Sheet-3.pdf    Fact sheet for patients: https://docs.[employer])/wp-content/uploads/USS8482-0459-WV2.1-EUA-Patient-Fact-Sheet-1.pdf          Imaging Results (All)     Procedure Component Value Units Date/Time    XR Chest 1 View [[id number]] Collected: 09/12/20 1034     Updated: 09/12/20 1914    Narrative:         EXAMINATION: XR CHEST 1 VW - 09/12/2020     INDICATION: R41.82-Altered mental status, unspecified; R79.89-Other  specified abnormal findings of blood chemistry. Fever.      COMPARISON: Chest x-ray 09/11/2020.     FINDINGS: Cardiac size midline enlarged with mild central vascular  congestion. No new focal consolidation, overt edema or pleural effusion.       Impression:      Cardiac size midline enlarged with mild central vascular  congestion. No new focal consolidation, overt edema or pleural effusion.     DICTATED:   09/12/2020  EDITED/ls :   09/12/2020      This report was finalized on 9/12/2020 7:11 PM by Dr. Mele Brooks.       XR Chest 1 View [[id number]] Collected: 09/11/20 0330     Updated: 09/11/20 0332    Narrative:      CR Chest 1 Vw    INDICATION:   Cough and mental status changes today.     COMPARISON:    9/9/2020    FINDINGS:  Single portable AP view(s) of the chest.    Heart remains enlarged. Mild infiltrate or atelectasis is noted in the lung bases, similar to prior. Lungs otherwise are clear. No pneumothorax. Pulmonary vascularity is normal.       Impression:      No significant change. There is mild atelectasis or infiltrate in the lung bases.    Signer Name: Sajan Jones MD   Signed: 9/11/2020 3:30 AM   Workstation Name: [id number]    [facility]    CT Head Without Contrast [[id number]] Collected: 09/11/20 0322     Updated: 09/11/20 0325    Narrative:      CT Head WO    HISTORY:   Confusion and delirium worse than baseline today.    TECHNIQUE:   Axial unenhanced head CT  with multiplanar reformats. Radiation dose reduction techniques included automated exposure control or exposure modulation based on body size. Count of known CT and cardiac nuc med studies performed in previous 12 months: 3.     COMPARISON:   9/9/2020    FINDINGS:   Generalized atrophy is unchanged. Ventricular size and configuration are stable. Chronic small vessel ischemic changes are present in the white matter. No acute infarct or hemorrhage is identified. There are no masses.  Atherosclerotic calcifications are  present in the carotid siphons. There is chronic mucosal thickening in the paranasal sinuses. A fluid level in the left sphenoid sinus may reflect acute disease. There are bilateral mastoid effusions. No skull fracture.      Impression:        1. No acute findings in the brain. Stable chronic changes.  2. Chronic changes in the paranasal sinuses with possible acute left ethmoid sinus disease.  3. Bilateral mastoid effusions.    Signer Name: Sajan Jones MD   Signed: 9/11/2020 3:22 AM   Workstation Name: [facility]    [facility]    CT Chest Without Contrast [[id number]] Collected: 09/09/20 2014     Updated: 09/09/20 2016    Narrative:      CT Chest WO    INDICATION:   Acute onset of cough and confusion    TECHNIQUE:   CT of the thorax without IV contrast. Coronal and sagittal reconstructions were obtained.  Radiation dose reduction techniques included automated exposure control or exposure modulation based on body size. Count of known CT and cardiac nuc med studies  performed in previous 12 months: 1.     COMPARISON:   No pertinent prior study    FINDINGS:  The heart size is mildly enlarged. No pericardial fluid. Coronary artery calcifications. No acute aortic findings noted on this noncontrasted study. No threshold mediastinal or hilar adenopathy.    The lungs are free of acute infiltrates. There is mild dependent atelectasis in the posterior and inferior lung fields. No  pleural fluid. No pneumothorax. No endobronchial lesions.    Limited imaging of the upper abdomen shows no clearly acute findings.      Impression:      Mild cardiomegaly with atherosclerosis of the coronary arteries.    No clearly acute infiltrates. Mild bibasilar dependent atelectasis    Signer Name: Cristhian Mccarthy MD   Signed: 9/9/2020 8:14 PM   Workstation Name: [id number]    [facility]    XR Chest 1 View [[id number]] Collected: 09/09/20 1806     Updated: 09/09/20 1810    Narrative:      EXAMINATION: XR CHEST 1 VW-      INDICATION: CONFUSION      COMPARISON: 6/10/2020     FINDINGS: No new focal consolidation. Unchanged bilateral lung base  atelectasis. No pleural effusion or pneumothorax. Unchanged heart and  mediastinal contours.       Impression:      Hypoventilatory changes without evidence of acute disease in  the chest     This report was finalized on 9/9/2020 6:07 PM by Abdirashid Luna.       CT Head Without Contrast [[id number]] Collected: 09/09/20 1804     Updated: 09/09/20 1809    Narrative:      EXAMINATION: CT HEAD WO CONTRAST-      INDICATION: Confusion/delirium, altered LOC, unexplained     TECHNIQUE: Axial noncontrast CT of the head with multiplanar  reconstruction.     The radiation dose reduction device was turned on for each scan per the  ALARA (As Low as Reasonably Achievable) protocol.     COMPARISON: 6/8/2020     FINDINGS: No intracranial hemorrhage, mass or mass effect. Unchanged  diffuse volume loss and moderate periventricular white matter changes of  chronic small vessel ischemia. Unchanged ventricular size and  configuration. The orbits are normal. Left sphenoid and ethmoid air cell  opacification is noted.       Impression:      No acute intracranial abnormality. Unchanged volume loss and  periventricular white matter changes of chronic small vessel ischemia.     Mucosal thickening and fluid opacification of the left sphenoid sinus  and ethmoid air cells.  Correlate with any clinical evidence of  sinusitis.        This report was finalized on 9/9/2020 6:06 PM by Abdirashid Luna.                       Results for orders placed during the hospital encounter of 09/09/20   Adult Transthoracic Echo Complete W/ Cont if Necessary Per Protocol    Narrative · There is mild calcification of the aortic valve mainly affecting the   non, left and right coronary cusp(s).  · Peak velocity of the flow distal to the aortic valve is 217.2 cm/s.  · Aortic valve maximum pressure gradient is 18.9 mmHg.  · Aortic valve area is 1.1 cm2.  · Mild aortic valve stenosis is present.  · Aortic valve mean pressure gradient is 10.7 mmHg.  · Estimated EF = 60%.  · Left ventricular systolic function is normal.          Plan for Follow-up of Pending Labs/Results:     Discharge Details        Discharge Medications      New Medications      Instructions Start Date   amoxicillin-clavulanate 875-125 MG per tablet  Commonly known as: Augmentin   1 tablet, Oral, 2 Times Daily         Continue These Medications      Instructions Start Date   acetaminophen 325 MG tablet  Commonly known as: TYLENOL   650 mg, Oral, Every 4 Hours PRN      apixaban 2.5 MG tablet tablet  Commonly known as: ELIQUIS   2.5 mg, Oral, Every 12 Hours Scheduled      calcium carbonate 500 MG chewable tablet  Commonly known as: TUMS   1 tablet, Oral, Daily PRN      cyanocobalamin 1000 MCG/ML injection   1,000 mcg, Intramuscular, Every 28 Days      docusate sodium 100 MG capsule   100 mg, Oral, 2 Times Daily PRN      Exelon 9.5 MG/24HR patch  Generic drug: rivastigmine   1 patch, Transdermal, Daily      ipratropium 0.06 % nasal spray  Commonly known as: ATROVENT   2 sprays, Nasal, 2 times daily      levothyroxine 75 MCG tablet  Commonly known as: SYNTHROID, LEVOTHROID   75 mcg, Oral, Daily      losartan 50 MG tablet  Commonly known as: COZAAR   50 mg, Oral, Daily      metFORMIN 500 MG tablet  Commonly known as: GLUCOPHAGE   1,000 mg, Oral,  2 Times Daily With Meals      Myrbetriq 50 MG tablet sustained-release 24 hour 24 hr tablet  Generic drug: Mirabegron ER   50 mg, Oral, Daily      promethazine 12.5 MG tablet  Commonly known as: PHENERGAN   12.5 mg, Oral, Every 6 Hours PRN      tamsulosin 0.4 MG capsule 24 hr capsule  Commonly known as: FLOMAX   1 capsule, Oral, Nightly             Allergies   Allergen Reactions   • Contrast Dye Unknown - Low Severity         Discharge Disposition:  Home or Self Care    Diet:  Hospital:  Diet Order   Procedures   • Diet Regular; Cardiac, Consistent Carbohydrate       Activity:  with assistance    Restrictions or Other Recommendations:  Finish course of antibiotics       CODE STATUS:    Code Status and Medical Interventions:   Ordered at: 09/09/20 1936     Code Status:    CPR     Medical Interventions (Level of Support Prior to Arrest):    Full       No future appointments.      Electronically signed by Stefanie Spicer DO, 09/13/20, 1:10 PM EDT.      Time Spent on Discharge:  I spent  32  minutes on this discharge activity which included: face-to-face encounter with the patient, reviewing the data in the system, coordination of the care with the nursing staff as well as consultants, documentation, and entering orders.

## 2020-09-14 ENCOUNTER — READMISSION MANAGEMENT (OUTPATIENT)
Dept: CALL CENTER | Facility: HOSPITAL | Age: 85
End: 2020-09-14

## 2020-09-14 NOTE — OUTREACH NOTE
Prep Survey      Responses   [facility] facility patient discharged from?  [place]   Is LACE score < 7 ?  No   Eligibility  Readm Mgmt   Discharge diagnosis  **Altered mental status    Does the patient have one of the following disease processes/diagnoses(primary or secondary)?  Other   Does the patient have Home health ordered?  No   Is there a DME ordered?  No   General alerts for this patient  Dementia home with caregivers   Prep survey completed?  Yes          Arianne Cooper RN

## 2020-09-18 ENCOUNTER — READMISSION MANAGEMENT (OUTPATIENT)
Dept: CALL CENTER | Facility: HOSPITAL | Age: 85
End: 2020-09-18

## 2020-09-18 NOTE — OUTREACH NOTE
Medical Week 1 Survey      Responses   [facility] patient discharged from?  [place]   COVID-19 Test Status  Negative   Does the patient have one of the following disease processes/diagnoses(primary or secondary)?  Other   Is there a successful TCM telephone encounter documented?  No   Week 1 attempt successful?  Yes   Call start time  1240   Rescheduled  Rescheduled-pt requested   Call end time  1240   General alerts for this patient  Dementia home with caregivers   Discharge diagnosis  **Altered mental status           Ambar Phan RN

## 2020-09-22 ENCOUNTER — READMISSION MANAGEMENT (OUTPATIENT)
Dept: CALL CENTER | Facility: HOSPITAL | Age: 85
End: 2020-09-22

## 2020-09-22 NOTE — OUTREACH NOTE
Medical Week 1 Survey      Responses   [facility] patient discharged from?  [place]   COVID-19 Test Status  Negative   Does the patient have one of the following disease processes/diagnoses(primary or secondary)?  Other   Is there a successful TCM telephone encounter documented?  No   Week 1 attempt successful?  Yes   Call start time  1740   Call end time  1744   General alerts for this patient  Dementia home with caregivers   Discharge diagnosis  Altered mental status    Person spoke with today (if not patient) and relationship  Sloane Acuña-spouse    Meds reviewed with patient/caregiver?  Yes   Is the patient having any side effects they believe may be caused by any medication additions or changes?  No   Does the patient have all medications ordered at discharge?  Yes   Is the patient taking all medications as directed (includes completed medication regime)?  Yes   Does the patient have a primary care provider?   Yes   Does the patient have an appointment with their PCP within 7 days of discharge?  No   Comments regarding PCP  Patients PCP is a cardiology    What is preventing the patient from scheduling follow up appointments within 7 days of discharge?  Haven't had time   Nursing Interventions  Advised patient to make appointment, Educated patient on importance of making appointment   Has the patient kept scheduled appointments due by today?  N/A   Pulse Ox monitoring  None   Psychosocial issues?  No   Did the patient receive a copy of their discharge instructions?  -- [Wife reports she's not sure if she received AVS]   Nursing interventions  Reviewed instructions with patient   What is the patient's perception of their health status since discharge?  Improving   Is the patient/caregiver able to teach back signs and symptoms related to disease process for when to call PCP?  Yes   Is the patient/caregiver able to teach back signs and symptoms related to disease process for when to call 911?  Yes   Is the  patient/caregiver able to teach back the hierarchy of who to call/visit for symptoms/problems? PCP, Specialist, Home health nurse, Urgent Care, ED, 911  Yes   If the patient is a current smoker, are they able to teach back resources for cessation?  -- [Nonsmoker]   Week 1 call completed?  Yes          Savana Phelan RN

## 2020-11-03 RX ORDER — LEVOTHYROXINE SODIUM 0.07 MG/1
75 TABLET ORAL DAILY
Qty: 90 TABLET | Refills: 1 | Status: SHIPPED | OUTPATIENT
Start: 2020-11-03 | End: 2021-03-08

## 2020-11-10 ENCOUNTER — TRANSCRIBE ORDERS (OUTPATIENT)
Dept: ADMINISTRATIVE | Facility: HOSPITAL | Age: 85
End: 2020-11-10

## 2020-11-10 DIAGNOSIS — R47.02 DYSPHASIA: Primary | ICD-10-CM

## 2020-11-17 ENCOUNTER — APPOINTMENT (OUTPATIENT)
Dept: PREADMISSION TESTING | Facility: HOSPITAL | Age: 85
End: 2020-11-17

## 2020-11-17 ENCOUNTER — APPOINTMENT (OUTPATIENT)
Dept: GENERAL RADIOLOGY | Facility: HOSPITAL | Age: 85
End: 2020-11-17

## 2020-11-19 ENCOUNTER — APPOINTMENT (OUTPATIENT)
Dept: GENERAL RADIOLOGY | Facility: HOSPITAL | Age: 85
End: 2020-11-19

## 2020-11-19 RX ORDER — TAMSULOSIN HYDROCHLORIDE 0.4 MG/1
CAPSULE ORAL
Qty: 90 CAPSULE | Refills: 0 | Status: SHIPPED | OUTPATIENT
Start: 2020-11-19 | End: 2021-05-20 | Stop reason: SDUPTHER

## 2020-12-09 ENCOUNTER — APPOINTMENT (OUTPATIENT)
Dept: GENERAL RADIOLOGY | Facility: HOSPITAL | Age: 85
End: 2020-12-09

## 2020-12-09 ENCOUNTER — APPOINTMENT (OUTPATIENT)
Dept: CT IMAGING | Facility: HOSPITAL | Age: 85
End: 2020-12-09

## 2020-12-09 ENCOUNTER — HOSPITAL ENCOUNTER (EMERGENCY)
Facility: HOSPITAL | Age: 85
Discharge: HOME | End: 2020-12-09
Attending: EMERGENCY MEDICINE | Admitting: EMERGENCY MEDICINE

## 2020-12-09 VITALS
TEMPERATURE: 98.1 F | OXYGEN SATURATION: 98 % | HEART RATE: 85 BPM | DIASTOLIC BLOOD PRESSURE: 90 MMHG | SYSTOLIC BLOOD PRESSURE: 141 MMHG | HEIGHT: 67 IN | RESPIRATION RATE: 16 BRPM | BODY MASS INDEX: 31.71 KG/M2 | WEIGHT: 202 LBS

## 2020-12-09 DIAGNOSIS — R41.0 TRANSIENT CONFUSION: Primary | ICD-10-CM

## 2020-12-09 DIAGNOSIS — Z86.59 HISTORY OF DEMENTIA: ICD-10-CM

## 2020-12-09 LAB
ALBUMIN SERPL-MCNC: 2.9 G/DL (ref 3.5–5.2)
ALBUMIN/GLOB SERPL: 0.8 G/DL
ALP SERPL-CCNC: 113 U/L (ref 39–117)
ALT SERPL W P-5'-P-CCNC: 9 U/L (ref 1–41)
ANION GAP SERPL CALCULATED.3IONS-SCNC: 6 MMOL/L (ref 5–15)
AST SERPL-CCNC: 19 U/L (ref 1–40)
BACTERIA UR QL AUTO: NORMAL /HPF
BASOPHILS # BLD AUTO: 0.11 10*3/MM3 (ref 0–0.2)
BASOPHILS NFR BLD AUTO: 1.5 % (ref 0–1.5)
BILIRUB SERPL-MCNC: 0.4 MG/DL (ref 0–1.2)
BILIRUB UR QL STRIP: NEGATIVE
BUN SERPL-MCNC: 9 MG/DL (ref 8–23)
BUN/CREAT SERPL: 13.8 (ref 7–25)
CALCIUM SPEC-SCNC: 9 MG/DL (ref 8.6–10.5)
CHLORIDE SERPL-SCNC: 106 MMOL/L (ref 98–107)
CLARITY UR: CLEAR
CO2 SERPL-SCNC: 27 MMOL/L (ref 22–29)
COLOR UR: YELLOW
CREAT SERPL-MCNC: 0.65 MG/DL (ref 0.76–1.27)
DEPRECATED RDW RBC AUTO: 56 FL (ref 37–54)
EOSINOPHIL # BLD AUTO: 0.71 10*3/MM3 (ref 0–0.4)
EOSINOPHIL NFR BLD AUTO: 9.9 % (ref 0.3–6.2)
ERYTHROCYTE [DISTWIDTH] IN BLOOD BY AUTOMATED COUNT: 17.9 % (ref 12.3–15.4)
GFR SERPL CREATININE-BSD FRML MDRD: 116 ML/MIN/1.73
GLOBULIN UR ELPH-MCNC: 3.6 GM/DL
GLUCOSE BLDC GLUCOMTR-MCNC: 106 MG/DL (ref 70–130)
GLUCOSE SERPL-MCNC: 104 MG/DL (ref 65–99)
GLUCOSE UR STRIP-MCNC: NEGATIVE MG/DL
HCT VFR BLD AUTO: 36.5 % (ref 37.5–51)
HGB BLD-MCNC: 11.5 G/DL (ref 13–17.7)
HGB UR QL STRIP.AUTO: NEGATIVE
HOLD SPECIMEN: NORMAL
HOLD SPECIMEN: NORMAL
HYALINE CASTS UR QL AUTO: NORMAL /LPF
IMM GRANULOCYTES # BLD AUTO: 0.02 10*3/MM3 (ref 0–0.05)
IMM GRANULOCYTES NFR BLD AUTO: 0.3 % (ref 0–0.5)
KETONES UR QL STRIP: NEGATIVE
LEUKOCYTE ESTERASE UR QL STRIP.AUTO: ABNORMAL
LYMPHOCYTES # BLD AUTO: 2.48 10*3/MM3 (ref 0.7–3.1)
LYMPHOCYTES NFR BLD AUTO: 34.4 % (ref 19.6–45.3)
MAGNESIUM SERPL-MCNC: 1.9 MG/DL (ref 1.6–2.4)
MCH RBC QN AUTO: 26.9 PG (ref 26.6–33)
MCHC RBC AUTO-ENTMCNC: 31.5 G/DL (ref 31.5–35.7)
MCV RBC AUTO: 85.5 FL (ref 79–97)
MONOCYTES # BLD AUTO: 0.41 10*3/MM3 (ref 0.1–0.9)
MONOCYTES NFR BLD AUTO: 5.7 % (ref 5–12)
NEUTROPHILS NFR BLD AUTO: 3.47 10*3/MM3 (ref 1.7–7)
NEUTROPHILS NFR BLD AUTO: 48.2 % (ref 42.7–76)
NITRITE UR QL STRIP: NEGATIVE
NRBC BLD AUTO-RTO: 0 /100 WBC (ref 0–0.2)
NT-PROBNP SERPL-MCNC: 95 PG/ML (ref 0–1800)
PH UR STRIP.AUTO: 6.5 [PH] (ref 5–8)
PLATELET # BLD AUTO: 236 10*3/MM3 (ref 140–450)
PMV BLD AUTO: 9.8 FL (ref 6–12)
POTASSIUM SERPL-SCNC: 4.4 MMOL/L (ref 3.5–5.2)
PROT SERPL-MCNC: 6.5 G/DL (ref 6–8.5)
PROT UR QL STRIP: NEGATIVE
RBC # BLD AUTO: 4.27 10*6/MM3 (ref 4.14–5.8)
RBC # UR: NORMAL /HPF
REF LAB TEST METHOD: NORMAL
SODIUM SERPL-SCNC: 139 MMOL/L (ref 136–145)
SP GR UR STRIP: 1.01 (ref 1–1.03)
SQUAMOUS #/AREA URNS HPF: NORMAL /HPF
TROPONIN T SERPL-MCNC: 0.02 NG/ML (ref 0–0.03)
TROPONIN T SERPL-MCNC: 0.03 NG/ML (ref 0–0.03)
UROBILINOGEN UR QL STRIP: ABNORMAL
WBC # BLD AUTO: 7.2 10*3/MM3 (ref 3.4–10.8)
WBC UR QL AUTO: NORMAL /HPF
WHOLE BLOOD HOLD SPECIMEN: NORMAL
WHOLE BLOOD HOLD SPECIMEN: NORMAL

## 2020-12-09 PROCEDURE — 93005 ELECTROCARDIOGRAM TRACING: CPT

## 2020-12-09 PROCEDURE — 70450 CT HEAD/BRAIN W/O DYE: CPT

## 2020-12-09 PROCEDURE — 99285 EMERGENCY DEPT VISIT HI MDM: CPT

## 2020-12-09 PROCEDURE — 84484 ASSAY OF TROPONIN QUANT: CPT | Performed by: PHYSICIAN ASSISTANT

## 2020-12-09 PROCEDURE — 85025 COMPLETE CBC W/AUTO DIFF WBC: CPT | Performed by: EMERGENCY MEDICINE

## 2020-12-09 PROCEDURE — 93005 ELECTROCARDIOGRAM TRACING: CPT | Performed by: PHYSICIAN ASSISTANT

## 2020-12-09 PROCEDURE — 80053 COMPREHEN METABOLIC PANEL: CPT | Performed by: EMERGENCY MEDICINE

## 2020-12-09 PROCEDURE — 84484 ASSAY OF TROPONIN QUANT: CPT | Performed by: EMERGENCY MEDICINE

## 2020-12-09 PROCEDURE — 99284 EMERGENCY DEPT VISIT MOD MDM: CPT

## 2020-12-09 PROCEDURE — 82962 GLUCOSE BLOOD TEST: CPT

## 2020-12-09 PROCEDURE — 81001 URINALYSIS AUTO W/SCOPE: CPT | Performed by: EMERGENCY MEDICINE

## 2020-12-09 PROCEDURE — 93005 ELECTROCARDIOGRAM TRACING: CPT | Performed by: EMERGENCY MEDICINE

## 2020-12-09 PROCEDURE — 83880 ASSAY OF NATRIURETIC PEPTIDE: CPT | Performed by: PHYSICIAN ASSISTANT

## 2020-12-09 PROCEDURE — 83735 ASSAY OF MAGNESIUM: CPT | Performed by: EMERGENCY MEDICINE

## 2020-12-09 PROCEDURE — 36415 COLL VENOUS BLD VENIPUNCTURE: CPT

## 2020-12-09 PROCEDURE — 71045 X-RAY EXAM CHEST 1 VIEW: CPT

## 2020-12-09 RX ORDER — SODIUM CHLORIDE 0.9 % (FLUSH) 0.9 %
10 SYRINGE (ML) INJECTION AS NEEDED
Status: DISCONTINUED | OUTPATIENT
Start: 2020-12-09 | End: 2020-12-09 | Stop reason: HOSPADM

## 2020-12-09 NOTE — ED PROVIDER NOTES
"Subjective   Pt is a 88 yo female presenting to ED by EMS with reports of confusion and lethargy. PMHx significant for Dementia, DVT (Eliquis), DM (non insulin), HTN, BPH, Hypothyroidism, and OA. Pt lives with wife in independent living at [facility]. EMS report during course of in route to hospital he became alert and talkative. Initially was only responsive to painful stimulus. During initial assessment patient knows he is at the hospital, year , month Dec, correct  and correctly states president and president elect. Discussed patient with wife over the phone and she explains he went to bed last night around 11 pm and was at baseline. This morning around 10:30 still sleeping and couldn't wake up. Nursing staff at [facility] came to check on patient and he was still unresponsive but was still breathing. Wife denies any new or change in medications. He was last admitted 20 to 20 for AMS with \"no infectious source identified\". Wife states patient does have frequent UTIs but no recent antibiotics. No known Covid exposure. No reports of recent fall, head injury, fever, headache, CP, SOB, cough, N/V/D, abdominal pain, flank pain, urinary sx, leg swelling or loss of taste/smell. Pt was given a dose of Melatonin last night before bed which he takes regularly and has never had similar side effects.       History provided by:  Medical records, nursing home and spouse      Review of Systems   Constitutional: Negative for chills and fever.   HENT: Negative for congestion, sore throat and trouble swallowing.    Eyes: Negative for visual disturbance.   Respiratory: Negative for cough and shortness of breath.    Cardiovascular: Negative for chest pain and leg swelling.   Gastrointestinal: Negative for abdominal pain, blood in stool, constipation, diarrhea, nausea and vomiting.   Genitourinary: Negative for difficulty urinating, dysuria and flank pain.   Musculoskeletal: Negative for arthralgias, back pain " and joint swelling.   Skin: Negative.  Negative for rash and wound.   Allergic/Immunologic: Negative.    Neurological: Negative for dizziness, syncope, weakness, numbness and headaches.   Psychiatric/Behavioral: Positive for confusion.   All other systems reviewed and are negative.      Past Medical History:   Diagnosis Date   • Dementia (CMS/HCC)    • Diabetes (CMS/HCC)    • H/O blood clots    • Osteoarthritis        Allergies   Allergen Reactions   • Contrast Dye Unknown - Low Severity       Past Surgical History:   Procedure Laterality Date   • BUNIONECTOMY     • HIP TROCHANTERIC NAILING WITH INTRAMEDULLARY HIP SCREW N/A 6/9/2020    Procedure: ADVANCED TROCHANTERIC FEMORAL NAIL (ATFN) RIGHT HIP/FEMUR;  Surgeon: Mejia Meza MD;  Location: [facility];  Service: Orthopedics;  Laterality: N/A;   • RECTAL FISTULECTOMY     • SKIN CANCER EXCISION         Family History   Problem Relation Age of Onset   • Stroke Mother    • Heart attack Father        Social History     Socioeconomic History   • Marital status:      Spouse name: Not on file   • Number of children: Not on file   • Years of education: Not on file   • Highest education level: Not on file   Tobacco Use   • Smoking status: Former Smoker     Packs/day: 2.00   • Smokeless tobacco: Never Used   Substance and Sexual Activity   • Alcohol use: Yes     Alcohol/week: 1.0 standard drinks     Types: 1 Glasses of wine per week     Frequency: Never   • Drug use: No   • Sexual activity: Defer           Objective   Physical Exam  Vitals signs and nursing note reviewed.   Constitutional:       Appearance: He is well-developed.   HENT:      Head: Atraumatic.      Nose: Nose normal.   Eyes:      General: Lids are normal.      Conjunctiva/sclera: Conjunctivae normal.      Pupils: Pupils are equal, round, and reactive to light.   Neck:      Musculoskeletal: Normal range of motion and neck supple.   Cardiovascular:      Rate and Rhythm: Normal rate and regular  rhythm.      Heart sounds: Normal heart sounds.   Pulmonary:      Effort: Pulmonary effort is normal.      Breath sounds: Normal breath sounds.   Abdominal:      General: There is no distension.      Palpations: Abdomen is soft.      Tenderness: There is no abdominal tenderness. There is no guarding or rebound.   Musculoskeletal: Normal range of motion.         General: No tenderness or deformity.   Skin:     General: Skin is warm and dry.   Neurological:      Mental Status: He is alert and oriented to person, place, and time.      Cranial Nerves: Cranial nerves are intact.      Sensory: Sensation is intact. No sensory deficit.      Motor: Motor function is intact.   Psychiatric:         Behavior: Behavior normal.         Procedures           ED Course  ED Course as of Dec 09 1655   Wed Dec 09, 2020   1413 Leukocytes, UA(!): Trace [RT]   1413 Bacteria, UA: None Seen [RT]   1413 WBC, UA: 0-2 [RT]   1439 WBC: 7.20 [RT]      ED Course User Index  [RT] Nedra Logan PA      Re-examined patient several times in ED. Pt resting, no distress. Wife in ED and states patient at baseline. Pt and wife prefer to go home. Discussed results and tx plan for discharge.     Discussed patient with Dr. Brown who is agreeable with ED course.   ED workup without acute emergent findings. EKG x2 without ischemia. Trop x2 WNL. CT head WNL.     Reviewed old records.     Recent Results (from the past 24 hour(s))   Comprehensive Metabolic Panel    Collection Time: 12/09/20  1:08 PM    Specimen: Blood   Result Value Ref Range    Glucose 104 (H) 65 - 99 mg/dL    BUN 9 8 - 23 mg/dL    Creatinine 0.65 (L) 0.76 - 1.27 mg/dL    Sodium 139 136 - 145 mmol/L    Potassium 4.4 3.5 - 5.2 mmol/L    Chloride 106 98 - 107 mmol/L    CO2 27.0 22.0 - 29.0 mmol/L    Calcium 9.0 8.6 - 10.5 mg/dL    Total Protein 6.5 6.0 - 8.5 g/dL    Albumin 2.90 (L) 3.50 - 5.20 g/dL    ALT (SGPT) 9 1 - 41 U/L    AST (SGOT) 19 1 - 40 U/L    Alkaline Phosphatase 113 39 - 117  U/L    Total Bilirubin 0.4 0.0 - 1.2 mg/dL    eGFR Non African Amer 116 >60 mL/min/1.73    Globulin 3.6 gm/dL    A/G Ratio 0.8 g/dL    BUN/Creatinine Ratio 13.8 7.0 - 25.0    Anion Gap 6.0 5.0 - 15.0 mmol/L   Troponin    Collection Time: 12/09/20  1:08 PM    Specimen: Blood   Result Value Ref Range    Troponin T 0.025 0.000 - 0.030 ng/mL   Magnesium    Collection Time: 12/09/20  1:08 PM    Specimen: Blood   Result Value Ref Range    Magnesium 1.9 1.6 - 2.4 mg/dL   Light Blue Top    Collection Time: 12/09/20  1:08 PM   Result Value Ref Range    Extra Tube hold for add-on    Green Top (Gel)    Collection Time: 12/09/20  1:08 PM   Result Value Ref Range    Extra Tube Hold for add-ons.    Lavender Top    Collection Time: 12/09/20  1:08 PM   Result Value Ref Range    Extra Tube hold for add-on    Gold Top - SST    Collection Time: 12/09/20  1:08 PM   Result Value Ref Range    Extra Tube Hold for add-ons.    CBC Auto Differential    Collection Time: 12/09/20  1:08 PM    Specimen: Blood   Result Value Ref Range    WBC 7.20 3.40 - 10.80 10*3/mm3    RBC 4.27 4.14 - 5.80 10*6/mm3    Hemoglobin 11.5 (L) 13.0 - 17.7 g/dL    Hematocrit 36.5 (L) 37.5 - 51.0 %    MCV 85.5 79.0 - 97.0 fL    MCH 26.9 26.6 - 33.0 pg    MCHC 31.5 31.5 - 35.7 g/dL    RDW 17.9 (H) 12.3 - 15.4 %    RDW-SD 56.0 (H) 37.0 - 54.0 fl    MPV 9.8 6.0 - 12.0 fL    Platelets 236 140 - 450 10*3/mm3    Neutrophil % 48.2 42.7 - 76.0 %    Lymphocyte % 34.4 19.6 - 45.3 %    Monocyte % 5.7 5.0 - 12.0 %    Eosinophil % 9.9 (H) 0.3 - 6.2 %    Basophil % 1.5 0.0 - 1.5 %    Immature Grans % 0.3 0.0 - 0.5 %    Neutrophils, Absolute 3.47 1.70 - 7.00 10*3/mm3    Lymphocytes, Absolute 2.48 0.70 - 3.10 10*3/mm3    Monocytes, Absolute 0.41 0.10 - 0.90 10*3/mm3    Eosinophils, Absolute 0.71 (H) 0.00 - 0.40 10*3/mm3    Basophils, Absolute 0.11 0.00 - 0.20 10*3/mm3    Immature Grans, Absolute 0.02 0.00 - 0.05 10*3/mm3    nRBC 0.0 0.0 - 0.2 /100 WBC   BNP    Collection Time:  12/09/20  1:08 PM    Specimen: Blood   Result Value Ref Range    proBNP 95.0 0.0-1,800.0 pg/mL   Urinalysis With Microscopic If Indicated (No Culture) - Urine, Clean Catch    Collection Time: 12/09/20  1:09 PM    Specimen: Urine, Clean Catch   Result Value Ref Range    Color, UA Yellow Yellow, Straw    Appearance, UA Clear Clear    pH, UA 6.5 5.0 - 8.0    Specific Gravity, UA 1.009 1.001 - 1.030    Glucose, UA Negative Negative    Ketones, UA Negative Negative    Bilirubin, UA Negative Negative    Blood, UA Negative Negative    Protein, UA Negative Negative    Leuk Esterase, UA Trace (A) Negative    Nitrite, UA Negative Negative    Urobilinogen, UA 1.0 E.U./dL 0.2 - 1.0 E.U./dL   Urinalysis, Microscopic Only - Urine, Clean Catch    Collection Time: 12/09/20  1:09 PM    Specimen: Urine, Clean Catch   Result Value Ref Range    RBC, UA 0-2 None Seen, 0-2 /HPF    WBC, UA 0-2 None Seen, 0-2 /HPF    Bacteria, UA None Seen None Seen, Trace /HPF    Squamous Epithelial Cells, UA 0-2 None Seen, 0-2 /HPF    Hyaline Casts, UA None Seen 0 - 6 /LPF    Methodology Automated Microscopy    POC Glucose Once    Collection Time: 12/09/20  1:16 PM    Specimen: Blood   Result Value Ref Range    Glucose 106 70 - 130 mg/dL   Troponin    Collection Time: 12/09/20  3:57 PM    Specimen: Blood   Result Value Ref Range    Troponin T 0.024 0.000 - 0.030 ng/mL     Note: In addition to lab results from this visit, the labs listed above may include labs taken at another facility or during a different encounter within the last 24 hours. Please correlate lab times with ED admission and discharge times for further clarification of the services performed during this visit.    CT Head Without Contrast   Preliminary Result   Fluid identified in the mastoid air cells right greater than   left with chronic paranasal sinusitis. Atrophy and chronic changes seen   within the brain with no acute intracranial abnormality.              XR Chest 1 View  "  Preliminary Result   Lung fields reveal improvement seen in the pulmonary   vascularity. The heart is enlarged. Chronic changes seen at the lung   bases.                Vitals:    12/09/20 1304   BP: 130/76   BP Location: Left arm   Patient Position: Lying   Pulse: 79   Resp: 16   Temp: 98.1 °F (36.7 °C)   TempSrc: Axillary   SpO2: 96%   Weight: 91.6 kg (202 lb)   Height: 170.2 cm (67\")     Medications   sodium chloride 0.9 % flush 10 mL (has no administration in time range)     ECG/EMG Results (last 24 hours)     Procedure Component Value Units Date/Time    ECG 12 Lead [[id number]] Collected: 12/09/20 1305     Updated: 12/09/20 1345        ECG 12 Lead         ECG 12 Lead               COVID-19 RISK SCREEN     1. Has the patient had close contact without PPE with a lab confirmed COVID-19 (+) person or a person under investigation (PUI) for COVID-19 infection?  -- No     2. Has the patient had respiratory symptoms, worsened/new cough and/or SOA, unexplained fever, or sudden loss of smell and/or taste in the past 7 days? --  No    3. Does the patient have baseline higher exposure risk such as working in healthcare field, currently residing in healthcare facility, or ongoing hemodialysis?  --  Yes           DISCHARGE    Patient discharged in stable condition.    Reviewed implications of results, diagnosis, meds, responsibility to follow up, warning signs and symptoms of possible worsening, potential complications and reasons to return to ER.    Patient/Family voiced understanding of above instructions.    Discussed plan for discharge, as there is no emergent indication for admission.  Pt/family is agreeable and understands need for follow up and possible repeat testing.  Pt/family is aware that discharge does not mean that nothing is wrong but that it indicates no emergency is currently present that requires admission and they must continue care with follow-up as given below or with a physician of their choice. "     FOLLOW-UP    PRIMARY CARE DOCTOR. SEE ATTACHED LIST.  Schedule an appointment as soon as possible for a visit       [facility] Emergency Department  [address] [address] [phone]    If symptoms worsen         Medication List      No changes were made to your prescriptions during this visit.                                         MDM    Final diagnoses:   Transient confusion   History of dementia            Nedra Logan PA  12/09/20 7723

## 2020-12-13 LAB
QT INTERVAL: 400 MS
QT INTERVAL: 418 MS
QTC INTERVAL: 457 MS
QTC INTERVAL: 461 MS

## 2021-02-01 RX ORDER — MIRABEGRON 50 MG/1
TABLET, FILM COATED, EXTENDED RELEASE ORAL
Qty: 90 TABLET | Refills: 0 | OUTPATIENT
Start: 2021-02-01

## 2021-02-01 NOTE — TELEPHONE ENCOUNTER
Patient's wife called and said that [facility] had reached out about a month ago to refill his Myrbetriq. They want to know if we can refill enough for a couple weeks at [facility] and then contact [facility] to get more refills for him. Please advise.

## 2021-02-01 NOTE — TELEPHONE ENCOUNTER
Per last refill  Mirabegron ER (Myrbetriq) 50 MG tablet sustained-release 24 hour 24 hr tablet [[id number]]     Order Details  Dose: 50 mg Route: Oral Frequency: Daily   Dispense Quantity: 90 tablet Refills: 0 Fills remaining: --           Sig: Take 50 mg by mouth Daily. Appointment needed for additional refills

## 2021-03-08 RX ORDER — LEVOTHYROXINE SODIUM 0.07 MG/1
TABLET ORAL
Qty: 90 TABLET | Refills: 1 | Status: SHIPPED | OUTPATIENT
Start: 2021-03-08 | End: 2021-10-13

## 2021-03-18 RX ORDER — BLOOD SUGAR DIAGNOSTIC
STRIP MISCELLANEOUS
Qty: 100 EACH | Refills: 5 | Status: SHIPPED | OUTPATIENT
Start: 2021-03-18 | End: 2022-05-05

## 2021-03-18 RX ORDER — LANCETS
EACH MISCELLANEOUS
Qty: 102 EACH | Refills: 5 | Status: SHIPPED | OUTPATIENT
Start: 2021-03-18 | End: 2022-05-05

## 2021-03-30 RX ORDER — MIRABEGRON 50 MG/1
TABLET, FILM COATED, EXTENDED RELEASE ORAL
Qty: 90 TABLET | OUTPATIENT
Start: 2021-03-30

## 2021-05-20 RX ORDER — TAMSULOSIN HYDROCHLORIDE 0.4 MG/1
1 CAPSULE ORAL DAILY
Qty: 90 CAPSULE | Refills: 0 | Status: SHIPPED | OUTPATIENT
Start: 2021-05-20 | End: 2021-07-08

## 2021-05-20 NOTE — TELEPHONE ENCOUNTER
Called patient to let him know he needs an appt for more refills. Spoke with caregiver and said patient will return call.

## 2021-05-25 NOTE — TELEPHONE ENCOUNTER
Patient's wife called and said that [facility] has said they've received no response from us regarding the Myrbetriq they've called in, but I see that they confirmed the receipt on the 20th. Please advise.

## 2021-06-29 ENCOUNTER — OFFICE VISIT (OUTPATIENT)
Dept: ENDOCRINOLOGY | Facility: CLINIC | Age: 86
End: 2021-06-29

## 2021-06-29 VITALS
SYSTOLIC BLOOD PRESSURE: 142 MMHG | DIASTOLIC BLOOD PRESSURE: 78 MMHG | BODY MASS INDEX: 31.64 KG/M2 | HEIGHT: 67 IN | OXYGEN SATURATION: 96 % | HEART RATE: 74 BPM

## 2021-06-29 DIAGNOSIS — I10 BENIGN ESSENTIAL HYPERTENSION: ICD-10-CM

## 2021-06-29 DIAGNOSIS — E11.65 UNCONTROLLED TYPE 2 DIABETES MELLITUS WITH HYPERGLYCEMIA (HCC): Primary | ICD-10-CM

## 2021-06-29 DIAGNOSIS — E03.9 ACQUIRED HYPOTHYROIDISM: ICD-10-CM

## 2021-06-29 LAB
EXPIRATION DATE: ABNORMAL
EXPIRATION DATE: NORMAL
GLUCOSE BLDC GLUCOMTR-MCNC: 164 MG/DL (ref 70–130)
HBA1C MFR BLD: 6.5 %
Lab: ABNORMAL
Lab: NORMAL

## 2021-06-29 PROCEDURE — 82947 ASSAY GLUCOSE BLOOD QUANT: CPT | Performed by: INTERNAL MEDICINE

## 2021-06-29 PROCEDURE — 3044F HG A1C LEVEL LT 7.0%: CPT | Performed by: INTERNAL MEDICINE

## 2021-06-29 PROCEDURE — 99214 OFFICE O/P EST MOD 30 MIN: CPT | Performed by: INTERNAL MEDICINE

## 2021-06-29 PROCEDURE — 83036 HEMOGLOBIN GLYCOSYLATED A1C: CPT | Performed by: INTERNAL MEDICINE

## 2021-06-29 RX ORDER — ISOPROPYL ALCOHOL 0.75 G/1
SWAB TOPICAL
Qty: 100 EACH | Refills: 1 | Status: SHIPPED | OUTPATIENT
Start: 2021-06-29 | End: 2021-12-09

## 2021-06-29 NOTE — PROGRESS NOTES
"     Office Note      Date: 2021  Patient Name: Marcos Acuña  MRN: [id number]  : 1931    Chief Complaint   Patient presents with   • Diabetes       History of Present Illness:   Marcos Acuña is a 89 y.o. male who presents for Diabetes type 2.  He remains on metformin. He is tolerating this okay. He is doing FSBS qAM. Recent FSBS have beeen <150. He denies any hypoglycemia. He is up to date on eye exam. He remains on ARB and eliquis.     He remains on T4 75mcg qd. He is taking this correctly. He isn't taking any interfering meds concurrently. He denies any sxs of hypo- or hyperthyroidism at this time.     His wife accompanies him today and provides some of the history today.    Subjective      Diabetic Complications:  Eyes: No  Kidneys: No  Feet: No  Heart: No    Diet and Exercise:  Meals per day: 3  Minutes of exercise per week: 0 mins.    Review of Systems:   Review of Systems   Constitutional: Negative.    Cardiovascular: Negative.    Gastrointestinal: Negative.    Endocrine: Negative.        The following portions of the patient's history were reviewed and updated as appropriate: allergies, current medications, past family history, past medical history, past social history, past surgical history and problem list.    Objective       Visit Vitals  /78 (BP Location: Left arm, Patient Position: Sitting, Cuff Size: Adult)   Pulse 74   Ht 170.2 cm (67\")   SpO2 96%   BMI 31.64 kg/m²       Physical Exam:  Physical Exam  Constitutional:       Appearance: Normal appearance.   Neurological:      Mental Status: He is alert.         Labs:    HbA1c  Lab Results   Component Value Date    HGBA1C 6.5 2021       CMP  Lab Results   Component Value Date    GLUCOSE 104 (H) 2020    BUN 9 2020    CREATININE 0.65 (L) 2020    EGFRIFNONA 116 2020    BCR 13.8 2020    K 4.4 2020    CO2 27.0 2020    CALCIUM 9.0 2020    AST 19 2020    ALT 9 2020        Lipid " Panel  Lab Results   Component Value Date    CHLPL 157 03/13/2018    HDL 63 (H) 07/27/2019    LDL 93 07/27/2019    TRIG 97 07/27/2019        TSH  Lab Results   Component Value Date    TSH 3.660 09/11/2020    FREET4 1.31 06/08/2020        Hemoglobin A1C  Lab Results   Component Value Date    HGBA1C 6.5 06/29/2021        Microalbumin/Creatinine  No results found for: MALBCRERATIO, CREATINIURIN, MICROALBUR        Assessment / Plan      Assessment & Plan:  Diagnoses and all orders for this visit:    1. Uncontrolled type 2 diabetes mellitus with hyperglycemia (CMS/[facility]) (Primary)  Assessment & Plan:  Diabetes is unchanged.   Continue current treatment regimen.  Diabetes will be reassessed in 6 months.    Orders:  -     POC Glycosylated Hemoglobin (Hb A1C)  -     POC Glucose, Blood  -     Comprehensive Metabolic Panel; Future    2. Benign essential hypertension  Assessment & Plan:  Hypertension is unchanged.  Continue current treatment regimen.  Blood pressure will be reassessed at the next regular appointment.      3. Acquired hypothyroidism  Assessment & Plan:  Continue T4 tx.  Check TSH today.    Orders:  -     TSH; Future    Other orders  -     metFORMIN (GLUCOPHAGE) 500 MG tablet; Take 1 tablet by mouth 2 (Two) Times a Day With Meals.  Dispense: 180 tablet; Refill: 3      Return in about 6 months (around 12/29/2021) for Recheck with A1c, TSH.    Mitch Olson MD   06/29/2021

## 2021-06-30 LAB
ALBUMIN SERPL-MCNC: 3.2 G/DL (ref 3.5–5.2)
ALBUMIN/GLOB SERPL: 1.3 G/DL
ALP SERPL-CCNC: 87 U/L (ref 39–117)
ALT SERPL-CCNC: 8 U/L (ref 1–41)
AST SERPL-CCNC: 16 U/L (ref 1–40)
BILIRUB SERPL-MCNC: 0.3 MG/DL (ref 0–1.2)
BUN SERPL-MCNC: 15 MG/DL (ref 8–23)
BUN/CREAT SERPL: 19 (ref 7–25)
CALCIUM SERPL-MCNC: 9.2 MG/DL (ref 8.2–9.6)
CHLORIDE SERPL-SCNC: 105 MMOL/L (ref 98–107)
CO2 SERPL-SCNC: 26.5 MMOL/L (ref 22–29)
CREAT SERPL-MCNC: 0.79 MG/DL (ref 0.76–1.27)
GLOBULIN SER CALC-MCNC: 2.5 GM/DL
GLUCOSE SERPL-MCNC: 204 MG/DL (ref 65–99)
POTASSIUM SERPL-SCNC: 4.3 MMOL/L (ref 3.5–5.2)
PROT SERPL-MCNC: 5.7 G/DL (ref 6–8.5)
SODIUM SERPL-SCNC: 141 MMOL/L (ref 136–145)
TSH SERPL DL<=0.005 MIU/L-ACNC: 3.15 UIU/ML (ref 0.27–4.2)

## 2021-07-08 RX ORDER — TAMSULOSIN HYDROCHLORIDE 0.4 MG/1
CAPSULE ORAL
Qty: 90 CAPSULE | Refills: 0 | Status: SHIPPED | OUTPATIENT
Start: 2021-07-08 | End: 2021-10-13

## 2021-07-21 ENCOUNTER — TRANSCRIBE ORDERS (OUTPATIENT)
Dept: ADMINISTRATIVE | Facility: HOSPITAL | Age: 86
End: 2021-07-21

## 2021-07-21 DIAGNOSIS — R13.10 DYSPHAGIA, UNSPECIFIED TYPE: Primary | ICD-10-CM

## 2021-07-26 ENCOUNTER — APPOINTMENT (OUTPATIENT)
Dept: PREADMISSION TESTING | Facility: HOSPITAL | Age: 86
End: 2021-07-26

## 2021-07-29 ENCOUNTER — APPOINTMENT (OUTPATIENT)
Dept: GENERAL RADIOLOGY | Facility: HOSPITAL | Age: 86
End: 2021-07-29

## 2021-10-13 RX ORDER — TAMSULOSIN HYDROCHLORIDE 0.4 MG/1
CAPSULE ORAL
Qty: 90 CAPSULE | Refills: 1 | Status: SHIPPED | OUTPATIENT
Start: 2021-10-13

## 2021-10-13 RX ORDER — LEVOTHYROXINE SODIUM 0.07 MG/1
TABLET ORAL
Qty: 90 TABLET | Refills: 1 | Status: SHIPPED | OUTPATIENT
Start: 2021-10-13 | End: 2022-04-14 | Stop reason: SDUPTHER

## 2021-12-09 RX ORDER — ISOPROPYL ALCOHOL 0.75 G/1
SWAB TOPICAL
Qty: 100 EACH | Refills: 1 | Status: SHIPPED | OUTPATIENT
Start: 2021-12-09 | End: 2022-05-05

## 2021-12-28 RX ORDER — MIRABEGRON 50 MG/1
TABLET, FILM COATED, EXTENDED RELEASE ORAL
Qty: 90 TABLET | Refills: 1 | OUTPATIENT
Start: 2021-12-28

## 2021-12-29 ENCOUNTER — LAB (OUTPATIENT)
Dept: LAB | Facility: HOSPITAL | Age: 86
End: 2021-12-29

## 2021-12-29 ENCOUNTER — OFFICE VISIT (OUTPATIENT)
Dept: ENDOCRINOLOGY | Facility: CLINIC | Age: 86
End: 2021-12-29

## 2021-12-29 VITALS
SYSTOLIC BLOOD PRESSURE: 128 MMHG | BODY MASS INDEX: 31.64 KG/M2 | HEIGHT: 67 IN | HEART RATE: 75 BPM | DIASTOLIC BLOOD PRESSURE: 76 MMHG | OXYGEN SATURATION: 95 %

## 2021-12-29 DIAGNOSIS — E11.65 UNCONTROLLED TYPE 2 DIABETES MELLITUS WITH HYPERGLYCEMIA (HCC): Primary | ICD-10-CM

## 2021-12-29 DIAGNOSIS — E78.5 HYPERLIPOPROTEINEMIA: ICD-10-CM

## 2021-12-29 DIAGNOSIS — E03.9 ACQUIRED HYPOTHYROIDISM: ICD-10-CM

## 2021-12-29 DIAGNOSIS — I10 BENIGN ESSENTIAL HYPERTENSION: ICD-10-CM

## 2021-12-29 LAB
EXPIRATION DATE: ABNORMAL
EXPIRATION DATE: NORMAL
GLUCOSE BLDC GLUCOMTR-MCNC: 253 MG/DL (ref 70–130)
HBA1C MFR BLD: 7.5 %
Lab: ABNORMAL
Lab: NORMAL

## 2021-12-29 PROCEDURE — 3051F HG A1C>EQUAL 7.0%<8.0%: CPT | Performed by: INTERNAL MEDICINE

## 2021-12-29 PROCEDURE — 82947 ASSAY GLUCOSE BLOOD QUANT: CPT | Performed by: INTERNAL MEDICINE

## 2021-12-29 PROCEDURE — 83036 HEMOGLOBIN GLYCOSYLATED A1C: CPT | Performed by: INTERNAL MEDICINE

## 2021-12-29 PROCEDURE — 99214 OFFICE O/P EST MOD 30 MIN: CPT | Performed by: INTERNAL MEDICINE

## 2021-12-29 RX ORDER — MEMANTINE HYDROCHLORIDE 28 MG/1
28 CAPSULE, EXTENDED RELEASE ORAL DAILY
COMMUNITY
Start: 2021-11-23

## 2021-12-29 RX ORDER — INHALER, ASSIST DEVICES
SPACER (EA) MISCELLANEOUS SEE ADMIN INSTRUCTIONS
Status: ON HOLD | COMMUNITY
Start: 2021-11-10 | End: 2022-05-23

## 2021-12-29 RX ORDER — TRAZODONE HYDROCHLORIDE 50 MG/1
TABLET ORAL
COMMUNITY
End: 2022-01-14 | Stop reason: HOSPADM

## 2021-12-29 RX ORDER — SYRINGE WITH NEEDLE, 1 ML 25GX5/8"
SYRINGE, EMPTY DISPOSABLE MISCELLANEOUS
Status: ON HOLD | COMMUNITY
Start: 2021-12-28 | End: 2022-05-23

## 2021-12-29 RX ORDER — CEFDINIR 300 MG/1
300 CAPSULE ORAL
COMMUNITY
End: 2022-01-14 | Stop reason: HOSPADM

## 2021-12-29 RX ORDER — LEVOCETIRIZINE DIHYDROCHLORIDE 5 MG/1
5 TABLET, FILM COATED ORAL EVERY EVENING
Qty: 90 TABLET | Refills: 3 | Status: SHIPPED | OUTPATIENT
Start: 2021-12-29

## 2021-12-29 RX ORDER — RIVAROXABAN 10 MG/1
TABLET, FILM COATED ORAL
Status: ON HOLD | COMMUNITY
Start: 2021-12-09 | End: 2022-01-13

## 2021-12-29 RX ORDER — QUETIAPINE FUMARATE 25 MG/1
TABLET, FILM COATED ORAL
Status: ON HOLD | COMMUNITY
Start: 2021-12-06 | End: 2022-01-14 | Stop reason: SDUPTHER

## 2021-12-29 NOTE — ASSESSMENT & PLAN NOTE
Diabetes is worsening.  A1c has increased but acceptable for him at 7.5%.  Continue current treatment regimen.  Diabetes will be reassessed in 6 months.

## 2021-12-29 NOTE — PROGRESS NOTES
"     Office Note      Date: 2021  Patient Name: Marcos Acuña  MRN: [id number]  : 1931    Chief Complaint   Patient presents with   • Diabetes     type 2       History of Present Illness:   Marcos Acuña is a 89 y.o. male who presents for Diabetes type 2.  He remains on metformin. He is tolerating this okay. He is doing FSBS qAM. Recent FSBS have beeen <150. He denies any hypoglycemia. He is up to date on eye exam. He remains on ARB and eliquis.      He remains on T4 75mcg qd. He is taking this correctly. He isn't taking any interfering meds concurrently. He denies any sxs of hypo- or hyperthyroidism at this time.     Subjective      Diabetic Complications:  Eyes: No  Kidneys: No  Feet: No  Heart: No    Diet and Exercise:  Meals per day: 3  Minutes of exercise per week: 0 mins.    Review of Systems:   Review of Systems   Constitutional: Negative.    Cardiovascular: Negative.    Gastrointestinal: Negative.    Endocrine: Negative.        The following portions of the patient's history were reviewed and updated as appropriate: allergies, current medications, past family history, past medical history, past social history, past surgical history and problem list.    Objective       Visit Vitals  /76   Pulse 75   Ht 170.2 cm (67\")   SpO2 95%   BMI 31.64 kg/m²       Physical Exam:  Physical Exam  Constitutional:       Appearance: Normal appearance.   Neurological:      Mental Status: He is alert.         Labs:    HbA1c  Lab Results   Component Value Date    HGBA1C 7.5 2021       CMP  Lab Results   Component Value Date    GLUCOSE 204 (H) 2021    BUN 15 2021    CREATININE 0.79 2021    EGFRIFNONA 92 2021    EGFRIFAFRI 112 2021    BCR 19.0 2021    K 4.3 2021    CO2 26.5 2021    CALCIUM 9.2 2021    PROTENTOTREF 5.7 (L) 2021    LABIL2 1.3 2021    AST 16 2021    ALT 8 2021        Lipid Panel  Lab Results   Component Value Date    " CHLPL 157 03/13/2018    HDL 63 (H) 07/27/2019    LDL 93 07/27/2019    TRIG 97 07/27/2019        TSH  Lab Results   Component Value Date    TSH 3.150 06/29/2021    FREET4 1.31 06/08/2020        Hemoglobin A1C  Lab Results   Component Value Date    HGBA1C 7.5 12/29/2021        Microalbumin/Creatinine  No results found for: MALBCRERATIO, CREATINIURIN, MICROALBUR        Assessment / Plan      Assessment & Plan:  Diagnoses and all orders for this visit:    1. Uncontrolled type 2 diabetes mellitus with hyperglycemia (HCC) (Primary)  Assessment & Plan:  Diabetes is worsening.  A1c has increased but acceptable for him at 7.5%.  Continue current treatment regimen.  Diabetes will be reassessed in 6 months.    Orders:  -     POC Glucose, Blood  -     POC Glycosylated Hemoglobin (Hb A1C)    2. Benign essential hypertension  Assessment & Plan:  Hypertension is unchanged.  Continue current treatment regimen.  Blood pressure will be reassessed at the next regular appointment.      3. Hyperlipoproteinemia  Assessment & Plan:  Seeing cardiologist.      4. Acquired hypothyroidism  Assessment & Plan:  Continue T4.  Check TSH.    Orders:  -     TSH; Future    Other orders  -     Mirabegron ER (Myrbetriq) 50 MG tablet sustained-release 24 hour 24 hr tablet; Take 50 mg by mouth Daily.  Dispense: 90 tablet; Refill: 3  -     levocetirizine (XYZAL) 5 MG tablet; Take 1 tablet by mouth Every Evening.  Dispense: 90 tablet; Refill: 3      Return in about 6 months (around 6/29/2022) for Recheck with A1c, CMP, TSH, microalbumin, foot exam.    Mitch Olson MD   12/29/2021

## 2021-12-30 LAB — TSH SERPL DL<=0.005 MIU/L-ACNC: 2.93 UIU/ML (ref 0.27–4.2)

## 2022-01-12 ENCOUNTER — APPOINTMENT (OUTPATIENT)
Dept: CT IMAGING | Facility: HOSPITAL | Age: 87
End: 2022-01-12

## 2022-01-12 ENCOUNTER — APPOINTMENT (OUTPATIENT)
Dept: GENERAL RADIOLOGY | Facility: HOSPITAL | Age: 87
End: 2022-01-12

## 2022-01-12 ENCOUNTER — HOSPITAL ENCOUNTER (OUTPATIENT)
Facility: HOSPITAL | Age: 87
Setting detail: OBSERVATION
Discharge: NURSING, REHAB OR LONG-TERM CARE | End: 2022-01-14
Attending: EMERGENCY MEDICINE | Admitting: INTERNAL MEDICINE

## 2022-01-12 ENCOUNTER — APPOINTMENT (OUTPATIENT)
Dept: MRI IMAGING | Facility: HOSPITAL | Age: 87
End: 2022-01-12

## 2022-01-12 DIAGNOSIS — R40.0 SOMNOLENCE: Primary | ICD-10-CM

## 2022-01-12 DIAGNOSIS — I95.9 HYPOTENSION, UNSPECIFIED HYPOTENSION TYPE: ICD-10-CM

## 2022-01-12 DIAGNOSIS — R26.89 BALANCE DISORDER: ICD-10-CM

## 2022-01-12 DIAGNOSIS — M17.12 PRIMARY OSTEOARTHRITIS OF LEFT KNEE: ICD-10-CM

## 2022-01-12 LAB
ALBUMIN SERPL-MCNC: 3.5 G/DL (ref 3.5–5.2)
ALBUMIN/GLOB SERPL: 1.1 G/DL
ALP SERPL-CCNC: 109 U/L (ref 39–117)
ALT SERPL W P-5'-P-CCNC: 12 U/L (ref 1–41)
ANION GAP SERPL CALCULATED.3IONS-SCNC: 10 MMOL/L (ref 5–15)
AST SERPL-CCNC: 20 U/L (ref 1–40)
BASE EXCESS BLDA CALC-SCNC: 4 MMOL/L (ref -5–5)
BASOPHILS # BLD AUTO: 0.1 10*3/MM3 (ref 0–0.2)
BASOPHILS NFR BLD AUTO: 1.6 % (ref 0–1.5)
BILIRUB SERPL-MCNC: 0.6 MG/DL (ref 0–1.2)
BILIRUB UR QL STRIP: NEGATIVE
BUN SERPL-MCNC: 8 MG/DL (ref 8–23)
BUN/CREAT SERPL: 9.4 (ref 7–25)
CA-I BLDA-SCNC: 1.27 MMOL/L (ref 1.2–1.32)
CALCIUM SPEC-SCNC: 9.7 MG/DL (ref 8.2–9.6)
CHLORIDE SERPL-SCNC: 106 MMOL/L (ref 98–107)
CLARITY UR: CLEAR
CO2 BLDA-SCNC: 31 MMOL/L (ref 24–29)
CO2 SERPL-SCNC: 28 MMOL/L (ref 22–29)
COLOR UR: YELLOW
CREAT BLDA-MCNC: 0.8 MG/DL (ref 0.6–1.3)
CREAT SERPL-MCNC: 0.85 MG/DL (ref 0.76–1.27)
D-LACTATE SERPL-SCNC: 2.1 MMOL/L (ref 0.5–2)
DEPRECATED RDW RBC AUTO: 47.3 FL (ref 37–54)
EOSINOPHIL # BLD AUTO: 0.52 10*3/MM3 (ref 0–0.4)
EOSINOPHIL NFR BLD AUTO: 8.2 % (ref 0.3–6.2)
ERYTHROCYTE [DISTWIDTH] IN BLOOD BY AUTOMATED COUNT: 15 % (ref 12.3–15.4)
FLUAV RNA RESP QL NAA+PROBE: NOT DETECTED
FLUBV RNA RESP QL NAA+PROBE: NOT DETECTED
GFR SERPL CREATININE-BSD FRML MDRD: 85 ML/MIN/1.73
GLOBULIN UR ELPH-MCNC: 3.2 GM/DL
GLUCOSE BLDC GLUCOMTR-MCNC: 123 MG/DL (ref 70–130)
GLUCOSE SERPL-MCNC: 129 MG/DL (ref 65–99)
GLUCOSE UR STRIP-MCNC: NEGATIVE MG/DL
HCO3 BLDA-SCNC: 29.3 MMOL/L (ref 22–26)
HCT VFR BLD AUTO: 39.4 % (ref 37.5–51)
HCT VFR BLDA CALC: 38 % (ref 38–51)
HGB BLD-MCNC: 13 G/DL (ref 13–17.7)
HGB BLDA-MCNC: 12.9 G/DL (ref 12–17)
HGB UR QL STRIP.AUTO: NEGATIVE
IMM GRANULOCYTES # BLD AUTO: 0.01 10*3/MM3 (ref 0–0.05)
IMM GRANULOCYTES NFR BLD AUTO: 0.2 % (ref 0–0.5)
KETONES UR QL STRIP: NEGATIVE
LEUKOCYTE ESTERASE UR QL STRIP.AUTO: NEGATIVE
LYMPHOCYTES # BLD AUTO: 2.89 10*3/MM3 (ref 0.7–3.1)
LYMPHOCYTES NFR BLD AUTO: 45.3 % (ref 19.6–45.3)
MCH RBC QN AUTO: 28.4 PG (ref 26.6–33)
MCHC RBC AUTO-ENTMCNC: 33 G/DL (ref 31.5–35.7)
MCV RBC AUTO: 86.2 FL (ref 79–97)
MONOCYTES # BLD AUTO: 0.39 10*3/MM3 (ref 0.1–0.9)
MONOCYTES NFR BLD AUTO: 6.1 % (ref 5–12)
NEUTROPHILS NFR BLD AUTO: 2.47 10*3/MM3 (ref 1.7–7)
NEUTROPHILS NFR BLD AUTO: 38.6 % (ref 42.7–76)
NITRITE UR QL STRIP: NEGATIVE
NRBC BLD AUTO-RTO: 0 /100 WBC (ref 0–0.2)
PCO2 BLDA: 51.5 MM HG (ref 35–45)
PH BLDA: 7.36 PH UNITS (ref 7.35–7.6)
PH UR STRIP.AUTO: 5.5 [PH] (ref 5–8)
PLATELET # BLD AUTO: 199 10*3/MM3 (ref 140–450)
PMV BLD AUTO: 10.8 FL (ref 6–12)
PO2 BLDA: 24 MMHG (ref 80–105)
POTASSIUM BLDA-SCNC: 4.2 MMOL/L (ref 3.5–4.9)
POTASSIUM SERPL-SCNC: 4.4 MMOL/L (ref 3.5–5.2)
PROT SERPL-MCNC: 6.7 G/DL (ref 6–8.5)
PROT UR QL STRIP: NEGATIVE
RBC # BLD AUTO: 4.57 10*6/MM3 (ref 4.14–5.8)
RSV RNA NPH QL NAA+NON-PROBE: NOT DETECTED
SAO2 % BLDA: 38 % (ref 95–98)
SARS-COV-2 RNA RESP QL NAA+PROBE: NOT DETECTED
SODIUM BLD-SCNC: 144 MMOL/L (ref 138–146)
SODIUM SERPL-SCNC: 144 MMOL/L (ref 136–145)
SP GR UR STRIP: <=1.005 (ref 1–1.03)
TROPONIN T SERPL-MCNC: 0.03 NG/ML (ref 0–0.03)
UROBILINOGEN UR QL STRIP: NORMAL
WBC NRBC COR # BLD: 6.38 10*3/MM3 (ref 3.4–10.8)

## 2022-01-12 PROCEDURE — G0378 HOSPITAL OBSERVATION PER HR: HCPCS

## 2022-01-12 PROCEDURE — 93005 ELECTROCARDIOGRAM TRACING: CPT | Performed by: EMERGENCY MEDICINE

## 2022-01-12 PROCEDURE — 87637 SARSCOV2&INF A&B&RSV AMP PRB: CPT | Performed by: EMERGENCY MEDICINE

## 2022-01-12 PROCEDURE — 80053 COMPREHEN METABOLIC PANEL: CPT | Performed by: EMERGENCY MEDICINE

## 2022-01-12 PROCEDURE — 81003 URINALYSIS AUTO W/O SCOPE: CPT | Performed by: EMERGENCY MEDICINE

## 2022-01-12 PROCEDURE — 99214 OFFICE O/P EST MOD 30 MIN: CPT | Performed by: NURSE PRACTITIONER

## 2022-01-12 PROCEDURE — 85025 COMPLETE CBC W/AUTO DIFF WBC: CPT | Performed by: EMERGENCY MEDICINE

## 2022-01-12 PROCEDURE — 82330 ASSAY OF CALCIUM: CPT

## 2022-01-12 PROCEDURE — 83605 ASSAY OF LACTIC ACID: CPT | Performed by: EMERGENCY MEDICINE

## 2022-01-12 PROCEDURE — 82565 ASSAY OF CREATININE: CPT

## 2022-01-12 PROCEDURE — 82803 BLOOD GASES ANY COMBINATION: CPT

## 2022-01-12 PROCEDURE — C9803 HOPD COVID-19 SPEC COLLECT: HCPCS | Performed by: EMERGENCY MEDICINE

## 2022-01-12 PROCEDURE — 84295 ASSAY OF SERUM SODIUM: CPT

## 2022-01-12 PROCEDURE — 84443 ASSAY THYROID STIM HORMONE: CPT | Performed by: NURSE PRACTITIONER

## 2022-01-12 PROCEDURE — 87040 BLOOD CULTURE FOR BACTERIA: CPT | Performed by: EMERGENCY MEDICINE

## 2022-01-12 PROCEDURE — P9612 CATHETERIZE FOR URINE SPEC: HCPCS

## 2022-01-12 PROCEDURE — 84484 ASSAY OF TROPONIN QUANT: CPT | Performed by: EMERGENCY MEDICINE

## 2022-01-12 PROCEDURE — 85014 HEMATOCRIT: CPT

## 2022-01-12 PROCEDURE — 84132 ASSAY OF SERUM POTASSIUM: CPT

## 2022-01-12 PROCEDURE — 84145 PROCALCITONIN (PCT): CPT | Performed by: NURSE PRACTITIONER

## 2022-01-12 PROCEDURE — 70551 MRI BRAIN STEM W/O DYE: CPT

## 2022-01-12 PROCEDURE — 71045 X-RAY EXAM CHEST 1 VIEW: CPT

## 2022-01-12 PROCEDURE — 82947 ASSAY GLUCOSE BLOOD QUANT: CPT

## 2022-01-12 PROCEDURE — 74176 CT ABD & PELVIS W/O CONTRAST: CPT

## 2022-01-12 PROCEDURE — 99285 EMERGENCY DEPT VISIT HI MDM: CPT

## 2022-01-12 PROCEDURE — 70450 CT HEAD/BRAIN W/O DYE: CPT

## 2022-01-12 RX ORDER — SODIUM CHLORIDE 9 MG/ML
75 INJECTION, SOLUTION INTRAVENOUS CONTINUOUS
Status: ACTIVE | OUTPATIENT
Start: 2022-01-12 | End: 2022-01-13

## 2022-01-12 RX ADMIN — SODIUM CHLORIDE 1 G: 900 INJECTION INTRAVENOUS at 23:58

## 2022-01-12 RX ADMIN — SODIUM CHLORIDE 1000 ML: 9 INJECTION, SOLUTION INTRAVENOUS at 21:33

## 2022-01-12 RX ADMIN — SODIUM CHLORIDE 75 ML/HR: 9 INJECTION, SOLUTION INTRAVENOUS at 23:53

## 2022-01-13 ENCOUNTER — APPOINTMENT (OUTPATIENT)
Dept: NEUROLOGY | Facility: HOSPITAL | Age: 87
End: 2022-01-13

## 2022-01-13 PROBLEM — I95.9 HYPOTENSION: Status: ACTIVE | Noted: 2022-01-13

## 2022-01-13 PROBLEM — E87.20 LACTIC ACIDOSIS: Status: ACTIVE | Noted: 2022-01-13

## 2022-01-13 PROBLEM — E83.52 HYPERCALCEMIA: Status: ACTIVE | Noted: 2022-01-13

## 2022-01-13 LAB
AMPHET+METHAMPHET UR QL: NEGATIVE
AMPHETAMINES UR QL: NEGATIVE
ANION GAP SERPL CALCULATED.3IONS-SCNC: 11 MMOL/L (ref 5–15)
BARBITURATES UR QL SCN: NEGATIVE
BASOPHILS # BLD AUTO: 0.1 10*3/MM3 (ref 0–0.2)
BASOPHILS NFR BLD AUTO: 1.4 % (ref 0–1.5)
BENZODIAZ UR QL SCN: NEGATIVE
BUN SERPL-MCNC: 9 MG/DL (ref 8–23)
BUN/CREAT SERPL: 10.5 (ref 7–25)
BUPRENORPHINE SERPL-MCNC: NEGATIVE NG/ML
CALCIUM SPEC-SCNC: 9.1 MG/DL (ref 8.2–9.6)
CANNABINOIDS SERPL QL: NEGATIVE
CHLORIDE SERPL-SCNC: 108 MMOL/L (ref 98–107)
CO2 SERPL-SCNC: 23 MMOL/L (ref 22–29)
COCAINE UR QL: NEGATIVE
CREAT SERPL-MCNC: 0.86 MG/DL (ref 0.76–1.27)
D-LACTATE SERPL-SCNC: 1.9 MMOL/L (ref 0.5–2)
DEPRECATED RDW RBC AUTO: 47.6 FL (ref 37–54)
EOSINOPHIL # BLD AUTO: 0.55 10*3/MM3 (ref 0–0.4)
EOSINOPHIL NFR BLD AUTO: 7.8 % (ref 0.3–6.2)
ERYTHROCYTE [DISTWIDTH] IN BLOOD BY AUTOMATED COUNT: 14.9 % (ref 12.3–15.4)
GFR SERPL CREATININE-BSD FRML MDRD: 84 ML/MIN/1.73
GLUCOSE BLDC GLUCOMTR-MCNC: 105 MG/DL (ref 70–130)
GLUCOSE BLDC GLUCOMTR-MCNC: 117 MG/DL (ref 70–130)
GLUCOSE BLDC GLUCOMTR-MCNC: 119 MG/DL (ref 70–130)
GLUCOSE BLDC GLUCOMTR-MCNC: 130 MG/DL (ref 70–130)
GLUCOSE SERPL-MCNC: 125 MG/DL (ref 65–99)
HCT VFR BLD AUTO: 37.6 % (ref 37.5–51)
HGB BLD-MCNC: 12.3 G/DL (ref 13–17.7)
IMM GRANULOCYTES # BLD AUTO: 0.02 10*3/MM3 (ref 0–0.05)
IMM GRANULOCYTES NFR BLD AUTO: 0.3 % (ref 0–0.5)
LYMPHOCYTES # BLD AUTO: 3.07 10*3/MM3 (ref 0.7–3.1)
LYMPHOCYTES NFR BLD AUTO: 43.4 % (ref 19.6–45.3)
MAGNESIUM SERPL-MCNC: 1.8 MG/DL (ref 1.6–2.4)
MCH RBC QN AUTO: 28.3 PG (ref 26.6–33)
MCHC RBC AUTO-ENTMCNC: 32.7 G/DL (ref 31.5–35.7)
MCV RBC AUTO: 86.4 FL (ref 79–97)
METHADONE UR QL SCN: NEGATIVE
MONOCYTES # BLD AUTO: 0.41 10*3/MM3 (ref 0.1–0.9)
MONOCYTES NFR BLD AUTO: 5.8 % (ref 5–12)
NEUTROPHILS NFR BLD AUTO: 2.92 10*3/MM3 (ref 1.7–7)
NEUTROPHILS NFR BLD AUTO: 41.3 % (ref 42.7–76)
NRBC BLD AUTO-RTO: 0 /100 WBC (ref 0–0.2)
OPIATES UR QL: NEGATIVE
OXYCODONE UR QL SCN: NEGATIVE
PCP UR QL SCN: NEGATIVE
PLATELET # BLD AUTO: 192 10*3/MM3 (ref 140–450)
PMV BLD AUTO: 10.7 FL (ref 6–12)
POTASSIUM SERPL-SCNC: 4 MMOL/L (ref 3.5–5.2)
PROCALCITONIN SERPL-MCNC: 0.1 NG/ML (ref 0–0.25)
PROPOXYPH UR QL: NEGATIVE
RBC # BLD AUTO: 4.35 10*6/MM3 (ref 4.14–5.8)
SODIUM SERPL-SCNC: 142 MMOL/L (ref 136–145)
TRICYCLICS UR QL SCN: POSITIVE
TROPONIN T SERPL-MCNC: 0.03 NG/ML (ref 0–0.03)
TSH SERPL DL<=0.05 MIU/L-ACNC: 3.72 UIU/ML (ref 0.27–4.2)
WBC NRBC COR # BLD: 7.07 10*3/MM3 (ref 3.4–10.8)

## 2022-01-13 PROCEDURE — 84484 ASSAY OF TROPONIN QUANT: CPT | Performed by: INTERNAL MEDICINE

## 2022-01-13 PROCEDURE — 99225 PR SBSQ OBSERVATION CARE/DAY 25 MINUTES: CPT | Performed by: PSYCHIATRY & NEUROLOGY

## 2022-01-13 PROCEDURE — 95819 EEG AWAKE AND ASLEEP: CPT

## 2022-01-13 PROCEDURE — 80048 BASIC METABOLIC PNL TOTAL CA: CPT | Performed by: NURSE PRACTITIONER

## 2022-01-13 PROCEDURE — G0378 HOSPITAL OBSERVATION PER HR: HCPCS

## 2022-01-13 PROCEDURE — 85025 COMPLETE CBC W/AUTO DIFF WBC: CPT | Performed by: NURSE PRACTITIONER

## 2022-01-13 PROCEDURE — 82962 GLUCOSE BLOOD TEST: CPT

## 2022-01-13 PROCEDURE — 25010000002 CEFTRIAXONE PER 250 MG: Performed by: NURSE PRACTITIONER

## 2022-01-13 PROCEDURE — 80306 DRUG TEST PRSMV INSTRMNT: CPT | Performed by: NURSE PRACTITIONER

## 2022-01-13 PROCEDURE — 99220 PR INITIAL OBSERVATION CARE/DAY 70 MINUTES: CPT | Performed by: INTERNAL MEDICINE

## 2022-01-13 PROCEDURE — 83735 ASSAY OF MAGNESIUM: CPT | Performed by: NURSE PRACTITIONER

## 2022-01-13 RX ORDER — MEMANTINE HYDROCHLORIDE 10 MG/1
10 TABLET ORAL EVERY 12 HOURS SCHEDULED
Status: DISCONTINUED | OUTPATIENT
Start: 2022-01-13 | End: 2022-01-14 | Stop reason: HOSPADM

## 2022-01-13 RX ORDER — SODIUM CHLORIDE 0.9 % (FLUSH) 0.9 %
10 SYRINGE (ML) INJECTION AS NEEDED
Status: DISCONTINUED | OUTPATIENT
Start: 2022-01-13 | End: 2022-01-14 | Stop reason: HOSPADM

## 2022-01-13 RX ORDER — ACETAMINOPHEN 650 MG/1
650 SUPPOSITORY RECTAL EVERY 4 HOURS PRN
Status: DISCONTINUED | OUTPATIENT
Start: 2022-01-13 | End: 2022-01-14 | Stop reason: HOSPADM

## 2022-01-13 RX ORDER — LEVOTHYROXINE SODIUM 0.07 MG/1
75 TABLET ORAL
Status: DISCONTINUED | OUTPATIENT
Start: 2022-01-14 | End: 2022-01-14 | Stop reason: HOSPADM

## 2022-01-13 RX ORDER — ACETAMINOPHEN 325 MG/1
650 TABLET ORAL EVERY 4 HOURS PRN
Status: DISCONTINUED | OUTPATIENT
Start: 2022-01-13 | End: 2022-01-14 | Stop reason: HOSPADM

## 2022-01-13 RX ORDER — ACETAMINOPHEN 160 MG/5ML
650 SOLUTION ORAL EVERY 4 HOURS PRN
Status: DISCONTINUED | OUTPATIENT
Start: 2022-01-13 | End: 2022-01-14 | Stop reason: HOSPADM

## 2022-01-13 RX ORDER — ONDANSETRON 4 MG/1
4 TABLET, FILM COATED ORAL EVERY 6 HOURS PRN
Status: DISCONTINUED | OUTPATIENT
Start: 2022-01-13 | End: 2022-01-14 | Stop reason: HOSPADM

## 2022-01-13 RX ORDER — CHOLECALCIFEROL (VITAMIN D3) 125 MCG
5 CAPSULE ORAL NIGHTLY
Status: DISCONTINUED | OUTPATIENT
Start: 2022-01-13 | End: 2022-01-14 | Stop reason: HOSPADM

## 2022-01-13 RX ORDER — DEXTROSE MONOHYDRATE 25 G/50ML
25 INJECTION, SOLUTION INTRAVENOUS
Status: DISCONTINUED | OUTPATIENT
Start: 2022-01-13 | End: 2022-01-14 | Stop reason: HOSPADM

## 2022-01-13 RX ORDER — ONDANSETRON 2 MG/ML
4 INJECTION INTRAMUSCULAR; INTRAVENOUS EVERY 6 HOURS PRN
Status: DISCONTINUED | OUTPATIENT
Start: 2022-01-13 | End: 2022-01-14 | Stop reason: HOSPADM

## 2022-01-13 RX ORDER — NICOTINE POLACRILEX 4 MG
15 LOZENGE BUCCAL
Status: DISCONTINUED | OUTPATIENT
Start: 2022-01-13 | End: 2022-01-14 | Stop reason: HOSPADM

## 2022-01-13 RX ORDER — QUETIAPINE FUMARATE 25 MG/1
50 TABLET, FILM COATED ORAL NIGHTLY PRN
Status: DISCONTINUED | OUTPATIENT
Start: 2022-01-13 | End: 2022-01-14 | Stop reason: HOSPADM

## 2022-01-13 RX ORDER — RIVASTIGMINE 9.5 MG/24H
1 PATCH, EXTENDED RELEASE TRANSDERMAL DAILY
Status: DISCONTINUED | OUTPATIENT
Start: 2022-01-13 | End: 2022-01-14 | Stop reason: HOSPADM

## 2022-01-13 RX ORDER — SODIUM CHLORIDE 0.9 % (FLUSH) 0.9 %
10 SYRINGE (ML) INJECTION EVERY 12 HOURS SCHEDULED
Status: DISCONTINUED | OUTPATIENT
Start: 2022-01-13 | End: 2022-01-14 | Stop reason: HOSPADM

## 2022-01-13 RX ADMIN — Medication 10 ML: at 21:34

## 2022-01-13 RX ADMIN — RIVASTIGMINE 1 PATCH: 9.5 PATCH TRANSDERMAL at 21:33

## 2022-01-13 RX ADMIN — Medication 10 ML: at 09:11

## 2022-01-13 RX ADMIN — MEMANTINE 10 MG: 10 TABLET ORAL at 21:33

## 2022-01-13 RX ADMIN — QUETIAPINE FUMARATE 50 MG: 25 TABLET ORAL at 21:45

## 2022-01-13 RX ADMIN — Medication 5 MG: at 21:33

## 2022-01-13 NOTE — ED PROVIDER NOTES
Subjective   Mr. Acuña is brought by EMS with lethargy.  Family reported this morning that he was lethargic.  He required assistance with moving him.  This has worsened throughout the day.  Last known well was yesterday.  He denies headache or having pain anywhere.  He tells me he feels lightheaded.  EMS reports an initial systolic blood pressure of 90.  He is observed to cough on exam.  Family reports that last time he behaved like this he had a bad urinary tract infection.  EMS bolused fluids in route.      History provided by:  Patient, medical records and EMS personnel  Altered Mental Status  Presenting symptoms: lethargy and unresponsiveness    Severity:  Moderate  Episode history:  Single  Timing:  Constant  Progression:  Unchanged  Chronicity:  New  Context: dementia    Associated symptoms: weakness    Associated symptoms: no abdominal pain, no difficulty breathing, no fever and no headaches        Review of Systems   Unable to perform ROS: Mental status change   Constitutional: Negative for fever.   Gastrointestinal: Negative for abdominal pain.   Neurological: Positive for weakness. Negative for headaches.       Past Medical History:   Diagnosis Date   • Dementia (HCC)    • Diabetes (HCC)    • H/O blood clots    • Hypertensive disorder    • Hypothyroidism    • Osteoarthritis    • Type 2 diabetes mellitus, uncontrolled (HCC)        Allergies   Allergen Reactions   • Contrast Dye Anaphylaxis       Past Surgical History:   Procedure Laterality Date   • BUNIONECTOMY     • HIP TROCHANTERIC NAILING WITH INTRAMEDULLARY HIP SCREW N/A 6/9/2020    Procedure: ADVANCED TROCHANTERIC FEMORAL NAIL (ATFN) RIGHT HIP/FEMUR;  Surgeon: Mejia Meza MD;  Location: [facility];  Service: Orthopedics;  Laterality: N/A;   • RECTAL FISTULECTOMY     • SKIN CANCER EXCISION         Family History   Problem Relation Age of Onset   • Stroke Mother    • Heart attack Father        Social History     Socioeconomic History   •  Marital status:    Tobacco Use   • Smoking status: Former Smoker     Packs/day: 2.00   • Smokeless tobacco: Never Used   Vaping Use   • Vaping Use: Never used   Substance and Sexual Activity   • Alcohol use: Yes     Alcohol/week: 1.0 standard drink     Types: 1 Glasses of wine per week   • Drug use: No   • Sexual activity: Defer           Objective   Physical Exam  Vitals and nursing note reviewed.   Constitutional:       Comments: He is lying with his eyes closed.  He does not engage in conversation.  If I speak loudly into his ear I can get him to answer a few simple questions.  He appears to doze off back to sleep during questioning however.  He is not in any distress or discomfort   HENT:      Head: Normocephalic and atraumatic.      Mouth/Throat:      Mouth: Mucous membranes are moist.      Pharynx: Oropharynx is clear.   Eyes:      Pupils: Pupils are equal, round, and reactive to light.   Neck:      Comments: No JVD  Cardiovascular:      Rate and Rhythm: Normal rate and regular rhythm.      Heart sounds: No murmur heard.  No friction rub.   Pulmonary:      Effort: Pulmonary effort is normal.      Breath sounds: Normal breath sounds. No wheezing or rales.   Abdominal:      Tenderness: There is no abdominal tenderness. There is no guarding.      Comments: His abdomen is firm   Musculoskeletal:         General: No swelling or tenderness.      Cervical back: Normal range of motion and neck supple.   Skin:     General: Skin is warm and dry.      Capillary Refill: Capillary refill takes less than 2 seconds.      Coloration: Skin is not pale.   Neurological:      Mental Status: He is lethargic.      Comments: He is lethargic.  He does have muscle tone in all extremities.  I noted him to move all extremities at times although I cannot get him to follow specific commands for testing   Psychiatric:         Behavior: Behavior normal.         Procedures           ED Course  ED Course as of 01/13/22 0012   Wed Jan  "12, 2022 2021 I saw Mr. Acuña emergently on arrival in the CT scan.  I interviewed paramedics.  I reviewed his CT scan as it was being done.  He is not a candidate for IV tPA as last known well was yesterday and additionally he is on Xarelto.  He is lethargic.  Abdomen is firm although he denies tenderness.  Family reports he cannot tolerate IV contrast.  We will obtain CT of his abdomen and pelvis while he is in the CT scanner.  Will obtain cath urine and COVID swab as well. [DT]   2236 His wife is present.  I obtained further history from her.  She tells me he has not been sick lately.  She tells me he has been \"unresponsive\" all day long.  She tells me that he did open his eyes a little bit prior to going to MRI but is still not at baseline.  Will seek admission to the hospital for further evaluation.  The stroke navigator has ordered MRI and he just went over for that. [DT]      ED Course User Index  [DT] Eliel Brown MD                                                 MDM  Number of Diagnoses or Management Options  Hypotension, unspecified hypotension type: new and requires workup  Somnolence: new and requires workup     Amount and/or Complexity of Data Reviewed  Clinical lab tests: ordered and reviewed  Tests in the radiology section of CPT®: ordered and reviewed  Obtain history from someone other than the patient: yes  Review and summarize past medical records: yes  Discuss the patient with other providers: yes  Independent visualization of images, tracings, or specimens: yes        Final diagnoses:   Somnolence   Hypotension, unspecified hypotension type       ED Disposition  ED Disposition     ED Disposition Condition Comment    Decision to Admit  Level of Care: Telemetry [5]   Diagnosis: Somnolence [359480]   Admitting Physician: LIZA BRENNER [[id number]]   Attending Physician: LIZA BRENNER [[id number]]            No follow-up provider specified.       Medication List      No changes were made to your " prescriptions during this visit.          Eliel Brown MD  01/13/22 0012

## 2022-01-13 NOTE — H&P
"    [facility] Medicine Services  HISTORY AND PHYSICAL    Patient Name: Marcos Acuña  : 1931  MRN: [id number]  Primary Care Physician: Abdirashid Mccabe MD  Date of admission: 2022      Subjective   Subjective     Chief Complaint:    \"unresponsive\"    HPI:  Marcos Acuña is a 89 y.o. male  Who resides with his wife at [facility] with 24 hour caregivers who is here when wife felt he was not as responsive.  Pt has hx of dementia but wife feels this is not his normal.  Notes he had seroquel increased from 50 qhs to 100 qhs for increased agitation at night however this was in November and no recent changes.  Pt not able to get to meds as he is wheelchair bound so wife not concerned that he may have taken extra.  Wife denies fever/chills/n/v/d/c.  Has had fairly good po intake.  On EMS arrival, pt w/ SBP in 90's but improved w/ NS.  No cough or apparent dyspnea.   Wife does note that pt has not been sleeping good for past couple of nights until arrival in ER.     COVID Details:    Symptoms:    [x] NONE [] Fever []  Cough [] Shortness of breath [] Change in taste/smell      Review of Systems      All other systems reviewed and are negative.     Personal History     Past Medical History:   Diagnosis Date   • Dementia (HCC)    • Diabetes (HCC)    • H/O blood clots    • Hypertensive disorder    • Hypothyroidism    • Osteoarthritis    • Type 2 diabetes mellitus, uncontrolled (HCC)        Past Surgical History:   Procedure Laterality Date   • BUNIONECTOMY     • HIP TROCHANTERIC NAILING WITH INTRAMEDULLARY HIP SCREW N/A 2020    Procedure: ADVANCED TROCHANTERIC FEMORAL NAIL (ATFN) RIGHT HIP/FEMUR;  Surgeon: Mejia Meza MD;  Location: [facility];  Service: Orthopedics;  Laterality: N/A;   • RECTAL FISTULECTOMY     • SKIN CANCER EXCISION         Family History:   family history includes Heart attack in his father; Stroke in his mother. Otherwise pertinent FHx was reviewed and " unremarkable.     Social History:  reports that he has quit smoking. He smoked 2.00 packs per day. He has never used smokeless tobacco. He reports current alcohol use of about 1.0 standard drink of alcohol per week. He reports that he does not use drugs.  Social History     Social History Narrative   • Not on file       Medications:  Available home medication information reviewed.  (Not in a hospital admission)      Allergies   Allergen Reactions   • Contrast Dye Anaphylaxis       Objective   Objective     Vital Signs:   Temp:  [98 °F (36.7 °C)] 98 °F (36.7 °C)  Heart Rate:  [69-80] 70  Resp:  [16] 16  BP: (121-140)/() 123/76  Total (NIH Stroke Scale): 18    Physical Exam    gen; asleep (wife notes he just fell asleep but had not previously been interactive other than holding her hand)  Heent; eyes closed, pasty mm  Cv; rrr, no mrg  l ;dec bs's bll  Abd; tense, abd breathing, palpable midline hernia reducible  Ext; cool to touch; palpable pulses, ble edema  Skin; cdi, cool  Neuro; grossly intact  Psych; lethargic    Result Review:  I have personally reviewed the results from the time of this admission to 1/13/2022 01:55 EST and agree with these findings:  [x]  Laboratory  []  Microbiology  [x]  Radiology  []  EKG/Telemetry   []  Cardiology/Vascular   []  Pathology  [x]  Old records  []  Other:  Most notable findings include:    Trop 0.031  Ca 9.7  Lactate 2.1-->1.9  uds + tca  Cxr; atelectasis  ua negative  MRI brain; no recent infarct.  FT dementia findings.  Sinus dz.       LAB RESULTS:      Lab 01/12/22 2354 01/12/22 2124 01/12/22 2031 01/12/22 2017   WBC  --  6.38  --   --    HEMOGLOBIN  --  13.0  --   --    HEMOGLOBIN, POC  --   --   --  12.9   HEMATOCRIT  --  39.4  --   --    HEMATOCRIT POC  --   --   --  38   PLATELETS  --  199  --   --    NEUTROS ABS  --  2.47  --   --    IMMATURE GRANS (ABS)  --  0.01  --   --    LYMPHS ABS  --  2.89  --   --    MONOS ABS  --  0.39  --   --    EOS ABS  --  0.52*   --   --    MCV  --  86.2  --   --    LACTATE 1.9  --  2.1*  --          Lab 01/12/22 2031 01/12/22 2017   SODIUM 144  --    POTASSIUM 4.4  --    CHLORIDE 106  --    CO2 28.0  --    ANION GAP 10.0  --    BUN 8  --    CREATININE 0.85 0.80   GLUCOSE 129*  --    CALCIUM 9.7*  --          Lab 01/12/22 2031   TOTAL PROTEIN 6.7   ALBUMIN 3.50   GLOBULIN 3.2   ALT (SGPT) 12   AST (SGOT) 20   BILIRUBIN 0.6   ALK PHOS 109         Lab 01/12/22 2031   TROPONIN T 0.031*                 Lab 01/12/22 2017   PH, ARTERIAL 7.36     UA    Urinalysis 1/12/22   Specific Gravity, UA <=1.005   Ketones, UA Negative   Blood, UA Negative   Leukocytes, UA Negative   Nitrite, UA Negative             Microbiology Results (last 10 days)     Procedure Component Value - Date/Time    COVID PRE-OP / PRE-PROCEDURE SCREENING ORDER (NO ISOLATION) - Swab, Nasopharynx [[id number]]  (Normal) Collected: 01/12/22 2029    Lab Status: Final result Specimen: Swab from Nasopharynx Updated: 01/12/22 2142    Narrative:      The following orders were created for panel order COVID PRE-OP / PRE-PROCEDURE SCREENING ORDER (NO ISOLATION) - Swab, Nasopharynx.  Procedure                               Abnormality         Status                     ---------                               -----------         ------                     COVID-19, FLU A/B, RSV P...[[id number]]  Normal              Final result                 Please view results for these tests on the individual orders.    COVID-19, FLU A/B, RSV PCR - Swab, Nasopharynx [[id number]]  (Normal) Collected: 01/12/22 2029    Lab Status: Final result Specimen: Swab from Nasopharynx Updated: 01/12/22 2142     COVID19 Not Detected     Influenza A PCR Not Detected     Influenza B PCR Not Detected     RSV, PCR Not Detected          CT Abdomen Pelvis Without Contrast    Result Date: 1/12/2022  CT Abdomen Pelvis WO INDICATION: Abdominal pain and pelvic pain today. TECHNIQUE: CT of the abdomen and pelvis without IV  contrast. Coronal and sagittal reconstructions were obtained.  Radiation dose reduction techniques included automated exposure control or exposure modulation based on body size. Count of known CT and cardiac nuc med studies performed in previous 12 months: 0. COMPARISON: None available. FINDINGS: Visualized lung bases are unremarkable. Abdomen: The liver is within normal limits. The spleen and pancreas are normal. The gallbladder is normal. Both adrenal glands are normal. Large 6.4 cm left renal cyst. 1 cm nonobstructing left intrarenal stone. No obstructing ureteral stones or hydronephrosis. Abdominal aorta normal in course and caliber. Small bowel is unremarkable without obstruction. Appendix difficult to clearly visualized. No pericecal inflammation. Uncomplicated colonic diverticulosis. Moderate stool burden. No free fluid or free air. Pelvis: The urinary bladder is unremarkable.  The prostate gland is unremarkable. No free pelvic fluid. No acute osseous abnormality. Chronic appearing L1 compression deformity. ORIF of the right hip.     Impression: 1. No acute abdominal or pelvic findings. 2. Nonobstructing left intrarenal stone. 3. Appendix not clearly visualized. No pericecal inflammation. 4. Uncomplicated colonic diverticulosis. Moderate stool burden. Signer Name: Thong Mccarthy MD  Signed: 1/12/2022 8:37 PM  Workstation Name: [email or web address]  [facility]    MRI Brain Without Contrast    Result Date: 1/12/2022  MRI Brain WO INDICATION: Neurologic deficit with slurred speech lethargy altered mental status. Evaluate for CVA TECHNIQUE: MRI of the brain without IV contrast. COMPARISON:  Earlier noncontrast head CT same day. FINDINGS: There is no abnormally restricted diffusion. No recent infarct is suspected. There is no extra-axial fluid collection. There is atrophy with disproportionate volume loss in the frontal and temporal lobes. Please correlate for clinical evidence of a frontotemporal  dementia. There is a small amount of fluid or inflammatory change in the mastoid air cells bilaterally. There has been cataract surgery bilaterally. There is opacification of majority of the left sphenoid sinus with an air-fluid level and otherwise mild paranasal sinus mucosal thickening. Please correlate for clinical evidence of acute sinusitis. The major arterial intracranial flow voids are maintained. There is mild white matter signal abnormality that is nonspecific but likely due to small vessel disease. There is no intracranial mass affect. Bone marrow signal intensity is suboptimally evaluated due to the technical factors for the T1 acquisition of the sagittal T1-weighted imaging. There is no MRI evidence for intracranial hemorrhage.     Impression: 1. No evidence for recent infarct. 2. Atrophy with disproportionate volume loss in the frontal and temporal lobes suggestive of frontotemporal type dementia. Please correlate with clinical findings. 3. Mild probable sequelae of small vessel disease. Number for paranasal sinus disease including air-fluid level in the left sphenoid sinus. Please correlate for clinical evidence of acute sinusitis. Also small amount of fluid or inflammatory change in the mastoid air cells bilaterally. Signer Name: Anika Ramírez MD  Signed: 1/12/2022 10:45 PM  Workstation Name: [email or web address]  [facility] [place]    XR Chest 1 View    Result Date: 1/12/2022  CR Chest 1 Vw INDICATION: Altered mental status COMPARISON:  Chest x-ray 12/9/2020. FINDINGS: Portable AP view(s) of the chest.  Lung volumes are low and diminished on comparison to prior. There is associated airspace disease at the left greater than right lung base which may be atelectasis. 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 No pneumothorax or acute congestive heart failure. No obvious pleural effusion.     Impression: Low lung volumes with bibasilar airspace disease most likely representing atelectasis.  Follow-up to ensure resolution. Signer Name: Anika Ramírez MD  Signed: 1/12/2022 9:41 PM  Workstation Name: [facility]  [facility]iology [facility]    CT Head Without Contrast Stroke Protocol    Result Date: 1/12/2022  CT Head WO Code Stroke HISTORY: Altered mental status today. TECHNIQUE: Routine noncontrast head CT. Coronal reformatted images. Radiation dose reduction techniques included automated exposure control or exposure modulation based on body size. Count of known CT and cardiac nuc med studies performed in previous 12 months: 0. COMPARISON: CT head 12/9/2020 FINDINGS: No hemorrhage, acute infarction, mass lesion, or abnormal extra-axial fluid collection. No midline shift or focal mass effect. Ventricular system is normal in size and configuration. Moderate generalized atrophy and chronic small vessel disease throughout the supratentorial white matter. No acute osseous abnormality. Chronic near complete opacification of the left sphenoid sinus. Partial left mastoid effusion.     Impression: 1. No acute intracranial abnormality. Age-related senescent changes. 2. Chronic near complete opacification of the left sphenoid sinus. 3. Partial left mastoid effusion. The examination was performed at 8:13 PM 1/12/2022 and critical results were called by me to the ED treatment team by telephone through the CT technologist, Keshia, at 1/12/2022 8:30 PM. Signer Name: Thong Mccarthy MD  Signed: 1/12/2022 8:33 PM  Workstation Name: [email or web address]  [facility]      Results for orders placed during the hospital encounter of 09/09/20    Adult Transthoracic Echo Complete W/ Cont if Necessary Per Protocol    Interpretation Summary  · There is mild calcification of the aortic valve mainly affecting the non, left and right coronary cusp(s).  · Peak velocity of the flow distal to the aortic valve is 217.2 cm/s.  · Aortic valve maximum pressure gradient is 18.9 mmHg.  · Aortic valve area is 1.1 cm2.  · Mild  aortic valve stenosis is present.  · Aortic valve mean pressure gradient is 10.7 mmHg.  · Estimated EF = 60%.  · Left ventricular systolic function is normal.      Assessment/Plan   Assessment & Plan     Active Hospital Problems    Diagnosis  POA   • Hypercalcemia [E83.52]  Yes   • Lactic acidosis [E87.2]  Yes   • Hypotension [I95.9]  Yes   • Altered mental status [R41.82]  Yes   • Elevated troponin [R77.8]  Yes   • Dementia (HCC) [F03.90]  Yes     89 y/o male w/ hx of dementia here w/ lethargy per wife;  1. Mental status change;  -pt has dementia at baseline but wife notes this is different  -ua negative  -uds only positive for tca  -wife distributes meds and no changes  -hct/mri unremarkable other than ?sinusitis which we will treat  -stroke shamar  Has seen and plans EEG in a.m. to r/o sz  2. Lactic acidosis;  -has resolved w/ ivf's; cont NS  -wife notes poor po intake  3. Hypotension;  -resolved w/ ivf's; cont NS  4. Hypercalcemia, mild;  -do  Not expect this is etiology of above and suspect will resolve w/ IVF's  5. Elevated troponin;  -unable to assess sx  -trend troponin but no aggressive evaluation      DVT prophylaxis:   eliquis    CODE STATUS:     Code Status and Medical Interventions:   Ordered at: 01/12/22 4390     Medical Intervention Limits:    NO intubation (DNI)     Code Status (Patient has no pulse and is not breathing):    No CPR (Do Not Attempt to Resuscitate)     Medical Interventions (Patient has pulse or is breathing):    Limited Support       Admission Status:  I believe this patient meets  OBSERVATION status, however if further evaluation or treatment plans warrant, status may change.  Based upon current information, I predict patient's care encounter to be less than or equal to 2 midnights.  Electronically signed by Jill Pham MD, 01/13/22, 2:01 AM TAMICA Pham MD  01/13/22

## 2022-01-13 NOTE — CONSULTS
Stroke Consult Note    Patient Name: Marcos Acuña   MRN: [id number]  Age: 89 y.o.  Sex: male  : 1931    Primary Care Physician: Abdirashid Mccabe MD  Referring Physician:  No ref. provider found    TIME STROKE TEAM CALLED:  EST   TIME PATIENT SEEN:  on arrival    Handedness: Unknown  Race:       Chief Complaint/Reason for Consultation: Lethargy, Decreased Responsiveness    Subjective .  HPI:    Mr. Marcos Acuña is a 89-year-old  male with past medical history significant for dementia, uncontrolled type 2 diabetes, hypertension, hyperlipidemia, hypothyroidism, DVT with chronic anticoagulation (Eliquis vs. Xarelto) and osteoarthritis who lives with his wife in an independent living facility here in [place].  Per EMS report the patient had a last known well of 10 PM. This morning the patient was found with decreased responsiveness as well as needing significant assistance with ADLs which is outside of his baseline. He needed lift assistance to put him in a wheelchair this morning which is also outside of his baseline.  He is unable to provide any type of the history and there is currently no family present at the bedside.  His past medical history is obtained from review of his medical record.    Upon presentation to the CT scanner the patient is noted to have an NIHSS of 18.  Blood pressure is 146/81. He is lethargic and is disoriented. He is mostly nonparticipatory in an exam, is not following commands and can only answer minimal questions.  He does have pain response in all 4 limbs.  Pupils are equal and reactive bilaterally. He denies any pain, but states that he feels lightheaded. Patient will not open eyes to assess for EOMs.  Unfortunately due to his last known well he is not a candidate for tPA patient also has documented history of anticoagulation.  Noncontrasted CT of the head was unremarkable for any acute infarct or hemorrhage.  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  MRI of the brain without contrast ordered.  Patient will be worked up in the emergency department for any potential infectious or metabolic causes of this acute lethargy.  Pending results plan of care will be developed for either discharge from the emergency department or admission to the hospital medicine service. Stroke neurology will continue to follow at this time.    Last Known Normal Date/Time: 2200 1/11/22 EST     Review of Systems   Unable to perform ROS: Mental status change      Past Medical History:   Diagnosis Date   • Dementia (HCC)    • Diabetes (HCC)    • H/O blood clots    • Hypertensive disorder    • Hypothyroidism    • Osteoarthritis    • Type 2 diabetes mellitus, uncontrolled (HCC)      Past Surgical History:   Procedure Laterality Date   • BUNIONECTOMY     • HIP TROCHANTERIC NAILING WITH INTRAMEDULLARY HIP SCREW N/A 6/9/2020    Procedure: ADVANCED TROCHANTERIC FEMORAL NAIL (ATFN) RIGHT HIP/FEMUR;  Surgeon: Mejia Meza MD;  Location[facility];  Service: Orthopedics;  Laterality: N/A;   • RECTAL FISTULECTOMY     • SKIN CANCER EXCISION       Family History   Problem Relation Age of Onset   • Stroke Mother    • Heart attack Father      Social History     Socioeconomic History   • Marital status:    Tobacco Use   • Smoking status: Former Smoker     Packs/day: 2.00   • Smokeless tobacco: Never Used   Vaping Use   • Vaping Use: Never used   Substance and Sexual Activity   • Alcohol use: Yes     Alcohol/week: 1.0 standard drink     Types: 1 Glasses of wine per week   • Drug use: No   • Sexual activity: Defer     Allergies   Allergen Reactions   • Contrast Dye Unknown - Low Severity     Prior to Admission medications    Medication Sig Start Date End Date Taking? Authorizing Provider   Accu-Chek FastClix Lancets misc TEST BLOOD SUGAR EVERY DAY 3/18/21   Mitch Olson MD   Accu-Chek Guide test strip  "TEST BLOOD SUGAR EVERY DAY 3/18/21   Mitch Olson MD   acetaminophen (TYLENOL) 325 MG tablet Take 2 tablets by mouth Every 4 (Four) Hours As Needed for Mild Pain . 6/13/20   Stefanie Spicer DO   Alcohol Swabs (B-D SINGLE USE SWABS REGULAR) pads USE AS DIRECTED TO TEST BLOOD GLUCOSE DAILY 12/9/21   Mitch Olson MD   apixaban (ELIQUIS) 2.5 MG tablet tablet Take 1 tablet by mouth Every 12 (Twelve) Hours. Indications: Prophylaxis of Venous Thromboembolism 6/13/20   Stefanie Spicer DO   B-D 3CC LUER-MAMI SYR 25GX1\" 25G X 1\" 3 ML misc  12/28/21   Khushbu Orellana MD   calcium carbonate (TUMS) 500 MG chewable tablet Chew 1 tablet Daily As Needed.    Khushbu Orellana MD   cefdinir (OMNICEF) 300 MG capsule Take 300 mg by mouth.    Khsuhbu Orellana MD   cyanocobalamin 1000 MCG/ML injection Inject 1,000 mcg into the appropriate muscle as directed by prescriber Every 28 (Twenty-Eight) Days.    Khushbu Orellana MD   docusate sodium 100 MG capsule Take 100 mg by mouth 2 (Two) Times a Day As Needed (constipation). 6/13/20   Stefanie Spicer DO   EXELON 9.5 MG/24HR patch Place 1 patch on the skin as directed by provider Daily. 7/26/19   Khushbu Orellana MD   ipratropium (ATROVENT) 0.06 % nasal spray 2 sprays into the nostril(s) as directed by provider 2 (two) times a day. 12/7/18   Khushbu Orellana MD   levocetirizine (XYZAL) 5 MG tablet Take 1 tablet by mouth Every Evening. 12/29/21   Mitch Olson MD   levothyroxine (SYNTHROID, LEVOTHROID) 75 MCG tablet TAKE 1 TABLET EVERY DAY 10/13/21   Mitch Olson MD   losartan (COZAAR) 50 MG tablet Take 50 mg by mouth Daily.    Khushbu Orellana MD   memantine (NAMENDA XR) 28 MG capsule sustained-release 24 hr extended release capsule  11/23/21   Khushbu Orellana MD   metFORMIN (GLUCOPHAGE) 500 MG tablet Take 1 tablet by mouth 2 (Two) Times a Day With Meals. 6/29/21   Mitch Olson MD   Mirabegron ER " (Myrbetriq) 50 MG tablet sustained-release 24 hour 24 hr tablet Take 50 mg by mouth Daily. 12/29/21   Mitch Olson MD   promethazine (PHENERGAN) 12.5 MG tablet Take 1 tablet by mouth Every 6 (Six) Hours As Needed for Nausea or Vomiting. 6/13/20   Stefanie Spicer DO   QUEtiapine (SEROquel) 25 MG tablet  12/6/21   Khushbu Orellana MD   Spacer/Aero-Holding Chambers (Compact Space Chamber) device See Admin Instructions. 11/10/21   Khushbu Orellana MD   tamsulosin (FLOMAX) 0.4 MG capsule 24 hr capsule TAKE 1 CAPSULE EVERY DAY 10/13/21   Mitch Olson MD   traZODone (DESYREL) 50 MG tablet trazodone 50 mg tablet   TK 1/2 T PO QHS    Khushbu Orellana MD   Xarelto 10 MG tablet  12/9/21   Khushbu Orellana MD         Objective     Temp:  [98 °F (36.7 °C)] 98 °F (36.7 °C)  Heart Rate:  [75] 75  Resp:  [16] 16  BP: (134)/(118) 134/118  Neurological Exam  Mental Status  Drowsy. Oriented only to person. Speech is normal. Unable to follow commands.  Patient is drowsy, minimal speech however what speech is present is normal.    Cranial Nerves  CN III, IV, VI: Pupils equal round and reactive to light bilaterally.  CN VII: Full and symmetric facial movement.  CN IX, X: Palate elevates symmetrically    Motor  Decreased muscle bulk throughout. No fasciculations present. Decreased muscle tone.  Generalized weakness noted throughout.    Sensory  Pain response noted in all 4 extremities.    Coordination  Unable to assess as patient is not participatory in exam.    Gait  Not observed.      Physical Exam  Constitutional:       General: He is not in acute distress.     Appearance: He is obese. He is ill-appearing.   HENT:      Head: Normocephalic and atraumatic.      Nose: Nose normal.      Mouth/Throat:      Mouth: Mucous membranes are dry.   Eyes:      Pupils: Pupils are equal, round, and reactive to light.   Cardiovascular:      Rate and Rhythm: Normal rate and regular rhythm.   Pulmonary:       Breath sounds: Wheezing present.   Abdominal:      General: There is distension.   Skin:     General: Skin is warm and dry.   Neurological:      Mental Status: He is disoriented.      Cranial Nerves: Cranial nerve deficit present.      Motor: Weakness present.   Psychiatric:         Speech: Speech normal.     Acute Stroke Data    IV Thrombolytic (TPA/Tenecteplase) Inclusion / Exclusion Criteria    Time: 2015 EST  Person Administering Scale: ALMA ROSA Gamez    Inclusion Criteria  [x]   18 years of age or greater   []   Onset of symptoms < 4.5 hours before beginning treatment (stroke onset = time patient was last seen well or without symptoms).   []   Diagnosis of acute ischemic stroke causing measurable disabling deficit (Complete Hemianopia, Any Aphasia, Visual or Sensory Extinction, Any weakness limiting sustained effort against gravity)   []   Any remaining deficit considered potentially disabling in view of patient and practitioner   Exclusion criteria (Do not proceed with Alteplase if any are checked under exclusion criteria)  [x]   Onset unknown or GREATER than 4.5 hours   []   ICH on CT/MRI   []   CT demonstrates hypodensity representing acute or subacute infarct   []   Significant head trauma or prior stroke in the previous 3 months   []   Symptoms suggestive of subarachnoid hemorrhage   []   History of un-ruptured intracranial aneurysm GREATER than 10 mm   []   Recent intracranial or intraspinal surgery within the last 3 months   []   Arterial puncture at a non-compressible site in the previous 7 days   []   Active internal bleeding   []   Acute bleeding tendency   []   Platelet count LESS than 100,000 for known hematological diseases such as leukemia, thrombocytopenia or chronic cirrhosis   [x]   Current use of anticoagulant with INR GREATER than 1.7 or PT GREATER than 15 seconds, aPTT GREATER than 40 seconds   []   Heparin received within 48 hours, resulting in abnormally elevated aPTT GREATER than  upper limit of normal   []   Current use of direct thrombin inhibitors or direct factor Xa inhibitors in the past 48 hours   []   Elevated blood pressure refractory to treatment (systolic GREATER than 185 mm/Hg or diastolic  GREATER than 110 mm/Hg   []   Suspected infective endocarditis and aortic arch dissection   []   Current use of therapeutic treatment dose of low-molecular-weight heparin (LMWH) within the previous 24 hours   []   Structural GI malignancy or bleed   Relative exclusion for all patients  []   Only minor nondisabling symptoms   []   Pregnancy   []   Seizure at onset with postictal residual neurological impairments   []   Major surgery or previous trauma within past 14 days   []   History of previous spontaneous ICH, intracranial neoplasm, or AV malformation   []   Postpartum (within previous 14 days)   []   Recent GI or urinary tract hemorrhage (within previous 21 days)   []   Recent acute MI (within previous 3 months)   []   History of unruptured intracranial aneurysm LESS than 10 mm   []   History of ruptured intracranial aneurysm   []   Blood glucose LESS than 50 mg/dL (2.7 mmol/L)   []   Dural puncture within the last 7 days   []   Known GREATER than 10 cerebral microbleeds   Additional exclusions for patients with symptoms onset between 3 and 4.5 hours.  []   Age > 80.   []   On any anticoagulants regardless of INR  >>> Warfarin (Coumadin), Heparin, Enoxaparin (Lovenox), fondaparinux (Arixtra), bivalirudin (Angiomax), Argatroban, dabigatran (Pradaxa), rivaroxaban (Xarelto), or apixaban (Eliquis)   []   Severe stroke (NIHSS > 25).   []   History of BOTH diabetes and previous ischemic stroke.   []   The risks and benefits have been discussed with the patient or family related to the administration of IV alteplase for stroke symptoms.   []   I have discussed and reviewed the patient's case and imaging with the attending prior to IV Thrombolytic (TPA/Tenecteplase).   N/A Time Thrombolytic  administered       Hospital Meds:  Scheduled-   Infusions- No current facility-administered medications for this encounter.     PRNs-     Functional Status Prior to Current Stroke/Regino Score: 3    NIH Stroke Scale  Time: 21:00 EST  Person Administering Scale: ALMA ROSA Gamez    Interval: baseline  1a. Level of Consciousness: 1-->Not alert, but arousable by minor stimulation to obey, answer, or respond  1b. LOC Questions: 2-->Answers neither question correctly  1c. LOC Commands: 2-->Performs neither task correctly  2. Best Gaze: 0-->Normal  3. Visual: 0-->No visual loss  4. Facial Palsy: 0-->Normal symmetrical movements  5a. Motor Arm, Left: 3-->No effort against gravity, limb falls  5b. Motor Arm, Right: 3-->No effort against gravity, limb falls  6a. Motor Leg, Left: 3-->No effort against gravity, leg falls to bed immediately  6b. Motor Leg, Right: 3-->No effort against gravity, leg falls to bed immediately  7. Limb Ataxia: 0-->Absent  8. Sensory: 0-->Normal, no sensory loss  9. Best Language: 0-->No aphasia, normal  10. Dysarthria: 1-->Mild-to-moderate dysarthria, patient slurs at least some words and, at worst, can be understood with some difficulty  11. Extinction and Inattention (formerly Neglect): 0-->No abnormality    Total (NIH Stroke Scale): 18      Results Reviewed:  I have personally reviewed current lab, radiology, and data and agree with results.  NCCT shows no acute intracranial abnormality, acute infarct, or hemorrhage  Troponin 0.031   Glucose 129  Sodium 144  Potassium 4.4  Creatinine 0.85  ALT 12 AST 20  Lactate 2.1  WBC 6.38  Urinalysis is unremarkable  PCR swab is negative for COVID-19, influenza A and B, RSV  Blood cultures pending  PCXR consistent with atelectasis  MRI ordered and pending  EKG normal sinus rhythm    Assessment/Plan:      1. Encephalopathy, suspect metabolic/infectious process  -NCCT unremarkable  -MRI brain without contrast ordered  -Fluid boluses provided in ED  -IV  antibiotic therapy initiated in the ER for URI  -Blood cultures collected and pending  -Defer on initiating stroke order set at this time, pending MRI  -Avoid sedating medications  -EEG in a.m. to rule out any seizure activity  -General neurology to see in a.m.  (Patient was previously seen by Dr. Horowitz in September 2020 for a similar episode, LP was offered at that time and family refused)    Plan of care was discussed with Dr. Brown. Patient will be admitted to hospital medicine services for further encephalopathic work-up. Stroke neurology will sign off at this time however general neurology will see the patient in a.m. for further assessment. Please call with any questions or concerns.    Renita Copeland, APRN  January 12, 2022  21:00 EST    Addendum 2300: MRI of the brain without contrast shows no evidence of recent infarct, however there is atrophy with volume loss in the frontal and temporal lobes which both suggest dementia.

## 2022-01-13 NOTE — CASE MANAGEMENT/SOCIAL WORK
Discharge Planning Assessment  [facility]     Patient Name: Marcos Acuña  MRN: [id number]  Today's Date: 1/13/2022    Admit Date: 1/12/2022     Discharge Needs Assessment     Row Name 01/13/22 1700       Living Environment    Lives With spouse    Name(s) of Who Lives With Patient Sloane Acuña    Current Living Arrangements independent/assisted living facility  Lives at [facility] with wife    Primary Care Provided by spouse/significant other; other (see comments)  hired caregivers    Provides Primary Care For no one, unable/limited ability to care for self    Family Caregiver if Needed spouse    Family Caregiver Names Sloane Acuña    Quality of Family Relationships supportive; involved; helpful    Able to Return to Prior Arrangements yes       Resource/Environmental Concerns    Resource/Environmental Concerns none    Transportation Concerns car, none       Transition Planning    Patient/Family Anticipates Transition to home with family; home with help/services    Patient/Family Anticipated Services at Transition     Transportation Anticipated health plan transportation       Discharge Needs Assessment    Readmission Within the Last 30 Days no previous admission in last 30 days    Equipment Currently Used at Home lift device    Concerns to be Addressed discharge planning    Discharge Facility/Level of Care Needs home with [facility]    Discharge Coordination/Progress PCP Gloria Linares's on [facility]               Discharge Plan     Row Name 01/13/22 1702       Plan    Plan Home with     Patient/Family in Agreement with Plan yes    Plan Comments Spoke with patient's wife over the phone. She stated they live at [facility]. She is his primary caregiver, he has hired caregivers at night and a.m. He is dependent in care and uses a mayuri lift at home. No current HH services but wife would like HH arranged. No preference of agency. He will need ambulance transport, request has been  faxed.    Final Discharge Disposition Code 30 - still a patient              Continued Care and Services - Admitted Since 1/12/2022    Coordination has not been started for this encounter.          Demographic Summary     Row Name 01/13/22 1700       General Information    Admission Type observation    Arrived From emergency department    Referral Source admission list    Reason for Consult discharge planning               Functional Status     Row Name 01/13/22 1700       Functional Status    Usual Activity Tolerance poor       Functional Status, IADL    Medications completely dependent    Meal Preparation completely dependent    Housekeeping completely dependent    Laundry completely dependent    Shopping completely dependent       Employment/    Employment Status retired               Psychosocial    No documentation.                Abuse/Neglect    No documentation.                Legal    No documentation.                Substance Abuse    No documentation.                Patient Forms    No documentation.                   Suzy Tan RN

## 2022-01-13 NOTE — PROGRESS NOTES
[facility] Medicine Services  ADMISSION FOLLOW-UP NOTE          Patient admitted after midnight, H&P by my partner performed earlier on today's date reviewed.  Interim findings, labs, and charting also reviewed.        The [facility] Hospital Problem List has been managed and updated to include any new diagnoses:  Active Hospital Problems    Diagnosis  POA   • Hypercalcemia [E83.52]  Yes   • Lactic acidosis [E87.2]  Yes   • Hypotension [I95.9]  Yes   • Altered mental status [R41.82]  Yes   • Elevated troponin [R77.8]  Yes   • Dementia (HCC) [F03.90]  Yes      Resolved Hospital Problems   No resolved problems to display.         ADDITIONAL PLAN:  - detailed assessment and plan from admission reviewed  -Summary: This is a 89-year-old male who lives at assisted living with his wife with 24-hour caregiver and known dementia, noted increase in Seroquel for agitation approximately 2 months ago, who was brought to the hospital for evaluation of increased lethargy/decreased responsiveness with largely unremarkable work-up thus far    Assessment/plan    Decreased level of attention  Underlying chronic dementia  -CMP, CBC, UA, UDS, MRI brain, CT A/P, CXR, EEG all without causative etiology  -Started on IV ceftriaxone by admitting provider for treatment of sinusitis  -Neurology follow-up today    Reported hypotension, improved  - No hypotension documented on vitals, per H&P he was hypotensive which improved with IVF    Mild hypercalcemia  -Improved with IVF    DM type 2, A1c 7.5%, without long-term use of insulin  -Continue Accu-Cheks with SSI    Hypothyroidism  Hypertension  -Restart home levothyroxine  -BP meds on hold for reported hypotension    Phong Arreola,   01/13/22

## 2022-01-13 NOTE — PROGRESS NOTES
Neurology       Patient Care Team:  Abdirashid Mccabe MD as PCP - General (Cardiology)    Chief complaint: Dementia worsening    History: Pleasant 89-year-old man admitted yesterday with changes ongoing behavior.  Known dementia on Aricept and Namenda.    He takes a number of psychiatric medications including Seroquel and trazodone.    No medicine changes have been described by the wife on admission.    His lactate was elevated on admission and has returned to normal.    The patient admits to not drinking as much as he should.      Past Medical History:   Diagnosis Date   • Dementia (HCC)    • Diabetes ([facility])    • H/O blood clots    • Hypertensive disorder    • Hypothyroidism    • Osteoarthritis    • Type 2 diabetes mellitus, uncontrolled ([facility])        Vital Signs   Vitals:    01/13/22 1125 01/13/22 1300 01/13/22 1500 01/13/22 1514   BP: 130/68   130/79   BP Location: Right arm   Right arm   Patient Position: Lying   Lying   Pulse: 72 76 77 87   Resp: 18   18   Temp: 97.7 °F (36.5 °C)   97.7 °F (36.5 °C)   TempSrc: Oral   Oral   SpO2: 96% 98% 97% 97%   Weight:       Height:           Physical Exam:   General: Pleasant white male in no distress              Neuro: Talkative with a rambling speech which is coherent but off the point.  He thinks he is in [place] but when told he was not he insist that he is in [facility].    He does not oriented to time or place.    Speech is rambling but articulate.    Cranial nerves show equal pupils full eye movements symmetrical face.    Palate elevates normally tongue protrudes midline.    Reflexes are 1+ bilaterally.    Motor testing shows equal  and full movement of the lower extremities.    Sensory testing cannot be done adequately.        Results Review:  MRI shows age-appropriate atrophy.    Lactate on admission was 2.1 dropping to 1.9 with hydration.      Results from last 7 days   Lab Units 01/13/22  0323   WBC 10*3/mm3 7.07   HEMOGLOBIN g/dL 12.3*    HEMATOCRIT % 37.6   PLATELETS 10*3/mm3 192     Results from last 7 days   Lab Units 01/13/22  0323 01/12/22 2031 01/12/22 2017   SODIUM mmol/L 142 144  --    POTASSIUM mmol/L 4.0 4.4  --    CHLORIDE mmol/L 108* 106  --    CO2 mmol/L 23.0 28.0  --    BUN mg/dL 9 8  --    CREATININE mg/dL 0.86 0.85 0.80   CALCIUM mg/dL 9.1 9.7*  --    BILIRUBIN mg/dL  --  0.6  --    ALK PHOS U/L  --  109  --    ALT (SGPT) U/L  --  12  --    AST (SGOT) U/L  --  20  --    GLUCOSE mg/dL 125* 129*  --        Imaging Results (Last 24 Hours)     Procedure Component Value Units Date/Time    MRI Brain Without Contrast [[id number]] Collected: 01/12/22 2245     Updated: 01/12/22 2248    Narrative:      MRI Brain WO    INDICATION:   Neurologic deficit with slurred speech lethargy altered mental status. Evaluate for CVA    TECHNIQUE:   MRI of the brain without IV contrast.    COMPARISON:    Earlier noncontrast head CT same day.    FINDINGS:  There is no abnormally restricted diffusion. No recent infarct is suspected. There is no extra-axial fluid collection. There is atrophy with disproportionate volume loss in the frontal and temporal lobes. Please correlate for clinical evidence of a  frontotemporal dementia. There is a small amount of fluid or inflammatory change in the mastoid air cells bilaterally. There has been cataract surgery bilaterally. There is opacification of majority of the left sphenoid sinus with an air-fluid level and  otherwise mild paranasal sinus mucosal thickening. Please correlate for clinical evidence of acute sinusitis. The major arterial intracranial flow voids are maintained. There is mild white matter signal abnormality that is nonspecific but likely due to  small vessel disease. There is no intracranial mass affect. Bone marrow signal intensity is suboptimally evaluated due to the technical factors for the T1 acquisition of the sagittal T1-weighted imaging. There is no MRI evidence for intracranial  hemorrhage.       Impression:        1. No evidence for recent infarct.  2. Atrophy with disproportionate volume loss in the frontal and temporal lobes suggestive of frontotemporal type dementia. Please correlate with clinical findings.  3. Mild probable sequelae of small vessel disease. Number for paranasal sinus disease including air-fluid level in the left sphenoid sinus. Please correlate for clinical evidence of acute sinusitis. Also small amount of fluid or inflammatory change in  the mastoid air cells bilaterally.    Signer Name: Anika Ramírez MD   Signed: 1/12/2022 10:45 PM   Workstation [facility]: [email or web address]    [facility]    XR Chest 1 View [[id number]] Collected: 01/12/22 2141     Updated: 01/12/22 2144    Narrative:      CR Chest 1 Vw    INDICATION:   Altered mental status     COMPARISON:    Chest x-ray 12/9/2020.    FINDINGS:  Portable AP view(s) of the chest.  Lung volumes are low and diminished on comparison to prior. There is associated airspace disease at the left greater than right lung base which may be atelectasis. 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 No  pneumothorax or acute congestive heart failure. No obvious pleural effusion.      Impression:      Low lung volumes with bibasilar airspace disease most likely representing atelectasis. Follow-up to ensure resolution.    Signer Name: Anika Ramírez MD   Signed: 1/12/2022 9:41 PM   Workstation Name: [facility]    [facility]    CT Abdomen Pelvis Without Contrast [[id number]] Collected: 01/12/22 2037     Updated: 01/12/22 2040    Narrative:      CT Abdomen Pelvis WO    INDICATION:   Abdominal pain and pelvic pain today.    TECHNIQUE:   CT of the abdomen and pelvis without IV contrast. Coronal and sagittal reconstructions were obtained.  Radiation dose reduction techniques included automated exposure control or exposure modulation based on body size. Count of known CT and cardiac nuc  med studies performed in  previous 12 months: 0.     COMPARISON:   None available.    FINDINGS:  Visualized lung bases are unremarkable.    Abdomen:   The liver is within normal limits. The spleen and pancreas are normal. The gallbladder is normal. Both adrenal glands are normal. Large 6.4 cm left renal cyst. 1 cm nonobstructing left intrarenal stone. No obstructing ureteral stones or hydronephrosis.  Abdominal aorta normal in course and caliber. Small bowel is unremarkable without obstruction. Appendix difficult to clearly visualized. No pericecal inflammation. Uncomplicated colonic diverticulosis. Moderate stool burden. No free fluid or free air.    Pelvis:   The urinary bladder is unremarkable.  The prostate gland is unremarkable. No free pelvic fluid.    No acute osseous abnormality. Chronic appearing L1 compression deformity. ORIF of the right hip.        Impression:        1. No acute abdominal or pelvic findings.  2. Nonobstructing left intrarenal stone.  3. Appendix not clearly visualized. No pericecal inflammation.  4. Uncomplicated colonic diverticulosis. Moderate stool burden.          Signer Name: Thong Mccarthy MD   Signed: 1/12/2022 8:37 PM   [facility]station Name: [email or web address]    [facility]    CT Head Without Contrast Stroke Protocol [[id number]] Collected: 01/12/22 2033     Updated: 01/12/22 2036    Narrative:      CT Head WO Code Stroke    HISTORY:   Altered mental status today.    TECHNIQUE:   Routine noncontrast head CT. Coronal reformatted images. Radiation dose reduction techniques included automated exposure control or exposure modulation based on body size. Count of known CT and cardiac nuc med studies performed in previous 12 months: 0.     COMPARISON:   CT head 12/9/2020    FINDINGS:   No hemorrhage, acute infarction, mass lesion, or abnormal extra-axial fluid collection. No midline shift or focal mass effect. Ventricular system is normal in size and configuration. Moderate generalized atrophy and  chronic small vessel disease  throughout the supratentorial white matter. No acute osseous abnormality. Chronic near complete opacification of the left sphenoid sinus. Partial left mastoid effusion.      Impression:        1. No acute intracranial abnormality. Age-related senescent changes.  2. Chronic near complete opacification of the left sphenoid sinus.  3. Partial left mastoid effusion.      The examination was performed at 8:13 PM 1/12/2022 and critical results were called by me to the ED treatment team by telephone through the CT technologist, Keshia, at 1/12/2022 8:30 PM.    Signer Name: Thong Mccarthy MD   Signed: 1/12/2022 8:33 PM   Workstation Name: [facility]    [facility] [place]          Assessment:  Dementia, transient worsening with dehydration    Plan:  Resume Exelon patch 9.5 mg daily.    Resume Namenda 10 mg twice daily.    Melatonin 5 mg nightly.    Seroquel 50 mg as needed at bedtime    Comment:  No evidence of stroke.    See the patient on request         I discussed the patients findings and my recommendations with patient and nursing staff    Edilberto Guzmán MD  01/13/22  15:27 EST

## 2022-01-14 ENCOUNTER — HOME HEALTH ADMISSION (OUTPATIENT)
Dept: HOME HEALTH SERVICES | Facility: HOME HEALTHCARE | Age: 87
End: 2022-01-14

## 2022-01-14 VITALS
SYSTOLIC BLOOD PRESSURE: 142 MMHG | HEART RATE: 84 BPM | WEIGHT: 205 LBS | RESPIRATION RATE: 18 BRPM | BODY MASS INDEX: 27.77 KG/M2 | DIASTOLIC BLOOD PRESSURE: 80 MMHG | TEMPERATURE: 97.8 F | HEIGHT: 72 IN | OXYGEN SATURATION: 97 %

## 2022-01-14 LAB
GLUCOSE BLDC GLUCOMTR-MCNC: 103 MG/DL (ref 70–130)
GLUCOSE BLDC GLUCOMTR-MCNC: 109 MG/DL (ref 70–130)

## 2022-01-14 PROCEDURE — 82962 GLUCOSE BLOOD TEST: CPT

## 2022-01-14 PROCEDURE — 25010000002 CEFTRIAXONE PER 250 MG

## 2022-01-14 PROCEDURE — G0378 HOSPITAL OBSERVATION PER HR: HCPCS

## 2022-01-14 PROCEDURE — 99217 PR OBSERVATION CARE DISCHARGE MANAGEMENT: CPT | Performed by: NURSE PRACTITIONER

## 2022-01-14 RX ORDER — AMOXICILLIN AND CLAVULANATE POTASSIUM 875; 125 MG/1; MG/1
1 TABLET, FILM COATED ORAL 2 TIMES DAILY
Qty: 6 TABLET | Refills: 0 | Status: SHIPPED | OUTPATIENT
Start: 2022-01-14 | End: 2022-01-17

## 2022-01-14 RX ORDER — LOSARTAN POTASSIUM 50 MG/1
25 TABLET ORAL DAILY
Status: ON HOLD
Start: 2022-01-14 | End: 2022-05-23

## 2022-01-14 RX ORDER — QUETIAPINE FUMARATE 50 MG/1
50 TABLET, FILM COATED ORAL NIGHTLY PRN
Qty: 7 TABLET | Refills: 0 | Status: ON HOLD | OUTPATIENT
Start: 2022-01-14 | End: 2022-05-22

## 2022-01-14 RX ORDER — CHOLECALCIFEROL (VITAMIN D3) 125 MCG
5 CAPSULE ORAL NIGHTLY
Qty: 30 TABLET | Refills: 0 | Status: SHIPPED | OUTPATIENT
Start: 2022-01-14 | End: 2022-02-13

## 2022-01-14 RX ADMIN — LEVOTHYROXINE SODIUM 75 MCG: 0.07 TABLET ORAL at 05:43

## 2022-01-14 RX ADMIN — SODIUM CHLORIDE 1 G: 900 INJECTION INTRAVENOUS at 00:36

## 2022-01-14 RX ADMIN — MEMANTINE 10 MG: 10 TABLET ORAL at 10:52

## 2022-01-14 NOTE — DISCHARGE SUMMARY
[facility] Medicine Services  DISCHARGE SUMMARY    Patient Name: Marcos Acuña  : 1931  MRN: [id number]    Date of Admission: 2022  8:10 PM  Date of Discharge:  2022  Primary Care Physician: Abdirashid Mccabe MD    Consults     Date and Time Order Name Status Description    2022  3:01 AM Inpatient Neurology Consult General            Hospital Course     Presenting Problem:   Somnolence [R40.0]    Active Hospital Problems    Diagnosis  POA   • **Altered mental status [R41.82]  Yes   • Hypercalcemia [E83.52]  Yes   • Lactic acidosis [E87.2]  Yes   • Hypotension [I95.9]  Yes   • Elevated troponin [R77.8]  Yes   • Dementia (HCC) [F03.90]  Yes      Resolved Hospital Problems   No resolved problems to display.          Hospital Course:  Marcos Acuña is a 89 y.o. male This is a 89-year-old male who lives at assisted living with his wife with 24-hour caregiver and known dementia, noted increase in Seroquel for agitation approximately 2 months ago, who was brought to the hospital for evaluation of increased lethargy/decreased responsiveness with largely unremarkable work-up thus far. Patient was treated with IVF's and seen by neurology who felt symptoms related to dehydration. MRI neg for stroke. Patient was hydrated and responsiveness improved and he will be transferred back to assisted living today    Decreased level of attention  Underlying chronic dementia  -CMP, CBC, UA, UDS, MRI brain, CT A/P, CXR, EEG all without causative etiology  -Started on IV ceftriaxone by admitting provider for treatment of sinusitis, will transition over to oral ABX to complete 5 days of therapy   -Neurology has seen and recommends melatonin and increase Seroquel at       Reported hypotension, improved  - No hypotension documented on vitals, per H&P he was hypotensive which improved with IVF     Mild hypercalcemia  -Improved with IVF     DM type 2, A1c 7.5%, without long-term use of  insulin  -Continue Accu-Cheks with SSI  -- resume home meds at [place]      Hypothyroidism  Hypertension  -Restart home levothyroxine  -BP meds held for reported hypotension, bp improved with ivf's   -- will resume cozaar but decrease dose to 25 mg     Patient has remained clinically stable and will be discharged home today.       Discharge Follow Up Recommendations for outpatient labs/diagnostics:   follow up with pcp one week     Day of Discharge     HPI:   Patient is resting in bed in NAD. He is awake and alert he knows his name. pleasantly confused. Talked with wife and patient will be sent home today.     Review of Systems  Unable to obtain due to confusion     Vital Signs:   Temp:  [97.7 °F (36.5 °C)-98.4 °F (36.9 °C)] 97.8 °F (36.6 °C)  Heart Rate:  [70-89] 82  Resp:  [18] 18  BP: (130-152)/(69-81) 142/80      Physical Exam:  Constitutional: No acute distress, awake, alert  HENT: NCAT, mucous membranes moist  Respiratory: Clear to auscultation bilaterally, respiratory effort normal room air 99%  Cardiovascular: RRR, no murmurs, rubs, or gallops  Gastrointestinal: Positive bowel sounds, soft, nontender, nondistended  Musculoskeletal: No bilateral ankle edema  Psychiatric: Appropriate affect, cooperative  Neurologic: Oriented x 1, strength symmetric in all extremities,  speech clear  Skin: No rashes      Pertinent  and/or Most Recent Results     LAB RESULTS:      Lab 01/13/22  0323 01/12/22  2354 01/12/22  2124 01/12/22  2031 01/12/22 2017   WBC 7.07  --  6.38  --   --    HEMOGLOBIN 12.3*  --  13.0  --   --    HEMOGLOBIN, POC  --   --   --   --  12.9   HEMATOCRIT 37.6  --  39.4  --   --    HEMATOCRIT POC  --   --   --   --  38   PLATELETS 192  --  199  --   --    NEUTROS ABS 2.92  --  2.47  --   --    IMMATURE GRANS (ABS) 0.02  --  0.01  --   --    LYMPHS ABS 3.07  --  2.89  --   --    MONOS ABS 0.41  --  0.39  --   --    EOS ABS 0.55*  --  0.52*  --   --    MCV 86.4  --  86.2  --   --    PROCALCITONIN  --   --    --  0.10  --    LACTATE  --  1.9  --  2.1*  --          Lab 01/13/22  0323 01/12/22 2031 01/12/22 2017   SODIUM 142 144  --    POTASSIUM 4.0 4.4  --    CHLORIDE 108* 106  --    CO2 23.0 28.0  --    ANION GAP 11.0 10.0  --    BUN 9 8  --    CREATININE 0.86 0.85 0.80   GLUCOSE 125* 129*  --    CALCIUM 9.1 9.7*  --    MAGNESIUM 1.8  --   --    TSH  --  3.720  --          Lab 01/12/22 2031   TOTAL PROTEIN 6.7   ALBUMIN 3.50   GLOBULIN 3.2   ALT (SGPT) 12   AST (SGOT) 20   BILIRUBIN 0.6   ALK PHOS 109         Lab 01/13/22 0323 01/12/22 2031   TROPONIN T 0.028 0.031*                 Lab 01/12/22 2017   PH, ARTERIAL 7.36     Brief Urine Lab Results  (Last result in the past 365 days)      Color   Clarity   Blood   Leuk Est   Nitrite   Protein   CREAT   Urine HCG        01/12/22 2039 Yellow   Clear   Negative   Negative   Negative   Negative               Microbiology Results (last 10 days)     Procedure Component Value - Date/Time    Blood Culture - Blood, Arm, Right [[id number]]  (Normal) Collected: 01/12/22 2034    Lab Status: Preliminary result Specimen: Blood from Arm, Right Updated: 01/13/22 2100     Blood Culture No growth at 24 hours    COVID PRE-OP / PRE-PROCEDURE SCREENING ORDER (NO ISOLATION) - Swab, Nasopharynx [[id number]]  (Normal) Collected: 01/12/22 2029    Lab Status: Final result Specimen: Swab from Nasopharynx Updated: 01/12/22 2142    Narrative:      The following orders were created for panel order COVID PRE-OP / PRE-PROCEDURE SCREENING ORDER (NO ISOLATION) - Swab, Nasopharynx.  Procedure                               Abnormality         Status                     ---------                               -----------         ------                     COVID-19, FLU A/B, RSV P...[[id number]]  Normal              Final result                 Please view results for these tests on the individual orders.    COVID-19, FLU A/B, RSV PCR - Swab, Nasopharynx [[id number]]  (Normal) Collected: 01/12/22 2029    Lab  Status: Final result Specimen: Swab from Nasopharynx Updated: 01/12/22 2142     COVID19 Not Detected     Influenza A PCR Not Detected     Influenza B PCR Not Detected     RSV, PCR Not Detected    Blood Culture - Blood, Arm, Left [[id number]]  (Normal) Collected: 01/12/22 2015    Lab Status: Preliminary result Specimen: Blood from Arm, Left Updated: 01/13/22 2100     Blood Culture No growth at 24 hours          EEG    Result Date: 1/13/2022  Reason for referral: 89 y.o.male with altered mental status Technical Summary:  A 19 channel digital EEG was performed using the international 10-20 placement system, including eye leads and EKG leads. Duration: 20 minutes Video: Off Findings: The awake tracing shows diffuse low to medium amplitude 5 to 7 Hz theta which is present symmetric over both hemispheres.  EMG artifact is noted over the frontotemporal leads.  Light sleep is seen with mild slowing of the background.  Photic stimulation does not change the tracing.  Hyperventilation is not performed.  No focal features or epileptiform activity are seen Technical quality: Excellent EKG: Regular, 70 bpm SUMMARY: Mild generalized slow No focal features or epileptiform activity are seen     This study shows evidence for diffuse cerebral dysfunction of mild degree but is nonspecific as to cause No evidence for epilepsy as This report is transcribed using the Dragon dictation system.      CT Abdomen Pelvis Without Contrast    Result Date: 1/12/2022  CT Abdomen Pelvis WO INDICATION: Abdominal pain and pelvic pain today. TECHNIQUE: CT of the abdomen and pelvis without IV contrast. Coronal and sagittal reconstructions were obtained.  Radiation dose reduction techniques included automated exposure control or exposure modulation based on body size. Count of known CT and cardiac nuc med studies performed in previous 12 months: 0. COMPARISON: None available. FINDINGS: Visualized lung bases are unremarkable. Abdomen: The liver is within  normal limits. The spleen and pancreas are normal. The gallbladder is normal. Both adrenal glands are normal. Large 6.4 cm left renal cyst. 1 cm nonobstructing left intrarenal stone. No obstructing ureteral stones or hydronephrosis. Abdominal aorta normal in course and caliber. Small bowel is unremarkable without obstruction. Appendix difficult to clearly visualized. No pericecal inflammation. Uncomplicated colonic diverticulosis. Moderate stool burden. No free fluid or free air. Pelvis: The urinary bladder is unremarkable.  The prostate gland is unremarkable. No free pelvic fluid. No acute osseous abnormality. Chronic appearing L1 compression deformity. ORIF of the right hip.     1. No acute abdominal or pelvic findings. 2. Nonobstructing left intrarenal stone. 3. Appendix not clearly visualized. No pericecal inflammation. 4. Uncomplicated colonic diverticulosis. Moderate stool burden. Signer Name: Thong Mccarthy MD  Signed: 1/12/2022 8:37 PM  Workstation Name: [email or web address]  [facility]    MRI Brain Without Contrast    Result Date: 1/12/2022  MRI Brain WO INDICATION: Neurologic deficit with slurred speech lethargy altered mental status. Evaluate for CVA TECHNIQUE: MRI of the brain without IV contrast. COMPARISON:  Earlier noncontrast head CT same day. FINDINGS: There is no abnormally restricted diffusion. No recent infarct is suspected. There is no extra-axial fluid collection. There is atrophy with disproportionate volume loss in the frontal and temporal lobes. Please correlate for clinical evidence of a frontotemporal dementia. There is a small amount of fluid or inflammatory change in the mastoid air cells bilaterally. There has been cataract surgery bilaterally. There is opacification of majority of the left sphenoid sinus with an air-fluid level and otherwise mild paranasal sinus mucosal thickening. Please correlate for clinical evidence of acute sinusitis. The major arterial  intracranial flow voids are maintained. There is mild white matter signal abnormality that is nonspecific but likely due to small vessel disease. There is no intracranial mass affect. Bone marrow signal intensity is suboptimally evaluated due to the technical factors for the T1 acquisition of the sagittal T1-weighted imaging. There is no MRI evidence for intracranial hemorrhage.     1. No evidence for recent infarct. 2. Atrophy with disproportionate volume loss in the frontal and temporal lobes suggestive of frontotemporal type dementia. Please correlate with clinical findings. 3. Mild probable sequelae of small vessel disease. Number for paranasal sinus disease including air-fluid level in the left sphenoid sinus. Please correlate for clinical evidence of acute sinusitis. Also small amount of fluid or inflammatory change in the mastoid air cells bilaterally. Signer Name: Anika Ramírez MD  Signed: 1/12/2022 10:45 PM  Workstation Name: [facility]  Radiology Psychiatric    XR Chest 1 View    Result Date: 1/12/2022  CR Chest 1 Vw INDICATION: Altered mental status COMPARISON:  Chest x-ray 12/9/2020. FINDINGS: Portable AP view(s) of the chest.  Lung volumes are low and diminished on comparison to prior. There is associated airspace disease at the left greater than right lung base which may be atelectasis. 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 No pneumothorax or acute congestive heart failure. No obvious pleural effusion.     Low lung volumes with bibasilar airspace disease most likely representing atelectasis. Follow-up to ensure resolution. Signer Name: Anika Ramírez MD  Signed: 1/12/2022 9:41 PM  Workstation Name: [facility]    CT Head Without Contrast Stroke Protocol    Result Date: 1/12/2022  CT Head WO Code Stroke HISTORY: Altered mental status today. TECHNIQUE: Routine noncontrast head CT. Coronal reformatted images. Radiation dose reduction techniques  included automated exposure control or exposure modulation based on body size. Count of known CT and cardiac nuc med studies performed in previous 12 months: 0. COMPARISON: CT head 12/9/2020 FINDINGS: No hemorrhage, acute infarction, mass lesion, or abnormal extra-axial fluid collection. No midline shift or focal mass effect. Ventricular system is normal in size and configuration. Moderate generalized atrophy and chronic small vessel disease throughout the supratentorial white matter. No acute osseous abnormality. Chronic near complete opacification of the left sphenoid sinus. Partial left mastoid effusion.     1. No acute intracranial abnormality. Age-related senescent changes. 2. Chronic near complete opacification of the left sphenoid sinus. 3. Partial left mastoid effusion. The examination was performed at 8:13 PM 1/12/2022 and critical results were called by me to the ED treatment team by telephone through the CT technologist, Keshia, at 1/12/2022 8:30 PM. Signer Name: Thong Mccarthy MD  Signed: 1/12/2022 8:33 PM  Workstation Name: [email or web address]-  [facility]              Results for orders placed during the hospital encounter of 09/09/20    Adult Transthoracic Echo Complete W/ Cont if Necessary Per Protocol    Interpretation Summary  · There is mild calcification of the aortic valve mainly affecting the non, left and right coronary cusp(s).  · Peak velocity of the flow distal to the aortic valve is 217.2 cm/s.  · Aortic valve maximum pressure gradient is 18.9 mmHg.  · Aortic valve area is 1.1 cm2.  · Mild aortic valve stenosis is present.  · Aortic valve mean pressure gradient is 10.7 mmHg.  · Estimated EF = 60%.  · Left ventricular systolic function is normal.      Plan for Follow-up of Pending Labs/Results:   Pending Labs     Order Current Status    Blood Culture - Blood, Arm, Left Preliminary result    Blood Culture - Blood, Arm, Right Preliminary result        Discharge Details       "  Discharge Medications      New Medications      Instructions Start Date   amoxicillin-clavulanate 875-125 MG per tablet  Commonly known as: Augmentin   1 tablet, Oral, 2 Times Daily      melatonin 5 MG tablet tablet   5 mg, Oral, Nightly         Changes to Medications      Instructions Start Date   losartan 50 MG tablet  Commonly known as: COZAAR  What changed: how much to take   25 mg, Oral, Daily      QUEtiapine 50 MG tablet  Commonly known as: SEROquel  What changed:   · medication strength  · how much to take  · how to take this  · when to take this  · reasons to take this   50 mg, Oral, Nightly PRN         Continue These Medications      Instructions Start Date   Accu-Chek FastClix Lancets misc   TEST BLOOD SUGAR EVERY DAY      Accu-Chek Guide test strip  Generic drug: glucose blood   TEST BLOOD SUGAR EVERY DAY      acetaminophen 325 MG tablet  Commonly known as: TYLENOL   650 mg, Oral, Every 4 Hours PRN      apixaban 2.5 MG tablet tablet  Commonly known as: ELIQUIS   2.5 mg, Oral, Every 12 Hours Scheduled      B-D 3CC LUER-MAMI SYR 25GX1\" 25G X 1\" 3 ML misc  Generic drug: Syringe/Needle (Disp)   No dose, route, or frequency recorded.      B-D SINGLE USE SWABS REGULAR pads   USE AS DIRECTED TO TEST BLOOD GLUCOSE DAILY      calcium carbonate 500 MG chewable tablet  Commonly known as: TUMS   1 tablet, Oral, Daily PRN      Compact Space Chamber device   See Admin Instructions      cyanocobalamin 1000 MCG/ML injection   1,000 mcg, Intramuscular, Every 28 Days      docusate sodium 100 MG capsule   100 mg, Oral, 2 Times Daily PRN      Exelon 9.5 MG/24HR patch  Generic drug: rivastigmine   1 patch, Transdermal, Daily      ipratropium 0.06 % nasal spray  Commonly known as: ATROVENT   2 sprays, Nasal, 2 times daily      levocetirizine 5 MG tablet  Commonly known as: XYZAL   5 mg, Oral, Every Evening      levothyroxine 75 MCG tablet  Commonly known as: SYNTHROID, LEVOTHROID   TAKE 1 TABLET EVERY DAY      memantine 28 MG " capsule sustained-release 24 hr extended release capsule  Commonly known as: NAMENDA XR   No dose, route, or frequency recorded.      metFORMIN 500 MG tablet  Commonly known as: GLUCOPHAGE   500 mg, Oral, 2 Times Daily With Meals      Mirabegron ER 50 MG tablet sustained-release 24 hour 24 hr tablet  Commonly known as: Myrbetriq   50 mg, Oral, Daily      promethazine 12.5 MG tablet  Commonly known as: PHENERGAN   12.5 mg, Oral, Every 6 Hours PRN      tamsulosin 0.4 MG capsule 24 hr capsule  Commonly known as: FLOMAX   TAKE 1 CAPSULE EVERY DAY         Stop These Medications    cefdinir 300 MG capsule  Commonly known as: OMNICEF     traZODone 50 MG tablet  Commonly known as: DESYREL            Allergies   Allergen Reactions   • Contrast Dye Anaphylaxis         Discharge Disposition:      Diet:  Hospital:  Diet Order   Procedures   • NPO Diet       Activity:  Activity Instructions     Activity as Tolerated      Measure Blood Pressure            Restrictions or Other Recommendations:         CODE STATUS:    Code Status and Medical Interventions:   Ordered at: 01/12/22 8042     Medical Intervention Limits:    NO intubation (DNI)     Code Status (Patient has no pulse and is not breathing):    No CPR (Do Not Attempt to Resuscitate)     Medical Interventions (Patient has pulse or is breathing):    Limited Support       Future Appointments   Date Time Provider Department Center   6/29/2022  1:30 PM Mitch Olson MD [facility]       Additional Instructions for the Follow-ups that You Need to Schedule     Discharge Follow-up with PCP   As directed       Currently Documented PCP:    Abdirashid Mccabe MD    PCP Phone Number:    [phone]     Follow Up Details: follow up with pcp one week                     ALMA ROSA Amado  01/14/22      Time Spent on Discharge:  I spent  35  minutes on this discharge activity which included: face-to-face encounter with the patient, reviewing the data in the system,  coordination of the care with the nursing staff as well as consultants, documentation, and entering orders.

## 2022-01-14 NOTE — PROGRESS NOTES
Discussed home health with patients wife and she is agreeable to [facility] and patients PCP is Patricia ST--Bennie Aleman RN [facility] Hospital Liaison

## 2022-01-14 NOTE — CASE MANAGEMENT/SOCIAL WORK
Case Management Discharge Note      Final Note: Spoke with patient's wife over the phone. She is agreeable for her  to be discharged home with  services. [facility]  has accepted patient.  EMS arranged for 1330 today, PCS in chartlet.         Selected Continued Care - Admitted Since 1/12/2022     Destination    No services have been selected for the patient.              Durable Medical Equipment    No services have been selected for the patient.              Dialysis/Infusion    No services have been selected for the patient.              Home Medical Care Coordination complete.    Service Provider Selected Services Address Phone Fax Patient Preferred     [facility]  [facility] [address] [address] [phone] --          Therapy    No services have been selected for the patient.              Community Resources    No services have been selected for the patient.              Community & DME    No services have been selected for the patient.                       Final Discharge Disposition Code: 06 - home with home health care

## 2022-01-17 ENCOUNTER — HOME CARE VISIT (OUTPATIENT)
Dept: HOME HEALTH SERVICES | Facility: HOME HEALTHCARE | Age: 87
End: 2022-01-17

## 2022-01-17 VITALS
DIASTOLIC BLOOD PRESSURE: 78 MMHG | SYSTOLIC BLOOD PRESSURE: 128 MMHG | RESPIRATION RATE: 16 BRPM | OXYGEN SATURATION: 99 % | HEART RATE: 74 BPM | TEMPERATURE: 96.6 F

## 2022-01-17 LAB
BACTERIA SPEC AEROBE CULT: NORMAL
BACTERIA SPEC AEROBE CULT: NORMAL

## 2022-01-17 PROCEDURE — G0299 HHS/HOSPICE OF RN EA 15 MIN: HCPCS

## 2022-01-18 NOTE — HOME HEALTH
Patient is a 89yr old  male who lives with wife at an assisted facility. Pt was d/c'd from [facility] on 1.14.22 after admission for somnolence, AMS, Hypotension, dehydration. All are resolved. Pt has pmh: OA, DM, dementia, BPH, DVT, HTN, HYPOTHYROIDISM. Pt has paid caregivers during the day. pcp Dr Mccabe. SN, PT, OT Ordered. SN foc assess and monitor for signs/symptoms dehydration, falls prevention, medication management., disease process of dementia.

## 2022-01-19 ENCOUNTER — HOME CARE VISIT (OUTPATIENT)
Dept: HOME HEALTH SERVICES | Facility: HOME HEALTHCARE | Age: 87
End: 2022-01-19

## 2022-01-19 PROCEDURE — G0152 HHCP-SERV OF OT,EA 15 MIN: HCPCS

## 2022-01-20 VITALS — SYSTOLIC BLOOD PRESSURE: 122 MMHG | HEART RATE: 75 BPM | DIASTOLIC BLOOD PRESSURE: 70 MMHG | OXYGEN SATURATION: 97 %

## 2022-01-24 ENCOUNTER — HOME CARE VISIT (OUTPATIENT)
Dept: HOME HEALTH SERVICES | Facility: HOME HEALTHCARE | Age: 87
End: 2022-01-24

## 2022-01-24 VITALS — DIASTOLIC BLOOD PRESSURE: 70 MMHG | SYSTOLIC BLOOD PRESSURE: 122 MMHG | OXYGEN SATURATION: 96 % | HEART RATE: 68 BPM

## 2022-01-24 VITALS
HEART RATE: 68 BPM | TEMPERATURE: 97.4 F | SYSTOLIC BLOOD PRESSURE: 136 MMHG | DIASTOLIC BLOOD PRESSURE: 70 MMHG | OXYGEN SATURATION: 97 % | RESPIRATION RATE: 18 BRPM

## 2022-01-24 PROCEDURE — G0299 HHS/HOSPICE OF RN EA 15 MIN: HCPCS

## 2022-01-24 PROCEDURE — G0152 HHCP-SERV OF OT,EA 15 MIN: HCPCS

## 2022-01-25 ENCOUNTER — HOME CARE VISIT (OUTPATIENT)
Dept: HOME HEALTH SERVICES | Facility: HOME HEALTHCARE | Age: 87
End: 2022-01-25

## 2022-01-25 VITALS
HEART RATE: 69 BPM | OXYGEN SATURATION: 97 % | SYSTOLIC BLOOD PRESSURE: 124 MMHG | TEMPERATURE: 96.9 F | DIASTOLIC BLOOD PRESSURE: 74 MMHG | RESPIRATION RATE: 18 BRPM

## 2022-01-25 LAB
QT INTERVAL: 458 MS
QTC INTERVAL: 511 MS

## 2022-01-25 PROCEDURE — G0151 HHCP-SERV OF PT,EA 15 MIN: HCPCS

## 2022-01-25 NOTE — CASE COMMUNICATION
CI confirmed that Patient was current with (2) agencies [facility] with SOC 1/17 and [facility] with SOC 1/20.  Spoke with Sloane the spouse and explained the situation and she confirmed that she wanted to continue with [facility].  Contacted A and spoke with Lizz notifying her that Patient spouse confirmed their chose to continue with [facility] and that this agency started care on 1/17/2022.  She indicated they would cancel there services.   Left my name and phone number in event there were any questions.

## 2022-01-26 NOTE — HOME HEALTH
Hospital Course:   Marcos Acuña is a 89 y.o. male This is a 89-year-old male who lives at assisted living with his wife with 24-hour caregiver and known dementia, noted increase in Seroquel for agitation approximately 2 months ago, who was brought to the hospital for evaluation of increased lethargy/decreased responsiveness with largely unremarkable work-up thus far. Patient was treated with IVF's and seen by neurology who felt symptoms related to dehydration. MRI neg for stroke. Patient was hydrated and responsiveness improved and he will be transferred back to assisted living today  Lives with spouse in ILF at [facility]; has full-time paid caregivers to assist with ADLs and HEP; PLOF is able to ambulate 60 ft with [facility] and transfer with [facility]

## 2022-01-31 ENCOUNTER — HOME CARE VISIT (OUTPATIENT)
Dept: HOME HEALTH SERVICES | Facility: HOME HEALTHCARE | Age: 87
End: 2022-01-31

## 2022-01-31 VITALS
DIASTOLIC BLOOD PRESSURE: 74 MMHG | SYSTOLIC BLOOD PRESSURE: 140 MMHG | RESPIRATION RATE: 18 BRPM | OXYGEN SATURATION: 99 % | HEART RATE: 74 BPM | TEMPERATURE: 97.9 F

## 2022-01-31 VITALS — OXYGEN SATURATION: 97 % | SYSTOLIC BLOOD PRESSURE: 120 MMHG | DIASTOLIC BLOOD PRESSURE: 70 MMHG | HEART RATE: 68 BPM

## 2022-01-31 PROCEDURE — G0299 HHS/HOSPICE OF RN EA 15 MIN: HCPCS

## 2022-01-31 PROCEDURE — G0152 HHCP-SERV OF OT,EA 15 MIN: HCPCS

## 2022-02-01 ENCOUNTER — HOME CARE VISIT (OUTPATIENT)
Dept: HOME HEALTH SERVICES | Facility: HOME HEALTHCARE | Age: 87
End: 2022-02-01

## 2022-02-01 VITALS
SYSTOLIC BLOOD PRESSURE: 112 MMHG | RESPIRATION RATE: 18 BRPM | OXYGEN SATURATION: 100 % | TEMPERATURE: 97.4 F | HEART RATE: 69 BPM | DIASTOLIC BLOOD PRESSURE: 64 MMHG

## 2022-02-01 PROCEDURE — G0151 HHCP-SERV OF PT,EA 15 MIN: HCPCS

## 2022-02-07 ENCOUNTER — HOME CARE VISIT (OUTPATIENT)
Dept: HOME HEALTH SERVICES | Facility: HOME HEALTHCARE | Age: 87
End: 2022-02-07

## 2022-02-07 PROCEDURE — G0299 HHS/HOSPICE OF RN EA 15 MIN: HCPCS

## 2022-02-08 VITALS
RESPIRATION RATE: 18 BRPM | OXYGEN SATURATION: 98 % | TEMPERATURE: 97.3 F | SYSTOLIC BLOOD PRESSURE: 120 MMHG | DIASTOLIC BLOOD PRESSURE: 64 MMHG | HEART RATE: 68 BPM

## 2022-02-10 ENCOUNTER — HOME CARE VISIT (OUTPATIENT)
Dept: HOME HEALTH SERVICES | Facility: HOME HEALTHCARE | Age: 87
End: 2022-02-10

## 2022-02-10 VITALS
DIASTOLIC BLOOD PRESSURE: 70 MMHG | RESPIRATION RATE: 18 BRPM | TEMPERATURE: 97.5 F | OXYGEN SATURATION: 99 % | HEART RATE: 71 BPM | SYSTOLIC BLOOD PRESSURE: 124 MMHG

## 2022-02-10 PROCEDURE — G0152 HHCP-SERV OF OT,EA 15 MIN: HCPCS

## 2022-02-10 PROCEDURE — G0151 HHCP-SERV OF PT,EA 15 MIN: HCPCS

## 2022-02-14 VITALS — SYSTOLIC BLOOD PRESSURE: 124 MMHG | OXYGEN SATURATION: 96 % | DIASTOLIC BLOOD PRESSURE: 70 MMHG | HEART RATE: 70 BPM

## 2022-02-14 NOTE — HOME HEALTH
Pt seen for skilled OT session and OT reassessment.  Pt has made good progress with all goals but would benefit from continued skilled OT services to address ADL retraining, functional mobility/balance/transfer training, and UE strength/endurance training.  OT will continue 1W4 to address the remaining problems/interventions/goals. Pt/family in agreement with plan.

## 2022-02-15 ENCOUNTER — HOME CARE VISIT (OUTPATIENT)
Dept: HOME HEALTH SERVICES | Facility: HOME HEALTHCARE | Age: 87
End: 2022-02-15

## 2022-02-15 VITALS
SYSTOLIC BLOOD PRESSURE: 128 MMHG | HEART RATE: 66 BPM | DIASTOLIC BLOOD PRESSURE: 76 MMHG | RESPIRATION RATE: 18 BRPM | TEMPERATURE: 97.4 F | OXYGEN SATURATION: 97 %

## 2022-02-15 PROCEDURE — G0151 HHCP-SERV OF PT,EA 15 MIN: HCPCS

## 2022-02-17 ENCOUNTER — HOME CARE VISIT (OUTPATIENT)
Dept: HOME HEALTH SERVICES | Facility: HOME HEALTHCARE | Age: 87
End: 2022-02-17

## 2022-02-17 VITALS — OXYGEN SATURATION: 98 % | SYSTOLIC BLOOD PRESSURE: 120 MMHG | HEART RATE: 78 BPM | DIASTOLIC BLOOD PRESSURE: 70 MMHG

## 2022-02-17 PROCEDURE — G0152 HHCP-SERV OF OT,EA 15 MIN: HCPCS

## 2022-02-23 ENCOUNTER — HOME CARE VISIT (OUTPATIENT)
Dept: HOME HEALTH SERVICES | Facility: HOME HEALTHCARE | Age: 87
End: 2022-02-23

## 2022-02-23 VITALS
OXYGEN SATURATION: 96 % | SYSTOLIC BLOOD PRESSURE: 120 MMHG | RESPIRATION RATE: 18 BRPM | TEMPERATURE: 97.8 F | DIASTOLIC BLOOD PRESSURE: 78 MMHG | HEART RATE: 84 BPM

## 2022-02-23 PROCEDURE — G0151 HHCP-SERV OF PT,EA 15 MIN: HCPCS

## 2022-02-24 ENCOUNTER — HOME CARE VISIT (OUTPATIENT)
Dept: HOME HEALTH SERVICES | Facility: HOME HEALTHCARE | Age: 87
End: 2022-02-24

## 2022-02-24 VITALS — SYSTOLIC BLOOD PRESSURE: 114 MMHG | DIASTOLIC BLOOD PRESSURE: 62 MMHG | HEART RATE: 69 BPM | OXYGEN SATURATION: 94 %

## 2022-02-24 PROCEDURE — G0152 HHCP-SERV OF OT,EA 15 MIN: HCPCS

## 2022-02-24 NOTE — HOME HEALTH
Spouse and caregiver report patient has been less responsive and not eating as much since Saturday, left arm is also swollen with pitting edema; this PT called [facility] and left message for provider Patricia ST during visit notifying of left arm edema; spouse reports she has already notified [facility] about decreased alertness and appetite and has appt set up for Friday with PCP.

## 2022-02-25 NOTE — HOME HEALTH
Pt seen today for skilled OT session with focus on ADL retraining and UE ROM/strength/end. training.  Pt very lethargic today and unable to stay awake or follow commands.  Wife/cg report pt has been like this since Saturday.  Pt has an MD appt. tomorrow.  Wife also reports concern of DVT in L UE.  L UE with increased edema.  OT completed AAROM R UE with pt but pt only helped minimally today.  Pt was able to take a drink from sippy cup but required Jackson A to complete.  OT ed. wife/cg on positioning of L UE due to increased edema and they verb. I. Pt did open eyes on command but was unable to keep them open for extended periods of time.  Will follow up with wife next week to see how pt is doing before scheduling a visit.

## 2022-02-28 ENCOUNTER — HOME CARE VISIT (OUTPATIENT)
Dept: HOME HEALTH SERVICES | Facility: HOME HEALTHCARE | Age: 87
End: 2022-02-28

## 2022-02-28 VITALS
TEMPERATURE: 97.6 F | HEART RATE: 68 BPM | SYSTOLIC BLOOD PRESSURE: 106 MMHG | RESPIRATION RATE: 18 BRPM | DIASTOLIC BLOOD PRESSURE: 60 MMHG

## 2022-02-28 PROCEDURE — G0151 HHCP-SERV OF PT,EA 15 MIN: HCPCS

## 2022-03-02 ENCOUNTER — HOME CARE VISIT (OUTPATIENT)
Dept: HOME HEALTH SERVICES | Facility: HOME HEALTHCARE | Age: 87
End: 2022-03-02

## 2022-03-02 PROCEDURE — G0299 HHS/HOSPICE OF RN EA 15 MIN: HCPCS

## 2022-03-03 ENCOUNTER — HOME CARE VISIT (OUTPATIENT)
Dept: HOME HEALTH SERVICES | Facility: HOME HEALTHCARE | Age: 87
End: 2022-03-03

## 2022-03-03 PROCEDURE — G0152 HHCP-SERV OF OT,EA 15 MIN: HCPCS

## 2022-03-05 ENCOUNTER — HOME CARE VISIT (OUTPATIENT)
Dept: HOME HEALTH SERVICES | Facility: HOME HEALTHCARE | Age: 87
End: 2022-03-05

## 2022-03-05 PROCEDURE — G0300 HHS/HOSPICE OF LPN EA 15 MIN: HCPCS

## 2022-03-07 ENCOUNTER — HOME CARE VISIT (OUTPATIENT)
Dept: HOME HEALTH SERVICES | Facility: HOME HEALTHCARE | Age: 87
End: 2022-03-07

## 2022-03-07 ENCOUNTER — LAB REQUISITION (OUTPATIENT)
Dept: LAB | Facility: HOSPITAL | Age: 87
End: 2022-03-07

## 2022-03-07 VITALS
OXYGEN SATURATION: 98 % | SYSTOLIC BLOOD PRESSURE: 102 MMHG | HEART RATE: 70 BPM | DIASTOLIC BLOOD PRESSURE: 72 MMHG | TEMPERATURE: 97.5 F | RESPIRATION RATE: 18 BRPM

## 2022-03-07 DIAGNOSIS — F03.90 UNSPECIFIED DEMENTIA WITHOUT BEHAVIORAL DISTURBANCE: ICD-10-CM

## 2022-03-07 LAB
BACTERIA UR QL AUTO: NORMAL /HPF
BILIRUB UR QL STRIP: NEGATIVE
CLARITY UR: CLEAR
COLOR UR: YELLOW
GLUCOSE UR STRIP-MCNC: NEGATIVE MG/DL
HGB UR QL STRIP.AUTO: NEGATIVE
HYALINE CASTS UR QL AUTO: NORMAL /LPF
KETONES UR QL STRIP: NEGATIVE
LEUKOCYTE ESTERASE UR QL STRIP.AUTO: NEGATIVE
NITRITE UR QL STRIP: NEGATIVE
PH UR STRIP.AUTO: <=5 [PH] (ref 5–8)
PROT UR QL STRIP: NEGATIVE
RBC # UR STRIP: NORMAL /HPF
REF LAB TEST METHOD: NORMAL
SP GR UR STRIP: 1.01 (ref 1–1.03)
SQUAMOUS #/AREA URNS HPF: NORMAL /HPF
UROBILINOGEN UR QL STRIP: NORMAL
WBC # UR STRIP: NORMAL /HPF

## 2022-03-07 PROCEDURE — G0151 HHCP-SERV OF PT,EA 15 MIN: HCPCS

## 2022-03-07 PROCEDURE — 81001 URINALYSIS AUTO W/SCOPE: CPT | Performed by: INTERNAL MEDICINE

## 2022-03-07 PROCEDURE — 87086 URINE CULTURE/COLONY COUNT: CPT | Performed by: INTERNAL MEDICINE

## 2022-03-08 LAB — BACTERIA SPEC AEROBE CULT: NORMAL

## 2022-03-10 ENCOUNTER — HOME CARE VISIT (OUTPATIENT)
Dept: HOME HEALTH SERVICES | Facility: HOME HEALTHCARE | Age: 87
End: 2022-03-10

## 2022-03-10 VITALS — DIASTOLIC BLOOD PRESSURE: 62 MMHG | OXYGEN SATURATION: 95 % | SYSTOLIC BLOOD PRESSURE: 104 MMHG | HEART RATE: 67 BPM

## 2022-03-10 PROCEDURE — G0300 HHS/HOSPICE OF LPN EA 15 MIN: HCPCS

## 2022-03-10 PROCEDURE — G0152 HHCP-SERV OF OT,EA 15 MIN: HCPCS

## 2022-03-10 NOTE — HOME HEALTH
Pt seen today for skilled OT session with focus on ADLs UE strength/endurance training, and pt/family ed..  Pt demo. good motivation and participation during session today.  Pt/family/cg demo. I with all ed.  Cont. OT POC with plan to dishcarge next week.  Pt/family in agreement with plan.

## 2022-03-13 VITALS — DIASTOLIC BLOOD PRESSURE: 70 MMHG | SYSTOLIC BLOOD PRESSURE: 118 MMHG | HEART RATE: 73 BPM | OXYGEN SATURATION: 96 %

## 2022-03-14 ENCOUNTER — HOME CARE VISIT (OUTPATIENT)
Dept: HOME HEALTH SERVICES | Facility: HOME HEALTHCARE | Age: 87
End: 2022-03-14

## 2022-03-14 VITALS
OXYGEN SATURATION: 97 % | TEMPERATURE: 97.2 F | DIASTOLIC BLOOD PRESSURE: 64 MMHG | HEART RATE: 70 BPM | SYSTOLIC BLOOD PRESSURE: 114 MMHG | RESPIRATION RATE: 18 BRPM

## 2022-03-14 PROCEDURE — G0151 HHCP-SERV OF PT,EA 15 MIN: HCPCS

## 2022-03-14 NOTE — HOME HEALTH
Pt discharged from skilled OT services effective 03/10/22 due to all goals met and pt reaching maximum potential at this time.  Pt/family in agreement with D/C.

## 2022-03-17 ENCOUNTER — HOME CARE VISIT (OUTPATIENT)
Dept: HOME HEALTH SERVICES | Facility: HOME HEALTHCARE | Age: 87
End: 2022-03-17

## 2022-03-17 VITALS
TEMPERATURE: 96.6 F | HEART RATE: 64 BPM | OXYGEN SATURATION: 96 % | RESPIRATION RATE: 16 BRPM | SYSTOLIC BLOOD PRESSURE: 130 MMHG | DIASTOLIC BLOOD PRESSURE: 80 MMHG

## 2022-03-17 PROCEDURE — G0299 HHS/HOSPICE OF RN EA 15 MIN: HCPCS

## 2022-03-17 NOTE — HOME HEALTH
HH visit made today for [employer] discharge. Pt lying in bed sleeping upon arrival. Pt spouse and sitter present. Pt sitter went into pt bedroom with nurse and pt awake, stated he was ready to get up. VS stable, no complaints of pain verbalized at present, but wife stated that pt does complain with his shoulders hurting occasionally. Pt smiling and very cooperative. Sitter assisted nurse to turn pt over in the bed. SN assessed pt buttocks and pt has no sores noted. Sitter stated that they have been  using Calmoseptine to coat pt buttocks with every time he is changed. SN instructed to continue and keep pt turned frequently to prevent any skin breakdown. SN went over pt medications with spouse, no new medications or changes noted. Also discussed keeping all MD appointments, and to take medications as prescribed. Discharge paper signed and copy put in pt folder.

## 2022-03-22 ENCOUNTER — HOME CARE VISIT (OUTPATIENT)
Dept: HOME HEALTH SERVICES | Facility: HOME HEALTHCARE | Age: 87
End: 2022-03-22

## 2022-03-22 NOTE — CASE COMMUNICATION
Received phone call from patient's wife last night requesting that we come out and get a UA on the patient today d/t symptoms of UTI.  Unfortunately, patient was discharged from  on 3.17.21.  I have called patient's wife and left a message explaining this and that we will not be able to see patient d/t discharge.

## 2022-03-25 VITALS
HEART RATE: 86 BPM | RESPIRATION RATE: 18 BRPM | OXYGEN SATURATION: 95 % | SYSTOLIC BLOOD PRESSURE: 130 MMHG | TEMPERATURE: 98.9 F | DIASTOLIC BLOOD PRESSURE: 64 MMHG

## 2022-04-14 RX ORDER — LEVOTHYROXINE SODIUM 0.07 MG/1
75 TABLET ORAL DAILY
Qty: 90 TABLET | Refills: 1 | Status: SHIPPED | OUTPATIENT
Start: 2022-04-14 | End: 2022-10-17

## 2022-05-05 RX ORDER — BLOOD SUGAR DIAGNOSTIC
STRIP MISCELLANEOUS
Qty: 100 EACH | Refills: 3 | Status: ON HOLD | OUTPATIENT
Start: 2022-05-05 | End: 2022-06-08

## 2022-05-05 RX ORDER — ISOPROPYL ALCOHOL 0.75 G/1
SWAB TOPICAL
Qty: 100 EACH | Refills: 3 | Status: ON HOLD | OUTPATIENT
Start: 2022-05-05 | End: 2022-06-08

## 2022-05-05 RX ORDER — LANCETS
EACH MISCELLANEOUS
Qty: 102 EACH | Refills: 3 | Status: ON HOLD | OUTPATIENT
Start: 2022-05-05 | End: 2022-06-08

## 2022-05-21 ENCOUNTER — HOSPITAL ENCOUNTER (INPATIENT)
Facility: HOSPITAL | Age: 87
LOS: 4 days | Discharge: HOME HEALTH | End: 2022-05-26
Attending: EMERGENCY MEDICINE | Admitting: FAMILY MEDICINE

## 2022-05-21 ENCOUNTER — APPOINTMENT (OUTPATIENT)
Dept: GENERAL RADIOLOGY | Facility: HOSPITAL | Age: 87
End: 2022-05-21

## 2022-05-21 DIAGNOSIS — F03.90 DEMENTIA WITHOUT BEHAVIORAL DISTURBANCE, UNSPECIFIED DEMENTIA TYPE: ICD-10-CM

## 2022-05-21 DIAGNOSIS — R91.1 PULMONARY NODULE: ICD-10-CM

## 2022-05-21 DIAGNOSIS — R78.81 E COLI BACTEREMIA: ICD-10-CM

## 2022-05-21 DIAGNOSIS — N39.0 ACUTE UTI: Primary | ICD-10-CM

## 2022-05-21 DIAGNOSIS — R41.82 ALTERED MENTAL STATUS, UNSPECIFIED ALTERED MENTAL STATUS TYPE: ICD-10-CM

## 2022-05-21 DIAGNOSIS — E11.65 UNCONTROLLED TYPE 2 DIABETES MELLITUS WITH HYPERGLYCEMIA: ICD-10-CM

## 2022-05-21 DIAGNOSIS — R13.10 DYSPHAGIA, UNSPECIFIED TYPE: ICD-10-CM

## 2022-05-21 DIAGNOSIS — B96.20 E COLI BACTEREMIA: ICD-10-CM

## 2022-05-21 LAB
BACTERIA UR QL AUTO: ABNORMAL /HPF
BASOPHILS # BLD AUTO: 0.05 10*3/MM3 (ref 0–0.2)
BASOPHILS NFR BLD AUTO: 0.4 % (ref 0–1.5)
BILIRUB UR QL STRIP: NEGATIVE
CLARITY UR: ABNORMAL
COLOR UR: YELLOW
DEPRECATED RDW RBC AUTO: 52.2 FL (ref 37–54)
EOSINOPHIL # BLD AUTO: 0.01 10*3/MM3 (ref 0–0.4)
EOSINOPHIL NFR BLD AUTO: 0.1 % (ref 0.3–6.2)
ERYTHROCYTE [DISTWIDTH] IN BLOOD BY AUTOMATED COUNT: 16.8 % (ref 12.3–15.4)
GLUCOSE UR STRIP-MCNC: NEGATIVE MG/DL
HCT VFR BLD AUTO: 34.2 % (ref 37.5–51)
HGB BLD-MCNC: 11.2 G/DL (ref 13–17.7)
HGB UR QL STRIP.AUTO: ABNORMAL
HYALINE CASTS UR QL AUTO: ABNORMAL /LPF
IMM GRANULOCYTES # BLD AUTO: 0.07 10*3/MM3 (ref 0–0.05)
IMM GRANULOCYTES NFR BLD AUTO: 0.5 % (ref 0–0.5)
KETONES UR QL STRIP: NEGATIVE
LEUKOCYTE ESTERASE UR QL STRIP.AUTO: ABNORMAL
LYMPHOCYTES # BLD AUTO: 1.32 10*3/MM3 (ref 0.7–3.1)
LYMPHOCYTES NFR BLD AUTO: 9.6 % (ref 19.6–45.3)
MCH RBC QN AUTO: 28 PG (ref 26.6–33)
MCHC RBC AUTO-ENTMCNC: 32.7 G/DL (ref 31.5–35.7)
MCV RBC AUTO: 85.5 FL (ref 79–97)
MONOCYTES # BLD AUTO: 0.6 10*3/MM3 (ref 0.1–0.9)
MONOCYTES NFR BLD AUTO: 4.4 % (ref 5–12)
NEUTROPHILS NFR BLD AUTO: 11.71 10*3/MM3 (ref 1.7–7)
NEUTROPHILS NFR BLD AUTO: 85 % (ref 42.7–76)
NITRITE UR QL STRIP: POSITIVE
NRBC BLD AUTO-RTO: 0 /100 WBC (ref 0–0.2)
PH UR STRIP.AUTO: 5.5 [PH] (ref 5–8)
PLATELET # BLD AUTO: 194 10*3/MM3 (ref 140–450)
PMV BLD AUTO: 10.3 FL (ref 6–12)
PROT UR QL STRIP: ABNORMAL
RBC # BLD AUTO: 4 10*6/MM3 (ref 4.14–5.8)
RBC # UR STRIP: ABNORMAL /HPF
REF LAB TEST METHOD: ABNORMAL
SP GR UR STRIP: 1.01 (ref 1–1.03)
SQUAMOUS #/AREA URNS HPF: ABNORMAL /HPF
UROBILINOGEN UR QL STRIP: ABNORMAL
WBC # UR STRIP: ABNORMAL /HPF
WBC NRBC COR # BLD: 13.76 10*3/MM3 (ref 3.4–10.8)

## 2022-05-21 PROCEDURE — 85025 COMPLETE CBC W/AUTO DIFF WBC: CPT

## 2022-05-21 PROCEDURE — 81001 URINALYSIS AUTO W/SCOPE: CPT | Performed by: EMERGENCY MEDICINE

## 2022-05-21 PROCEDURE — 87086 URINE CULTURE/COLONY COUNT: CPT | Performed by: NURSE PRACTITIONER

## 2022-05-21 PROCEDURE — P9612 CATHETERIZE FOR URINE SPEC: HCPCS

## 2022-05-21 PROCEDURE — 87077 CULTURE AEROBIC IDENTIFY: CPT | Performed by: NURSE PRACTITIONER

## 2022-05-21 PROCEDURE — 87186 SC STD MICRODIL/AGAR DIL: CPT | Performed by: NURSE PRACTITIONER

## 2022-05-21 PROCEDURE — 83735 ASSAY OF MAGNESIUM: CPT

## 2022-05-21 PROCEDURE — 83880 ASSAY OF NATRIURETIC PEPTIDE: CPT | Performed by: NURSE PRACTITIONER

## 2022-05-21 PROCEDURE — 99285 EMERGENCY DEPT VISIT HI MDM: CPT

## 2022-05-21 PROCEDURE — 84484 ASSAY OF TROPONIN QUANT: CPT

## 2022-05-21 PROCEDURE — 83605 ASSAY OF LACTIC ACID: CPT | Performed by: EMERGENCY MEDICINE

## 2022-05-21 PROCEDURE — 93005 ELECTROCARDIOGRAM TRACING: CPT

## 2022-05-21 PROCEDURE — 80053 COMPREHEN METABOLIC PANEL: CPT

## 2022-05-21 RX ORDER — ACETAMINOPHEN 650 MG/1
650 SUPPOSITORY RECTAL ONCE
Status: COMPLETED | OUTPATIENT
Start: 2022-05-21 | End: 2022-05-22

## 2022-05-21 RX ORDER — SODIUM CHLORIDE 0.9 % (FLUSH) 0.9 %
10 SYRINGE (ML) INJECTION AS NEEDED
Status: DISCONTINUED | OUTPATIENT
Start: 2022-05-21 | End: 2022-05-26 | Stop reason: HOSPADM

## 2022-05-22 ENCOUNTER — APPOINTMENT (OUTPATIENT)
Dept: GENERAL RADIOLOGY | Facility: HOSPITAL | Age: 87
End: 2022-05-22

## 2022-05-22 ENCOUNTER — APPOINTMENT (OUTPATIENT)
Dept: CARDIOLOGY | Facility: HOSPITAL | Age: 87
End: 2022-05-22

## 2022-05-22 PROBLEM — I82.509 CHRONIC DEEP VEIN THROMBOSIS (DVT) (HCC): Status: ACTIVE | Noted: 2017-07-06

## 2022-05-22 PROBLEM — N39.0 ACUTE UTI: Status: ACTIVE | Noted: 2022-05-22

## 2022-05-22 LAB
ALBUMIN SERPL-MCNC: 2.6 G/DL (ref 3.5–5.2)
ALBUMIN/GLOB SERPL: 0.7 G/DL
ALP SERPL-CCNC: 121 U/L (ref 39–117)
ALT SERPL W P-5'-P-CCNC: 19 U/L (ref 1–41)
ANION GAP SERPL CALCULATED.3IONS-SCNC: 10 MMOL/L (ref 5–15)
ANION GAP SERPL CALCULATED.3IONS-SCNC: 13 MMOL/L (ref 5–15)
AST SERPL-CCNC: 36 U/L (ref 1–40)
BACTERIA BLD CULT: ABNORMAL
BASOPHILS # BLD AUTO: 0.05 10*3/MM3 (ref 0–0.2)
BASOPHILS NFR BLD AUTO: 0.4 % (ref 0–1.5)
BH CV ECHO MEAS - AO MAX PG: 11.6 MMHG
BH CV ECHO MEAS - AO MEAN PG: 7.2 MMHG
BH CV ECHO MEAS - AO ROOT DIAM: 3.4 CM
BH CV ECHO MEAS - AO V2 MAX: 168.7 CM/SEC
BH CV ECHO MEAS - AO V2 VTI: 43.3 CM
BH CV ECHO MEAS - AVA(I,D): 1.44 CM2
BH CV ECHO MEAS - EDV(CUBED): 58 ML
BH CV ECHO MEAS - EDV(MOD-SP4): 82.8 ML
BH CV ECHO MEAS - EF(MOD-SP4): 60.1 %
BH CV ECHO MEAS - ESV(CUBED): 25.4 ML
BH CV ECHO MEAS - ESV(MOD-SP4): 33 ML
BH CV ECHO MEAS - FS: 24.1 %
BH CV ECHO MEAS - IVS/LVPW: 0.98 CM
BH CV ECHO MEAS - IVSD: 1.27 CM
BH CV ECHO MEAS - LA DIMENSION: 2.9 CM
BH CV ECHO MEAS - LAT PEAK E' VEL: 6.7 CM/SEC
BH CV ECHO MEAS - LV MASS(C)D: 173.9 GRAMS
BH CV ECHO MEAS - LV MAX PG: 2.9 MMHG
BH CV ECHO MEAS - LV MEAN PG: 1.61 MMHG
BH CV ECHO MEAS - LV V1 MAX: 85.3 CM/SEC
BH CV ECHO MEAS - LV V1 VTI: 20.6 CM
BH CV ECHO MEAS - LVIDD: 3.9 CM
BH CV ECHO MEAS - LVIDS: 2.9 CM
BH CV ECHO MEAS - LVOT AREA: 3 CM2
BH CV ECHO MEAS - LVOT DIAM: 1.97 CM
BH CV ECHO MEAS - LVPWD: 1.3 CM
BH CV ECHO MEAS - MED PEAK E' VEL: 6.6 CM/SEC
BH CV ECHO MEAS - MV A MAX VEL: 71.6 CM/SEC
BH CV ECHO MEAS - MV DEC SLOPE: 353.2 CM/SEC2
BH CV ECHO MEAS - MV DEC TIME: 0.23 MSEC
BH CV ECHO MEAS - MV E MAX VEL: 81.8 CM/SEC
BH CV ECHO MEAS - MV E/A: 1.14
BH CV ECHO MEAS - MV P1/2T: 67.2 MSEC
BH CV ECHO MEAS - MVA(P1/2T): 3.3 CM2
BH CV ECHO MEAS - PA V2 MAX: 164.2 CM/SEC
BH CV ECHO MEAS - SV(LVOT): 62.6 ML
BH CV ECHO MEAS - SV(MOD-SP4): 49.8 ML
BH CV ECHO MEAS - TAPSE (>1.6): 2.6 CM
BH CV ECHO MEAS - TR MAX PG: 25 MMHG
BH CV ECHO MEAS - TR MAX VEL: 209.1 CM/SEC
BH CV ECHO MEASUREMENTS AVERAGE E/E' RATIO: 12.3
BH CV XLRA - RV BASE: 4.6 CM
BH CV XLRA - RV LENGTH: 7.6 CM
BH CV XLRA - RV MID: 4.1 CM
BH CV XLRA - TDI S': 12.6 CM/SEC
BILIRUB SERPL-MCNC: 0.6 MG/DL (ref 0–1.2)
BUN SERPL-MCNC: 14 MG/DL (ref 8–23)
BUN SERPL-MCNC: 15 MG/DL (ref 8–23)
BUN/CREAT SERPL: 14.6 (ref 7–25)
BUN/CREAT SERPL: 16.3 (ref 7–25)
CALCIUM SPEC-SCNC: 7.5 MG/DL (ref 8.2–9.6)
CALCIUM SPEC-SCNC: 8.1 MG/DL (ref 8.2–9.6)
CHLORIDE SERPL-SCNC: 103 MMOL/L (ref 98–107)
CHLORIDE SERPL-SCNC: 110 MMOL/L (ref 98–107)
CO2 SERPL-SCNC: 22 MMOL/L (ref 22–29)
CO2 SERPL-SCNC: 22 MMOL/L (ref 22–29)
CREAT SERPL-MCNC: 0.86 MG/DL (ref 0.76–1.27)
CREAT SERPL-MCNC: 1.03 MG/DL (ref 0.76–1.27)
D-LACTATE SERPL-SCNC: 2 MMOL/L (ref 0.5–2)
D-LACTATE SERPL-SCNC: 2.4 MMOL/L (ref 0.5–2)
D-LACTATE SERPL-SCNC: 3.3 MMOL/L (ref 0.5–2)
D-LACTATE SERPL-SCNC: 4.7 MMOL/L (ref 0.5–2)
DEPRECATED RDW RBC AUTO: 51.2 FL (ref 37–54)
EGFRCR SERPLBLD CKD-EPI 2021: 69 ML/MIN/1.73
EGFRCR SERPLBLD CKD-EPI 2021: 82.3 ML/MIN/1.73
EOSINOPHIL # BLD AUTO: 0.04 10*3/MM3 (ref 0–0.4)
EOSINOPHIL NFR BLD AUTO: 0.3 % (ref 0.3–6.2)
ERYTHROCYTE [DISTWIDTH] IN BLOOD BY AUTOMATED COUNT: 16.9 % (ref 12.3–15.4)
FLUAV RNA RESP QL NAA+PROBE: NOT DETECTED
FLUBV RNA RESP QL NAA+PROBE: NOT DETECTED
GLOBULIN UR ELPH-MCNC: 3.6 GM/DL
GLUCOSE BLDC GLUCOMTR-MCNC: 101 MG/DL (ref 70–130)
GLUCOSE BLDC GLUCOMTR-MCNC: 116 MG/DL (ref 70–130)
GLUCOSE BLDC GLUCOMTR-MCNC: 118 MG/DL (ref 70–130)
GLUCOSE BLDC GLUCOMTR-MCNC: 120 MG/DL (ref 70–130)
GLUCOSE BLDC GLUCOMTR-MCNC: 127 MG/DL (ref 70–130)
GLUCOSE SERPL-MCNC: 123 MG/DL (ref 65–99)
GLUCOSE SERPL-MCNC: 221 MG/DL (ref 65–99)
HBA1C MFR BLD: 6.2 % (ref 4.8–5.6)
HCT VFR BLD AUTO: 25.1 % (ref 37.5–51)
HGB BLD-MCNC: 8.4 G/DL (ref 13–17.7)
HOLD SPECIMEN: NORMAL
IMM GRANULOCYTES # BLD AUTO: 0.06 10*3/MM3 (ref 0–0.05)
IMM GRANULOCYTES NFR BLD AUTO: 0.5 % (ref 0–0.5)
LEFT ATRIUM VOLUME INDEX: 20.9 ML/M2
LYMPHOCYTES # BLD AUTO: 3.08 10*3/MM3 (ref 0.7–3.1)
LYMPHOCYTES NFR BLD AUTO: 24.3 % (ref 19.6–45.3)
MAGNESIUM SERPL-MCNC: 1.7 MG/DL (ref 1.6–2.4)
MAGNESIUM SERPL-MCNC: 2.2 MG/DL (ref 1.6–2.4)
MAXIMAL PREDICTED HEART RATE: 130 BPM
MCH RBC QN AUTO: 27.9 PG (ref 26.6–33)
MCHC RBC AUTO-ENTMCNC: 33.5 G/DL (ref 31.5–35.7)
MCV RBC AUTO: 83.4 FL (ref 79–97)
MONOCYTES # BLD AUTO: 0.49 10*3/MM3 (ref 0.1–0.9)
MONOCYTES NFR BLD AUTO: 3.9 % (ref 5–12)
NEUTROPHILS NFR BLD AUTO: 70.6 % (ref 42.7–76)
NEUTROPHILS NFR BLD AUTO: 8.98 10*3/MM3 (ref 1.7–7)
NRBC BLD AUTO-RTO: 0 /100 WBC (ref 0–0.2)
NT-PROBNP SERPL-MCNC: 277.3 PG/ML (ref 0–1800)
PLATELET # BLD AUTO: 138 10*3/MM3 (ref 140–450)
PMV BLD AUTO: 9.8 FL (ref 6–12)
POTASSIUM SERPL-SCNC: 4 MMOL/L (ref 3.5–5.2)
POTASSIUM SERPL-SCNC: 4.1 MMOL/L (ref 3.5–5.2)
PROT SERPL-MCNC: 6.2 G/DL (ref 6–8.5)
RBC # BLD AUTO: 3.01 10*6/MM3 (ref 4.14–5.8)
RSV RNA NPH QL NAA+NON-PROBE: NOT DETECTED
SARS-COV-2 RNA RESP QL NAA+PROBE: NOT DETECTED
SODIUM SERPL-SCNC: 138 MMOL/L (ref 136–145)
SODIUM SERPL-SCNC: 142 MMOL/L (ref 136–145)
STRESS TARGET HR: 111 BPM
TROPONIN T SERPL-MCNC: 0.04 NG/ML (ref 0–0.03)
TROPONIN T SERPL-MCNC: 0.05 NG/ML (ref 0–0.03)
TSH SERPL DL<=0.05 MIU/L-ACNC: 2.73 UIU/ML (ref 0.27–4.2)
WBC NRBC COR # BLD: 12.7 10*3/MM3 (ref 3.4–10.8)
WHOLE BLOOD HOLD COAG: NORMAL
WHOLE BLOOD HOLD SPECIMEN: NORMAL

## 2022-05-22 PROCEDURE — 83735 ASSAY OF MAGNESIUM: CPT | Performed by: NURSE PRACTITIONER

## 2022-05-22 PROCEDURE — 85025 COMPLETE CBC W/AUTO DIFF WBC: CPT | Performed by: NURSE PRACTITIONER

## 2022-05-22 PROCEDURE — 87150 DNA/RNA AMPLIFIED PROBE: CPT | Performed by: NURSE PRACTITIONER

## 2022-05-22 PROCEDURE — 93306 TTE W/DOPPLER COMPLETE: CPT

## 2022-05-22 PROCEDURE — 0 MAGNESIUM SULFATE 4 GM/100ML SOLUTION: Performed by: NURSE PRACTITIONER

## 2022-05-22 PROCEDURE — 80048 BASIC METABOLIC PNL TOTAL CA: CPT | Performed by: NURSE PRACTITIONER

## 2022-05-22 PROCEDURE — 84484 ASSAY OF TROPONIN QUANT: CPT | Performed by: NURSE PRACTITIONER

## 2022-05-22 PROCEDURE — 83605 ASSAY OF LACTIC ACID: CPT | Performed by: EMERGENCY MEDICINE

## 2022-05-22 PROCEDURE — 25010000002 ALBUMIN HUMAN 25% PER 50 ML: Performed by: INTERNAL MEDICINE

## 2022-05-22 PROCEDURE — 82962 GLUCOSE BLOOD TEST: CPT

## 2022-05-22 PROCEDURE — 71045 X-RAY EXAM CHEST 1 VIEW: CPT

## 2022-05-22 PROCEDURE — 87040 BLOOD CULTURE FOR BACTERIA: CPT | Performed by: NURSE PRACTITIONER

## 2022-05-22 PROCEDURE — 99223 1ST HOSP IP/OBS HIGH 75: CPT | Performed by: INTERNAL MEDICINE

## 2022-05-22 PROCEDURE — 83036 HEMOGLOBIN GLYCOSYLATED A1C: CPT | Performed by: NURSE PRACTITIONER

## 2022-05-22 PROCEDURE — 25010000002 CEFTRIAXONE PER 250 MG: Performed by: EMERGENCY MEDICINE

## 2022-05-22 PROCEDURE — 87186 SC STD MICRODIL/AGAR DIL: CPT | Performed by: NURSE PRACTITIONER

## 2022-05-22 PROCEDURE — 93306 TTE W/DOPPLER COMPLETE: CPT | Performed by: INTERNAL MEDICINE

## 2022-05-22 PROCEDURE — 84443 ASSAY THYROID STIM HORMONE: CPT | Performed by: NURSE PRACTITIONER

## 2022-05-22 PROCEDURE — P9047 ALBUMIN (HUMAN), 25%, 50ML: HCPCS | Performed by: INTERNAL MEDICINE

## 2022-05-22 PROCEDURE — 87637 SARSCOV2&INF A&B&RSV AMP PRB: CPT | Performed by: INTERNAL MEDICINE

## 2022-05-22 RX ORDER — BISACODYL 5 MG/1
5 TABLET, DELAYED RELEASE ORAL DAILY PRN
Status: DISCONTINUED | OUTPATIENT
Start: 2022-05-22 | End: 2022-05-26 | Stop reason: HOSPADM

## 2022-05-22 RX ORDER — ACETAMINOPHEN 325 MG/1
650 TABLET ORAL EVERY 4 HOURS PRN
Status: DISCONTINUED | OUTPATIENT
Start: 2022-05-22 | End: 2022-05-26 | Stop reason: HOSPADM

## 2022-05-22 RX ORDER — NICOTINE POLACRILEX 4 MG
15 LOZENGE BUCCAL
Status: DISCONTINUED | OUTPATIENT
Start: 2022-05-22 | End: 2022-05-26 | Stop reason: HOSPADM

## 2022-05-22 RX ORDER — DEXTROSE MONOHYDRATE 25 G/50ML
25 INJECTION, SOLUTION INTRAVENOUS
Status: DISCONTINUED | OUTPATIENT
Start: 2022-05-22 | End: 2022-05-26 | Stop reason: HOSPADM

## 2022-05-22 RX ORDER — AMOXICILLIN 250 MG
2 CAPSULE ORAL 2 TIMES DAILY
Status: DISCONTINUED | OUTPATIENT
Start: 2022-05-22 | End: 2022-05-26 | Stop reason: HOSPADM

## 2022-05-22 RX ORDER — CETIRIZINE HYDROCHLORIDE 10 MG/1
10 TABLET ORAL DAILY
Status: DISCONTINUED | OUTPATIENT
Start: 2022-05-22 | End: 2022-05-26 | Stop reason: HOSPADM

## 2022-05-22 RX ORDER — MAGNESIUM SULFATE HEPTAHYDRATE 40 MG/ML
4 INJECTION, SOLUTION INTRAVENOUS AS NEEDED
Status: DISCONTINUED | OUTPATIENT
Start: 2022-05-22 | End: 2022-05-26 | Stop reason: HOSPADM

## 2022-05-22 RX ORDER — ONDANSETRON 4 MG/1
4 TABLET, FILM COATED ORAL EVERY 6 HOURS PRN
Status: DISCONTINUED | OUTPATIENT
Start: 2022-05-22 | End: 2022-05-26 | Stop reason: HOSPADM

## 2022-05-22 RX ORDER — ONDANSETRON 2 MG/ML
4 INJECTION INTRAMUSCULAR; INTRAVENOUS EVERY 6 HOURS PRN
Status: DISCONTINUED | OUTPATIENT
Start: 2022-05-22 | End: 2022-05-26 | Stop reason: HOSPADM

## 2022-05-22 RX ORDER — POLYETHYLENE GLYCOL 3350 17 G/17G
17 POWDER, FOR SOLUTION ORAL DAILY PRN
Status: DISCONTINUED | OUTPATIENT
Start: 2022-05-22 | End: 2022-05-26 | Stop reason: HOSPADM

## 2022-05-22 RX ORDER — ACETAMINOPHEN 160 MG/5ML
650 SOLUTION ORAL EVERY 4 HOURS PRN
Status: DISCONTINUED | OUTPATIENT
Start: 2022-05-22 | End: 2022-05-26 | Stop reason: HOSPADM

## 2022-05-22 RX ORDER — SODIUM CHLORIDE 0.9 % (FLUSH) 0.9 %
10 SYRINGE (ML) INJECTION AS NEEDED
Status: DISCONTINUED | OUTPATIENT
Start: 2022-05-22 | End: 2022-05-26 | Stop reason: HOSPADM

## 2022-05-22 RX ORDER — ACETAMINOPHEN 650 MG/1
650 SUPPOSITORY RECTAL EVERY 4 HOURS PRN
Status: DISCONTINUED | OUTPATIENT
Start: 2022-05-22 | End: 2022-05-26 | Stop reason: HOSPADM

## 2022-05-22 RX ORDER — LEVOTHYROXINE SODIUM 0.07 MG/1
75 TABLET ORAL
Status: DISCONTINUED | OUTPATIENT
Start: 2022-05-22 | End: 2022-05-26 | Stop reason: HOSPADM

## 2022-05-22 RX ORDER — TAMSULOSIN HYDROCHLORIDE 0.4 MG/1
0.4 CAPSULE ORAL DAILY
Status: DISCONTINUED | OUTPATIENT
Start: 2022-05-22 | End: 2022-05-26 | Stop reason: HOSPADM

## 2022-05-22 RX ORDER — MAGNESIUM SULFATE HEPTAHYDRATE 40 MG/ML
2 INJECTION, SOLUTION INTRAVENOUS AS NEEDED
Status: DISCONTINUED | OUTPATIENT
Start: 2022-05-22 | End: 2022-05-26 | Stop reason: HOSPADM

## 2022-05-22 RX ORDER — RIVASTIGMINE 9.5 MG/24H
1 PATCH, EXTENDED RELEASE TRANSDERMAL DAILY
Status: DISCONTINUED | OUTPATIENT
Start: 2022-05-22 | End: 2022-05-26 | Stop reason: HOSPADM

## 2022-05-22 RX ORDER — ALBUMIN (HUMAN) 12.5 G/50ML
25 SOLUTION INTRAVENOUS ONCE
Status: COMPLETED | OUTPATIENT
Start: 2022-05-22 | End: 2022-05-22

## 2022-05-22 RX ORDER — IPRATROPIUM BROMIDE 42 UG/1
2 SPRAY, METERED NASAL 2 TIMES DAILY
Status: DISCONTINUED | OUTPATIENT
Start: 2022-05-22 | End: 2022-05-26 | Stop reason: HOSPADM

## 2022-05-22 RX ORDER — BISACODYL 10 MG
10 SUPPOSITORY, RECTAL RECTAL DAILY PRN
Status: DISCONTINUED | OUTPATIENT
Start: 2022-05-22 | End: 2022-05-26 | Stop reason: HOSPADM

## 2022-05-22 RX ORDER — MEMANTINE HYDROCHLORIDE 10 MG/1
10 TABLET ORAL EVERY 12 HOURS SCHEDULED
Status: DISCONTINUED | OUTPATIENT
Start: 2022-05-22 | End: 2022-05-26

## 2022-05-22 RX ORDER — CHOLECALCIFEROL (VITAMIN D3) 125 MCG
5 CAPSULE ORAL NIGHTLY PRN
Status: DISCONTINUED | OUTPATIENT
Start: 2022-05-22 | End: 2022-05-26 | Stop reason: HOSPADM

## 2022-05-22 RX ORDER — SODIUM CHLORIDE 0.9 % (FLUSH) 0.9 %
10 SYRINGE (ML) INJECTION EVERY 12 HOURS SCHEDULED
Status: DISCONTINUED | OUTPATIENT
Start: 2022-05-22 | End: 2022-05-26 | Stop reason: HOSPADM

## 2022-05-22 RX ORDER — INSULIN LISPRO 100 [IU]/ML
0-7 INJECTION, SOLUTION INTRAVENOUS; SUBCUTANEOUS
Status: DISCONTINUED | OUTPATIENT
Start: 2022-05-22 | End: 2022-05-26 | Stop reason: HOSPADM

## 2022-05-22 RX ADMIN — SENNOSIDES AND DOCUSATE SODIUM 2 TABLET: 50; 8.6 TABLET ORAL at 20:01

## 2022-05-22 RX ADMIN — CETIRIZINE HYDROCHLORIDE 10 MG: 10 TABLET, FILM COATED ORAL at 13:45

## 2022-05-22 RX ADMIN — SODIUM CHLORIDE 500 ML: 9 INJECTION, SOLUTION INTRAVENOUS at 03:30

## 2022-05-22 RX ADMIN — RIVAROXABAN 20 MG: 20 TABLET, FILM COATED ORAL at 20:01

## 2022-05-22 RX ADMIN — MEMANTINE 10 MG: 10 TABLET ORAL at 20:01

## 2022-05-22 RX ADMIN — RIVASTIGMINE 1 PATCH: 9.5 PATCH TRANSDERMAL at 13:48

## 2022-05-22 RX ADMIN — IPRATROPIUM BROMIDE 2 SPRAY: 42 SPRAY, METERED NASAL at 21:50

## 2022-05-22 RX ADMIN — LEVOTHYROXINE SODIUM 75 MCG: 0.07 TABLET ORAL at 13:48

## 2022-05-22 RX ADMIN — MEMANTINE 10 MG: 10 TABLET ORAL at 13:45

## 2022-05-22 RX ADMIN — Medication 10 ML: at 08:30

## 2022-05-22 RX ADMIN — TAMSULOSIN HYDROCHLORIDE 0.4 MG: 0.4 CAPSULE ORAL at 13:45

## 2022-05-22 RX ADMIN — SODIUM CHLORIDE 1 G: 900 INJECTION INTRAVENOUS at 01:18

## 2022-05-22 RX ADMIN — SODIUM CHLORIDE 1000 ML: 9 INJECTION, SOLUTION INTRAVENOUS at 01:18

## 2022-05-22 RX ADMIN — SODIUM CHLORIDE 1000 ML: 9 INJECTION, SOLUTION INTRAVENOUS at 00:10

## 2022-05-22 RX ADMIN — ALBUMIN HUMAN 25 G: 0.25 SOLUTION INTRAVENOUS at 03:33

## 2022-05-22 RX ADMIN — Medication 10 ML: at 21:50

## 2022-05-22 RX ADMIN — IPRATROPIUM BROMIDE 2 SPRAY: 42 SPRAY, METERED NASAL at 13:47

## 2022-05-22 RX ADMIN — MAGNESIUM SULFATE HEPTAHYDRATE 4 G: 40 INJECTION, SOLUTION INTRAVENOUS at 05:14

## 2022-05-22 RX ADMIN — ACETAMINOPHEN 650 MG: 650 SUPPOSITORY RECTAL at 00:24

## 2022-05-23 ENCOUNTER — APPOINTMENT (OUTPATIENT)
Dept: CT IMAGING | Facility: HOSPITAL | Age: 87
End: 2022-05-23

## 2022-05-23 LAB
ANION GAP SERPL CALCULATED.3IONS-SCNC: 7 MMOL/L (ref 5–15)
BASOPHILS # BLD AUTO: 0.07 10*3/MM3 (ref 0–0.2)
BASOPHILS NFR BLD AUTO: 0.8 % (ref 0–1.5)
BUN SERPL-MCNC: 13 MG/DL (ref 8–23)
BUN/CREAT SERPL: 15.1 (ref 7–25)
CALCIUM SPEC-SCNC: 7.7 MG/DL (ref 8.2–9.6)
CHLORIDE SERPL-SCNC: 112 MMOL/L (ref 98–107)
CO2 SERPL-SCNC: 23 MMOL/L (ref 22–29)
CREAT SERPL-MCNC: 0.86 MG/DL (ref 0.76–1.27)
DEPRECATED RDW RBC AUTO: 52 FL (ref 37–54)
EGFRCR SERPLBLD CKD-EPI 2021: 82.3 ML/MIN/1.73
EOSINOPHIL # BLD AUTO: 0.25 10*3/MM3 (ref 0–0.4)
EOSINOPHIL NFR BLD AUTO: 2.9 % (ref 0.3–6.2)
ERYTHROCYTE [DISTWIDTH] IN BLOOD BY AUTOMATED COUNT: 17.2 % (ref 12.3–15.4)
GLUCOSE BLDC GLUCOMTR-MCNC: 111 MG/DL (ref 70–130)
GLUCOSE BLDC GLUCOMTR-MCNC: 127 MG/DL (ref 70–130)
GLUCOSE BLDC GLUCOMTR-MCNC: 176 MG/DL (ref 70–130)
GLUCOSE BLDC GLUCOMTR-MCNC: 94 MG/DL (ref 70–130)
GLUCOSE SERPL-MCNC: 90 MG/DL (ref 65–99)
HCT VFR BLD AUTO: 27.1 % (ref 37.5–51)
HGB BLD-MCNC: 9.1 G/DL (ref 13–17.7)
IMM GRANULOCYTES # BLD AUTO: 0.02 10*3/MM3 (ref 0–0.05)
IMM GRANULOCYTES NFR BLD AUTO: 0.2 % (ref 0–0.5)
LYMPHOCYTES # BLD AUTO: 3.34 10*3/MM3 (ref 0.7–3.1)
LYMPHOCYTES NFR BLD AUTO: 39.2 % (ref 19.6–45.3)
MAGNESIUM SERPL-MCNC: 2.4 MG/DL (ref 1.6–2.4)
MCH RBC QN AUTO: 27.9 PG (ref 26.6–33)
MCHC RBC AUTO-ENTMCNC: 33.6 G/DL (ref 31.5–35.7)
MCV RBC AUTO: 83.1 FL (ref 79–97)
MONOCYTES # BLD AUTO: 0.52 10*3/MM3 (ref 0.1–0.9)
MONOCYTES NFR BLD AUTO: 6.1 % (ref 5–12)
NEUTROPHILS NFR BLD AUTO: 4.31 10*3/MM3 (ref 1.7–7)
NEUTROPHILS NFR BLD AUTO: 50.8 % (ref 42.7–76)
NRBC BLD AUTO-RTO: 0 /100 WBC (ref 0–0.2)
PLATELET # BLD AUTO: 141 10*3/MM3 (ref 140–450)
PMV BLD AUTO: 10 FL (ref 6–12)
POTASSIUM SERPL-SCNC: 4 MMOL/L (ref 3.5–5.2)
QT INTERVAL: 358 MS
QTC INTERVAL: 473 MS
RBC # BLD AUTO: 3.26 10*6/MM3 (ref 4.14–5.8)
SODIUM SERPL-SCNC: 142 MMOL/L (ref 136–145)
WBC NRBC COR # BLD: 8.51 10*3/MM3 (ref 3.4–10.8)

## 2022-05-23 PROCEDURE — 85025 COMPLETE CBC W/AUTO DIFF WBC: CPT | Performed by: INTERNAL MEDICINE

## 2022-05-23 PROCEDURE — 82962 GLUCOSE BLOOD TEST: CPT

## 2022-05-23 PROCEDURE — 63710000001 INSULIN LISPRO (HUMAN) PER 5 UNITS: Performed by: NURSE PRACTITIONER

## 2022-05-23 PROCEDURE — 97166 OT EVAL MOD COMPLEX 45 MIN: CPT

## 2022-05-23 PROCEDURE — 25010000002 PIPERACILLIN SOD-TAZOBACTAM PER 1 G: Performed by: NURSE PRACTITIONER

## 2022-05-23 PROCEDURE — 99232 SBSQ HOSP IP/OBS MODERATE 35: CPT | Performed by: INTERNAL MEDICINE

## 2022-05-23 PROCEDURE — 74176 CT ABD & PELVIS W/O CONTRAST: CPT

## 2022-05-23 PROCEDURE — 94799 UNLISTED PULMONARY SVC/PX: CPT

## 2022-05-23 PROCEDURE — 80048 BASIC METABOLIC PNL TOTAL CA: CPT | Performed by: INTERNAL MEDICINE

## 2022-05-23 PROCEDURE — 97162 PT EVAL MOD COMPLEX 30 MIN: CPT | Performed by: PHYSICAL THERAPIST

## 2022-05-23 PROCEDURE — 92610 EVALUATE SWALLOWING FUNCTION: CPT

## 2022-05-23 PROCEDURE — 25010000002 ERTAPENEM PER 500 MG: Performed by: INTERNAL MEDICINE

## 2022-05-23 PROCEDURE — 94660 CPAP INITIATION&MGMT: CPT

## 2022-05-23 PROCEDURE — 83735 ASSAY OF MAGNESIUM: CPT | Performed by: INTERNAL MEDICINE

## 2022-05-23 RX ORDER — LOSARTAN POTASSIUM 50 MG/1
50 TABLET ORAL DAILY
COMMUNITY
End: 2022-05-26 | Stop reason: HOSPADM

## 2022-05-23 RX ADMIN — CETIRIZINE HYDROCHLORIDE 10 MG: 10 TABLET, FILM COATED ORAL at 09:00

## 2022-05-23 RX ADMIN — TAZOBACTAM SODIUM AND PIPERACILLIN SODIUM 3.38 G: 375; 3 INJECTION, SOLUTION INTRAVENOUS at 08:00

## 2022-05-23 RX ADMIN — RIVAROXABAN 10 MG: 10 TABLET, FILM COATED ORAL at 18:14

## 2022-05-23 RX ADMIN — SENNOSIDES AND DOCUSATE SODIUM 2 TABLET: 50; 8.6 TABLET ORAL at 09:00

## 2022-05-23 RX ADMIN — TAZOBACTAM SODIUM AND PIPERACILLIN SODIUM 3.38 G: 375; 3 INJECTION, SOLUTION INTRAVENOUS at 00:26

## 2022-05-23 RX ADMIN — ERTAPENEM 1 G: 1 INJECTION, POWDER, LYOPHILIZED, FOR SOLUTION INTRAMUSCULAR; INTRAVENOUS at 15:00

## 2022-05-23 RX ADMIN — LEVOTHYROXINE SODIUM 75 MCG: 0.07 TABLET ORAL at 06:00

## 2022-05-23 RX ADMIN — INSULIN LISPRO 2 UNITS: 100 INJECTION, SOLUTION INTRAVENOUS; SUBCUTANEOUS at 18:14

## 2022-05-23 RX ADMIN — Medication 10 ML: at 09:00

## 2022-05-23 RX ADMIN — RIVASTIGMINE 1 PATCH: 9.5 PATCH TRANSDERMAL at 09:00

## 2022-05-23 RX ADMIN — TAMSULOSIN HYDROCHLORIDE 0.4 MG: 0.4 CAPSULE ORAL at 09:00

## 2022-05-23 RX ADMIN — MEMANTINE 10 MG: 10 TABLET ORAL at 08:59

## 2022-05-24 LAB
BACTERIA SPEC AEROBE CULT: ABNORMAL
GLUCOSE BLDC GLUCOMTR-MCNC: 123 MG/DL (ref 70–130)
GLUCOSE BLDC GLUCOMTR-MCNC: 126 MG/DL (ref 70–130)
GLUCOSE BLDC GLUCOMTR-MCNC: 139 MG/DL (ref 70–130)
GLUCOSE BLDC GLUCOMTR-MCNC: 161 MG/DL (ref 70–130)

## 2022-05-24 PROCEDURE — 63710000001 INSULIN LISPRO (HUMAN) PER 5 UNITS: Performed by: NURSE PRACTITIONER

## 2022-05-24 PROCEDURE — 25010000002 HALOPERIDOL LACTATE PER 5 MG: Performed by: PHYSICIAN ASSISTANT

## 2022-05-24 PROCEDURE — 92526 ORAL FUNCTION THERAPY: CPT

## 2022-05-24 PROCEDURE — 25010000002 ERTAPENEM PER 500 MG: Performed by: INTERNAL MEDICINE

## 2022-05-24 PROCEDURE — 99232 SBSQ HOSP IP/OBS MODERATE 35: CPT | Performed by: FAMILY MEDICINE

## 2022-05-24 PROCEDURE — 82962 GLUCOSE BLOOD TEST: CPT

## 2022-05-24 RX ORDER — QUETIAPINE FUMARATE 25 MG/1
25 TABLET, FILM COATED ORAL NIGHTLY
Status: DISCONTINUED | OUTPATIENT
Start: 2022-05-24 | End: 2022-05-26 | Stop reason: HOSPADM

## 2022-05-24 RX ORDER — HALOPERIDOL 5 MG/ML
0.5 INJECTION INTRAMUSCULAR ONCE
Status: COMPLETED | OUTPATIENT
Start: 2022-05-24 | End: 2022-05-24

## 2022-05-24 RX ORDER — QUETIAPINE FUMARATE 25 MG/1
12.5 TABLET, FILM COATED ORAL 3 TIMES DAILY PRN
Status: DISCONTINUED | OUTPATIENT
Start: 2022-05-24 | End: 2022-05-26 | Stop reason: HOSPADM

## 2022-05-24 RX ADMIN — SENNOSIDES AND DOCUSATE SODIUM 2 TABLET: 50; 8.6 TABLET ORAL at 09:32

## 2022-05-24 RX ADMIN — Medication 10 ML: at 09:34

## 2022-05-24 RX ADMIN — CETIRIZINE HYDROCHLORIDE 10 MG: 10 TABLET, FILM COATED ORAL at 09:32

## 2022-05-24 RX ADMIN — HALOPERIDOL LACTATE 0.5 MG: 5 INJECTION, SOLUTION INTRAMUSCULAR at 06:22

## 2022-05-24 RX ADMIN — TAMSULOSIN HYDROCHLORIDE 0.4 MG: 0.4 CAPSULE ORAL at 09:31

## 2022-05-24 RX ADMIN — RIVASTIGMINE 1 PATCH: 9.5 PATCH TRANSDERMAL at 10:19

## 2022-05-24 RX ADMIN — RIVAROXABAN 10 MG: 10 TABLET, FILM COATED ORAL at 18:10

## 2022-05-24 RX ADMIN — QUETIAPINE FUMARATE 12.5 MG: 25 TABLET ORAL at 15:24

## 2022-05-24 RX ADMIN — MEMANTINE 10 MG: 10 TABLET ORAL at 21:23

## 2022-05-24 RX ADMIN — SENNOSIDES AND DOCUSATE SODIUM 2 TABLET: 50; 8.6 TABLET ORAL at 21:23

## 2022-05-24 RX ADMIN — QUETIAPINE FUMARATE 25 MG: 25 TABLET ORAL at 21:23

## 2022-05-24 RX ADMIN — ERTAPENEM 1 G: 1 INJECTION, POWDER, LYOPHILIZED, FOR SOLUTION INTRAMUSCULAR; INTRAVENOUS at 15:24

## 2022-05-24 RX ADMIN — IPRATROPIUM BROMIDE 2 SPRAY: 42 SPRAY, METERED NASAL at 21:25

## 2022-05-24 RX ADMIN — MEMANTINE 10 MG: 10 TABLET ORAL at 09:31

## 2022-05-25 PROBLEM — B96.20 E COLI BACTEREMIA: Status: ACTIVE | Noted: 2022-05-25

## 2022-05-25 PROBLEM — R78.81 E COLI BACTEREMIA: Status: ACTIVE | Noted: 2022-05-25

## 2022-05-25 LAB
BACTERIA SPEC AEROBE CULT: ABNORMAL
GLUCOSE BLDC GLUCOMTR-MCNC: 77 MG/DL (ref 70–130)
GLUCOSE BLDC GLUCOMTR-MCNC: 78 MG/DL (ref 70–130)
GLUCOSE BLDC GLUCOMTR-MCNC: 81 MG/DL (ref 70–130)
GLUCOSE BLDC GLUCOMTR-MCNC: 82 MG/DL (ref 70–130)
GLUCOSE BLDC GLUCOMTR-MCNC: 96 MG/DL (ref 70–130)
GRAM STN SPEC: ABNORMAL
ISOLATED FROM: ABNORMAL

## 2022-05-25 PROCEDURE — 25010000002 CEFTRIAXONE PER 250 MG: Performed by: INTERNAL MEDICINE

## 2022-05-25 PROCEDURE — 82962 GLUCOSE BLOOD TEST: CPT

## 2022-05-25 PROCEDURE — 99231 SBSQ HOSP IP/OBS SF/LOW 25: CPT | Performed by: FAMILY MEDICINE

## 2022-05-25 PROCEDURE — 94799 UNLISTED PULMONARY SVC/PX: CPT

## 2022-05-25 RX ORDER — QUETIAPINE FUMARATE 25 MG/1
25 TABLET, FILM COATED ORAL NIGHTLY PRN
COMMUNITY

## 2022-05-25 RX ADMIN — Medication 10 ML: at 08:16

## 2022-05-25 RX ADMIN — CEFTRIAXONE 2 G: 2 INJECTION, POWDER, FOR SOLUTION INTRAMUSCULAR; INTRAVENOUS at 08:15

## 2022-05-25 RX ADMIN — RIVASTIGMINE 1 PATCH: 9.5 PATCH TRANSDERMAL at 08:17

## 2022-05-25 RX ADMIN — Medication 5 MG: at 03:10

## 2022-05-26 ENCOUNTER — HOME HEALTH ADMISSION (OUTPATIENT)
Dept: HOME HEALTH SERVICES | Facility: HOME HEALTHCARE | Age: 87
End: 2022-05-26

## 2022-05-26 VITALS
WEIGHT: 194.3 LBS | RESPIRATION RATE: 14 BRPM | TEMPERATURE: 98.2 F | HEART RATE: 73 BPM | SYSTOLIC BLOOD PRESSURE: 124 MMHG | DIASTOLIC BLOOD PRESSURE: 59 MMHG | HEIGHT: 67 IN | BODY MASS INDEX: 30.49 KG/M2 | OXYGEN SATURATION: 97 %

## 2022-05-26 LAB
GLUCOSE BLDC GLUCOMTR-MCNC: 108 MG/DL (ref 70–130)
GLUCOSE BLDC GLUCOMTR-MCNC: 144 MG/DL (ref 70–130)
GLUCOSE BLDC GLUCOMTR-MCNC: 147 MG/DL (ref 70–130)
GLUCOSE BLDC GLUCOMTR-MCNC: 70 MG/DL (ref 70–130)

## 2022-05-26 PROCEDURE — 25010000002 CEFTRIAXONE PER 250 MG: Performed by: INTERNAL MEDICINE

## 2022-05-26 PROCEDURE — 99239 HOSP IP/OBS DSCHRG MGMT >30: CPT | Performed by: NURSE PRACTITIONER

## 2022-05-26 PROCEDURE — 82962 GLUCOSE BLOOD TEST: CPT

## 2022-05-26 RX ORDER — CHOLECALCIFEROL (VITAMIN D3) 125 MCG
5 CAPSULE ORAL NIGHTLY PRN
Start: 2022-05-26

## 2022-05-26 RX ORDER — CEFUROXIME AXETIL 500 MG/1
500 TABLET ORAL 2 TIMES DAILY
Qty: 28 TABLET | Refills: 0 | Status: SHIPPED | OUTPATIENT
Start: 2022-06-01 | End: 2022-06-11 | Stop reason: HOSPADM

## 2022-05-26 RX ORDER — MEMANTINE HYDROCHLORIDE 10 MG/1
10 TABLET ORAL EVERY 12 HOURS SCHEDULED
Status: DISCONTINUED | OUTPATIENT
Start: 2022-05-26 | End: 2022-05-26 | Stop reason: HOSPADM

## 2022-05-26 RX ORDER — CEFTRIAXONE 1 G/1
1 INJECTION, POWDER, FOR SOLUTION INTRAMUSCULAR; INTRAVENOUS EVERY 24 HOURS
Qty: 5 G | Refills: 0
Start: 2022-05-26 | End: 2022-05-31

## 2022-05-26 RX ADMIN — RIVAROXABAN 10 MG: 10 TABLET, FILM COATED ORAL at 02:14

## 2022-05-26 RX ADMIN — TAMSULOSIN HYDROCHLORIDE 0.4 MG: 0.4 CAPSULE ORAL at 08:38

## 2022-05-26 RX ADMIN — MEMANTINE 10 MG: 10 TABLET ORAL at 02:14

## 2022-05-26 RX ADMIN — SENNOSIDES AND DOCUSATE SODIUM 2 TABLET: 50; 8.6 TABLET ORAL at 08:38

## 2022-05-26 RX ADMIN — CEFTRIAXONE 2 G: 2 INJECTION, POWDER, FOR SOLUTION INTRAMUSCULAR; INTRAVENOUS at 08:38

## 2022-05-26 RX ADMIN — IPRATROPIUM BROMIDE 2 SPRAY: 42 SPRAY, METERED NASAL at 08:38

## 2022-05-26 RX ADMIN — RIVASTIGMINE 1 PATCH: 9.5 PATCH TRANSDERMAL at 08:39

## 2022-05-26 RX ADMIN — LEVOTHYROXINE SODIUM 75 MCG: 0.07 TABLET ORAL at 05:40

## 2022-05-26 RX ADMIN — CETIRIZINE HYDROCHLORIDE 10 MG: 10 TABLET, FILM COATED ORAL at 08:38

## 2022-05-26 RX ADMIN — Medication 10 ML: at 08:38

## 2022-05-27 LAB — BACTERIA SPEC AEROBE CULT: NORMAL

## 2022-05-28 ENCOUNTER — HOME CARE VISIT (OUTPATIENT)
Dept: HOME HEALTH SERVICES | Facility: HOME HEALTHCARE | Age: 87
End: 2022-05-28

## 2022-05-28 PROCEDURE — G0299 HHS/HOSPICE OF RN EA 15 MIN: HCPCS

## 2022-05-29 VITALS
TEMPERATURE: 97.5 F | BODY MASS INDEX: 32.18 KG/M2 | RESPIRATION RATE: 16 BRPM | WEIGHT: 205 LBS | OXYGEN SATURATION: 98 % | HEIGHT: 67 IN | DIASTOLIC BLOOD PRESSURE: 64 MMHG | HEART RATE: 64 BPM | SYSTOLIC BLOOD PRESSURE: 110 MMHG

## 2022-05-29 NOTE — HOME HEALTH
Pt is a 89 yo white male admitted to [facility] for services of SN/PT/OT due to recent hospitalization from 5/21-5/26/22 due to Acute UTI ( E Coli Bacteremia). Pt has a hx: UTI's,T2DM, Hypothyroidism, HTN, Sleep apnea with CPAP use, Chronic DVT LLE, and Dementia. Pt lives with his wife in an Independent Living Facility and has sitters around the clock to assist with pt care. Pt is non-ambulatory and is assisted from his hospital bed to W/C via mayuri lift per CG's. Wife states that pt is fed his meals in the morning, but at night pt is able to feed himself and they go to the main dining room to eat with other residents. Pt is getting Rocephin 1g IM daily x 5 days and CG is administering to pt. Pt sitting up in W/C asleep during visit.

## 2022-05-31 ENCOUNTER — TELEPHONE (OUTPATIENT)
Dept: ENDOCRINOLOGY | Facility: CLINIC | Age: 87
End: 2022-05-31

## 2022-05-31 ENCOUNTER — HOME CARE VISIT (OUTPATIENT)
Dept: HOME HEALTH SERVICES | Facility: HOME HEALTHCARE | Age: 87
End: 2022-05-31

## 2022-05-31 VITALS
RESPIRATION RATE: 18 BRPM | TEMPERATURE: 96 F | DIASTOLIC BLOOD PRESSURE: 70 MMHG | SYSTOLIC BLOOD PRESSURE: 116 MMHG | HEART RATE: 72 BPM | OXYGEN SATURATION: 97 %

## 2022-05-31 PROCEDURE — G0151 HHCP-SERV OF PT,EA 15 MIN: HCPCS

## 2022-06-01 ENCOUNTER — HOME CARE VISIT (OUTPATIENT)
Dept: HOME HEALTH SERVICES | Facility: HOME HEALTHCARE | Age: 87
End: 2022-06-01

## 2022-06-01 ENCOUNTER — LAB REQUISITION (OUTPATIENT)
Dept: LAB | Facility: HOSPITAL | Age: 87
End: 2022-06-01

## 2022-06-01 VITALS
DIASTOLIC BLOOD PRESSURE: 72 MMHG | HEART RATE: 72 BPM | SYSTOLIC BLOOD PRESSURE: 118 MMHG | TEMPERATURE: 97.7 F | OXYGEN SATURATION: 97 % | RESPIRATION RATE: 16 BRPM

## 2022-06-01 DIAGNOSIS — N39.0 URINARY TRACT INFECTION, SITE NOT SPECIFIED: ICD-10-CM

## 2022-06-01 LAB
BACTERIA UR QL AUTO: ABNORMAL /HPF
BILIRUB UR QL STRIP: NEGATIVE
CLARITY UR: CLEAR
COLOR UR: YELLOW
GLUCOSE UR STRIP-MCNC: NEGATIVE MG/DL
HGB UR QL STRIP.AUTO: NEGATIVE
HYALINE CASTS UR QL AUTO: ABNORMAL /LPF
KETONES UR QL STRIP: NEGATIVE
LEUKOCYTE ESTERASE UR QL STRIP.AUTO: ABNORMAL
NITRITE UR QL STRIP: NEGATIVE
PH UR STRIP.AUTO: 5.5 [PH] (ref 5–8)
PROT UR QL STRIP: NEGATIVE
RBC # UR STRIP: ABNORMAL /HPF
REF LAB TEST METHOD: ABNORMAL
SP GR UR STRIP: 1.02 (ref 1–1.03)
SQUAMOUS #/AREA URNS HPF: ABNORMAL /HPF
UROBILINOGEN UR QL STRIP: ABNORMAL
WBC # UR STRIP: ABNORMAL /HPF

## 2022-06-01 PROCEDURE — G0152 HHCP-SERV OF OT,EA 15 MIN: HCPCS

## 2022-06-01 PROCEDURE — 81001 URINALYSIS AUTO W/SCOPE: CPT | Performed by: PHYSICAL MEDICINE & REHABILITATION

## 2022-06-01 PROCEDURE — G0495 RN CARE TRAIN/EDU IN HH: HCPCS

## 2022-06-02 ENCOUNTER — HOME CARE VISIT (OUTPATIENT)
Dept: HOME HEALTH SERVICES | Facility: HOME HEALTHCARE | Age: 87
End: 2022-06-02

## 2022-06-02 VITALS — HEART RATE: 72 BPM | OXYGEN SATURATION: 98 % | DIASTOLIC BLOOD PRESSURE: 70 MMHG | SYSTOLIC BLOOD PRESSURE: 122 MMHG

## 2022-06-02 NOTE — HOME HEALTH
Pt was seen for OT evaluation.  Pt demo. decreased I with ADL tasks, functional transfers, and decreased UE ROM/strength/endurance.  Pt would benefit from skilled OT services 1W 5 to address OT problems/interventions/goals.  Pt/family in agreement with plan.

## 2022-06-06 ENCOUNTER — HOME CARE VISIT (OUTPATIENT)
Dept: HOME HEALTH SERVICES | Facility: HOME HEALTHCARE | Age: 87
End: 2022-06-06

## 2022-06-06 VITALS — HEART RATE: 67 BPM | OXYGEN SATURATION: 99 % | SYSTOLIC BLOOD PRESSURE: 127 MMHG | DIASTOLIC BLOOD PRESSURE: 58 MMHG

## 2022-06-06 PROCEDURE — G0152 HHCP-SERV OF OT,EA 15 MIN: HCPCS

## 2022-06-06 NOTE — HOME HEALTH
Pt. tolerated OT session well. Pt. participated in 53 mins of theraputic exercises: Pt. /family demonstrated good participation and understanding of HEP. Continue OT per POC.

## 2022-06-07 ENCOUNTER — APPOINTMENT (OUTPATIENT)
Dept: GENERAL RADIOLOGY | Facility: HOSPITAL | Age: 87
End: 2022-06-07

## 2022-06-07 ENCOUNTER — HOSPITAL ENCOUNTER (OUTPATIENT)
Facility: HOSPITAL | Age: 87
Setting detail: OBSERVATION
LOS: 1 days | Discharge: HOSPICE | End: 2022-06-11
Attending: EMERGENCY MEDICINE | Admitting: HOSPITALIST

## 2022-06-07 ENCOUNTER — APPOINTMENT (OUTPATIENT)
Dept: CT IMAGING | Facility: HOSPITAL | Age: 87
End: 2022-06-07

## 2022-06-07 ENCOUNTER — HOME CARE VISIT (OUTPATIENT)
Dept: HOME HEALTH SERVICES | Facility: HOME HEALTHCARE | Age: 87
End: 2022-06-07

## 2022-06-07 DIAGNOSIS — R41.82 ALTERED MENTAL STATUS, UNSPECIFIED ALTERED MENTAL STATUS TYPE: Primary | ICD-10-CM

## 2022-06-07 DIAGNOSIS — Z78.9 FAILURE OF OUTPATIENT TREATMENT: ICD-10-CM

## 2022-06-07 DIAGNOSIS — R13.12 OROPHARYNGEAL DYSPHAGIA: ICD-10-CM

## 2022-06-07 DIAGNOSIS — N39.0 ACUTE UTI: ICD-10-CM

## 2022-06-07 PROBLEM — R05.8 PRODUCTIVE COUGH: Status: ACTIVE | Noted: 2022-06-07

## 2022-06-07 PROBLEM — E87.1 HYPONATREMIA: Status: RESOLVED | Noted: 2020-09-09 | Resolved: 2022-06-07

## 2022-06-07 PROBLEM — B96.20 E COLI BACTEREMIA: Status: RESOLVED | Noted: 2022-05-25 | Resolved: 2022-06-07

## 2022-06-07 PROBLEM — Z86.718: Status: RESOLVED | Noted: 2018-02-09 | Resolved: 2022-06-07

## 2022-06-07 PROBLEM — Z09: Status: RESOLVED | Noted: 2018-02-09 | Resolved: 2022-06-07

## 2022-06-07 PROBLEM — E87.20 LACTIC ACIDOSIS: Status: RESOLVED | Noted: 2022-01-13 | Resolved: 2022-06-07

## 2022-06-07 PROBLEM — E83.52 HYPERCALCEMIA: Status: RESOLVED | Noted: 2022-01-13 | Resolved: 2022-06-07

## 2022-06-07 PROBLEM — M17.12 PRIMARY OSTEOARTHRITIS OF LEFT KNEE: Status: RESOLVED | Noted: 2017-11-27 | Resolved: 2022-06-07

## 2022-06-07 PROBLEM — I95.9 HYPOTENSION: Status: RESOLVED | Noted: 2022-01-13 | Resolved: 2022-06-07

## 2022-06-07 PROBLEM — S72.001A CLOSED FRACTURE OF RIGHT HIP (HCC): Status: RESOLVED | Noted: 2020-06-08 | Resolved: 2022-06-07

## 2022-06-07 PROBLEM — R78.81 E COLI BACTEREMIA: Status: RESOLVED | Noted: 2022-05-25 | Resolved: 2022-06-07

## 2022-06-07 PROBLEM — R13.10 DYSPHAGIA: Status: ACTIVE | Noted: 2022-06-07

## 2022-06-07 PROBLEM — Z87.898 HISTORY OF BACTEREMIA: Status: ACTIVE | Noted: 2022-06-07

## 2022-06-07 PROBLEM — R05.9 COUGH: Status: RESOLVED | Noted: 2020-09-09 | Resolved: 2022-06-07

## 2022-06-07 LAB
ALBUMIN SERPL-MCNC: 2.6 G/DL (ref 3.5–5.2)
ALBUMIN/GLOB SERPL: 0.7 G/DL
ALP SERPL-CCNC: 97 U/L (ref 39–117)
ALT SERPL W P-5'-P-CCNC: 15 U/L (ref 1–41)
ANION GAP SERPL CALCULATED.3IONS-SCNC: 5 MMOL/L (ref 5–15)
AST SERPL-CCNC: 25 U/L (ref 1–40)
BACTERIA UR QL AUTO: ABNORMAL /HPF
BASOPHILS # BLD MANUAL: 0 10*3/MM3 (ref 0–0.2)
BASOPHILS NFR BLD MANUAL: 0 % (ref 0–1.5)
BILIRUB SERPL-MCNC: 0.5 MG/DL (ref 0–1.2)
BILIRUB UR QL STRIP: NEGATIVE
BUN SERPL-MCNC: 11 MG/DL (ref 8–23)
BUN/CREAT SERPL: 13.6 (ref 7–25)
CALCIUM SPEC-SCNC: 8.7 MG/DL (ref 8.2–9.6)
CHLORIDE SERPL-SCNC: 112 MMOL/L (ref 98–107)
CLARITY UR: CLEAR
CO2 SERPL-SCNC: 29 MMOL/L (ref 22–29)
COD CRY URNS QL: ABNORMAL /HPF
COLOR UR: YELLOW
CREAT SERPL-MCNC: 0.81 MG/DL (ref 0.76–1.27)
D-LACTATE SERPL-SCNC: 1.7 MMOL/L (ref 0.5–2)
DEPRECATED RDW RBC AUTO: 58.5 FL (ref 37–54)
EGFRCR SERPLBLD CKD-EPI 2021: 83.2 ML/MIN/1.73
EOSINOPHIL # BLD MANUAL: 0.25 10*3/MM3 (ref 0–0.4)
EOSINOPHIL NFR BLD MANUAL: 3 % (ref 0.3–6.2)
ERYTHROCYTE [DISTWIDTH] IN BLOOD BY AUTOMATED COUNT: 18.6 % (ref 12.3–15.4)
FLUAV RNA RESP QL NAA+PROBE: NOT DETECTED
FLUBV RNA RESP QL NAA+PROBE: NOT DETECTED
GLOBULIN UR ELPH-MCNC: 3.5 GM/DL
GLUCOSE BLDC GLUCOMTR-MCNC: 86 MG/DL (ref 70–130)
GLUCOSE SERPL-MCNC: 99 MG/DL (ref 65–99)
GLUCOSE UR STRIP-MCNC: NEGATIVE MG/DL
HCT VFR BLD AUTO: 32 % (ref 37.5–51)
HGB BLD-MCNC: 10.3 G/DL (ref 13–17.7)
HGB UR QL STRIP.AUTO: NEGATIVE
HOLD SPECIMEN: NORMAL
HYALINE CASTS UR QL AUTO: ABNORMAL /LPF
KETONES UR QL STRIP: ABNORMAL
LEUKOCYTE ESTERASE UR QL STRIP.AUTO: ABNORMAL
LYMPHOCYTES # BLD MANUAL: 6 10*3/MM3 (ref 0.7–3.1)
LYMPHOCYTES NFR BLD MANUAL: 2 % (ref 5–12)
MAGNESIUM SERPL-MCNC: 2 MG/DL (ref 1.7–2.3)
MCH RBC QN AUTO: 28.4 PG (ref 26.6–33)
MCHC RBC AUTO-ENTMCNC: 32.2 G/DL (ref 31.5–35.7)
MCV RBC AUTO: 88.2 FL (ref 79–97)
MONOCYTES # BLD: 0.17 10*3/MM3 (ref 0.1–0.9)
MUCOUS THREADS URNS QL MICRO: ABNORMAL /HPF
NEUTROPHILS # BLD AUTO: 2.03 10*3/MM3 (ref 1.7–7)
NEUTROPHILS NFR BLD MANUAL: 24 % (ref 42.7–76)
NITRITE UR QL STRIP: NEGATIVE
NT-PROBNP SERPL-MCNC: 254.1 PG/ML (ref 0–1800)
PH UR STRIP.AUTO: 5.5 [PH] (ref 5–8)
PLAT MORPH BLD: NORMAL
PLATELET # BLD AUTO: 205 10*3/MM3 (ref 140–450)
PMV BLD AUTO: 9.8 FL (ref 6–12)
POTASSIUM SERPL-SCNC: 4.2 MMOL/L (ref 3.5–5.2)
PROCALCITONIN SERPL-MCNC: 0.08 NG/ML (ref 0–0.25)
PROT SERPL-MCNC: 6.1 G/DL (ref 6–8.5)
PROT UR QL STRIP: NEGATIVE
QT INTERVAL: 406 MS
QTC INTERVAL: 438 MS
RBC # BLD AUTO: 3.63 10*6/MM3 (ref 4.14–5.8)
RBC # UR STRIP: ABNORMAL /HPF
RBC MORPH BLD: NORMAL
REF LAB TEST METHOD: ABNORMAL
SARS-COV-2 RNA RESP QL NAA+PROBE: NOT DETECTED
SMUDGE CELLS BLD QL SMEAR: ABNORMAL
SODIUM SERPL-SCNC: 146 MMOL/L (ref 136–145)
SP GR UR STRIP: 1.02 (ref 1–1.03)
SQUAMOUS #/AREA URNS HPF: ABNORMAL /HPF
TROPONIN T SERPL-MCNC: 0.06 NG/ML (ref 0–0.03)
TROPONIN T SERPL-MCNC: 0.07 NG/ML (ref 0–0.03)
UROBILINOGEN UR QL STRIP: ABNORMAL
VARIANT LYMPHS NFR BLD MANUAL: 30 % (ref 0–5)
VARIANT LYMPHS NFR BLD MANUAL: 41 % (ref 19.6–45.3)
WBC # UR STRIP: ABNORMAL /HPF
WBC NRBC COR # BLD: 8.45 10*3/MM3 (ref 3.4–10.8)
WHOLE BLOOD HOLD COAG: NORMAL
WHOLE BLOOD HOLD SPECIMEN: NORMAL

## 2022-06-07 PROCEDURE — 87086 URINE CULTURE/COLONY COUNT: CPT | Performed by: EMERGENCY MEDICINE

## 2022-06-07 PROCEDURE — 96366 THER/PROPH/DIAG IV INF ADDON: CPT

## 2022-06-07 PROCEDURE — 85007 BL SMEAR W/DIFF WBC COUNT: CPT | Performed by: EMERGENCY MEDICINE

## 2022-06-07 PROCEDURE — 84484 ASSAY OF TROPONIN QUANT: CPT | Performed by: EMERGENCY MEDICINE

## 2022-06-07 PROCEDURE — 83880 ASSAY OF NATRIURETIC PEPTIDE: CPT | Performed by: NURSE PRACTITIONER

## 2022-06-07 PROCEDURE — 25010000002 FUROSEMIDE PER 20 MG: Performed by: NURSE PRACTITIONER

## 2022-06-07 PROCEDURE — 70450 CT HEAD/BRAIN W/O DYE: CPT

## 2022-06-07 PROCEDURE — 96365 THER/PROPH/DIAG IV INF INIT: CPT

## 2022-06-07 PROCEDURE — 96367 TX/PROPH/DG ADDL SEQ IV INF: CPT

## 2022-06-07 PROCEDURE — 36415 COLL VENOUS BLD VENIPUNCTURE: CPT

## 2022-06-07 PROCEDURE — 83605 ASSAY OF LACTIC ACID: CPT | Performed by: EMERGENCY MEDICINE

## 2022-06-07 PROCEDURE — 93005 ELECTROCARDIOGRAM TRACING: CPT | Performed by: EMERGENCY MEDICINE

## 2022-06-07 PROCEDURE — 87636 SARSCOV2 & INF A&B AMP PRB: CPT | Performed by: EMERGENCY MEDICINE

## 2022-06-07 PROCEDURE — 25010000002 PIPERACILLIN SOD-TAZOBACTAM PER 1 G: Performed by: EMERGENCY MEDICINE

## 2022-06-07 PROCEDURE — 82962 GLUCOSE BLOOD TEST: CPT

## 2022-06-07 PROCEDURE — 83735 ASSAY OF MAGNESIUM: CPT | Performed by: EMERGENCY MEDICINE

## 2022-06-07 PROCEDURE — 87040 BLOOD CULTURE FOR BACTERIA: CPT | Performed by: EMERGENCY MEDICINE

## 2022-06-07 PROCEDURE — 85025 COMPLETE CBC W/AUTO DIFF WBC: CPT | Performed by: EMERGENCY MEDICINE

## 2022-06-07 PROCEDURE — G0495 RN CARE TRAIN/EDU IN HH: HCPCS

## 2022-06-07 PROCEDURE — C9803 HOPD COVID-19 SPEC COLLECT: HCPCS

## 2022-06-07 PROCEDURE — 25010000002 VANCOMYCIN 10 G RECONSTITUTED SOLUTION: Performed by: EMERGENCY MEDICINE

## 2022-06-07 PROCEDURE — 96375 TX/PRO/DX INJ NEW DRUG ADDON: CPT

## 2022-06-07 PROCEDURE — 51798 US URINE CAPACITY MEASURE: CPT

## 2022-06-07 PROCEDURE — 99285 EMERGENCY DEPT VISIT HI MDM: CPT

## 2022-06-07 PROCEDURE — 80053 COMPREHEN METABOLIC PANEL: CPT | Performed by: EMERGENCY MEDICINE

## 2022-06-07 PROCEDURE — P9612 CATHETERIZE FOR URINE SPEC: HCPCS

## 2022-06-07 PROCEDURE — 81001 URINALYSIS AUTO W/SCOPE: CPT | Performed by: EMERGENCY MEDICINE

## 2022-06-07 PROCEDURE — 84484 ASSAY OF TROPONIN QUANT: CPT | Performed by: NURSE PRACTITIONER

## 2022-06-07 PROCEDURE — 99220 PR INITIAL OBSERVATION CARE/DAY 70 MINUTES: CPT | Performed by: FAMILY MEDICINE

## 2022-06-07 PROCEDURE — 84145 PROCALCITONIN (PCT): CPT | Performed by: EMERGENCY MEDICINE

## 2022-06-07 PROCEDURE — 94640 AIRWAY INHALATION TREATMENT: CPT

## 2022-06-07 PROCEDURE — 71045 X-RAY EXAM CHEST 1 VIEW: CPT

## 2022-06-07 PROCEDURE — 71250 CT THORAX DX C-: CPT

## 2022-06-07 RX ORDER — ACETAMINOPHEN 325 MG/1
650 TABLET ORAL EVERY 4 HOURS PRN
Status: DISCONTINUED | OUTPATIENT
Start: 2022-06-07 | End: 2022-06-07 | Stop reason: SDUPTHER

## 2022-06-07 RX ORDER — ONDANSETRON 4 MG/1
4 TABLET, FILM COATED ORAL EVERY 6 HOURS PRN
Status: DISCONTINUED | OUTPATIENT
Start: 2022-06-07 | End: 2022-06-11 | Stop reason: HOSPADM

## 2022-06-07 RX ORDER — FUROSEMIDE 10 MG/ML
20 INJECTION INTRAMUSCULAR; INTRAVENOUS ONCE
Status: COMPLETED | OUTPATIENT
Start: 2022-06-07 | End: 2022-06-07

## 2022-06-07 RX ORDER — IPRATROPIUM BROMIDE AND ALBUTEROL SULFATE 2.5; .5 MG/3ML; MG/3ML
3 SOLUTION RESPIRATORY (INHALATION)
Status: DISCONTINUED | OUTPATIENT
Start: 2022-06-07 | End: 2022-06-11 | Stop reason: HOSPADM

## 2022-06-07 RX ORDER — INSULIN LISPRO 100 [IU]/ML
0-7 INJECTION, SOLUTION INTRAVENOUS; SUBCUTANEOUS
Status: DISCONTINUED | OUTPATIENT
Start: 2022-06-07 | End: 2022-06-11 | Stop reason: HOSPADM

## 2022-06-07 RX ORDER — SODIUM CHLORIDE 0.9 % (FLUSH) 0.9 %
10 SYRINGE (ML) INJECTION EVERY 12 HOURS SCHEDULED
Status: DISCONTINUED | OUTPATIENT
Start: 2022-06-07 | End: 2022-06-11 | Stop reason: HOSPADM

## 2022-06-07 RX ORDER — NICOTINE POLACRILEX 4 MG
15 LOZENGE BUCCAL
Status: DISCONTINUED | OUTPATIENT
Start: 2022-06-07 | End: 2022-06-11 | Stop reason: HOSPADM

## 2022-06-07 RX ORDER — ACETAMINOPHEN 325 MG/1
650 TABLET ORAL EVERY 4 HOURS PRN
Status: DISCONTINUED | OUTPATIENT
Start: 2022-06-07 | End: 2022-06-11 | Stop reason: HOSPADM

## 2022-06-07 RX ORDER — CHOLECALCIFEROL (VITAMIN D3) 125 MCG
5 CAPSULE ORAL NIGHTLY PRN
Status: DISCONTINUED | OUTPATIENT
Start: 2022-06-07 | End: 2022-06-11 | Stop reason: HOSPADM

## 2022-06-07 RX ORDER — SODIUM CHLORIDE 0.9 % (FLUSH) 0.9 %
10 SYRINGE (ML) INJECTION AS NEEDED
Status: DISCONTINUED | OUTPATIENT
Start: 2022-06-07 | End: 2022-06-11 | Stop reason: HOSPADM

## 2022-06-07 RX ORDER — MEMANTINE HYDROCHLORIDE 10 MG/1
10 TABLET ORAL EVERY 12 HOURS SCHEDULED
Status: DISCONTINUED | OUTPATIENT
Start: 2022-06-07 | End: 2022-06-11 | Stop reason: HOSPADM

## 2022-06-07 RX ORDER — ACETAMINOPHEN 160 MG/5ML
650 SOLUTION ORAL EVERY 4 HOURS PRN
Status: DISCONTINUED | OUTPATIENT
Start: 2022-06-07 | End: 2022-06-11 | Stop reason: HOSPADM

## 2022-06-07 RX ORDER — ACETAMINOPHEN 650 MG/1
650 SUPPOSITORY RECTAL EVERY 4 HOURS PRN
Status: DISCONTINUED | OUTPATIENT
Start: 2022-06-07 | End: 2022-06-11 | Stop reason: HOSPADM

## 2022-06-07 RX ORDER — LEVOTHYROXINE SODIUM 0.07 MG/1
75 TABLET ORAL DAILY
Status: DISCONTINUED | OUTPATIENT
Start: 2022-06-08 | End: 2022-06-11 | Stop reason: HOSPADM

## 2022-06-07 RX ORDER — ONDANSETRON 2 MG/ML
4 INJECTION INTRAMUSCULAR; INTRAVENOUS EVERY 6 HOURS PRN
Status: DISCONTINUED | OUTPATIENT
Start: 2022-06-07 | End: 2022-06-11 | Stop reason: HOSPADM

## 2022-06-07 RX ORDER — SODIUM CHLORIDE 0.9 % (FLUSH) 0.9 %
10 SYRINGE (ML) INJECTION AS NEEDED
Status: DISCONTINUED | OUTPATIENT
Start: 2022-06-07 | End: 2022-06-07

## 2022-06-07 RX ORDER — DEXTROSE MONOHYDRATE 25 G/50ML
25 INJECTION, SOLUTION INTRAVENOUS
Status: DISCONTINUED | OUTPATIENT
Start: 2022-06-07 | End: 2022-06-11 | Stop reason: HOSPADM

## 2022-06-07 RX ORDER — RIVASTIGMINE 9.5 MG/24H
1 PATCH, EXTENDED RELEASE TRANSDERMAL DAILY
Status: DISCONTINUED | OUTPATIENT
Start: 2022-06-08 | End: 2022-06-11 | Stop reason: HOSPADM

## 2022-06-07 RX ORDER — TAMSULOSIN HYDROCHLORIDE 0.4 MG/1
0.4 CAPSULE ORAL DAILY
Status: DISCONTINUED | OUTPATIENT
Start: 2022-06-08 | End: 2022-06-09

## 2022-06-07 RX ORDER — QUETIAPINE FUMARATE 25 MG/1
25 TABLET, FILM COATED ORAL NIGHTLY PRN
Status: DISCONTINUED | OUTPATIENT
Start: 2022-06-07 | End: 2022-06-11 | Stop reason: HOSPADM

## 2022-06-07 RX ADMIN — Medication 5 MG: at 23:55

## 2022-06-07 RX ADMIN — MEMANTINE 10 MG: 10 TABLET ORAL at 23:31

## 2022-06-07 RX ADMIN — IPRATROPIUM BROMIDE AND ALBUTEROL SULFATE 3 ML: .5; 3 SOLUTION RESPIRATORY (INHALATION) at 23:41

## 2022-06-07 RX ADMIN — FUROSEMIDE 20 MG: 10 INJECTION INTRAMUSCULAR; INTRAVENOUS at 21:43

## 2022-06-07 RX ADMIN — TAZOBACTAM SODIUM AND PIPERACILLIN SODIUM 3.38 G: 375; 3 INJECTION, SOLUTION INTRAVENOUS at 19:29

## 2022-06-07 RX ADMIN — QUETIAPINE FUMARATE 25 MG: 25 TABLET ORAL at 23:55

## 2022-06-07 RX ADMIN — VANCOMYCIN HYDROCHLORIDE 1750 MG: 500 INJECTION, POWDER, LYOPHILIZED, FOR SOLUTION INTRAVENOUS at 19:54

## 2022-06-07 RX ADMIN — SODIUM CHLORIDE 1000 ML: 9 INJECTION, SOLUTION INTRAVENOUS at 16:10

## 2022-06-07 NOTE — H&P
[facility] Medicine Services  HISTORY AND PHYSICAL    Patient Name: Marcos Acuña  : 1931  MRN: [id number]  Primary Care Physician: Abdirashid Mccabe MD  Date of admission: 2022    Subjective   Subjective     Chief Complaint:  Altered mental status    HPI:  Marcos Acuña is a 89 y.o. male with medical history of hypothyroidism, T2DM, HTN, MIREILLE w/ CPAP use, chronic LLE DVT on chronic anticoagulation (Xarelto) and dementia who was brought to the ED for evaluation of altered mental status. Patient is confused, he is able to answer some of my questions and wife is present assisting with HPI. Per wife, this morning the patient was not waking up and has been sleeping more for the past few days. His glucose was 84 and he was given some sugar water by the home health nurse and glucose came up to 121 but he was still not waking up. He was moving but not opening his eyes, his nurse was able to feed him some eggs for breakfast but he still did not open his eyes. EMS was called. Patient brought to the ED for evaluation and found to have UTI. Wife also reports patient with productive cough since recent hospitalization (see summary below) and has had increased BLE edema with erythema for the past few days.    Recent hospitalization -- for UTI/E.coli bacteremia with similar presentation. Followed by [facility] and was treated with Zosyn initially, switched to Invanz, and eventually rocephin 2 grams IV in hospital and then at discharge was given 1 gram rocephin IM x 5 days and then transitioned to cefuoxime 500mg oral twice daily for 2 weeks. Urine culture = pansensitive E.coli; BC positive for E.coli by [facility].    Review of Systems   Constitutional: Positive for activity change, appetite change and fatigue. Negative for chills and fever.   HENT: Positive for congestion.    Respiratory: Positive for cough and shortness of breath.    Cardiovascular: Positive for leg swelling.    Genitourinary: Positive for dysuria.   Skin: Positive for color change (erythema BLE).   Neurological: Positive for weakness.   All other systems reviewed and are negative.     All other systems reviewed and are negative.     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 Otherwise pertinent FHx was reviewed and unremarkable.     Social History:  reports that he has quit smoking. He smoked 2.00 packs per day. He has never used smokeless tobacco. He reports current alcohol use of about 1.0 standard drink of alcohol per week. He reports that he does not use drugs.  Social History     Social History Narrative   • Not on file       Medications:  Accu-Chek FastClix Lancets, B-D SINGLE USE SWABS REGULAR, Mirabegron ER, QUEtiapine, acetaminophen, cefuroxime, cyanocobalamin, glucose blood, ipratropium, levocetirizine, levothyroxine, melatonin, memantine, metFORMIN, rivaroxaban, rivastigmine, and tamsulosin    Allergies   Allergen Reactions   • Contrast Dye Anaphylaxis   • Iodine Unknown - High Severity     Pt states eyes start watering.    • Other Anaphylaxis   • Prednisone & Diphenhydramine Rash       Objective   Objective     Vital Signs:   Temp:  [97.1 °F (36.2 °C)] 97.1 °F (36.2  °C)  Heart Rate:  [66-75] 71  Resp:  [16] 16  BP: (127-147)/(62-80) 130/64    Physical Exam  Vitals reviewed.   Constitutional:       General: He is not in acute distress.     Appearance: He is obese. He is not toxic-appearing.   HENT:      Head: Normocephalic and atraumatic.      Nose: Nose normal.      Mouth/Throat:      Mouth: Mucous membranes are moist.      Pharynx: Oropharynx is clear.   Eyes:      General: No scleral icterus.     Extraocular Movements: Extraocular movements intact.      Pupils: Pupils are equal, round, and reactive to light.   Cardiovascular:      Rate and Rhythm: Normal rate and regular rhythm.      Pulses: Normal pulses.      Heart sounds: Normal heart sounds.   Pulmonary:      Effort: Pulmonary effort is normal. No respiratory distress.      Breath sounds: Rales (scattered posterior bases) present.   Abdominal:      General: Bowel sounds are normal. There is no distension.      Palpations: Abdomen is soft.      Tenderness: There is no abdominal tenderness. There is no right CVA tenderness, left CVA tenderness or guarding.   Musculoskeletal:      Cervical back: Normal range of motion and neck supple.      Right lower le+ Pitting Edema present.      Left lower le+ Pitting Edema present.   Skin:     General: Skin is warm.      Capillary Refill: Capillary refill takes less than 2 seconds.      Findings: Erythema (BLE, warm to touch; tender LLE) present.      Comments: Facial flushing     Neurological:      Mental Status: He is alert. He is disoriented.   Psychiatric:         Mood and Affect: Mood normal.         Behavior: Behavior normal.        Results Reviewed:  I have personally reviewed most recent indicated data and agree with findings including:  [x]  Laboratory  [x]  Radiology  [x]  EKG/Telemetry  []  Pathology  [x]  Cardiac/Vascular Studies  [x]  Old records  []  Other:    LAB RESULTS:      Lab 22  1539   WBC 8.45   HEMOGLOBIN 10.3*   HEMATOCRIT 32.0*   PLATELETS 205    NEUTROS ABS 2.03   EOS ABS 0.25   MCV 88.2   PROCALCITONIN 0.08   LACTATE 1.7         Lab 06/07/22  1539   SODIUM 146*   POTASSIUM 4.2   CHLORIDE 112*   CO2 29.0   ANION GAP 5.0   BUN 11   CREATININE 0.81   EGFR 83.2   GLUCOSE 99   CALCIUM 8.7   MAGNESIUM 2.0         Lab 06/07/22  1539   TOTAL PROTEIN 6.1   ALBUMIN 2.60*   GLOBULIN 3.5   ALT (SGPT) 15   AST (SGOT) 25   BILIRUBIN 0.5   ALK PHOS 97         Lab 06/07/22  1539   PROBNP 254.1   TROPONIN T 0.067*                 Brief Urine Lab Results  (Last result in the past 365 days)      Color   Clarity   Blood   Leuk Est   Nitrite   Protein   CREAT   Urine HCG        06/07/22 1538 Yellow   Clear   Negative   Small (1+)   Negative   Negative               Microbiology Results (last 10 days)     Procedure Component Value - Date/Time    COVID PRE-OP / PRE-PROCEDURE SCREENING ORDER (NO ISOLATION) - Swab, Nasopharynx [[id number]]  (Normal) Collected: 06/07/22 1633    Lab Status: Final result Specimen: Swab from Nasopharynx Updated: 06/07/22 1705    Narrative:      The following orders were created for panel order COVID PRE-OP / PRE-PROCEDURE SCREENING ORDER (NO ISOLATION) - Swab, Nasopharynx.  Procedure                               Abnormality         Status                     ---------                               -----------         ------                     COVID-19 and FLU A/B PCR...[[id number]]  Normal              Final result                 Please view results for these tests on the individual orders.    COVID-19 and FLU A/B PCR - Swab, Nasopharynx [[id number]]  (Normal) Collected: 06/07/22 1633    Lab Status: Final result Specimen: Swab from Nasopharynx Updated: 06/07/22 1705     COVID19 Not Detected     Influenza A PCR Not Detected     Influenza B PCR Not Detected    Narrative:      Fact sheet for providers: https://www.fda.gov/media/066786/download    Fact sheet for patients: https://www.fda.gov/media/624420/download    Test performed by PCR.          CT  Head Without Contrast    Result Date: 6/7/2022  DATE OF EXAM: 6/7/2022 4:57 PM  PROCEDURE: CT HEAD WO CONTRAST-  INDICATIONS: AMS  COMPARISON: Head CT 1/12/2022  TECHNIQUE: Routine transaxial and coronal reconstruction images were obtained through the head without the administration of contrast. Automated exposure control and iterative reconstruction methods were used.  The radiation dose reduction device was turned on for each scan per the ALARA (As Low as Reasonably Achievable) protocol.  FINDINGS: No acute intracranial hemorrhage. No acute large territory infarct. Redemonstration of scattered and confluent subcortical and periventricular white matter hypodensities which are nonspecific and can be seen in the setting of chronic small vessel ischemic change. No extra-axial collections. No midline shift or herniation. Mild global cerebral volume loss. Normal size and configuration of the ventricles commensurate with degree of cerebral volume loss. Unremarkable appearance of the orbits. The mastoid air cells are clear. There is scattered mucosal thickening of the ethmoid air cells and mucosal mucosal thickening in the left sphenoid sinus, similar to prior.      Impression: No acute intracranial findings. Stable chronic and senescent changes as detailed above.  This report was finalized on 6/7/2022 5:12 PM by Berhane Hendrix MD.      CT Chest Without Contrast Diagnostic    Result Date: 6/7/2022  CT CHEST WO CONTRAST DIAGNOSTIC-  Date of Exam: 6/7/2022 8:08 PM  Indication: Respiratory illness, nondiagnostic xray; R41.82-Altered mental status, unspecified; Z78.9-Other specified health status; N39.0-Urinary tract infection, site not specified  confusion. History of pyelonephritis and cystitis.  Comparison: CT the abdomen pelvis 05/23/2022, chest x-ray 06/07/2022, chest CT 05/29/2020  Technique: Serial and axial CT images of the chest were obtained. Reconstructions in the coronal and sagittal planes were performed.  Automated exposure control and iterative reconstruction methods were used.  FINDINGS: Hilum and Mediastinum: There are no pathologically enlarged hilar mediastinal lymph nodes. There is cardiomegaly. There is mild coronary artery atherosclerotic calcification. There is aortic valvular calcification. There is no pericardial effusion. Thoracic aorta has normal caliber. Pulmonary arteries are enlarged.  Lung Parenchyma and Pleura: Moderate size bilateral pleural effusions. Respiratory motion limits evaluation lung parenchyma. There is mild peripheral septal thickening. There is mild bibasilar subsegmental atelectasis. The appearance is not significantly changed compared to the previous CT the abdomen pelvis.  Upper Abdomen: Large parapelvic cyst right kidney.  Soft tissues: Unremarkable.  Osseous structures: No aggressive focal lytic or sclerotic osseous lesions. Osteopenia. Compression deformity along the superior endplate of L1 is unchanged.      Impression: 1.  there are moderate bilateral pleural effusions with bibasilar basilar subsegmental atelectasis. Bibasilar pneumonia less likely but cannot be excluded on this exam. No associated lymphadenopathy.. Pulmonary edema with pleural effusions with atelectasis favored over infection based off exam. Clinical correlation is recommended. 2.  Cardiomegaly with enlarged pulmonary arteries, correlate for pulmonary artery hypertension.    This report was finalized on 6/7/2022 8:20 PM by Delores Cid MD.      XR Chest 1 View    Result Date: 6/7/2022  DATE OF EXAM: 6/7/2022 3:40 PM  PROCEDURE: XR CHEST 1 VW-  INDICATIONS: AMS protocol  COMPARISON: Chest x-ray 5/22/2022  TECHNIQUE: Single radiographic AP view of the chest was obtained.  FINDINGS: Mild low lung volumes with stable cardiomegaly and mildly accentuated cardiac mediastinal silhouette. Mild increase in diffuse interstitial prominence possibly reflective of bronchovascular crowding in the setting of low lung volumes  of chronic interstitial changes interstitial edema could appear similarly. There are left lung base opacities favored reflect atelectasis. No pneumothorax. No significant pleural effusion. Previously questioned nodule in the left lower lobe is not well appreciated on today's exam.      Impression: Low lung volumes with mild increase in diffuse interstitial prominence which may reflect bronchovascular crowding although a component of interstitial edema, or possibly atypical/viral infection, cannot be excluded. Ill-defined opacities at the left lung base are favored reflect atelectasis.  This report was finalized on 6/7/2022 3:54 PM by Berhane Hendrix MD.        Results for orders placed during the hospital encounter of 05/21/22    Adult Transthoracic Echo Complete w/ Color, Spectral and Contrast if necessary per protocol    Interpretation Summary  · Left ventricular wall thickness is consistent with mild concentric hypertrophy.  · There is moderate calcification of the aortic valve.  · The right ventricular cavity is mild to moderately dilated.  · The right atrial cavity is mildly dilated.  · Left ventricular ejection fraction appears to be 61 - 65%.  · There is significant calcification of the aortic valve with reduced excursion. However the acoustic windows are so poor that the transducer cannot align properly to get a good measurement across the aortic valve. Consider ADARSH if clinically indicated      Assessment & Plan   Assessment & Plan       Altered mental status, unspecified altered mental status type    Benign essential hypertension    Dementia (HCC)    Chronic deep vein thrombosis (DVT) (HCC)    Hypothyroidism    Type 2 diabetes mellitus without complication (HCC)    Obstructive sleep apnea syndrome    Elevated troponin    Acute UTI    History of bacteremia, E.coli    Dysphagia    Productive cough    Altered mental status   Dementia  - chronic, mild increase confusion likely metabolic  - wife reports previous  UTI's typically cause similar presentation  - consider neurology consult if not improving  - caregiver will be coming in to relieve the wife to assist with the patient    Acute UTI  - continue vancomycin, zosyn started in ED   - urine culture pending  - prior culture with pansensitive E.coli, was still on oral Ceftin that was started on 6/1/22 for planned 2 week course after completion of IV/IM antibiotics noted above  - bladder scan Q 4 hours  - acute urinary retention protocol    History of E.coli bacteremia  - consult infectious disease, followed w/ prior admission by Dr. Jose E Radford  - blood cultures obtained in the ED    Productive cough  Dysphagia  - has been coughing since last admission, chest xray concerning for possible atypical/viral infection, interstitial edema, favored atelectasis  - previous dysphagia with thickened liquids and small pieces of food per wife  - will have SLP evaluate to r/o aspiration  - obtain CT chest w/o contrast  -- cannot rule out pneumonia, volume overload favored w/ bilateral pleural effusions  -- lasix 20 mg IV x 1  - will check MRSA pcr, urine antigens for s.pneumo, legionella and full respiratory panel    Elevated troponin  - has previously been chronically elevated  - initial troponin 0.067, repeat now  - EKG stable, repeat in am    Chronic DVT on Xarelto  - continue Xarelto    T2DM  - A1c 6.20 on 5/22/22  - FSBG ACHS w/ low dose SSI    Hypothyroidism  - TSH 2.730 on 5/22/22  - continue levothyroxine    MIREILLE  - BIPAP at HS      DVT prophylaxis:  Xarelto    CODE STATUS:   Medical Intervention Limits: NO intubation (DNI)  Code Status (Patient has no pulse and is not breathing): No CPR (Do Not Attempt to Resuscitate)  Medical Interventions (Patient has pulse or is breathing): Limited Support      This note has been completed as part of a split-shared workflow.     Signature: Electronically signed by ALMA ROSA Perez, 06/07/22, 8:54 PM EDT        Attending   Admission  Attestation       I have seen and examined the patient, performing an independent face-to-face diagnostic evaluation with plan of care reviewed and developed with the advanced practice clinician (APC).      Brief Summary Statement:   Marcos Acuña is a 89 y.o. male with recent hospitalization for AMS and UTI presented with decrease energy and AMS. His wife is bedside and reports that he would not woke up and was fairly lethargic at home. EMS was called and was brought to the hospital. Here in the ED, she reports that he is more awake and alert and back to his baseline.     Remainder of detailed HPI is as noted by APC and has been reviewed and/or edited by me for completeness.    Attending Physical Exam:  Constitutional: Awake, elderly male resting in bed   Eyes: PERRLA, sclerae anicteric, no conjunctival injection  HENT: NCAT, mucous membranes moist  Neck: Supple, no thyromegaly, no lymphadenopathy, trachea midline  Respiratory: Clear to auscultation bilaterally, nonlabored respirations   Cardiovascular: RRR, no murmurs, rubs, or gallops,   Gastrointestinal: Positive bowel sounds, soft, nontender, nondistended  Musculoskeletal: + bilateral ankle edema,  Psychiatric:  cooperative  Neurologic: Oriented x 2 , speech clear  Skin: No rashes    Brief Assessment/Plan :  See detailed assessment and plan developed with APC which I have reviewed and/or edited for completeness.        Admission Status: I believe that this patient meets INPATIENT status due to AMS.  I feel patient’s risk for adverse outcomes and need for care warrant INPATIENT evaluation and I predict the patient’s care encounter to likely last beyond 2 midnights.        Angy Boss, DO  06/07/22

## 2022-06-07 NOTE — ED PROVIDER NOTES
" EMERGENCY DEPARTMENT ENCOUNTER    Pt Name: Marcos Acuña  MRN: [id number]  Pt :   1931  Room Number:    Date of encounter:  2022  PCP: Abdirashid Mccabe MD  ED Provider: Jose E Jauregui MD    Historian: Patient's wife      HPI:  Chief Complaint: Altered mental status        Context: Marcos Acuña is a 89 y.o. male who presents to the ED c/o altered mental status throughout the day today.  The patient suffers from dementia and last night was \"holding a meeting\" which she oftentimes does because of his dementia.  He was around midnight and he was talking normally.  This morning he has been poorly arousable.  He has not opened his eyes the entire day.  A home health care worker came and was able to feed him eggs this morning however he never said a word and never opened his eyes.  The patient is normally nonambulatory and in a wheelchair when out of bed.  He did suffer a recent urinary tract infection and was admitted to [facility] from 2022 through 2022.  He was sent home on 5 days of IM Rocephin and then switched to cefuroxime 500 mg orally twice daily.  The patient has kept up on his medications.  The patient has been coughing over the last couple days.  His wife has not noticed any complaints of pain or fever or other abnormalities until this morning.  The patient is unable to provide any information whatsoever.  Patient and his wife live at [facility].        PAST MEDICAL HISTORY  Past Medical History:   Diagnosis Date   • Dementia (HCC)    • Diabetes (HCC)    • H/O blood clots    • Hypertensive disorder    • Hypothyroidism    • Osteoarthritis    • Type 2 diabetes mellitus, uncontrolled          PAST SURGICAL HISTORY  Past Surgical History:   Procedure Laterality Date   • BUNIONECTOMY     • HIP TROCHANTERIC NAILING WITH INTRAMEDULLARY HIP SCREW N/A 2020    Procedure: ADVANCED TROCHANTERIC FEMORAL NAIL (ATFN) RIGHT HIP/FEMUR;  Surgeon: Mejia Meza MD; "  Location: [facility] OR;  Service: Orthopedics;  Laterality: N/A;   • RECTAL FISTULECTOMY     • SKIN CANCER EXCISION           FAMILY HISTORY  Family History   Problem Relation Age of Onset   • Stroke Mother    • Heart attack Father          SOCIAL HISTORY  Social History     Socioeconomic History   • Marital status:    Tobacco Use   • Smoking status: Former Smoker     Packs/day: 2.00   • Smokeless tobacco: Never Used   Vaping Use   • Vaping Use: Never used   Substance and Sexual Activity   • Alcohol use: Yes     Alcohol/week: 1.0 standard drink     Types: 1 Glasses of wine per week   • Drug use: No   • Sexual activity: Defer         ALLERGIES  Contrast dye, Iodine, Other, and Prednisone & diphenhydramine        REVIEW OF SYSTEMS  Review of Systems     Unable secondary to altered mental status.      PHYSICAL EXAM    I have reviewed the triage vital signs and nursing notes.    ED Triage Vitals   Temp Heart Rate Resp BP SpO2   06/07/22 1540 06/07/22 1534 06/07/22 1540 06/07/22 1530 06/07/22 1530   97.1 °F (36.2 °C) 74 16 131/80 97 %      Temp src Heart Rate Source Patient Position BP Location FiO2 (%)   06/07/22 1540 -- -- -- --   Axillary           Physical Exam  GENERAL:   Appears somnolent with eyes closed throughout my entire time in the room.  I am able to basically pry his eyes open but he does not open them on command.  HENT: Nares patent.  Dry mucous membranes  EYES: No scleral icterus.  PERRL at 3 mm.  Patient does not track on command.  CV: Regular rhythm, regular rate.  No murmurs gallops rubs  RESPIRATORY: Normal effort.  No audible wheezes, rales or rhonchi.  Clear to auscultation to deep palpation  ABDOMEN: Soft, nontender  MUSCULOSKELETAL: No deformities.   NEURO: Somnolent.  Eyes closed throughout the entire exam.  Patient does not follow commands other than when I instruct him to keep his arms elevated he does so once I elevate his arms.  He does this with his legs as well.  Is unclear if this is  volitional or not..  SKIN: Warm, dry, no rash visualized.        LAB RESULTS  Recent Results (from the past 24 hour(s))   Urinalysis With Microscopic If Indicated (No Culture) - Urine, Catheter    Collection Time: 06/07/22  3:38 PM    Specimen: Urine, Catheter   Result Value Ref Range    Color, UA Yellow Yellow, Straw    Appearance, UA Clear Clear    pH, UA 5.5 5.0 - 8.0    Specific Gravity, UA 1.020 1.001 - 1.030    Glucose, UA Negative Negative    Ketones, UA Trace (A) Negative    Bilirubin, UA Negative Negative    Blood, UA Negative Negative    Protein, UA Negative Negative    Leuk Esterase, UA Small (1+) (A) Negative    Nitrite, UA Negative Negative    Urobilinogen, UA 0.2 E.U./dL 0.2 - 1.0 E.U./dL   Urinalysis, Microscopic Only - Urine, Catheter    Collection Time: 06/07/22  3:38 PM    Specimen: Urine, Catheter   Result Value Ref Range    RBC, UA 21-30 (A) None Seen, 0-2 /HPF    WBC, UA 6-12 (A) None Seen, 0-2 /HPF    Bacteria, UA 1+ (A) None Seen, Trace /HPF    Squamous Epithelial Cells, UA 3-6 (A) None Seen, 0-2 /HPF    Hyaline Casts, UA None Seen 0 - 6 /LPF    Calcium Oxalate Crystals, UA Small/1+ None Seen /HPF    Mucus, UA Small/1+ (A) None Seen, Trace /HPF    Methodology Manual Light Microscopy    Comprehensive Metabolic Panel    Collection Time: 06/07/22  3:39 PM    Specimen: Blood   Result Value Ref Range    Glucose 99 65 - 99 mg/dL    BUN 11 8 - 23 mg/dL    Creatinine 0.81 0.76 - 1.27 mg/dL    Sodium 146 (H) 136 - 145 mmol/L    Potassium 4.2 3.5 - 5.2 mmol/L    Chloride 112 (H) 98 - 107 mmol/L    CO2 29.0 22.0 - 29.0 mmol/L    Calcium 8.7 8.2 - 9.6 mg/dL    Total Protein 6.1 6.0 - 8.5 g/dL    Albumin 2.60 (L) 3.50 - 5.20 g/dL    ALT (SGPT) 15 1 - 41 U/L    AST (SGOT) 25 1 - 40 U/L    Alkaline Phosphatase 97 39 - 117 U/L    Total Bilirubin 0.5 0.0 - 1.2 mg/dL    Globulin 3.5 gm/dL    A/G Ratio 0.7 g/dL    BUN/Creatinine Ratio 13.6 7.0 - 25.0    Anion Gap 5.0 5.0 - 15.0 mmol/L    eGFR 83.2 >60.0  mL/min/1.73   Green Top (Gel)    Collection Time: 06/07/22  3:39 PM   Result Value Ref Range    Extra Tube Hold for add-ons.    Lavender Top    Collection Time: 06/07/22  3:39 PM   Result Value Ref Range    Extra Tube hold for add-on    Gold Top - SST    Collection Time: 06/07/22  3:39 PM   Result Value Ref Range    Extra Tube Hold for add-ons.    Light Blue Top    Collection Time: 06/07/22  3:39 PM   Result Value Ref Range    Extra Tube Hold for add-ons.    CBC Auto Differential    Collection Time: 06/07/22  3:39 PM    Specimen: Blood   Result Value Ref Range    WBC 8.45 3.40 - 10.80 10*3/mm3    RBC 3.63 (L) 4.14 - 5.80 10*6/mm3    Hemoglobin 10.3 (L) 13.0 - 17.7 g/dL    Hematocrit 32.0 (L) 37.5 - 51.0 %    MCV 88.2 79.0 - 97.0 fL    MCH 28.4 26.6 - 33.0 pg    MCHC 32.2 31.5 - 35.7 g/dL    RDW 18.6 (H) 12.3 - 15.4 %    RDW-SD 58.5 (H) 37.0 - 54.0 fl    MPV 9.8 6.0 - 12.0 fL    Platelets 205 140 - 450 10*3/mm3   Troponin    Collection Time: 06/07/22  3:39 PM    Specimen: Blood   Result Value Ref Range    Troponin T 0.067 (C) 0.000 - 0.030 ng/mL   Magnesium    Collection Time: 06/07/22  3:39 PM    Specimen: Blood   Result Value Ref Range    Magnesium 2.0 1.7 - 2.3 mg/dL   Lactic Acid, Plasma    Collection Time: 06/07/22  3:39 PM    Specimen: Blood   Result Value Ref Range    Lactate 1.7 0.5 - 2.0 mmol/L   Procalcitonin    Collection Time: 06/07/22  3:39 PM    Specimen: Blood   Result Value Ref Range    Procalcitonin 0.08 0.00 - 0.25 ng/mL   Manual Differential    Collection Time: 06/07/22  3:39 PM    Specimen: Blood   Result Value Ref Range    Neutrophil % 24.0 (L) 42.7 - 76.0 %    Lymphocyte % 41.0 19.6 - 45.3 %    Monocyte % 2.0 (L) 5.0 - 12.0 %    Eosinophil % 3.0 0.3 - 6.2 %    Basophil % 0.0 0.0 - 1.5 %    Atypical Lymphocyte % 30.0 (H) 0.0 - 5.0 %    Neutrophils Absolute 2.03 1.70 - 7.00 10*3/mm3    Lymphocytes Absolute 6.00 (H) 0.70 - 3.10 10*3/mm3    Monocytes Absolute 0.17 0.10 - 0.90 10*3/mm3    Eosinophils  Absolute 0.25 0.00 - 0.40 10*3/mm3    Basophils Absolute 0.00 0.00 - 0.20 10*3/mm3    RBC Morphology Normal Normal    Smudge Cells Mod/2+ None Seen    Platelet Morphology Normal Normal   COVID-19 and FLU A/B PCR - Swab, Nasopharynx    Collection Time: 06/07/22  4:33 PM    Specimen: Nasopharynx; Swab   Result Value Ref Range    COVID19 Not Detected Not Detected - Ref. Range    Influenza A PCR Not Detected Not Detected    Influenza B PCR Not Detected Not Detected       If labs were ordered, I independently reviewed the results.        RADIOLOGY  CT Head Without Contrast    Result Date: 6/7/2022  DATE OF EXAM: 6/7/2022 4:57 PM  PROCEDURE: CT HEAD WO CONTRAST-  INDICATIONS: AMS  COMPARISON: Head CT 1/12/2022  TECHNIQUE: Routine transaxial and coronal reconstruction images were obtained through the head without the administration of contrast. Automated exposure control and iterative reconstruction methods were used.  The radiation dose reduction device was turned on for each scan per the ALARA (As Low as Reasonably Achievable) protocol.  FINDINGS: No acute intracranial hemorrhage. No acute large territory infarct. Redemonstration of scattered and confluent subcortical and periventricular white matter hypodensities which are nonspecific and can be seen in the setting of chronic small vessel ischemic change. No extra-axial collections. No midline shift or herniation. Mild global cerebral volume loss. Normal size and configuration of the ventricles commensurate with degree of cerebral volume loss. Unremarkable appearance of the orbits. The mastoid air cells are clear. There is scattered mucosal thickening of the ethmoid air cells and mucosal mucosal thickening in the left sphenoid sinus, similar to prior.      No acute intracranial findings. Stable chronic and senescent changes as detailed above.  This report was finalized on 6/7/2022 5:12 PM by Berhane Hendrix MD.      XR Chest 1 View    Result Date: 6/7/2022  DATE OF  EXAM: 6/7/2022 3:40 PM  PROCEDURE: XR CHEST 1 VW-  INDICATIONS: AMS protocol  COMPARISON: Chest x-ray 5/22/2022  TECHNIQUE: Single radiographic AP view of the chest was obtained.  FINDINGS: Mild low lung volumes with stable cardiomegaly and mildly accentuated cardiac mediastinal silhouette. Mild increase in diffuse interstitial prominence possibly reflective of bronchovascular crowding in the setting of low lung volumes of chronic interstitial changes interstitial edema could appear similarly. There are left lung base opacities favored reflect atelectasis. No pneumothorax. No significant pleural effusion. Previously questioned nodule in the left lower lobe is not well appreciated on today's exam.      Low lung volumes with mild increase in diffuse interstitial prominence which may reflect bronchovascular crowding although a component of interstitial edema, or possibly atypical/viral infection, cannot be excluded. Ill-defined opacities at the left lung base are favored reflect atelectasis.  This report was finalized on 6/7/2022 3:54 PM by Berhane Hendrix MD.        I ordered and reviewed the above noted radiographic studies.      I viewed images of chest x-ray which showed no consolidative infiltrates per my independent interpretation.    See radiologist's dictation for official interpretation.        PROCEDURES    Procedures    ECG 12 Lead   Preliminary Result             MEDICATIONS GIVEN IN ER    Medications   sodium chloride 0.9 % flush 10 mL (has no administration in time range)   sodium chloride 0.9 % flush 10 mL (has no administration in time range)   vancomycin 1750 mg/500 mL 0.9% NS IVPB ([facility]) (has no administration in time range)   piperacillin-tazobactam (ZOSYN) 3.375 g in iso-osmotic dextrose 50 ml (premix) (has no administration in time range)   sodium chloride 0.9 % bolus 1,000 mL (0 mL Intravenous Stopped 6/7/22 1652)         PROGRESS, DATA ANALYSIS, CONSULTS, AND MEDICAL DECISION MAKING    All labs  have been independently reviewed by me.  All radiology studies have been reviewed by me and the radiologist dictating the report.   EKG's have been independently viewed and interpreted by me.      Differential diagnoses: Altered mental status which could be secondary to infection or cerebral ischemia.  Multiple other etiologies considered as well.      ED Course as of 06/07/22 1806 Tue Jun 07, 2022 1806 I have ordered broad-spectrum antibiotics and blood cultures as well as urine culture.  I have contacted the hospitalist Dr. Boss for admission. [MS]      ED Course User Index  [MS] Jose E Jauregui MD             AS OF 18:06 EDT VITALS:    BP - 132/78  HR - 67  TEMP - 97.1 °F (36.2 °C) (Axillary)  O2 SATS - 100%                  DIAGNOSIS  Final diagnoses:   Altered mental status, unspecified altered mental status type   Failure of outpatient treatment   Acute UTI         DISPOSITION  Admission           Jose E Jauregui MD  06/08/22 2594

## 2022-06-08 ENCOUNTER — APPOINTMENT (OUTPATIENT)
Dept: GENERAL RADIOLOGY | Facility: HOSPITAL | Age: 87
End: 2022-06-08

## 2022-06-08 ENCOUNTER — HOME CARE VISIT (OUTPATIENT)
Dept: HOME HEALTH SERVICES | Facility: HOME HEALTHCARE | Age: 87
End: 2022-06-08

## 2022-06-08 VITALS
OXYGEN SATURATION: 95 % | SYSTOLIC BLOOD PRESSURE: 112 MMHG | TEMPERATURE: 96.8 F | DIASTOLIC BLOOD PRESSURE: 64 MMHG | RESPIRATION RATE: 14 BRPM | HEART RATE: 66 BPM

## 2022-06-08 LAB
ANION GAP SERPL CALCULATED.3IONS-SCNC: 11 MMOL/L (ref 5–15)
B PARAPERT DNA SPEC QL NAA+PROBE: NOT DETECTED
B PERT DNA SPEC QL NAA+PROBE: NOT DETECTED
BACTERIA SPEC AEROBE CULT: NO GROWTH
BASOPHILS # BLD AUTO: 0.13 10*3/MM3 (ref 0–0.2)
BASOPHILS NFR BLD AUTO: 1.9 % (ref 0–1.5)
BUN SERPL-MCNC: 11 MG/DL (ref 8–23)
BUN/CREAT SERPL: 12.4 (ref 7–25)
C PNEUM DNA NPH QL NAA+NON-PROBE: NOT DETECTED
CALCIUM SPEC-SCNC: 8.6 MG/DL (ref 8.2–9.6)
CHLORIDE SERPL-SCNC: 108 MMOL/L (ref 98–107)
CO2 SERPL-SCNC: 24 MMOL/L (ref 22–29)
CREAT SERPL-MCNC: 0.89 MG/DL (ref 0.76–1.27)
DEPRECATED RDW RBC AUTO: 55.1 FL (ref 37–54)
EGFRCR SERPLBLD CKD-EPI 2021: 80.9 ML/MIN/1.73
EOSINOPHIL # BLD AUTO: 0.33 10*3/MM3 (ref 0–0.4)
EOSINOPHIL NFR BLD AUTO: 4.7 % (ref 0.3–6.2)
ERYTHROCYTE [DISTWIDTH] IN BLOOD BY AUTOMATED COUNT: 18.2 % (ref 12.3–15.4)
FLUAV SUBTYP SPEC NAA+PROBE: NOT DETECTED
FLUBV RNA ISLT QL NAA+PROBE: NOT DETECTED
GLUCOSE BLDC GLUCOMTR-MCNC: 102 MG/DL (ref 70–130)
GLUCOSE BLDC GLUCOMTR-MCNC: 183 MG/DL (ref 70–130)
GLUCOSE BLDC GLUCOMTR-MCNC: 215 MG/DL (ref 70–130)
GLUCOSE BLDC GLUCOMTR-MCNC: 96 MG/DL (ref 70–130)
GLUCOSE SERPL-MCNC: 103 MG/DL (ref 65–99)
HADV DNA SPEC NAA+PROBE: NOT DETECTED
HCOV 229E RNA SPEC QL NAA+PROBE: NOT DETECTED
HCOV HKU1 RNA SPEC QL NAA+PROBE: NOT DETECTED
HCOV NL63 RNA SPEC QL NAA+PROBE: NOT DETECTED
HCOV OC43 RNA SPEC QL NAA+PROBE: NOT DETECTED
HCT VFR BLD AUTO: 29.5 % (ref 37.5–51)
HGB BLD-MCNC: 10 G/DL (ref 13–17.7)
HMPV RNA NPH QL NAA+NON-PROBE: NOT DETECTED
HPIV1 RNA ISLT QL NAA+PROBE: NOT DETECTED
HPIV2 RNA SPEC QL NAA+PROBE: NOT DETECTED
HPIV3 RNA NPH QL NAA+PROBE: NOT DETECTED
HPIV4 P GENE NPH QL NAA+PROBE: NOT DETECTED
IMM GRANULOCYTES # BLD AUTO: 0.02 10*3/MM3 (ref 0–0.05)
IMM GRANULOCYTES NFR BLD AUTO: 0.3 % (ref 0–0.5)
L PNEUMO1 AG UR QL IA: NEGATIVE
LYMPHOCYTES # BLD AUTO: 3.68 10*3/MM3 (ref 0.7–3.1)
LYMPHOCYTES NFR BLD AUTO: 52.9 % (ref 19.6–45.3)
M PNEUMO IGG SER IA-ACNC: NOT DETECTED
MCH RBC QN AUTO: 28.4 PG (ref 26.6–33)
MCHC RBC AUTO-ENTMCNC: 33.9 G/DL (ref 31.5–35.7)
MCV RBC AUTO: 83.8 FL (ref 79–97)
MONOCYTES # BLD AUTO: 0.4 10*3/MM3 (ref 0.1–0.9)
MONOCYTES NFR BLD AUTO: 5.8 % (ref 5–12)
MRSA DNA SPEC QL NAA+PROBE: NEGATIVE
NEUTROPHILS NFR BLD AUTO: 2.39 10*3/MM3 (ref 1.7–7)
NEUTROPHILS NFR BLD AUTO: 34.4 % (ref 42.7–76)
NRBC BLD AUTO-RTO: 0 /100 WBC (ref 0–0.2)
PLATELET # BLD AUTO: 203 10*3/MM3 (ref 140–450)
PMV BLD AUTO: 10.3 FL (ref 6–12)
POTASSIUM SERPL-SCNC: 3.2 MMOL/L (ref 3.5–5.2)
RBC # BLD AUTO: 3.52 10*6/MM3 (ref 4.14–5.8)
RHINOVIRUS RNA SPEC NAA+PROBE: NOT DETECTED
RSV RNA NPH QL NAA+NON-PROBE: NOT DETECTED
S PNEUM AG SPEC QL LA: NEGATIVE
SARS-COV-2 RNA NPH QL NAA+NON-PROBE: NOT DETECTED
SODIUM SERPL-SCNC: 143 MMOL/L (ref 136–145)
VANCOMYCIN SERPL-MCNC: 10 MCG/ML (ref 5–40)
WBC NRBC COR # BLD: 6.95 10*3/MM3 (ref 3.4–10.8)

## 2022-06-08 PROCEDURE — 96366 THER/PROPH/DIAG IV INF ADDON: CPT

## 2022-06-08 PROCEDURE — 87641 MR-STAPH DNA AMP PROBE: CPT | Performed by: NURSE PRACTITIONER

## 2022-06-08 PROCEDURE — 96375 TX/PRO/DX INJ NEW DRUG ADDON: CPT

## 2022-06-08 PROCEDURE — 25010000002 HALOPERIDOL LACTATE PER 5 MG: Performed by: NURSE PRACTITIONER

## 2022-06-08 PROCEDURE — 87449 NOS EACH ORGANISM AG IA: CPT | Performed by: NURSE PRACTITIONER

## 2022-06-08 PROCEDURE — 80048 BASIC METABOLIC PNL TOTAL CA: CPT | Performed by: NURSE PRACTITIONER

## 2022-06-08 PROCEDURE — 51798 US URINE CAPACITY MEASURE: CPT

## 2022-06-08 PROCEDURE — 82962 GLUCOSE BLOOD TEST: CPT

## 2022-06-08 PROCEDURE — 85025 COMPLETE CBC W/AUTO DIFF WBC: CPT | Performed by: NURSE PRACTITIONER

## 2022-06-08 PROCEDURE — 94799 UNLISTED PULMONARY SVC/PX: CPT

## 2022-06-08 PROCEDURE — G0378 HOSPITAL OBSERVATION PER HR: HCPCS

## 2022-06-08 PROCEDURE — 97165 OT EVAL LOW COMPLEX 30 MIN: CPT

## 2022-06-08 PROCEDURE — 92610 EVALUATE SWALLOWING FUNCTION: CPT | Performed by: SPEECH-LANGUAGE PATHOLOGIST

## 2022-06-08 PROCEDURE — 80202 ASSAY OF VANCOMYCIN: CPT

## 2022-06-08 PROCEDURE — 63710000001 INSULIN LISPRO (HUMAN) PER 5 UNITS: Performed by: NURSE PRACTITIONER

## 2022-06-08 PROCEDURE — 96376 TX/PRO/DX INJ SAME DRUG ADON: CPT

## 2022-06-08 PROCEDURE — 99225 PR SBSQ OBSERVATION CARE/DAY 25 MINUTES: CPT | Performed by: HOSPITALIST

## 2022-06-08 PROCEDURE — 0202U NFCT DS 22 TRGT SARS-COV-2: CPT | Performed by: NURSE PRACTITIONER

## 2022-06-08 PROCEDURE — 74230 X-RAY XM SWLNG FUNCJ C+: CPT

## 2022-06-08 PROCEDURE — 97530 THERAPEUTIC ACTIVITIES: CPT

## 2022-06-08 PROCEDURE — 92611 MOTION FLUOROSCOPY/SWALLOW: CPT | Performed by: SPEECH-LANGUAGE PATHOLOGIST

## 2022-06-08 PROCEDURE — 25010000002 PIPERACILLIN SOD-TAZOBACTAM PER 1 G: Performed by: NURSE PRACTITIONER

## 2022-06-08 PROCEDURE — 97162 PT EVAL MOD COMPLEX 30 MIN: CPT

## 2022-06-08 RX ORDER — CEFUROXIME AXETIL 250 MG/1
500 TABLET ORAL EVERY 12 HOURS SCHEDULED
Status: DISCONTINUED | OUTPATIENT
Start: 2022-06-08 | End: 2022-06-11 | Stop reason: HOSPADM

## 2022-06-08 RX ORDER — POTASSIUM CHLORIDE 750 MG/1
40 CAPSULE, EXTENDED RELEASE ORAL AS NEEDED
Status: DISCONTINUED | OUTPATIENT
Start: 2022-06-08 | End: 2022-06-11 | Stop reason: HOSPADM

## 2022-06-08 RX ORDER — HALOPERIDOL 5 MG/ML
0.5 INJECTION INTRAMUSCULAR EVERY 6 HOURS PRN
Status: DISCONTINUED | OUTPATIENT
Start: 2022-06-08 | End: 2022-06-11 | Stop reason: HOSPADM

## 2022-06-08 RX ORDER — POTASSIUM CHLORIDE 1.5 G/1.77G
40 POWDER, FOR SOLUTION ORAL AS NEEDED
Status: DISCONTINUED | OUTPATIENT
Start: 2022-06-08 | End: 2022-06-11 | Stop reason: HOSPADM

## 2022-06-08 RX ADMIN — IPRATROPIUM BROMIDE AND ALBUTEROL SULFATE 3 ML: .5; 3 SOLUTION RESPIRATORY (INHALATION) at 15:49

## 2022-06-08 RX ADMIN — TAZOBACTAM SODIUM AND PIPERACILLIN SODIUM 3.38 G: 375; 3 INJECTION, SOLUTION INTRAVENOUS at 09:17

## 2022-06-08 RX ADMIN — POTASSIUM CHLORIDE 40 MEQ: 750 CAPSULE, EXTENDED RELEASE ORAL at 15:39

## 2022-06-08 RX ADMIN — HALOPERIDOL LACTATE 0.5 MG: 5 INJECTION, SOLUTION INTRAMUSCULAR at 16:40

## 2022-06-08 RX ADMIN — INSULIN LISPRO 2 UNITS: 100 INJECTION, SOLUTION INTRAVENOUS; SUBCUTANEOUS at 20:09

## 2022-06-08 RX ADMIN — MEMANTINE 10 MG: 10 TABLET ORAL at 20:09

## 2022-06-08 RX ADMIN — IPRATROPIUM BROMIDE AND ALBUTEROL SULFATE 3 ML: .5; 3 SOLUTION RESPIRATORY (INHALATION) at 19:17

## 2022-06-08 RX ADMIN — CEFUROXIME AXETIL 500 MG: 250 TABLET, FILM COATED ORAL at 20:09

## 2022-06-08 RX ADMIN — HALOPERIDOL LACTATE 0.5 MG: 5 INJECTION, SOLUTION INTRAMUSCULAR at 01:04

## 2022-06-08 RX ADMIN — IPRATROPIUM BROMIDE AND ALBUTEROL SULFATE 3 ML: .5; 3 SOLUTION RESPIRATORY (INHALATION) at 12:42

## 2022-06-08 RX ADMIN — Medication 5 MG: at 20:08

## 2022-06-08 RX ADMIN — BARIUM SULFATE 20 ML: 400 PASTE ORAL at 13:47

## 2022-06-08 RX ADMIN — Medication 10 ML: at 20:09

## 2022-06-08 RX ADMIN — MEMANTINE 10 MG: 10 TABLET ORAL at 15:39

## 2022-06-08 RX ADMIN — IPRATROPIUM BROMIDE AND ALBUTEROL SULFATE 3 ML: .5; 3 SOLUTION RESPIRATORY (INHALATION) at 23:10

## 2022-06-08 RX ADMIN — HALOPERIDOL LACTATE 0.5 MG: 5 INJECTION, SOLUTION INTRAMUSCULAR at 10:13

## 2022-06-08 RX ADMIN — RIVAROXABAN 10 MG: 10 TABLET, FILM COATED ORAL at 15:39

## 2022-06-08 RX ADMIN — IPRATROPIUM BROMIDE AND ALBUTEROL SULFATE 3 ML: .5; 3 SOLUTION RESPIRATORY (INHALATION) at 07:51

## 2022-06-08 RX ADMIN — TAMSULOSIN HYDROCHLORIDE 0.4 MG: 0.4 CAPSULE ORAL at 15:39

## 2022-06-08 RX ADMIN — INSULIN LISPRO 3 UNITS: 100 INJECTION, SOLUTION INTRAVENOUS; SUBCUTANEOUS at 17:18

## 2022-06-08 RX ADMIN — BARIUM SULFATE 50 ML: 400 SUSPENSION ORAL at 13:47

## 2022-06-08 RX ADMIN — RIVASTIGMINE 1 PATCH: 9.5 PATCH TRANSDERMAL at 09:17

## 2022-06-08 RX ADMIN — Medication 10 ML: at 09:17

## 2022-06-08 RX ADMIN — QUETIAPINE FUMARATE 25 MG: 25 TABLET ORAL at 20:08

## 2022-06-08 RX ADMIN — LEVOTHYROXINE SODIUM 75 MCG: 0.07 TABLET ORAL at 15:39

## 2022-06-08 RX ADMIN — TAZOBACTAM SODIUM AND PIPERACILLIN SODIUM 3.38 G: 375; 3 INJECTION, SOLUTION INTRAVENOUS at 01:05

## 2022-06-08 RX ADMIN — BARIUM SULFATE 100 ML: 0.81 POWDER, FOR SUSPENSION ORAL at 13:47

## 2022-06-08 NOTE — THERAPY EVALUATION
Patient Name: Marcos Acuña  : 1931    MRN: [id number]                              Today's Date: 2022       Admit Date: 2022    Visit Dx:     ICD-10-CM ICD-9-CM   1. Altered mental status, unspecified altered mental status type  R41.82 780.97   2. Failure of outpatient treatment  Z78.9 V49.89   3. 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[facility])   • Hypothyroidism   • Left leg cellulitis   • Type 2 diabetes mellitus without complication ([facility])   • Obstructive sleep apnea syndrome   • Obesity   • Elevated troponin   • Uncontrolled type 2 diabetes mellitus with hyperglycemia ([facility])   • Acute UTI   • Altered mental status, unspecified altered mental status type   • History of bacteremia, E.coli   • Dysphagia   • Productive cough     Past Medical History:   Diagnosis Date   • Dementia ([facility])    • Diabetes ([facility])    • H/O blood clots    • Hypertensive disorder    • Hypothyroidism    • Osteoarthritis    • Type 2 diabetes mellitus, uncontrolled      Past Surgical History:   Procedure Laterality Date   • BUNIONECTOMY     • HIP TROCHANTERIC NAILING WITH INTRAMEDULLARY HIP SCREW N/A 2020    Procedure: ADVANCED TROCHANTERIC FEMORAL NAIL (ATFN) RIGHT HIP/FEMUR;  Surgeon: Mejia Meza MD;  [facility];  Service: Orthopedics;  Laterality: N/A;   • RECTAL FISTULECTOMY     • SKIN CANCER EXCISION        General Information     Row Name 22 1134          OT Time and Intention    Document Type evaluation  -LC     Mode of Treatment occupational therapy  -LC     Row Name 22 1134          General Information    Patient Profile Reviewed yes  -LC     Prior Level of Function max assist:;transfer;w/c or  scooter;feeding;grooming;dependent:;dressing;bathing  CG present and provided history  -     Existing Precautions/Restrictions fall  -     Barriers to Rehab medically complex;previous functional deficit;cognitive status  -     Row Name 06/08/22 1134          Living Environment    People in Home facility resident;spouse  -     Row Name 06/08/22 1134          Home Main Entrance    Number of Stairs, Main Entrance none  -     Row Name 06/08/22 1134          Stairs Within Home, Primary    Number of Stairs, Within Home, Primary none  -     Row Name 06/08/22 1134          Cognition    Orientation Status (Cognition) oriented to;person;disoriented to;place;situation;time;verbal cues/prompts needed for orientation  -     Row Name 06/08/22 1134          Safety Issues, Functional Mobility    Safety Issues Affecting Function (Mobility) ability to follow commands;at risk behavior observed;awareness of need for assistance;impulsivity;insight into deficits/self-awareness;judgment;problem-solving;safety precaution awareness;safety precautions follow-through/compliance;sequencing abilities  -     Impairments Affecting Function (Mobility) balance;cognition;coordination;endurance/activity tolerance;postural/trunk control;range of motion (ROM);strength  -     Cognitive Impairments, Mobility Safety/Performance attention;awareness, need for assistance;impulsivity;insight into deficits/self-awareness;judgment;problem-solving/reasoning;safety precaution awareness;safety precaution follow-through;sequencing abilities  -     Comment, Safety Issues/Impairments (Mobility) Consistent cueing to reorient patient.  -           User Key  (r) = Recorded By, (t) = Taken By, (c) = Cosigned By    Initials Name Provider Type     Berna Yost OT Occupational Therapist                 Mobility/ADL's     Row Name 06/08/22 1137          Bed Mobility    Comment, (Bed Mobility) [facility] on arrival  -     Row Name 06/08/22 1137           Transfers    Transfers sit-stand transfer  -     Comment, (Transfers) Required B LE blocked and consistent cues to sequence STS from recliner, Pt. with flexed posture, u/a to correct despite cueing.  -     Sit-Stand [place] (Transfers) maximum assist (25% patient effort);2 person assist;verbal cues  -     Row Name 06/08/22 1137          Activities of Daily Living    BADL Assessment/Intervention grooming;lower body dressing  -[facility] Name 06/08/22 1137          Grooming Assessment/Training    [place] Level (Grooming) wash face, hands;dependent (less than 25% patient effort)  -     Position (Grooming) supported sitting  -[facility] Name 06/08/22 1137          Lower Body Dressing Assessment/Training    [place] Level (Lower Body Dressing) don;socks;dependent (less than 25% patient effort)  -     Position (Lower Body Dressing) supported sitting  -           User Key  (r) = Recorded By, (t) = Taken By, (c) = Cosigned By    Initials Name Provider Type     Berna Yost OT Occupational Therapist               Obj/Interventions     [facility] Name 06/08/22 1138          Sensory Assessment (Somatosensory)    Sensory Assessment (Somatosensory) unable/difficult to assess  -[facility] Name 06/08/22 1138          Vision Assessment/Intervention    Visual Impairment/Limitations unable/difficult to assess  -[facility] Name 06/08/22 1138          Range of Motion Comprehensive    Comment, General Range of Motion Pt limited by rigidity and poor command following, BUE PROM WFL  -[facility] Name 06/08/22 1138          Strength Comprehensive (MMT)    General Manual Muscle Testing (MMT) Assessment upper extremity strength deficits identified  -     Comment, General Manual Muscle Testing (MMT) Assessment BUE grossly 3/5  -[facility] Name 06/08/22 1138          Motor Skills    Motor Skills coordination;muscle tone  -     Coordination bilateral;upper extremity;fine motor deficit;moderate impairment  -      Muscle Tone cogwheel rigidity  -     Row Name 06/08/22 1138          Balance    Balance Assessment sitting static balance;sitting dynamic balance;standing static balance  -     Static Sitting Balance minimal assist;verbal cues;2-person assist  -     Dynamic Sitting Balance moderate assist;2-person assist;verbal cues  -     Position, Sitting Balance supported;sitting in chair  -     Static Standing Balance maximum assist;2-person assist;verbal cues  -LC     Balance Interventions sitting;standing;sit to stand;supported;weight shifting activity  -     Comment, Balance BLE blocked, flexed posture in standing. Rigidity and cognition limiting mobility. Max A x 2 needed  -           User Key  (r) = Recorded By, (t) = Taken By, (c) = Cosigned By    Initials Name Provider Type     Berna Yost, GALLITO Occupational Therapist               Goals/Plan     Row Name 06/08/22 1146          Transfer Goal 1 (OT)    Activity/Assistive Device (Transfer Goal 1, OT) sit-to-stand/stand-to-sit;walker, rolling  -     [place] Level/Cues Needed (Transfer Goal 1, OT) moderate assist (50-74% patient effort);verbal cues required;tactile cues required  -     Time Frame (Transfer Goal 1, OT) 10 days;long term goal (LTG)  -     Progress/Outcome (Transfer Goal 1, OT) goal ongoing  -     Row Name 06/08/22 1146          Grooming Goal 1 (OT)    Activity/Device (Grooming Goal 1, OT) wash face, hands  -     [place] (Grooming Goal 1, OT) moderate assist (50-74% patient effort);verbal cues required  -     Time Frame (Grooming Goal 1, OT) 10 days;long term goal (LTG)  -     Progress/Outcome (Grooming Goal 1, OT) goal ongoing  -     Row Name 06/08/22 1146          Self-Feeding Goal 1 (OT)    Activity/Device (Self-Feeding Goal 1, OT) liquids to mouth;scoop food and bring to mouth  -     [place] Level/Cues Needed (Self-Feeding Goal 1, OT) moderate assist (50-74% patient effort);verbal cues required  -     Time  Frame (Self-Feeding Goal 1, OT) 10 days;long term goal (LTG)  -     Progress/Outcomes (Self-Feeding Goal 1, OT) goal ongoing  -           User Key  (r) = Recorded By, (t) = Taken By, (c) = Cosigned By    Initials Name Provider Type    Berna Stephen, GALLITO Occupational Therapist               Clinical Impression     Row Name 06/08/22 1142          Pain Assessment    Pre/Posttreatment Pain Comment No signs of physical discomfort  -     Row Name 06/08/22 1142          Pain Scale: FACES Pre/Post-Treatment    Pain: FACES Scale, Pretreatment 0-->no hurt  -LC     Posttreatment Pain Rating 0-->no hurt  -LC     Row Name 06/08/22 1142          Plan of Care Review    Plan of Care Reviewed With patient;caregiver  -     Progress no change  -     Outcome Evaluation Pt. presents with impaired activity tolerance, balance, generalized weakness, and a decline in ADLs and functional mobility. Pt. limited by rigidity and cognitive status. Consistent cueing needed to redirect and sequence. Pt. required Max A x 2 for STS from recliner. DEP for LBD and grooming. Skilled OT services warranted 3x/wk at this time to promote return to PLOF. Recommend SNF at discharge.  -     Row Name 06/08/22 1142          Therapy Assessment/Plan (OT)    Patient/Family Therapy Goal Statement (OT) To return to PLOF  -     Therapy Frequency (OT) 3 times/wk  -     Row Name 06/08/22 1142          Therapy Plan Review/Discharge Plan (OT)    Anticipated Discharge Disposition (OT) skilled nursing facility  -     Row Name 06/08/22 1142          Vital Signs    Pre Systolic BP Rehab --  VSS  -LC     Pre Patient Position Sitting  -LC     Intra Patient Position Standing  -LC     Post Patient Position Sitting  -LC     Row Name 06/08/22 1142          Positioning and Restraints    Pre-Treatment Position sitting in chair/recliner  -LC     Post Treatment Position chair  -LC     In Chair notified nsg;reclined;call light within reach;encouraged to call for  assist;exit alarm on;with family/caregiver;waffle cushion;on mechanical lift sling;legs elevated;heels elevated  -           User Key  (r) = Recorded By, (t) = Taken By, (c) = Cosigned By    Initials Name Provider Type     Berna Yost, OT Occupational Therapist               Outcome Measures     Row Name 06/08/22 1147          How much help from another is currently needed...    Putting on and taking off regular lower body clothing? 1  -LC     Bathing (including washing, rinsing, and drying) 1  -LC     Toileting (which includes using toilet bed pan or urinal) 1  -LC     Putting on and taking off regular upper body clothing 1  -LC     Taking care of personal grooming (such as brushing teeth) 2  -LC     Eating meals 2  -LC     AM-PAC 6 Clicks Score (OT) 8  -     Row Name 06/08/22 1019          How much help from another person do you currently need...    Turning from your back to your side while in flat bed without using bedrails? 2  -CD     Moving from lying on back to sitting on the side of a flat bed without bedrails? 2  -CD     Moving to and from a bed to a chair (including a wheelchair)? 2  -CD     Standing up from a chair using your arms (e.g., wheelchair, bedside chair)? 2  -CD     Climbing 3-5 steps with a railing? 1  -CD     To walk in hospital room? 1  -CD     AM-PAC 6 Clicks Score (PT) 10  -CD     Highest level of mobility 4 --> Transferred to chair/commode  -CD     Row Name 06/08/22 1019          Modified Appling Scale    Modified Appling Scale 4 - Moderately severe disability.  Unable to walk without assistance, and unable to attend to own bodily needs without assistance.  -CD     Row Name 06/08/22 1147 06/08/22 1019       Functional Assessment    Outcome Measure Options AM-PAC 6 Clicks Daily Activity (OT)  - AM-PAC 6 Clicks Basic Mobility (PT);Modified [place]  -CD          User Key  (r) = Recorded By, (t) = Taken By, (c) = Cosigned By    Initials Name Provider Type    CD Rhianna Lovell, PT  Physical Therapist    Berna Stephen OT Occupational Therapist                Occupational Therapy Education                 Title: PT OT SLP Therapies (In Progress)     Topic: Occupational Therapy (In Progress)     Point: ADL training (In Progress)     Description:   Instruct learner(s) on proper safety adaptation and remediation techniques during self care or transfers.   Instruct in proper use of assistive devices.              Learning Progress Summary           Patient Acceptance, E,D, NL,NR by  at 6/8/2022 1028   Caregiver Acceptance, E,D, NL,NR by  at 6/8/2022 1028                   Point: Home exercise program (Not Started)     Description:   Instruct learner(s) on appropriate technique for monitoring, assisting and/or progressing therapeutic exercises/activities.              Learner Progress:  Not documented in this visit.          Point: Precautions (In Progress)     Description:   Instruct learner(s) on prescribed precautions during self-care and functional transfers.              Learning Progress Summary           Patient Acceptance, E,D, NL,NR by  at 6/8/2022 1028   Caregiver Acceptance, E,D, NL,NR by  at 6/8/2022 1028                   Point: Body mechanics (In Progress)     Description:   Instruct learner(s) on proper positioning and spine alignment during self-care, functional mobility activities and/or exercises.              Learning Progress Summary           Patient Acceptance, E,D, NL,NR by  at 6/8/2022 1028   Caregiver Acceptance, E,D, NL,NR by  at 6/8/2022 1028                               User Key     Initials Effective Dates Name Provider Type Discipline     06/16/21 -  Berna Yost OT Occupational Therapist OT              OT Recommendation and Plan  Therapy Frequency (OT): 3 times/wk  Plan of Care Review  Plan of Care Reviewed With: patient, caregiver  Progress: no change  Outcome Evaluation: Pt. presents with impaired activity tolerance, balance, generalized  weakness, and a decline in ADLs and functional mobility. Pt. limited by rigidity and cognitive status. Consistent cueing needed to redirect and sequence. Pt. required Max A x 2 for STS from recliner. DEP for LBD and grooming. Skilled OT services warranted 3x/wk at this time to promote return to PLOF. Recommend SNF at discharge.     Time Calculation:    Time Calculation- OT     Row Name 06/08/22 1149             Time Calculation- OT    OT Start Time 1028  -LC      OT Received On 06/08/22  -      OT Goal Re-Cert Due Date 06/18/22  -              Untimed Charges    OT Eval/Re-eval Minutes 47  -LC              Total Minutes    Untimed Charges Total Minutes 47  -LC       Total Minutes 47  -LC            User Key  (r) = Recorded By, (t) = Taken By, (c) = Cosigned By    Initials Name Provider Type     Berna Yost OT Occupational Therapist              Therapy Charges for Today     Code Description Service Date Service Provider Modifiers Qty    [id number]  OT EVAL LOW COMPLEXITY 4 6/8/2022 Berna Yost OT GO 1    [id number]  OT THER SUPP EA 15 MIN 6/8/2022 Berna Yost OT GO 3               Berna Yost OT  6/8/2022

## 2022-06-08 NOTE — CONSULTS
INFECTIOUS DISEASE CONSULT/INITIAL HOSPITAL VISIT    Marcos Acuña  5/31/1931  [id number]    Date of Consult: 6/8/2022    Admission Date: 6/7/2022      Requesting Provider: No ref. provider found  Evaluating Physician: Jose E Radford MD    Reason for Consultation: Encephalopathy/acute mental status change      History of present illness:    Patient is a 89 y.o. male with dementia, type 2 diabetes mellitus, obstructive sleep apnea, chronic lower extremity DVT, hypertension, hypothyroidism, and a recent bacteremic E. coli UTI who is seen today for evaluation of acute mental status changes.  I saw him initially on 5/23/2022 when he presented with an obvious UTI and sepsis with too numerous to count white blood cells on urinalysis and blood/urine cultures positive for relatively sensitive E. coli.  He was treated with a course of parenteral antibiotic therapy in the hospital and was subsequently switched to complete parenteral antibiotic therapy with 5 additional days of intramuscular ceftriaxone and discharge.  He was then switched to oral cefuroxime and remains on oral cefuroxime.  I saw him recently in telehealth follow-up at which point he appeared remarkably better.  He has a history of sleep apnea and has not been using CPAP.  He was found to be severely confused after awakening, prompting return to the emergency room last night.  He has remained afebrile and had no leukocytosis with a white blood cell count of 8.5.  His procalcitonin was normal at 1.08.  His viral respiratory panel PCR was negative.  He did have some residual leukocytosis on urinalysis with 6-12 white blood cells.  Chest CT scan revealed moderate bilateral pleural effusions with bibasilar atelectasis and he was also noted to have cardiomegaly with enlarged pulmonary arteries consistent with pulmonary artery hypertension.  Previous echocardiogram on 5/22 revealed right atrial and ventricular enlargement.  After blood cultures were obtained, he  was started on intravenous vancomycin and Zosyn.  He has remained afebrile overnight.  His acute confusional state has resolved per his caregiver and he is now back to his baseline cognitive function.  He denies headache, earache, sore throat.  He denies cough and sputum production.  He denies nausea, vomiting, and diarrhea.  He denies dysuria.    Past Medical History:   Diagnosis Date   • Dementia (HCC)    • Diabetes (HCC)    • H/O blood clots    • Hypertensive disorder    • Hypothyroidism    • Osteoarthritis    • Type 2 diabetes mellitus, uncontrolled        Past Surgical History:   Procedure Laterality Date   • BUNIONECTOMY     • HIP TROCHANTERIC NAILING WITH INTRAMEDULLARY HIP SCREW N/A 6/9/2020    Procedure: ADVANCED TROCHANTERIC FEMORAL NAIL (ATFN) RIGHT HIP/FEMUR;  Surgeon: Mejia Meza MD;  Location: [facility];  Service: Orthopedics;  Laterality: N/A;   • RECTAL FISTULECTOMY     • SKIN CANCER EXCISION         Family History   Problem Relation Age of Onset   • Stroke Mother    • Heart attack Father        Social History     Socioeconomic History   • Marital status:    Tobacco Use   • Smoking status: Former Smoker     Packs/day: 2.00   • Smokeless tobacco: Never Used   Vaping Use   • Vaping Use: Never used   Substance and Sexual Activity   • Alcohol use: Yes     Alcohol/week: 1.0 standard drink     Types: 1 Glasses of wine per week   • Drug use: No   • Sexual activity: Defer       Allergies   Allergen Reactions   • Contrast Dye Anaphylaxis   • Iodine Unknown - High Severity     Pt states eyes start watering.    • Other Anaphylaxis   • Prednisone & Diphenhydramine Rash         Medication:    Current Facility-Administered Medications:   •  acetaminophen (TYLENOL) tablet 650 mg, 650 mg, Oral, Q4H PRN **OR** acetaminophen (TYLENOL) 160 MG/5ML solution 650 mg, 650 mg, Oral, Q4H PRN **OR** acetaminophen (TYLENOL) suppository 650 mg, 650 mg, Rectal, Q4H PRN, Melly Sears, APRN  •  cefuroxime  (CEFTIN) tablet 500 mg, 500 mg, Oral, Q12H, Jose E Radford MD  •  dextrose (D50W) (25 g/50 mL) IV injection 25 g, 25 g, Intravenous, Q15 Min PRN, Melly Sears APRVALERY  •  dextrose (GLUTOSE) oral gel 15 g, 15 g, Oral, Q15 Min PRN, Melly Sears, APRN  •  glucagon (human recombinant) (GLUCAGEN DIAGNOSTIC) injection 1 mg, 1 mg, Intramuscular, Q15 Min PRN, Melly Sears APRN  •  haloperidol lactate (HALDOL) injection 0.5 mg, 0.5 mg, Intravenous, Q6H PRN, Melly Sears APRN, 0.5 mg at 06/08/22 1013  •  Insulin Lispro (humaLOG) injection 0-7 Units, 0-7 Units, Subcutaneous, 4x Daily With Meals & Nightly, Melly Sears APRN  •  ipratropium-albuterol (DUO-NEB) nebulizer solution 3 mL, 3 mL, Nebulization, Q4H - RT, Melly Sears APRN, 3 mL at 06/08/22 1242  •  levothyroxine (SYNTHROID, LEVOTHROID) tablet 75 mcg, 75 mcg, Oral, Daily, Melly Sears APRN  •  melatonin tablet 5 mg, 5 mg, Oral, Nightly PRN, Melly Sears APRN, 5 mg at 06/07/22 2355  •  memantine (NAMENDA) tablet 10 mg, 10 mg, Oral, Q12H, Melly Sears APRN, 10 mg at 06/07/22 2331  •  ondansetron (ZOFRAN) tablet 4 mg, 4 mg, Oral, Q6H PRN **OR** ondansetron (ZOFRAN) injection 4 mg, 4 mg, Intravenous, Q6H PRN, Melly Sears, APRN  •  potassium chloride (KLOR-CON) packet 40 mEq, 40 mEq, Oral, PRN, Seth Patel MD  •  potassium chloride (MICRO-K) CR capsule 40 mEq, 40 mEq, Oral, PRN, Seth Patel MD  •  QUEtiapine (SEROquel) tablet 25 mg, 25 mg, Oral, Nightly PRN, Melly Sears, APRN, 25 mg at 06/07/22 0984  •  rivaroxaban (XARELTO) tablet 10 mg, 10 mg, Oral, Daily, Melly Sears APRN  •  rivastigmine (EXELON) 9.5 MG/24HR patch 1 patch, 1 patch, Transdermal, Daily, Melly Sears APRN, 1 patch at 06/08/22 0917  •  sodium chloride 0.9 % flush 10 mL, 10 mL, Intravenous, PRN, Melly Sears, APRN  •  sodium chloride 0.9 % flush 10 mL, 10 mL, Intravenous, Q12H, Melly Sears APRN, 10 mL at 06/08/22 0917  •  sodium chloride 0.9 % flush 10 mL,  10 mL, Intravenous, PRN, Melly Sears APRN  •  tamsulosin (FLOMAX) 24 hr capsule 0.4 mg, 0.4 mg, Oral, Daily, Melly Sears APRN    Antibiotics:  Anti-Infectives (From admission, onward)    Ordered     Dose/Rate Route Frequency Start Stop    22 1423  cefuroxime (CEFTIN) tablet 500 mg        Ordering Provider: Jose E Radford MD    500 mg Oral Every 12 Hours Scheduled 22 2100 22 180  vancomycin 1750 mg/500 mL 0.9% NS IVPB ([facility])        Ordering Provider: Jose E Jauregui MD    20 mg/kg × 93 kg  250 mL/hr over 2 Hours Intravenous Once 22 1803 06/07/22 2143    06/07/22 180  piperacillin-tazobactam (ZOSYN) 3.375 g in iso-osmotic dextrose 50 ml (premix)        Ordering Provider: Jose E Jauregui MD    3.375 g  over 30 Minutes Intravenous Once 22 1803 06/07/22 1954            Review of Systems:  He is demented and his review of systems is unreliable.      Physical Exam:   Vital Signs  Temp (24hrs), Av.5 °F (36.4 °C), Min:97.1 °F (36.2 °C), Max:97.9 °F (36.6 °C)    Temp  Min: 97.1 °F (36.2 °C)  Max: 97.9 °F (36.6 °C)  BP  Min: 127/59  Max: 151/81  Pulse  Min: 66  Max: 116  Resp  Min: 16  Max: 20  SpO2  Min: 95 %  Max: 100 %    GENERAL: Awake and alert, in no acute distress.   HEENT: Normocephalic, atraumatic.  PERRL. EOMI. No conjunctival injection. No icterus. Oropharynx clear without evidence of thrush or exudate. No evidence of peridontal disease.    NECK: Supple without nuchal rigidity.  LYMPH: No cervical, axillary or inguinal lymphadenopathy.  HEART: RRR; 1-2/6 systolic murmur  LUNGS: Clear to auscultation bilaterally without wheezing, rales, rhonchi. Normal respiratory effort. Nonlabored. N  ABDOMEN: Soft, nontender, nondistended.  No rebound or guarding. NO mass or HSM.  EXT: 1-2+ bilateral lower extremity edema  :  Without Meek catheter.  MSK: No focal joint swelling  SKIN: Warm and dry without cutaneous eruptions on Inspection/palpation.    NEURO:  Pleasantly confused.  He knows his name.  He does not recognize me or  comprehend his current situation.      Laboratory Data    Results from last 7 days   Lab Units 06/08/22  0946 06/07/22  1539   WBC 10*3/mm3 6.95 8.45   HEMOGLOBIN g/dL 10.0* 10.3*   HEMATOCRIT % 29.5* 32.0*   PLATELETS 10*3/mm3 203 205     Results from last 7 days   Lab Units 06/08/22  0946   SODIUM mmol/L 143   POTASSIUM mmol/L 3.2*   CHLORIDE mmol/L 108*   CO2 mmol/L 24.0   BUN mg/dL 11   CREATININE mg/dL 0.89   GLUCOSE mg/dL 103*   CALCIUM mg/dL 8.6     Results from last 7 days   Lab Units 06/07/22  1539   ALK PHOS U/L 97   BILIRUBIN mg/dL 0.5   ALT (SGPT) U/L 15   AST (SGOT) U/L 25             Results from last 7 days   Lab Units 06/07/22  1539   LACTATE mmol/L 1.7         Results from last 7 days   Lab Units 06/08/22  0946   VANCOMYCIN RM mcg/mL 10.00     Estimated Creatinine Clearance: 59.1 mL/min (by C-G formula based on SCr of 0.89 mg/dL).      Microbiology:  No results found for: ACANTHNAEG, AFBCX, BPERTUSSISCX, BLOODCX  No results found for: BCIDPCR, CXREFLEX, CSFCX, CULTURETIS  No results found for: CULTURES, HSVCX, URCX  No results found for: EYECULTURE, GCCX, HSVCULTURE, LABHSV  No results found for: LEGIONELLA, MRSACX, MUMPSCX, MYCOPLASCX  No results found for: NOCARDIACX, STOOLCX  Urine Culture   Date Value Ref Range Status   06/07/2022 No growth  Preliminary     No results found for: VIRALCULTU, WOUNDCX        Radiology:  Imaging Results (Last 72 Hours)     Procedure Component Value Units Date/Time    FL Video Swallow With Speech Single Contrast [[id number]] Resulted: 06/08/22 1312     Updated: 06/08/22 1348    CT Chest Without Contrast Diagnostic [[id number]] Collected: 06/07/22 2014     Updated: 06/07/22 2023    Narrative:      CT CHEST WO CONTRAST DIAGNOSTIC-     Date of Exam: 6/7/2022 8:08 PM     Indication: Respiratory illness, nondiagnostic xray; R41.82-Altered  mental status, unspecified; Z78.9-Other specified health  status;  N39.0-Urinary tract infection, site not specified  confusion. History of  pyelonephritis and cystitis.     Comparison: CT the abdomen pelvis 05/23/2022, chest x-ray 06/07/2022,  chest CT 05/29/2020     Technique: Serial and axial CT images of the chest were obtained.  Reconstructions in the coronal and sagittal planes were performed.   Automated exposure control and iterative reconstruction methods were  used.     FINDINGS:  Hilum and Mediastinum: There are no pathologically enlarged hilar  mediastinal lymph nodes. There is cardiomegaly. There is mild coronary  artery atherosclerotic calcification. There is aortic valvular  calcification. There is no pericardial effusion. Thoracic aorta has  normal caliber. Pulmonary arteries are enlarged.     Lung Parenchyma and Pleura: Moderate size bilateral pleural effusions.  Respiratory motion limits evaluation lung parenchyma. There is mild  peripheral septal thickening. There is mild bibasilar subsegmental  atelectasis. The appearance is not significantly changed compared to the  previous CT the abdomen pelvis.     Upper Abdomen: Large parapelvic cyst right kidney.     Soft tissues: Unremarkable.     Osseous structures: No aggressive focal lytic or sclerotic osseous  lesions. Osteopenia. Compression deformity along the superior endplate  of L1 is unchanged.       Impression:      1.  there are moderate bilateral pleural effusions with bibasilar  basilar subsegmental atelectasis. Bibasilar pneumonia less likely but  cannot be excluded on this exam. No associated lymphadenopathy..  Pulmonary edema with pleural effusions with atelectasis favored over  infection based off exam. Clinical correlation is recommended.  2.  Cardiomegaly with enlarged pulmonary arteries, correlate for  pulmonary artery hypertension.           This report was finalized on 6/7/2022 8:20 PM by Delores Cid MD.       CT Head Without Contrast [[id number]] Collected: 06/07/22 4113     Updated:  06/07/22 1715    Narrative:      DATE OF EXAM: 6/7/2022 4:57 PM     PROCEDURE: CT HEAD WO CONTRAST-     INDICATIONS: AMS     COMPARISON: Head CT 1/12/2022     TECHNIQUE: Routine transaxial and coronal reconstruction images were  obtained through the head without the administration of contrast.  Automated exposure control and iterative reconstruction methods were  used.     The radiation dose reduction device was turned on for each scan per the  ALARA (As Low as Reasonably Achievable) protocol.     FINDINGS:  No acute intracranial hemorrhage. No acute large territory infarct.  Redemonstration of scattered and confluent subcortical and  periventricular white matter hypodensities which are nonspecific and can  be seen in the setting of chronic small vessel ischemic change. No  extra-axial collections. No midline shift or herniation. Mild global  cerebral volume loss. Normal size and configuration of the ventricles  commensurate with degree of cerebral volume loss. Unremarkable  appearance of the orbits. The mastoid air cells are clear. There is  scattered mucosal thickening of the ethmoid air cells and mucosal  mucosal thickening in the left sphenoid sinus, similar to prior.        Impression:      No acute intracranial findings. Stable chronic and senescent changes as  detailed above.     This report was finalized on 6/7/2022 5:12 PM by Berhane Hendrix MD.       XR Chest 1 View [[id number]] Collected: 06/07/22 1551     Updated: 06/07/22 1557    Narrative:      DATE OF EXAM: 6/7/2022 3:40 PM     PROCEDURE: XR CHEST 1 VW-     INDICATIONS: AMS protocol     COMPARISON: Chest x-ray 5/22/2022     TECHNIQUE: Single radiographic AP view of the chest was obtained.     FINDINGS:  Mild low lung volumes with stable cardiomegaly and mildly accentuated  cardiac mediastinal silhouette. Mild increase in diffuse interstitial  prominence possibly reflective of bronchovascular crowding in the  setting of low lung volumes of chronic  interstitial changes interstitial  edema could appear similarly. There are left lung base opacities favored  reflect atelectasis. No pneumothorax. No significant pleural effusion.  Previously questioned nodule in the left lower lobe is not well  appreciated on today's exam.        Impression:      Low lung volumes with mild increase in diffuse interstitial prominence  which may reflect bronchovascular crowding although a component of  interstitial edema, or possibly atypical/viral infection, cannot be  excluded. Ill-defined opacities at the left lung base are favored  reflect atelectasis.     This report was finalized on 6/7/2022 3:54 PM by Berhane Hendrix MD.           I read his radiographic images.      Echocardiogram of 5/22/2022  Interpretation Summary    · Left ventricular wall thickness is consistent with mild concentric hypertrophy.  · There is moderate calcification of the aortic valve.  · The right ventricular cavity is mild to moderately dilated.  · The right atrial cavity is mildly dilated.  · Left ventricular ejection fraction appears to be 61 - 65%.  · There is significant calcification of the aortic valve with reduced excursion. However the acoustic windows are so poor that the transducer cannot align properly to get a good measurement across the aortic valve. Consider ADARSH if clinically indicated        Impression:   1.  Encephalopathy/altered mental status- this is most likely secondary to hypoxia related to his untreated obstructive sleep apnea.  He does not appear to have recurrent sepsis with no fever, leukocytosis, or overt new focus of infection.  I discussed his complex situation with Dr. Patel today.  He will treat his sleep apnea and we will discontinue his intravenous antibiotic therapy and switch him back to oral cefuroxime.  2.  Dementia  3.  Obstructive sleep apnea-untreated  4.  Right heart failure/pulmonary artery hypertension-he has a dilated right ventricular cavity, dilated right  atrial cavity, and dilated pulmonary arteries. This is most likely secondary to chronic nocturnal hypoxia from his untreated obstructive sleep apnea.  5.  Lower extremity edema-secondary to right heart failure  6.  E. coli bacteremia/UTI-with positive blood and urine cultures cultures on .  He has received a course of intravenous antibiotics and is now been on oral cefuroxime.  I will plan to give him at least 10 more days of oral cefuroxime.  7.  Pleural effusion/atelectasis- his chest CT scan reveals pleural effusions and atelectasis although these are fairly mild.  There is no evidence of a focal pneumonia.  8.  Chronic lower extremity DVT  9.  Type 2 diabetes mellitus        PLAN/RECOMMENDATIONS:   Thank you for asking us to see Marcos Acuña, I recommend the followin.  Blood cultures-pending  2.  Discontinue vancomycin  3.  Discontinue Zosyn  4.  Resume cefuroxime 500 mg p.o. twice daily  5.  Treatment for right heart failure and obstructive sleep apnea per Dr. Patel.       I discussed his complex situation with his caregiver and with Dr. Patel today.    Jose E Radford MD  2022  14:23 EDT

## 2022-06-08 NOTE — HOME HEALTH
[facility] for cardiopulmonary assessment.  Patient nonresponsive during visit.  Unable to open eyes or follow any commands.  Nonverbal.  All symptoms are not patient's baseline.  Patient's wife hesitant for patient to return to the hospital.  VSS. Caregiver reports that patient has only urinated once since last night at 1800.  BLE's +2 pitting edema. Blood sugar 84.  Small amount of sugar water put under patient's tongue and blood sugar elevated to 120.  911 called and patient transported to [facility] ED via ambulance. Last known normal was last night per caregiver and wife.

## 2022-06-08 NOTE — PLAN OF CARE
Goal Outcome Evaluation:  Plan of Care Reviewed With: patient, caregiver        Progress: no change  Outcome Evaluation: Pt. presents with impaired activity tolerance, balance, generalized weakness, and a decline in ADLs and functional mobility. Pt. limited by rigidity and cognitive status. Consistent cueing needed to redirect and sequence. Pt. required Max A x 2 for STS from recliner. DEP for LBD and grooming. Skilled OT services warranted 3x/wk at this time to promote return to PLOF. Recommend SNF at discharge.

## 2022-06-08 NOTE — PROGRESS NOTES
[facility] Medicine Services  PROGRESS NOTE    Patient Name: Marcos Acuña  : 1931  MRN: [id number]    Date of Admission: 2022  Primary Care Physician: No primary care provider on file.    Subjective   Subjective     CC:   Not waking up / sleeping more    HPI:   Recently hospitalized for AMS / UTI / Bacteremia.  Started on [employer] IV abx and has caregiver in room.  He falls asleep quickly    ROS:    Gen- denies chills, denies fevers  CV- No chest pain, palpitations  Resp- No cough, dyspnea  GI- No N/V/D, abd pain    Objective   Objective     Vital Signs:   Temp:  [97.1 °F (36.2 °C)-97.9 °F (36.6 °C)] 97.9 °F (36.6 °C)  Heart Rate:  [] 75  Resp:  [16-20] 18  BP: (127-151)/(59-83) 127/59     Physical Exam:    Constitutional: sleepy, NAD, wakes easily  HENT: NCAT, dry tongue  Respiratory: Clear to auscultation bilaterally, respiratory effort normal   Cardiovascular: RRR, s1 and s2  Gastrointestinal: Positive bowel sounds, soft, nontender, obese abdomen  Musculoskeletal: No bilateral ankle edema  Psychiatric: Appropriate affect, cooperative  Neurologic: Oriented x 2, generalized weakness, speech clear  Skin: dry, + skin tenting    Results Reviewed:  LAB RESULTS:      Lab 22  0946 22  1539   WBC 6.95 8.45   HEMOGLOBIN 10.0* 10.3*   HEMATOCRIT 29.5* 32.0*   PLATELETS 203 205   NEUTROS ABS 2.39 2.03   IMMATURE GRANS (ABS) 0.02  --    LYMPHS ABS 3.68*  --    MONOS ABS 0.40  --    EOS ABS 0.33 0.25   MCV 83.8 88.2   PROCALCITONIN  --  0.08   LACTATE  --  1.7         Lab 22  0946 22  1539   SODIUM 143 146*   POTASSIUM 3.2* 4.2   CHLORIDE 108* 112*   CO2 24.0 29.0   ANION GAP 11.0 5.0   BUN 11 11   CREATININE 0.89 0.81   EGFR 80.9 83.2   GLUCOSE 103* 99   CALCIUM 8.6 8.7   MAGNESIUM  --  2.0         Lab 22  1539   TOTAL PROTEIN 6.1   ALBUMIN 2.60*   GLOBULIN 3.5   ALT (SGPT) 15   AST (SGOT) 25   BILIRUBIN 0.5   ALK PHOS 97         Lab  06/07/22  2219 06/07/22  1539   PROBNP  --  254.1   TROPONIN T 0.057* 0.067*                 Brief Urine Lab Results  (Last result in the past 365 days)      Color   Clarity   Blood   Leuk Est   Nitrite   Protein   CREAT   Urine HCG        06/07/22 1538 Yellow   Clear   Negative   Small (1+)   Negative   Negative                 Microbiology Results Abnormal     Procedure Component Value - Date/Time    Urine Culture - Urine, Urine, Catheter [[id number]]  (Normal) Collected: 06/07/22 1538    Lab Status: Preliminary result Specimen: Urine, Catheter Updated: 06/08/22 1245     Urine Culture No growth    S. Pneumo Ag Urine or CSF - Urine, Urine, Clean Catch [[id number]]  (Normal) Collected: 06/08/22 0632    Lab Status: Final result Specimen: Urine, Clean Catch Updated: 06/08/22 0953     Strep Pneumo Ag Negative    Legionella Antigen, Urine - Urine, Urine, Clean Catch [[id number]]  (Normal) Collected: 06/08/22 0632    Lab Status: Final result Specimen: Urine, Clean Catch Updated: 06/08/22 0953     LEGIONELLA ANTIGEN, URINE Negative    MRSA Screen, PCR (Inpatient) - Swab, Nares [[id number]]  (Normal) Collected: 06/08/22 0632    Lab Status: Final result Specimen: Swab from Nares Updated: 06/08/22 0918     MRSA PCR Negative    Narrative:      MRSA Negative    Respiratory Panel PCR w/COVID-19(SARS-CoV-2) FREDDIE/URI/JAYSON/PAD/COR/MAD/JAYLA In-House, NP Swab in UTM/VTM, 3-4 HR TAT - Swab, Nasopharynx [[id number]]  (Normal) Collected: 06/08/22 0632    Lab Status: Final result Specimen: Swab from Nasopharynx Updated: 06/08/22 0858     ADENOVIRUS, PCR Not Detected     Coronavirus 229E Not Detected     Coronavirus HKU1 Not Detected     Coronavirus NL63 Not Detected     Coronavirus OC43 Not Detected     COVID19 Not Detected     Human Metapneumovirus Not Detected     Human Rhinovirus/Enterovirus Not Detected     Influenza A PCR Not Detected     Influenza B PCR Not Detected     Parainfluenza Virus 1 Not Detected     Parainfluenza Virus 2 Not  Detected     Parainfluenza Virus 3 Not Detected     Parainfluenza Virus 4 Not Detected     RSV, PCR Not Detected     Bordetella pertussis pcr Not Detected     Bordetella parapertussis PCR Not Detected     Chlamydophila pneumoniae PCR Not Detected     Mycoplasma pneumo by PCR Not Detected    Narrative:      In the setting of a positive respiratory panel with a viral infection PLUS a negative procalcitonin without other underlying concern for bacterial infection, consider observing off antibiotics or discontinuation of antibiotics and continue supportive care. If the respiratory panel is positive for atypical bacterial infection (Bordetella pertussis, Chlamydophila pneumoniae, or Mycoplasma pneumoniae), consider antibiotic de-escalation to target atypical bacterial infection.    COVID PRE-OP / PRE-PROCEDURE SCREENING ORDER (NO ISOLATION) - Swab, Nasopharynx [[id number]]  (Normal) Collected: 06/07/22 1633    Lab Status: Final result Specimen: Swab from Nasopharynx Updated: 06/07/22 1705    Narrative:      The following orders were created for panel order COVID PRE-OP / PRE-PROCEDURE SCREENING ORDER (NO ISOLATION) - Swab, Nasopharynx.  Procedure                               Abnormality         Status                     ---------                               -----------         ------                     COVID-19 and FLU A/B PCR...[[id number]]  Normal              Final result                 Please view results for these tests on the individual orders.    COVID-19 and FLU A/B PCR - Swab, Nasopharynx [[id number]]  (Normal) Collected: 06/07/22 1633    Lab Status: Final result Specimen: Swab from Nasopharynx Updated: 06/07/22 1705     COVID19 Not Detected     Influenza A PCR Not Detected     Influenza B PCR Not Detected    Narrative:      Fact sheet for providers: https://www.fda.gov/media/575514/download    Fact sheet for patients: https://www.fda.gov/media/840015/download    Test performed by PCR.          CT Head  Without Contrast    Result Date: 6/7/2022  DATE OF EXAM: 6/7/2022 4:57 PM  PROCEDURE: CT HEAD WO CONTRAST-  INDICATIONS: AMS  COMPARISON: Head CT 1/12/2022  TECHNIQUE: Routine transaxial and coronal reconstruction images were obtained through the head without the administration of contrast. Automated exposure control and iterative reconstruction methods were used.  The radiation dose reduction device was turned on for each scan per the ALARA (As Low as Reasonably Achievable) protocol.  FINDINGS: No acute intracranial hemorrhage. No acute large territory infarct. Redemonstration of scattered and confluent subcortical and periventricular white matter hypodensities which are nonspecific and can be seen in the setting of chronic small vessel ischemic change. No extra-axial collections. No midline shift or herniation. Mild global cerebral volume loss. Normal size and configuration of the ventricles commensurate with degree of cerebral volume loss. Unremarkable appearance of the orbits. The mastoid air cells are clear. There is scattered mucosal thickening of the ethmoid air cells and mucosal mucosal thickening in the left sphenoid sinus, similar to prior.      Impression: No acute intracranial findings. Stable chronic and senescent changes as detailed above.  This report was finalized on 6/7/2022 5:12 PM by Berhane Hendrix MD.      CT Chest Without Contrast Diagnostic    Result Date: 6/7/2022  CT CHEST WO CONTRAST DIAGNOSTIC-  Date of Exam: 6/7/2022 8:08 PM  Indication: Respiratory illness, nondiagnostic xray; R41.82-Altered mental status, unspecified; Z78.9-Other specified health status; N39.0-Urinary tract infection, site not specified  confusion. History of pyelonephritis and cystitis.  Comparison: CT the abdomen pelvis 05/23/2022, chest x-ray 06/07/2022, chest CT 05/29/2020  Technique: Serial and axial CT images of the chest were obtained. Reconstructions in the coronal and sagittal planes were performed. Automated  exposure control and iterative reconstruction methods were used.  FINDINGS: Hilum and Mediastinum: There are no pathologically enlarged hilar mediastinal lymph nodes. There is cardiomegaly. There is mild coronary artery atherosclerotic calcification. There is aortic valvular calcification. There is no pericardial effusion. Thoracic aorta has normal caliber. Pulmonary arteries are enlarged.  Lung Parenchyma and Pleura: Moderate size bilateral pleural effusions. Respiratory motion limits evaluation lung parenchyma. There is mild peripheral septal thickening. There is mild bibasilar subsegmental atelectasis. The appearance is not significantly changed compared to the previous CT the abdomen pelvis.  Upper Abdomen: Large parapelvic cyst right kidney.  Soft tissues: Unremarkable.  Osseous structures: No aggressive focal lytic or sclerotic osseous lesions. Osteopenia. Compression deformity along the superior endplate of L1 is unchanged.      Impression: 1.  there are moderate bilateral pleural effusions with bibasilar basilar subsegmental atelectasis. Bibasilar pneumonia less likely but cannot be excluded on this exam. No associated lymphadenopathy.. Pulmonary edema with pleural effusions with atelectasis favored over infection based off exam. Clinical correlation is recommended. 2.  Cardiomegaly with enlarged pulmonary arteries, correlate for pulmonary artery hypertension.    This report was finalized on 6/7/2022 8:20 PM by Delores Cid MD.      XR Chest 1 View    Result Date: 6/7/2022  DATE OF EXAM: 6/7/2022 3:40 PM  PROCEDURE: XR CHEST 1 VW-  INDICATIONS: AMS protocol  COMPARISON: Chest x-ray 5/22/2022  TECHNIQUE: Single radiographic AP view of the chest was obtained.  FINDINGS: Mild low lung volumes with stable cardiomegaly and mildly accentuated cardiac mediastinal silhouette. Mild increase in diffuse interstitial prominence possibly reflective of bronchovascular crowding in the setting of low lung volumes of chronic  interstitial changes interstitial edema could appear similarly. There are left lung base opacities favored reflect atelectasis. No pneumothorax. No significant pleural effusion. Previously questioned nodule in the left lower lobe is not well appreciated on today's exam.      Impression: Low lung volumes with mild increase in diffuse interstitial prominence which may reflect bronchovascular crowding although a component of interstitial edema, or possibly atypical/viral infection, cannot be excluded. Ill-defined opacities at the left lung base are favored reflect atelectasis.  This report was finalized on 6/7/2022 3:54 PM by Berhane Hendrix MD.        Results for orders placed during the hospital encounter of 05/21/22    Adult Transthoracic Echo Complete w/ Color, Spectral and Contrast if necessary per protocol    Interpretation Summary  · Left ventricular wall thickness is consistent with mild concentric hypertrophy.  · There is moderate calcification of the aortic valve.  · The right ventricular cavity is mild to moderately dilated.  · The right atrial cavity is mildly dilated.  · Left ventricular ejection fraction appears to be 61 - 65%.  · There is significant calcification of the aortic valve with reduced excursion. However the acoustic windows are so poor that the transducer cannot align properly to get a good measurement across the aortic valve. Consider ADARSH if clinically indicated      I have reviewed the medications:  Scheduled Meds:[START ON 6/10/2022] Pharmacy Consult, , Does not apply, Once  insulin lispro, 0-7 Units, Subcutaneous, 4x Daily With Meals & Nightly  ipratropium-albuterol, 3 mL, Nebulization, Q4H - RT  levothyroxine, 75 mcg, Oral, Daily  memantine, 10 mg, Oral, Q12H  piperacillin-tazobactam, 3.375 g, Intravenous, Q8H  rivaroxaban, 10 mg, Oral, Daily  rivastigmine, 1 patch, Transdermal, Daily  sodium chloride, 10 mL, Intravenous, Q12H  tamsulosin, 0.4 mg, Oral, Daily  vancomycin, 1,500 mg,  Intravenous, Q24H      Continuous Infusions:Pharmacy to dose vancomycin,       PRN Meds:.acetaminophen **OR** acetaminophen **OR** acetaminophen  •  dextrose  •  dextrose  •  glucagon (human recombinant)  •  haloperidol lactate  •  melatonin  •  ondansetron **OR** ondansetron  •  Pharmacy to dose vancomycin  •  potassium chloride  •  potassium chloride  •  QUEtiapine  •  sodium chloride  •  sodium chloride    Assessment & Plan   Assessment & Plan     Active Hospital Problems    Diagnosis  POA   • **Altered mental status, unspecified altered mental status type [R41.82]  Yes   • History of bacteremia, E.coli [Z87.898]  Unknown   • Dysphagia [R13.10]  Unknown   • Productive cough [R05.8]  Unknown   • Acute UTI [N39.0]  Yes   • Elevated troponin [R77.8]  Yes   • Chronic deep vein thrombosis (DVT) (HCC) [I82.509]  Yes   • Dementia (HCC) [F03.90]  Yes   • Obstructive sleep apnea syndrome [G47.33]  Yes   • Type 2 diabetes mellitus without complication (HCC) [E11.9]  Yes   • Hypothyroidism [E03.9]  Yes   • Benign essential hypertension [I10]  Yes      Resolved Hospital Problems   No resolved problems to display.        Brief Hospital Course to date:  Marcos Acuña is a 89 y.o. male with history of hypothyroidism, DM, Hypertension, MIREILLE, chronic Left Lower Extremity DVT on chronic anticoagulation, and dementia who was brought to the ER for AMS.  He was not waking up and has been sleeping more over the last few days.  He hasn't been using CPAP.        AMS  Encephalopathy  Dementia  - CT head without contrast - no acute findings  - would benefit from using CPAP    Previous UTI / History of E. Coli bacteremia  - consider de-escalate to IV Ceftriaxone  - ID consultation  - procalcitonin encouraging    Cough (productive)  Dysphagia  - coughing since last admission  - previous dysphagia  - respiratory panel   - CT less consistent with pneumonia  - on Vanc / Zosyn    Sleep Apnea  - order CPAP with nasal mask    Chronic DVT on  Xarelto  - continue Xarelto    Hypothyroidism  - continue levothyroxine     DM  - insulin SS        DVT prophylaxis:  Medical DVT prophylaxis orders are present.       AM-PAC 6 Clicks Score (PT): 10 (06/08/22 1019)    Disposition: I expect the patient to be discharged TBD    CODE STATUS:   Code Status and Medical Interventions:   Ordered at: 06/07/22 2007     Medical Intervention Limits:    NO intubation (DNI)     Code Status (Patient has no pulse and is not breathing):    No CPR (Do Not Attempt to Resuscitate)     Medical Interventions (Patient has pulse or is breathing):    Limited Support       Seth Patel MD  06/08/22

## 2022-06-08 NOTE — PLAN OF CARE
Goal Outcome Evaluation:              Outcome Evaluation: A&Ox1. VSS on RA. NSR on monitor. Barium swallow completed. Haldol given PRN x2. bladder scan q4. Caregiver at bedside.

## 2022-06-08 NOTE — MBS/VFSS/FEES
Acute Care - Speech Language Pathology   Swallow Initial Evaluation [facility]   Clinical Swallow Evaluation  Modified Barium Swallow Study (MBS)       Patient Name: Marcos Acuña  : 1931  MRN: [id number]  Today's Date: 2022               Admit Date: 2022    Visit Dx:     ICD-10-CM ICD-9-CM   1. Altered mental status, unspecified altered mental status type  R41.82 780.97   2. Failure of outpatient treatment  Z78.9 V49.89   3. Acute UTI  N39.0 599.0   4. Oropharyngeal dysphagia  R13.12 787.22     Patient Active Problem List   Diagnosis   • ACE-inhibitor cough   • Benign essential hypertension   • Dementia (HCC)   • Chronic deep vein thrombosis (DVT) ([facility])   • Hyperlipoproteinemia   • Hypertrophy of prostate without urinary obstruction and other lower urinary tract symptoms (LUTS)   • Hypopituitarism (HCC)   • Hypothyroidism   • Left leg cellulitis   • Type 2 diabetes mellitus without complication (HCC)   • Obstructive sleep apnea syndrome   • Obesity   • Elevated troponin   • Uncontrolled type 2 diabetes mellitus with hyperglycemia (HCC)   • Acute UTI   • Altered mental status, unspecified altered mental status type   • History of bacteremia, E.coli   • Dysphagia   • Productive cough     Past Medical History:   Diagnosis Date   • Dementia (HCC)    • Diabetes (HCC)    • H/O blood clots    • Hypertensive disorder    • Hypothyroidism    • Osteoarthritis    • Type 2 diabetes mellitus, uncontrolled      Past Surgical History:   Procedure Laterality Date   • BUNIONECTOMY     • HIP TROCHANTERIC NAILING WITH INTRAMEDULLARY HIP SCREW N/A 2020    Procedure: ADVANCED TROCHANTERIC FEMORAL NAIL (ATFN) RIGHT HIP/FEMUR;  Surgeon: Mejia Meza MD;  Location: [facility];  Service: Orthopedics;  Laterality: N/A;   • RECTAL FISTULECTOMY     • SKIN CANCER EXCISION         SLP Recommendation and Plan  SLP Swallowing Diagnosis: moderate, oral dysphagia, mild-moderate, pharyngeal dysphagia (22  1315)  SLP Diet Recommendation: mechanical soft with no mixed consistencies, nectar thick liquids (06/08/22 1315)  Recommended Precautions and Strategies: upright posture during/after eating, small bites of food and sips of liquid, multiple swallows per bite of food, multiple swallows per sip of liquid, alternate between small bites of food and sips of liquid, check mouth frequently for oral residue/pocketing, general aspiration precautions, reflux precautions, 1:1 supervision, assist with feeding (06/08/22 1315)  SLP Rec. for Method of Medication Administration: meds whole, meds crushed, with pudding or applesauce, as tolerated (06/08/22 1315)     Monitor for Signs of Aspiration: yes, notify SLP if any concerns (06/08/22 1315)  Recommended Diagnostics: [place] (06/08/22 0910)  Swallow Criteria for Skilled Therapeutic Interventions Met: demonstrates skilled criteria (06/08/22 1315)  Anticipated Discharge Disposition (SLP): unknown, anticipate therapy at next level of care (06/08/22 1315)  Rehab Potential/Prognosis, Swallowing: good, to achieve stated therapy goals (06/08/22 1315)  Therapy Frequency (Swallow): 5 days per week (06/08/22 1315)  Predicted Duration Therapy Intervention (Days): until discharge (06/08/22 1315)                                  Plan of Care Reviewed With: patient, caregiver  Progress: no change      SWALLOW EVALUATION (last 72 hours)     SLP Adult Swallow Evaluation     Row Name 06/08/22 1315 06/08/22 0910       Rehab Evaluation    Document Type evaluation  -CJ evaluation  -CJ    Subjective Information no complaints  -CJ no complaints  -CJ    Patient Observations alert;cooperative  -CJ alert;cooperative  -CJ    Patient/Family/Caregiver Comments/Observations caregiver present  -CJ no family present  -CJ    Patient Effort good  -CJ good  -CJ    Symptoms Noted During/After Treatment none  -CJ none  -CJ             General Information    Patient Profile Reviewed yes  -CJ yes  -CJ    Pertinent  History Of Current Problem see am eval; referred for repeat MBS  -CJ Pt adm w/ ams; has sig h/o HTN, dementia, chronic DVT, DM, hypothyroid, [place], dysphagia, hypotiuitarism. Per chart review pt seen on recent admit where someone reported pt is on a baseline diet of dysphagia LVL 4 w/ nectar at baseline. No instrumental completed during that hospitalization. Chest CT: revealed BL PE and BL bibasilar atelectasis w/ ? PNA  -CJ    Current Method of Nutrition NPO  -CJ NPO  -CJ    Precautions/Limitations, Vision WFL;for purposes of eval  -CJ WFL;for purposes of eval  -CJ    Precautions/Limitations, Hearing WFL;for purposes of eval  -CJ WFL;for purposes of eval  -CJ    Prior Level of Function-Communication unknown;other (see comments)  -CJ unknown;other (see comments)  dementia per chart  -CJ    Prior Level of Function-Swallowing other (see comments)  -CJ other (see comments)  baseline diet of MS w/ nectar  -CJ    Plans/Goals Discussed with patient  -CJ patient  -CJ    Barriers to Rehab cognitive status;previous functional deficit  -CJ cognitive status;previous functional deficit  -CJ    Patient's Goals for Discharge patient did not state  -CJ patient did not state  -CJ             Pain    Additional Documentation Pain Scale: FACES Pre/Post-Treatment (Group)  -CJ Pain Scale: FACES Pre/Post-Treatment (Group)  -CJ             Pain Scale: FACES Pre/Post-Treatment    Pain: FACES Scale, Pretreatment 0-->no hurt  -CJ 0-->no hurt  -CJ    Posttreatment Pain Rating 0-->no hurt  -CJ 0-->no hurt  -CJ             Oral Motor Structure and Function    Dentition Assessment -- natural, present and adequate  -CJ    Secretion Management -- WNL/WFL  -CJ    Mucosal Quality -- moist, healthy  -CJ             Oral Musculature and Cranial Nerve Assessment    Oral Motor General Assessment -- generalized oral motor weakness  -CJ             General Eating/Swallowing Observations    Respiratory Support Currently in Use -- room air  -CJ     Eating/Swallowing Skills -- fed by SLP  -    Positioning During Eating -- upright 90 degree;upright in chair  -    Utensils Used -- spoon;cup;straw  -    Consistencies Trialed -- ice chips;thin liquids;nectar/syrup-thick liquids;pureed  -CJ             Clinical Swallow Eval    Pharyngeal Phase -- suspected pharyngeal impairment  -    Clinical Swallow Evaluation Summary -- Generalized oral motor weakness. Swallow initiation is seemingly delayed per palpation, intermittently required cues from SLP. Overt s/s of aspiration w/ thin and nectar thick liquids w/ cough repsonse elicited. Per most recent CSE pt only coughed w/ thins and no overt s/s w/ nectar. Given increasd s/s concerning for aspiration safest at this time is felt to be NPO w/ plan for MBS.  -             Pharyngeal Phase Concerns    Pharyngeal Phase Concerns -- cough  -    Cough -- thin;nectar  -CJ             MBS/VFSS    Utensils Used spoon;cup;straw  - --    Consistencies Trialed regular textures;thin liquids;nectar/syrup-thick liquids;pudding thick  - --             MBS/VFSS Interpretation    Oral Prep Phase impaired oral phase of swallowing  - --    Oral Transit Phase impaired  - --    Oral Residue impaired  - --    VFSS Summary Moderate oral phase, mild-moderate pharyngeal dysphagia. Prolonged manipulation w/ pudding/solid cracker. Pt required liquid wash to clear bolus from BOT. Prespill w/ thin and nectar thick liquids to the pyriforms. Penetration w/ thin liquids occurring before the swallow 2/2 prespill and delay resulting in silent aspiration occurring during the swallow. Cued cough ineffective to clear aspirated material. Transient penetration only w/ nectar thick liquids that cleared upon completion of swallow w/o significat vestibular residue impacting safety of swallowing fnx. Diffuse pharyngeal residue, that is able to clear w/ cued swallow or liquid wash. No other laryngeal penetration or aspiration observed across  this evaluation. Pt is also s/p medication administration prior to this evaluation. Caregiver reports pt typically doesn't have any difficulty w/ solids at home when fully awake and alert. She reports today is not consistent w/ his baseline. Per this evaluation will recommend modified po diet of dysphagia level IV w/ nectar thick liquids, 1:1 assist w/ all po intake. Cues to swallow. Check for pocketing. Alternate liquids/solids. Caregiver reports pt has been on nectar thick liquids for ~1year, may be representative of his baseline. Will f/u for diet tolerance as unsure if pt will be able to functionally participate in dysphagia tx  -CJ --             Oral Preparatory Phase    Oral Preparatory Phase prolonged manipulation;oral holding  -CJ --    Oral Holding pudding/puree;secondary to reduced lingual strength;secondary to impaired cognitive status  -CJ --    Prolonged Manipulation pudding/puree;regular textures;secondary to reduced lingual strength  -CJ --             Oral Transit Phase    Impaired Oral Transit Phase delayed initiation of bolus transit;increased A-P transit time;tongue pumping;premature spillage of liquids into pharynx  -CJ --    Delayed Initiation of bolus transit all consistencies tested;discoordination of lingual movement;secondary to impaired cognitive status  -CJ --    Increased A-P Transit Time pudding/puree;regular textures;secondary to reduced lingual control  -CJ --    Premature Spillage of Liquids into Pharynx thin liquids;nectar-thick liquids;secondary to reduced lingual control;secondary to impaired cognitive status  -CJ --             Oral Residue    Impaired Oral Residue diffuse residue throughout oral cavity  -CJ --    Diffuse Residue throughout Oral Cavity all consistencies tested;secondary to reduced lingual strength  -CJ --    Response to Oral Residue other (see comments)  cleared majority w/ liquid wash. worse residue w/ pudding/solids  -CJ --             Initiation of Pharyngeal  Swallow    Initiation of Pharyngeal Swallow bolus in pyriform sinuses  -CJ --    Pharyngeal Phase impaired pharyngeal phase of swallowing  -CJ --    Penetration Before the Swallow thin liquids;secondary to delayed swallow initiation or mistiming;secondary to reduced back of tongue control  -CJ --    Penetration During the Swallow thin liquids;nectar-thick liquids;secondary to delayed swallow initiation or mistiming;secondary to reduced vestibular closure  -CJ --    Aspiration During the Swallow thin liquids;secondary to delayed swallow initiation or mistiming;secondary to reduced vestibular closure  -CJ --    Response to Penetration deep;no response  -CJ --    Response to Aspiration no response, silent aspiration;response with cue only;could not clear subglottic material;weak cough/throat clear;other (see comments)  difficulty producing cough w/ cue  -CJ --    Rosenbek's Scale thin:;8--->level 8;nectar:;2--->level 2  -CJ --    Pharyngeal Residue all consistencies tested;diffuse within pharynx;secondary to reduced posterior pharyngeal wall stripping;secondary to reduced hyolaryngeal excursion  -CJ --    Response to Residue cleared residue with liquid wash;other (see comments)  partially cleared  -CJ --    Attempted Compensatory Maneuvers bolus size;bolus presentation style;additional subsequent swallow;multiple swallows;throat clear after swallow  -CJ --    Response to Attempted Compensatory Maneuvers did not prevent penetration;did not prevent aspiration  -CJ --             SLP Evaluation Clinical Impression    SLP Swallowing Diagnosis moderate;oral dysphagia;mild-moderate;pharyngeal dysphagia  -CJ suspected pharyngeal dysphagia  -CJ    Functional Impact risk of aspiration/pneumonia;risk of malnutrition;risk of dehydration  -CJ risk of aspiration/pneumonia;risk of malnutrition;risk of dehydration  -CJ    Rehab Potential/Prognosis, Swallowing good, to achieve stated therapy goals  -CJ good, to achieve stated therapy  goals  -    Swallow Criteria for Skilled Therapeutic Interventions Met demonstrates skilled criteria  -CJ demonstrates skilled criteria  -             Recommendations    Therapy Frequency (Swallow) 5 days per week  -CJ --    Predicted Duration Therapy Intervention (Days) until discharge  -CJ until discharge  -    SLP Diet Recommendation mechanical soft with no mixed consistencies;nectar thick liquids  -CJ NPO;other (see comments)  [place] [facility]  -    Recommended Diagnostics -- [place]  -    Recommended Precautions and Strategies upright posture during/after eating;small bites of food and sips of liquid;multiple swallows per bite of food;multiple swallows per sip of liquid;alternate between small bites of food and sips of liquid;check mouth frequently for oral residue/pocketing;general aspiration precautions;reflux precautions;1:1 supervision;assist with feeding  -CJ general aspiration precautions  -CJ    Oral Care Recommendations Oral Care BID/PRN  -CJ Oral Care BID/PRN  -CJ    SLP Rec. for Method of Medication Administration meds whole;meds crushed;with pudding or applesauce;as tolerated  - meds via alternate route  -CJ    Monitor for Signs of Aspiration yes;notify SLP if any concerns  -CJ yes;notify SLP if any concerns  -CJ    Anticipated Discharge Disposition (SLP) unknown;anticipate therapy at next level of care  -CJ unknown;anticipate therapy at next level of care  -CJ          User Key  (r) = Recorded By, (t) = Taken By, (c) = Cosigned By    Initials Name Effective Dates    Alexia Perez, MS CCC-SLP 06/16/21 -                 EDUCATION  The patient has been educated in the following areas:   Dysphagia (Swallowing Impairment) Oral Care/Hydration Modified Diet Instruction.        SLP GOALS     Row Name 06/08/22 5696             Oral Nutrition/Hydration Goal 1 (SLP)    Oral Nutrition/Hydration Goal 1, SLP LTG: Pt will tolerate least restrictive level of po intake w/o overt s/s of  aspiration/distress given no cues w/ 100% acc  -CJ      Time Frame (Oral Nutrition/Hydration Goal 1, SLP) by discharge  -CJ              Oral Nutrition/Hydration Goal 2 (SLP)    Oral Nutrition/Hydration Goal 2, SLP STG: Pt will tolerate modified po diet of soft solids and nectar thick liquids w/o overt s/s of aspiration/distress given no cues w/ 100% acc  -CJ      Time Frame (Oral Nutrition/Hydration Goal 2, SLP) short term goal (STG)  -CJ              Lingual Strengthening Goal 1 (SLP)    Activity (Lingual Strengthening Goal 1, SLP) increase lingual tone/sensation/control/coordination/movement;increase tongue back strength  -CJ      Increase Lingual Tone/Sensation/Control/Coordination/Movement swallow trials;lingual resistance exercises  -CJ      Increase Tongue Back Strength lingual resistance exercises;swallow trials  -CJ      [place]/Accuracy (Lingual Strengthening Goal 1, SLP) with moderate cues (50-74% accuracy)  -CJ      Time Frame (Lingual Strengthening Goal 1, SLP) short term goal (STG)  -CJ              Pharyngeal Strengthening Exercise Goal 1 (SLP)    Activity (Pharyngeal Strengthening Goal 1, SLP) increase timing;increase superior movement of the hyolaryngeal complex;increase anterior movement of the hyolaryngeal complex;increase closure at entrance to airway/closure of airway at glottis;increase squeeze/positive pressure generation  -CJ      Increase Timing prepping - 3 second prep or suck swallow or 3-step swallow  -CJ      Increase Superior Movement of the Hyolaryngeal Complex falsetto  -CJ      Increase Anterior Movement of the Hyolaryngeal Complex chin tuck against resistance (CTAR)  -CJ      Increase Closure at Entrance to Airway/Closure of Airway at Glottis breath hold exercises;hard effortful swallow  -CJ      Increase Squeeze/Positive Pressure Generation hard effortful swallow  -CJ      [place]/Accuracy (Pharyngeal Strengthening Goal 1, SLP) with moderate cues (50-74% accuracy);with  maximum cues (25-49% accuracy)  -      Time Frame (Pharyngeal Strengthening Goal 1, SLP) short term goal (STG)  -              Swallow Compensatory Strategies Goal 1 (SLP)    Activity (Swallow Compensatory Strategies/Techniques Goal 1, SLP) compensatory strategies;during meal intake;during p.o. trials;small bites;small cup sips;small straw sips;alternate food/liquid intake;extra swallow per bolus;other (see comments)  caregiver education as well  -      [place]/Accuracy (Swallow Compensatory Strategies/Techniques Goal 1, SLP) with moderate cues (50-74% accuracy)  -      Time Frame (Swallow Compensatory Strategies/Techniques Goal 1, SLP) short term goal (STG)  -            User Key  (r) = Recorded By, (t) = Taken By, (c) = Cosigned By    Initials Name Provider Type    Alexia Perez MS CCC-SLP Speech and Language Pathologist                   Time Calculation:    Time Calculation- SLP     Row Name 06/08/22 1416 06/08/22 1029          Time Calculation- SLP    SLP Start Time 1315  - 0910  -     SLP Received On 06/08/22  -CJ 06/08/22  -            Untimed Charges    54811-ND Eval Oral Pharyng Swallow Minutes -- 40  -CJ     03706-MW Motion Fluoro Eval Swallow Minutes 54  -CJ --            Total Minutes    Untimed Charges Total Minutes 54  -CJ 40  -CJ      Total Minutes 54  -CJ 40  -CJ           User Key  (r) = Recorded By, (t) = Taken By, (c) = Cosigned By    Initials Name Provider Type    Alexia Perez MS CCC-SLP Speech and Language Pathologist                Therapy Charges for Today     Code Description Service Date Service Provider Modifiers Qty    15362115176 HC ST EVAL ORAL PHARYNG SWALLOW 3 6/8/2022 Alexia Huizar MS CCC-SLP GN 1    19563095124 HC ST MOTION FLUORO EVAL SWALLOW 4 6/8/2022 Alexia Huizar MS CCC-FRANCISCO GN 1               Alexia Huizar MS CCC-FRANCISCO  6/8/2022

## 2022-06-08 NOTE — HOME HEALTH
Patient went to the hospital and was admitted after home health SN visit and patient being unresponsive with visit. Patient admitted to the hospital with UTI, dementia, altered mental status.

## 2022-06-08 NOTE — PLAN OF CARE
Goal Outcome Evaluation:  Plan of Care Reviewed With: patient, caregiver        Progress: no change   SLP evaluation completed. Will continue to address dysphagia. MBS completed, recommend dysphagia level IV w/ nectar thick liquids. 1:1 assist w/ all po intake. Please see note for further details and recommendations.

## 2022-06-08 NOTE — DISCHARGE INSTR - DIET
6/8/2022  MBS/VFSS Reason for Referral  Patient was referred for a MBS to assess the efficiency of his/her swallow function, rule out aspiration and make recommendations regarding safe dietary consistencies, effective compensatory strategies, and safe eating environment.             Recommendations/Treatment  SLP Swallowing Diagnosis: moderate, oral dysphagia, mild-moderate, pharyngeal dysphagia (06/08/22 1315)  Functional Impact: risk of aspiration/pneumonia, risk of malnutrition, risk of dehydration (06/08/22 1315)  Rehab Potential/Prognosis, Swallowing: good, to achieve stated therapy goals (06/08/22 1315)  Swallow Criteria for Skilled Therapeutic Interventions Met: demonstrates skilled criteria (06/08/22 1315)  Therapy Frequency (Swallow): 5 days per week (06/08/22 1315)  Predicted Duration Therapy Intervention (Days): until discharge (06/08/22 1315)  SLP Diet Recommendation: mechanical soft with no mixed consistencies, nectar thick liquids (06/08/22 1315)  Recommended Diagnostics: VFSS (MBS) (06/08/22 0910)  Recommended Precautions and Strategies: upright posture during/after eating, small bites of food and sips of liquid, multiple swallows per bite of food, multiple swallows per sip of liquid, alternate between small bites of food and sips of liquid, check mouth frequently for oral residue/pocketing, general aspiration precautions, reflux precautions, 1:1 supervision, assist with feeding (06/08/22 1315)  SLP Rec. for Method of Medication Administration: meds whole, meds crushed, with pudding or applesauce, as tolerated (06/08/22 1315)  Monitor for Signs of Aspiration: yes, notify SLP if any concerns (06/08/22 1315)  Anticipated Discharge Disposition (SLP): unknown, anticipate therapy at next level of care (06/08/22 1315)    Instrumental Set-up  Utensils Used: spoon, cup, straw (06/08/22 1315)  Consistencies Trialed: regular textures, thin liquids, nectar/syrup-thick liquids, pudding thick (06/08/22  1315)    Oral Preparation/ Oral Phase  Oral Prep Phase: impaired oral phase of swallowing (06/08/22 1315)  Oral Transit Phase: impaired (06/08/22 1315)  Oral Residue: impaired (06/08/22 1315)  Oral Preparatory Phase: prolonged manipulation, oral holding (06/08/22 1315)  Oral Holding: pudding/puree, secondary to reduced lingual strength, secondary to impaired cognitive status (06/08/22 1315)  Prolonged Manipulation: pudding/puree, regular textures, secondary to reduced lingual strength (06/08/22 1315)  Impaired Oral Transit Phase: delayed initiation of bolus transit, increased A-P transit time, tongue pumping, premature spillage of liquids into pharynx (06/08/22 1315)  Delayed Initiation of bolus transit: all consistencies tested, discoordination of lingual movement, secondary to impaired cognitive status (06/08/22 1315)  Premature Spillage of Liquids into Pharynx: thin liquids, nectar-thick liquids, secondary to reduced lingual control, secondary to impaired cognitive status (06/08/22 1315)  Impaired Oral Residue: diffuse residue throughout oral cavity (06/08/22 1315)  Diffuse Residue throughout Oral Cavity: all consistencies tested, secondary to reduced lingual strength (06/08/22 1315)  Response to Oral Residue: other (see comments) (cleared majority w/ liquid wash. worse residue w/ pudding/solids) (06/08/22 1315)    Pharyngeal Phase  Initiation of Pharyngeal Swallow: bolus in pyriform sinuses (06/08/22 1315)  Pharyngeal Phase: impaired pharyngeal phase of swallowing (06/08/22 1315)  Penetration Before the Swallow: thin liquids, secondary to delayed swallow initiation or mistiming, secondary to reduced back of tongue control (06/08/22 1315)  Penetration During the Swallow: thin liquids, nectar-thick liquids, secondary to delayed swallow initiation or mistiming, secondary to reduced vestibular closure (06/08/22 1315)  Aspiration During the Swallow: thin liquids, secondary to delayed swallow initiation or mistiming,  secondary to reduced vestibular closure (06/08/22 1315)  Response to Penetration: deep, no response (06/08/22 1315)  Response to Aspiration: no response, silent aspiration, response with cue only, could not clear subglottic material, weak cough/throat clear, other (see comments) (difficulty producing cough w/ cue) (06/08/22 1315)  Pharyngeal Residue: all consistencies tested, diffuse within pharynx, secondary to reduced posterior pharyngeal wall stripping, secondary to reduced hyolaryngeal excursion (06/08/22 1315)  Response to Residue: cleared residue with liquid wash, other (see comments) (partially cleared) (06/08/22 1315)  Attempted Compensatory Maneuvers: bolus size, bolus presentation style, additional subsequent swallow, multiple swallows, throat clear after swallow (06/08/22 1315)  Response to Attempted Compensatory Maneuvers: did not prevent penetration, did not prevent aspiration (06/08/22 1315)  VFSS Summary: Moderate oral phase, mild-moderate pharyngeal dysphagia. Prolonged manipulation w/ pudding/solid cracker. Pt required liquid wash to clear bolus from BOT. Prespill w/ thin and nectar thick liquids to the pyriforms. Penetration w/ thin liquids occurring before the swallow 2/2 prespill and delay resulting in silent aspiration occurring during the swallow. Cued cough ineffective to clear aspirated material. Transient penetration only w/ nectar thick liquids that cleared upon completion of swallow w/o significat vestibular residue impacting safety of swallowing fnx. Diffuse pharyngeal residue, that is able to clear w/ cued swallow or liquid wash. No other laryngeal penetration or aspiration observed across this evaluation. Pt is also s/p medication administration prior to this evaluation. Caregiver reports pt typically doesn't have any difficulty w/ solids at home when fully awake and alert. She reports today is not consistent w/ his baseline. Per this evaluation will recommend modified po diet of  dysphagia level IV w/ nectar thick liquids, 1:1 assist w/ all po intake. Cues to swallow. Check for pocketing. Alternate liquids/solids. Caregiver reports pt has been on nectar thick liquids for ~1year, may be representative of his baseline. Will f/u for diet tolerance as unsure if pt will be able to functionally participate in dysphagia tx (06/08/22 1315)    Cervical Esophageal Phase

## 2022-06-08 NOTE — PLAN OF CARE
"Goal Outcome Evaluation:      Patient admitted this shift. Patient restless, continues to pull at clothes while laying in bed, intermittently yelling out inappropriate words. Per 24hr caregiver at bedside, this is normal for patient, states he \"sundowns\" and can be aggressive at night. Per caregiver, patient lives at independent living home at [facility] with his 90 yo spouse. Patient alert to self. VSS on RA. Bladder scan this AM shows volume greater than 500, straight cath performed per protocol. Unable to obtain accurate I/O this shift due to frequent incontinence, unable to place purewick due to anatomy.            "

## 2022-06-08 NOTE — PROGRESS NOTES
"Pharmacy Consult-Vancomycin Dosing  Marcos Acuña is a  89 y.o. male receiving vancomycin therapy.     Indication: Sepsis  Consulting Provider: Hospitalist  ID Consult: Yes    Goal AUC: 400-600 mg/L*hr    Current Antimicrobial Therapy  Anti-Infectives (From admission, onward)      Ordered     Dose/Rate Route Frequency Start Stop    06/08/22 1140  vancomycin 1500 mg/500 mL 0.9% NS IVPB ([facility])        Ordering Provider: Talya Villafana PharmD    1,500 mg Intravenous Every 24 Hours 06/08/22 1800 06/14/22 1759    06/07/22 2208  piperacillin-tazobactam (ZOSYN) 3.375 g in iso-osmotic dextrose 50 ml (premix)        Ordering Provider: Melly Sears APRN    3.375 g  over 4 Hours Intravenous Every 8 Hours 06/08/22 0200 06/13/22 0159    06/07/22 2208  Pharmacy to dose vancomycin        Ordering Provider: Melly Sears APRN     Does not apply Continuous PRN 06/07/22 2208 06/12/22 2207    06/07/22 1801  vancomycin 1750 mg/500 mL 0.9% NS IVPB ([facility])        Ordering Provider: Jose E Jauregui MD    20 mg/kg × 93 kg  250 mL/hr over 2 Hours Intravenous Once 06/07/22 1803 06/07/22 2143    06/07/22 1801  piperacillin-tazobactam (ZOSYN) 3.375 g in iso-osmotic dextrose 50 ml (premix)        Ordering Provider: Jose E Jauregui MD    3.375 g  over 30 Minutes Intravenous Once 06/07/22 1803 06/07/22 1954          Allergies  Allergies as of 06/07/2022 - Reviewed 06/07/2022   Allergen Reaction Noted    Contrast dye Anaphylaxis 09/09/2020    Iodine Unknown - High Severity 05/22/2022    Other Anaphylaxis 05/22/2022    Prednisone & diphenhydramine Rash 05/22/2022     Labs    Results from last 7 days   Lab Units 06/08/22  0946 06/07/22  1539   BUN mg/dL 11 11   CREATININE mg/dL 0.89 0.81     Results from last 7 days   Lab Units 06/08/22  0946 06/07/22  1539   WBC 10*3/mm3 6.95 8.45     Evaluation of Dosing     Is Patient on Dialysis or Renal Replacement: No    Ht - 170.2 cm (67\")  Wt - 94.2 kg (207 lb 10.8 oz)    Estimated Creatinine Clearance: " 59.1 mL/min (by C-G formula based on SCr of 0.89 mg/dL).    Intake & Output (last 3 days)         06/05 0701 06/06 0700 06/06 0701 06/07 0700 06/07 0701 06/08 0700 06/08 0701 06/09 0700    IV Piggyback   1000 50    Total Intake(mL/kg)   1000 (10.6) 50 (0.5)    Urine (mL/kg/hr)   480     Total Output   480     Net   +520 +50            Urine Unmeasured Occurrence   3 x           Microbiology and Radiology  Microbiology Results (last 10 days)       Procedure Component Value - Date/Time    MRSA Screen, PCR (Inpatient) - Swab, Nares [[id number]]  (Normal) Collected: 06/08/22 0632    Lab Status: Final result Specimen: Swab from Nares Updated: 06/08/22 0918     MRSA PCR Negative    Narrative:      MRSA Negative    Legionella Antigen, Urine - Urine, Urine, Clean Catch [[id number]]  (Normal) Collected: 06/08/22 0632    Lab Status: Final result Specimen: Urine, Clean Catch Updated: 06/08/22 0953     LEGIONELLA ANTIGEN, URINE Negative    S. Pneumo Ag Urine or CSF - Urine, Urine, Clean Catch [[id number]]  (Normal) Collected: 06/08/22 0632    Lab Status: Final result Specimen: Urine, Clean Catch Updated: 06/08/22 0953     Strep Pneumo Ag Negative    Respiratory Panel PCR w/COVID-19(SARS-CoV-2) FREDDIE/URI/JAYSON/PAD/[facility]/MAD/JAYLA In-House, NP Swab in UTM/VTM, 3-4 HR TAT - Swab, Nasopharynx [[id number]]  (Normal) Collected: 06/08/22 0632    Lab Status: Final result Specimen: Swab from Nasopharynx Updated: 06/08/22 0858     ADENOVIRUS, PCR Not Detected     Coronavirus 229E Not Detected     Coronavirus HKU1 Not Detected     Coronavirus NL63 Not Detected     Coronavirus OC43 Not Detected     COVID19 Not Detected     Human Metapneumovirus Not Detected     Human Rhinovirus/Enterovirus Not Detected     Influenza A PCR Not Detected     Influenza B PCR Not Detected     Parainfluenza Virus 1 Not Detected     Parainfluenza Virus 2 Not Detected     Parainfluenza Virus 3 Not Detected     Parainfluenza Virus 4 Not Detected     RSV, PCR Not Detected      Bordetella pertussis pcr Not Detected     Bordetella parapertussis PCR Not Detected     Chlamydophila pneumoniae PCR Not Detected     Mycoplasma pneumo by PCR Not Detected    Narrative:      In the setting of a positive respiratory panel with a viral infection PLUS a negative procalcitonin without other underlying concern for bacterial infection, consider observing off antibiotics or discontinuation of antibiotics and continue supportive care. If the respiratory panel is positive for atypical bacterial infection (Bordetella pertussis, Chlamydophila pneumoniae, or Mycoplasma pneumoniae), consider antibiotic de-escalation to target atypical bacterial infection.    COVID PRE-OP / PRE-PROCEDURE SCREENING ORDER (NO ISOLATION) - Swab, Nasopharynx [[id number]]  (Normal) Collected: 06/07/22 1633    Lab Status: Final result Specimen: Swab from Nasopharynx Updated: 06/07/22 1705    Narrative:      The following orders were created for panel order COVID PRE-OP / PRE-PROCEDURE SCREENING ORDER (NO ISOLATION) - Swab, Nasopharynx.  Procedure                               Abnormality         Status                     ---------                               -----------         ------                     COVID-19 and FLU A/B PCR...[[id number]]  Normal              Final result                 Please view results for these tests on the individual orders.    COVID-19 and FLU A/B PCR - Swab, Nasopharynx [[id number]]  (Normal) Collected: 06/07/22 1633    Lab Status: Final result Specimen: Swab from Nasopharynx Updated: 06/07/22 1705     COVID19 Not Detected     Influenza A PCR Not Detected     Influenza B PCR Not Detected    Narrative:      Fact sheet for providers: https://www.fda.gov/media/139857/download    Fact sheet for patients: https://www.fda.gov/media/251923/download    Test performed by PCR.          Reported Vancomycin Levels    Results from last 7 days   Lab Units 06/08/22  0946   VANCOMYCIN RM mcg/mL 10.00            [employer] AUC Calculation:    Current AUC: -- mg/L*hr    Predicted Steady State AUC on Current Dose: -- mg/L*hr  _________________________________    Predicted Steady State AUC on New Dose: 478 mg/L*hr    Assessment/Plan:    Pharmacy to dose vancomycin for sepsis.  Patient received a loading dose of vancomycin 1750 mg IV x 1. Vancomycin random = 10 mcg/mL. Will start a maintenance dose of 1500 mg IV every 24 hours.   Vanc trough 6/10 @ 1200.    Monitor renal function, cultures and sensitivities, and clinical status, and adjust regimen as necessary.  Pharmacy will continue to follow.    Talya Khan, PharmD, BCPS  6/8/2022  11:41 EDT

## 2022-06-08 NOTE — PLAN OF CARE
Goal Outcome Evaluation:  Plan of Care Reviewed With: patient           Outcome Evaluation: PT PRESENTS WITH EVOLVING SYMPTOMS TO INCLUDE IMPAIRED BALANCE, GENERALIZED WEAKNESS, INCREASED CONFUSION, IMPAIRED COORDINATION AND DECLINE IN FUNCTIONAL MOBILITY. REQUIRED MAX ASSIST FOR BED MOBILITY AND TRANSFERS BED TO CHAIR. NEEDS CUES FOR SEQUENCING BUT FOLLOWS COMMANDS AND IS COOPERATIVE WITH OOB ACTIVITY. RECOMMEND SNF AT D/C. IT PT GOES BACK TO ILF WITH WIFE WILL NEED TO RESUME 24/7 CAREGIVERS AND HHPT.

## 2022-06-08 NOTE — THERAPY EVALUATION
Patient Name: Marcos Acuña  : 1931    MRN: [id number]                              Today's Date: 2022       Admit Date: 2022    Visit Dx:     ICD-10-CM ICD-9-CM   1. Altered mental status, unspecified altered mental status type  R41.82 780.97   2. Failure of outpatient treatment  Z78.9 V49.89   3. 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[facility])   • Hypothyroidism   • Left leg cellulitis   • Type 2 diabetes mellitus without complication ([facility])   • Obstructive sleep apnea syndrome   • Obesity   • Elevated troponin   • Uncontrolled type 2 diabetes mellitus with hyperglycemia ([facility])   • Acute UTI   • Altered mental status, unspecified altered mental status type   • History of bacteremia, E.coli   • Dysphagia   • Productive cough     Past Medical History:   Diagnosis Date   • Dementia ([facility])    • Diabetes ([facility])    • H/O blood clots    • Hypertensive disorder    • Hypothyroidism    • Osteoarthritis    • Type 2 diabetes mellitus, uncontrolled      Past Surgical History:   Procedure Laterality Date   • BUNIONECTOMY     • HIP TROCHANTERIC NAILING WITH INTRAMEDULLARY HIP SCREW N/A 2020    Procedure: ADVANCED TROCHANTERIC FEMORAL NAIL (ATFN) RIGHT HIP/FEMUR;  Surgeon: Mejia Meza MD;  Location: [facility];  Service: Orthopedics;  Laterality: N/A;   • RECTAL FISTULECTOMY     • SKIN CANCER EXCISION        General Information     Row Name 22 0932          Physical Therapy Time and Intention    Document Type evaluation  -CD     Mode of Treatment physical therapy  -CD     Row Name 22 0932          General Information    Patient Profile Reviewed yes  -CD     Prior Level of Function --  FAMILY NOT PRESENT  AND PT IS POOR HISTORIAN, BUT REPORTS HE HAS NOT WALKED IN SEVERAL WEEKS AND NORMALLY USES A ROLLATOR. PT LIVES WITH WIFE IN ILF BUT MOST LIKELY GETS ASSIST FOR ALL ADL'S. PER RECORDS, HAD HHPT ON 5/31 AND AMBULATED 10 FEET W/ P.T.  -CD     Existing Precautions/Restrictions fall  ALZHEIMER'S DEMENTIA.  -CD     Barriers to Rehab cognitive status;previous functional deficit;medically complex  -CD     Row Name 06/08/22 0932          Living Environment    People in Home spouse  LIVES IN AN ILF WITH 24/7 CAREGIVERS PER HHPT NOTE ON 5/31/22.  -CD     Row Name 06/08/22 0932          Cognition    Orientation Status (Cognition) oriented to;person;disoriented to;place;situation;time  -CD     Row Name 06/08/22 0932          Safety Issues, Functional Mobility    Safety Issues Affecting Function (Mobility) awareness of need for assistance;insight into deficits/self-awareness;safety precaution awareness;safety precautions follow-through/compliance;problem-solving;sequencing abilities  -CD     Impairments Affecting Function (Mobility) balance;cognition;coordination;endurance/activity tolerance;strength;postural/trunk control  -CD     Cognitive Impairments, Mobility Safety/Performance awareness, need for assistance;insight into deficits/self-awareness;problem-solving/reasoning;safety precaution awareness;safety precaution follow-through;sequencing abilities  -CD           User Key  (r) = Recorded By, (t) = Taken By, (c) = Cosigned By    Initials Name Provider Type    CD Rhianna Lovell PT Physical Therapist               Mobility     Row Name 06/08/22 0952          Bed Mobility    Bed Mobility supine-sit  -CD     Supine-Sit [place] (Bed Mobility) maximum assist (25% patient effort);verbal cues  -CD     Assistive Device (Bed Mobility) bed rails;draw sheet;head of bed elevated  -CD     Comment, (Bed Mobility) REQUIRED MANUAL ASSIST TO SCOOT TO EOB FOR FEET ON FLOOR.  -CD     Row Name 06/08/22 0952          Transfers    Comment,  (Transfers) STS X 2 REPS FROM EOB AND 1X FROM RECLINER. STAND PIVOT BED TO RECLINER. PT UNABLE TO COORDINATE STEPS TO TURN.  -CD     Row Name 06/08/22 0952          Bed-Chair Transfer    Bed-Chair [place] (Transfers) maximum assist (25% patient effort);2 person assist  -CD     Assistive Device (Bed-Chair Transfers) --  VIA B UE SUPPORT AND GAIT BELT.  -CD     Row Name 06/08/22 0952          Sit-Stand Transfer    Sit-Stand [place] (Transfers) maximum assist (25% patient effort);2 person assist  -CD     Row Name 06/08/22 0952          Gait/Stairs (Locomotion)    Distance in Feet (Gait) DEFERRED DUE TO POOR STANDING BALANCE AND FATIGUE.  -CD           User Key  (r) = Recorded By, (t) = Taken By, (c) = Cosigned By    Initials Name Provider Type    CD Rhianna Lovell, PT Physical Therapist               Obj/Interventions     Row Name 06/08/22 0957          Range of Motion Comprehensive    General Range of Motion lower extremity range of motion deficits identified  -CD     Comment, General Range of Motion B LE STIFF WITH ROM, R >L AND EDEMATOUS. ROM WFL'S FOR SITTING EOB AND FOR PIVOT TRANSFER.  -     Row Name 06/08/22 0957          Strength Comprehensive (MMT)    General Manual Muscle Testing (MMT) Assessment lower extremity strength deficits identified  -CD     Comment, General Manual Muscle Testing (MMT) Assessment R LE GROSSLY 2+/5, L LE 3/5.  -     Row Name 06/08/22 0957          Motor Skills    Motor Skills coordination;functional endurance  -CD     Coordination gross motor deficit;bilateral;lower extremity;severe impairment  -CD     Functional Endurance PT FATIGUES QUICKLY AND SOA WITH ACTIVITY. O2 SATS STABLE.  -CD     Row Name 06/08/22 0957          Balance    Balance Assessment sitting static balance;sitting dynamic balance;sit to stand dynamic balance;standing static balance  -CD     Static Sitting Balance contact guard  -CD     Dynamic Sitting Balance moderate assist  -CD     Position, Sitting  Balance supported;sitting edge of bed  -CD     Sit to Stand Dynamic Balance maximum assist;2-person assist  -CD     Static Standing Balance maximum assist;2-person assist  -CD     Position/Device Used, Standing Balance supported  B UE SUPPORT  -CD     Comment, Balance WORKED ON ANTERIOR ROCKING FOR TRANSFERS. PT ABLE TO MAINTAIN STATIC SITTING BALANCE AT MIDLINE WITH SBA. HAS POSTERIOR LEAN IN STANDING AND IS UNABLE TO FULLY EXTEND HIPS/KNEES.  -CD           User Key  (r) = Recorded By, (t) = Taken By, (c) = Cosigned By    Initials Name Provider Type    CD Rhianna Lovell, PT Physical Therapist               Goals/Plan     Row Name 06/08/22 1017          Bed Mobility Goal 1 (PT)    Activity/Assistive Device (Bed Mobility Goal 1, PT) sit to supine/supine to sit  -CD     [place] Level/Cues Needed (Bed Mobility Goal 1, PT) minimum assist (75% or more patient effort);verbal cues required  -CD     Time Frame (Bed Mobility Goal 1, PT) long term goal (LTG);2 weeks  -CD     Row Name 06/08/22 1017          Transfer Goal 1 (PT)    Activity/Assistive Device (Transfer Goal 1, PT) sit-to-stand/stand-to-sit;bed-to-chair/chair-to-bed;walker, rolling  -CD     [place] Level/Cues Needed (Transfer Goal 1, PT) moderate assist (50-74% patient effort)  -CD     Time Frame (Transfer Goal 1, PT) long term goal (LTG);2 weeks  -CD     Row Name 06/08/22 1017          Gait Training Goal 1 (PT)    Activity/Assistive Device (Gait Training Goal 1, PT) gait (walking locomotion);walker, rolling  -CD     [place] Level (Gait Training Goal 1, PT) moderate assist (50-74% patient effort)  -CD     Distance (Gait Training Goal 1, PT) 50 FEET  -CD     Time Frame (Gait Training Goal 1, PT) long term goal (LTG);2 weeks  -CD     Row Name 06/08/22 1017          Therapy Assessment/Plan (PT)    Planned Therapy Interventions (PT) balance training;bed mobility training;transfer training;patient/family education;home exercise program;gait  training;strengthening;stretching;postural re-education  -CD           User Key  (r) = Recorded By, (t) = Taken By, (c) = Cosigned By    Initials Name Provider Type    CD Rhianna Lovell PT Physical Therapist               Clinical Impression     Row Name 06/08/22 1009          Pain    Pretreatment Pain Rating 0/10 - no pain  -CD     Posttreatment Pain Rating 0/10 - no pain  -CD     Row Name 06/08/22 1009          Plan of Care Review    Plan of Care Reviewed With patient  -CD     Outcome Evaluation PT PRESENTS WITH EVOLVING SYMPTOMS TO INCLUDE IMPAIRED BALANCE, GENERALIZED WEAKNESS, INCREASED CONFUSION, IMPAIRED COORDINATION AND DECLINE IN FUNCTIONAL MOBILITY. REQUIRED MAX ASSIST FOR BED MOBILITY AND TRANSFERS BED TO CHAIR. NEEDS CUES FOR SEQUENCING BUT FOLLOWS COMMANDS AND IS COOPERATIVE WITH OOB ACTIVITY. RECOMMEND SNF AT D/C. IT PT GOES BACK TO ILF WITH WIFE WILL NEED TO RESUME 24/7 CAREGIVERS AND HHPT.  -CD     Row Name 06/08/22 1009          Therapy Assessment/Plan (PT)    Patient/Family Therapy Goals Statement (PT) DID NOT STATE.  -CD     Rehab Potential (PT) good, to achieve stated therapy goals  -CD     Criteria for Skilled Interventions Met (PT) yes  -CD     Therapy Frequency (PT) daily  -CD     Row Name 06/08/22 1009          Vital Signs    Pre Systolic BP Rehab --  VSS. NSG CLEARED FOR TREATMENT.  -CD     Pre Patient Position Supine  -CD     Intra Patient Position Standing  -CD     Post Patient Position Sitting  -CD     Row Name 06/08/22 1009          Positioning and Restraints    Pre-Treatment Position in bed  -CD     Post Treatment Position chair  -CD     In Chair reclined;call light within reach;encouraged to call for assist;exit alarm on;notified nsg;legs elevated;RUE elevated;LUE elevated;with nsg  -CD           User Key  (r) = Recorded By, (t) = Taken By, (c) = Cosigned By    Initials Name Provider Type    Rhianna Wray PT Physical Therapist               Outcome Measures     Row Name 06/08/22  1019          How much help from another person do you currently need...    Turning from your back to your side while in flat bed without using bedrails? 2  -CD     Moving from lying on back to sitting on the side of a flat bed without bedrails? 2  -CD     Moving to and from a bed to a chair (including a wheelchair)? 2  -CD     Standing up from a chair using your arms (e.g., wheelchair, bedside chair)? 2  -CD     Climbing 3-5 steps with a railing? 1  -CD     To walk in hospital room? 1  -CD     AM-[facility] 6 Clicks Score (PT) 10  -CD     Highest level of mobility 4 --> Transferred to chair/commode  -CD     Row Name 06/08/22 1019          Modified [place] Scale    Modified [place] Scale 4 - Moderately severe disability.  Unable to walk without assistance, and unable to attend to own bodily needs without assistance.  -CD     Row Name 06/08/22 1019          Functional Assessment    Outcome Measure Options AM-PAC 6 Clicks Basic Mobility (PT);Modified [place]  -CD           User Key  (r) = Recorded By, (t) = Taken By, (c) = Cosigned By    Initials Name Provider Type    CD Rhianna Lovell PT Physical Therapist                             Physical Therapy Education                 Title: PT OT SLP Therapies (Done)     Topic: Physical Therapy (Done)     Point: Mobility training (Done)     Learning Progress Summary           Patient Acceptance, E, VU,NR by CD at 6/8/2022 1020    Comment: BENEFITS OF OOB ACTIVITY, SAFETY WITH MOBILITY, PROGRESSION OF POC, D/C PLANNING,                   Point: Home exercise program (Done)     Learning Progress Summary           Patient Acceptance, E, VU,NR by CD at 6/8/2022 1020    Comment: BENEFITS OF OOB ACTIVITY, SAFETY WITH MOBILITY, PROGRESSION OF POC, D/C PLANNING,                   Point: Body mechanics (Done)     Learning Progress Summary           Patient Acceptance, E, VU,NR by CD at 6/8/2022 1020    Comment: BENEFITS OF OOB ACTIVITY, SAFETY WITH MOBILITY, PROGRESSION OF POC, D/C PLANNING,                    Point: Precautions (Done)     Learning Progress Summary           Patient Acceptance, E, VU,NR by CD at 6/8/2022 1020    Comment: BENEFITS OF OOB ACTIVITY, SAFETY WITH MOBILITY, PROGRESSION OF POC, D/C PLANNING,                               User Key     Initials Effective Dates Name Provider Type Discipline    CD 06/16/21 -  Rhianna Lovell PT Physical Therapist PT              PT Recommendation and Plan  Planned Therapy Interventions (PT): balance training, bed mobility training, transfer training, patient/family education, home exercise program, gait training, strengthening, stretching, postural re-education  Plan of Care Reviewed With: patient  Outcome Evaluation: PT PRESENTS WITH EVOLVING SYMPTOMS TO INCLUDE IMPAIRED BALANCE, GENERALIZED WEAKNESS, INCREASED CONFUSION, IMPAIRED COORDINATION AND DECLINE IN FUNCTIONAL MOBILITY. REQUIRED MAX ASSIST FOR BED MOBILITY AND TRANSFERS BED TO CHAIR. NEEDS CUES FOR SEQUENCING BUT FOLLOWS COMMANDS AND IS COOPERATIVE WITH OOB ACTIVITY. RECOMMEND SNF AT D/C. IT PT GOES BACK TO ILF WITH WIFE WILL NEED TO RESUME 24/7 CAREGIVERS AND HHPT.     Time Calculation:    PT Charges     Row Name 06/08/22 1022             Time Calculation    Start Time 0859  -CD      PT Received On 06/08/22  -CD      PT Goal Re-Cert Due Date 06/18/22  -CD              Time Calculation- PT    Total Timed Code Minutes- PT 10 minute(s)  -CD              Timed Charges    59440 - PT Therapeutic Activity Minutes 10  -CD              Untimed Charges    PT Eval/Re-eval Minutes 58  -CD              Total Minutes    Timed Charges Total Minutes 10  -CD      Untimed Charges Total Minutes 58  -CD       Total Minutes 68  -CD            User Key  (r) = Recorded By, (t) = Taken By, (c) = Cosigned By    Initials Name Provider Type    CD Rhianna Lovell PT Physical Therapist              Therapy Charges for Today     Code Description Service Date Service Provider Modifiers Qty    [id number]  PT  THERAPEUTIC ACT EA 15 MIN 6/8/2022 Rhianna Lovell, PT GP 1    27087172686 HC PT EVAL MOD COMPLEXITY 4 6/8/2022 Rhianna Lovell, PT GP 1    [id number] HC PT THER SUPP EA 15 MIN 6/8/2022 Rhianna Lovell, PT GP 2          PT G-Codes  Outcome Measure Options: AM-PAC 6 Clicks Basic Mobility (PT), Modified [place]  AM-PAC 6 Clicks Score (PT): 10  Modified [place]o Scale: 4 - Moderately severe disability.  Unable to walk without assistance, and unable to attend to own bodily needs without assistance.    Rhianna Lovell, PT  6/8/2022

## 2022-06-09 LAB
ANION GAP SERPL CALCULATED.3IONS-SCNC: 7 MMOL/L (ref 5–15)
BUN SERPL-MCNC: 12 MG/DL (ref 8–23)
BUN/CREAT SERPL: 15 (ref 7–25)
CALCIUM SPEC-SCNC: 8.5 MG/DL (ref 8.2–9.6)
CHLORIDE SERPL-SCNC: 114 MMOL/L (ref 98–107)
CO2 SERPL-SCNC: 28 MMOL/L (ref 22–29)
CREAT SERPL-MCNC: 0.8 MG/DL (ref 0.76–1.27)
EGFRCR SERPLBLD CKD-EPI 2021: 83.5 ML/MIN/1.73
GLUCOSE BLDC GLUCOMTR-MCNC: 106 MG/DL (ref 70–130)
GLUCOSE BLDC GLUCOMTR-MCNC: 140 MG/DL (ref 70–130)
GLUCOSE BLDC GLUCOMTR-MCNC: 82 MG/DL (ref 70–130)
GLUCOSE BLDC GLUCOMTR-MCNC: 87 MG/DL (ref 70–130)
GLUCOSE SERPL-MCNC: 82 MG/DL (ref 65–99)
POTASSIUM SERPL-SCNC: 3.6 MMOL/L (ref 3.5–5.2)
SODIUM SERPL-SCNC: 149 MMOL/L (ref 136–145)

## 2022-06-09 PROCEDURE — 51798 US URINE CAPACITY MEASURE: CPT

## 2022-06-09 PROCEDURE — 99225 PR SBSQ OBSERVATION CARE/DAY 25 MINUTES: CPT | Performed by: HOSPITALIST

## 2022-06-09 PROCEDURE — 94799 UNLISTED PULMONARY SVC/PX: CPT

## 2022-06-09 PROCEDURE — 82962 GLUCOSE BLOOD TEST: CPT

## 2022-06-09 PROCEDURE — 94664 DEMO&/EVAL PT USE INHALER: CPT

## 2022-06-09 PROCEDURE — 80048 BASIC METABOLIC PNL TOTAL CA: CPT

## 2022-06-09 PROCEDURE — G0378 HOSPITAL OBSERVATION PER HR: HCPCS

## 2022-06-09 PROCEDURE — 94761 N-INVAS EAR/PLS OXIMETRY MLT: CPT

## 2022-06-09 RX ORDER — TAMSULOSIN HYDROCHLORIDE 0.4 MG/1
0.8 CAPSULE ORAL DAILY
Status: DISCONTINUED | OUTPATIENT
Start: 2022-06-10 | End: 2022-06-11 | Stop reason: HOSPADM

## 2022-06-09 RX ORDER — SODIUM CHLORIDE 450 MG/100ML
20 INJECTION, SOLUTION INTRAVENOUS CONTINUOUS
Status: ACTIVE | OUTPATIENT
Start: 2022-06-09 | End: 2022-06-10

## 2022-06-09 RX ADMIN — SODIUM CHLORIDE 20 ML/HR: 4.5 INJECTION, SOLUTION INTRAVENOUS at 22:02

## 2022-06-09 RX ADMIN — MEMANTINE 10 MG: 10 TABLET ORAL at 23:03

## 2022-06-09 RX ADMIN — IPRATROPIUM BROMIDE AND ALBUTEROL SULFATE 3 ML: .5; 3 SOLUTION RESPIRATORY (INHALATION) at 03:18

## 2022-06-09 RX ADMIN — Medication 10 ML: at 09:30

## 2022-06-09 RX ADMIN — IPRATROPIUM BROMIDE AND ALBUTEROL SULFATE 3 ML: .5; 3 SOLUTION RESPIRATORY (INHALATION) at 12:38

## 2022-06-09 RX ADMIN — CEFUROXIME AXETIL 500 MG: 250 TABLET, FILM COATED ORAL at 23:03

## 2022-06-09 RX ADMIN — IPRATROPIUM BROMIDE AND ALBUTEROL SULFATE 3 ML: .5; 3 SOLUTION RESPIRATORY (INHALATION) at 19:42

## 2022-06-09 RX ADMIN — IPRATROPIUM BROMIDE AND ALBUTEROL SULFATE 3 ML: .5; 3 SOLUTION RESPIRATORY (INHALATION) at 07:58

## 2022-06-09 RX ADMIN — IPRATROPIUM BROMIDE AND ALBUTEROL SULFATE 3 ML: .5; 3 SOLUTION RESPIRATORY (INHALATION) at 23:11

## 2022-06-09 RX ADMIN — RIVASTIGMINE 1 PATCH: 9.5 PATCH TRANSDERMAL at 09:31

## 2022-06-09 RX ADMIN — IPRATROPIUM BROMIDE AND ALBUTEROL SULFATE 3 ML: .5; 3 SOLUTION RESPIRATORY (INHALATION) at 15:53

## 2022-06-09 NOTE — PROGRESS NOTES
[facility] Medicine Services  PROGRESS NOTE    Patient Name: Marcos Acuña  : 1931  MRN: [id number]    Date of Admission: 2022  Primary Care Physician: No primary care provider on file.    Subjective   Subjective     CC:   hypernatremia    HPI:   Caregiver in room.  High serum sodium.  Looks dehydrated on exam.  No ability to keep himself hydrated at this point.    ROS:    Unable to assess    Objective   Objective     Vital Signs:   Temp:  [97.3 °F (36.3 °C)-98.3 °F (36.8 °C)] 98.3 °F (36.8 °C)  Heart Rate:  [63-91] 65  Resp:  [16-18] 16  BP: (111-137)/(61-73) 126/63     Physical Exam:    Constitutional: sleepy, NAD, wakes easily  HENT: NCAT, dry tongue, head extended back to breathe  Respiratory: Clear to auscultation bilaterally, weak inspiratory effort  Cardiovascular: RRR, s1 and s2  Gastrointestinal: Positive bowel sounds, soft, nontender, obese abdomen  Musculoskeletal: No bilateral ankle edema  Psychiatric: flat affect, cooperative  Neurologic: Oriented x 1, generalized weakness, speech clear  Skin: dry, + skin tenting    Results Reviewed:  LAB RESULTS:      Lab 22  0946 22  1539   WBC 6.95 8.45   HEMOGLOBIN 10.0* 10.3*   HEMATOCRIT 29.5* 32.0*   PLATELETS 203 205   NEUTROS ABS 2.39 2.03   IMMATURE GRANS (ABS) 0.02  --    LYMPHS ABS 3.68*  --    MONOS ABS 0.40  --    EOS ABS 0.33 0.25   MCV 83.8 88.2   PROCALCITONIN  --  0.08   LACTATE  --  1.7         Lab 22  0655 22  0946 22  1539   SODIUM 149* 143 146*   POTASSIUM 3.6 3.2* 4.2   CHLORIDE 114* 108* 112*   CO2 28.0 24.0 29.0   ANION GAP 7.0 11.0 5.0   BUN 12 11 11   CREATININE 0.80 0.89 0.81   EGFR 83.5 80.9 83.2   GLUCOSE 82 103* 99   CALCIUM 8.5 8.6 8.7   MAGNESIUM  --   --  2.0         Lab 22  1539   TOTAL PROTEIN 6.1   ALBUMIN 2.60*   GLOBULIN 3.5   ALT (SGPT) 15   AST (SGOT) 25   BILIRUBIN 0.5   ALK PHOS 97         Lab 229 22  1539   PROBNP  --  254.1   TROPONIN T  0.057* 0.067*                 Brief Urine Lab Results  (Last result in the past 365 days)      Color   Clarity   Blood   Leuk Est   Nitrite   Protein   CREAT   Urine HCG        06/07/22 1538 Yellow   Clear   Negative   Small (1+)   Negative   Negative                 Microbiology Results Abnormal     Procedure Component Value - Date/Time    Blood Culture - Blood, Hand, Left [[id number]]  (Normal) Collected: 06/07/22 1630    Lab Status: Preliminary result Specimen: Blood from Hand, Left Updated: 06/09/22 1701     Blood Culture No growth at 2 days    Blood Culture - Blood, Hand, Right [[id number]]  (Normal) Collected: 06/07/22 1615    Lab Status: Preliminary result Specimen: Blood from Hand, Right Updated: 06/09/22 1701     Blood Culture No growth at 2 days    Urine Culture - Urine, Urine, Catheter [[id number]]  (Normal) Collected: 06/07/22 1538    Lab Status: Final result Specimen: Urine, Catheter Updated: 06/08/22 1905     Urine Culture No growth    S. Pneumo Ag Urine or CSF - Urine, Urine, Clean Catch [[id number]]  (Normal) Collected: 06/08/22 0632    Lab Status: Final result Specimen: Urine, Clean Catch Updated: 06/08/22 0953     Strep Pneumo Ag Negative    Legionella Antigen, Urine - Urine, Urine, Clean Catch [[id number]]  (Normal) Collected: 06/08/22 0632    Lab Status: Final result Specimen: Urine, Clean Catch Updated: 06/08/22 0953     LEGIONELLA ANTIGEN, URINE Negative    MRSA Screen, PCR (Inpatient) - Swab, Nares [[id number]]  (Normal) Collected: 06/08/22 0632    Lab Status: Final result Specimen: Swab from Nares Updated: 06/08/22 0918     MRSA PCR Negative    Narrative:      MRSA Negative    Respiratory Panel PCR w/COVID-19(SARS-CoV-2) FREDDIE/URI/JAYSON/PAD/COR/MAD/JAYLA In-House, NP Swab in UTM/VTM, 3-4 HR TAT - Swab, Nasopharynx [[id number]]  (Normal) Collected: 06/08/22 0632    Lab Status: Final result Specimen: Swab from Nasopharynx Updated: 06/08/22 0858     ADENOVIRUS, PCR Not Detected     Coronavirus 229E Not  Detected     Coronavirus HKU1 Not Detected     Coronavirus NL63 Not Detected     Coronavirus OC43 Not Detected     COVID19 Not Detected     Human Metapneumovirus Not Detected     Human Rhinovirus/Enterovirus Not Detected     Influenza A PCR Not Detected     Influenza B PCR Not Detected     Parainfluenza Virus 1 Not Detected     Parainfluenza Virus 2 Not Detected     Parainfluenza Virus 3 Not Detected     Parainfluenza Virus 4 Not Detected     RSV, PCR Not Detected     Bordetella pertussis pcr Not Detected     Bordetella parapertussis PCR Not Detected     Chlamydophila pneumoniae PCR Not Detected     Mycoplasma pneumo by PCR Not Detected    Narrative:      In the setting of a positive respiratory panel with a viral infection PLUS a negative procalcitonin without other underlying concern for bacterial infection, consider observing off antibiotics or discontinuation of antibiotics and continue supportive care. If the respiratory panel is positive for atypical bacterial infection (Bordetella pertussis, Chlamydophila pneumoniae, or Mycoplasma pneumoniae), consider antibiotic de-escalation to target atypical bacterial infection.    COVID PRE-OP / PRE-PROCEDURE SCREENING ORDER (NO ISOLATION) - Swab, Nasopharynx [[id number]]  (Normal) Collected: 06/07/22 1633    Lab Status: Final result Specimen: Swab from Nasopharynx Updated: 06/07/22 1705    Narrative:      The following orders were created for panel order COVID PRE-OP / PRE-PROCEDURE SCREENING ORDER (NO ISOLATION) - Swab, Nasopharynx.  Procedure                               Abnormality         Status                     ---------                               -----------         ------                     COVID-19 and FLU A/B PCR...[[id number]]  Normal              Final result                 Please view results for these tests on the individual orders.    COVID-19 and FLU A/B PCR - Swab, Nasopharynx [[id number]]  (Normal) Collected: 06/07/22 1633    Lab Status: Final  result Specimen: Swab from Nasopharynx Updated: 06/07/22 1705     COVID19 Not Detected     Influenza A PCR Not Detected     Influenza B PCR Not Detected    Narrative:      Fact sheet for providers: https://www.fda.gov/media/630603/download    Fact sheet for patients: https://www.fda.gov/media/024662/download    Test performed by PCR.          CT Chest Without Contrast Diagnostic    Result Date: 6/7/2022  CT CHEST WO CONTRAST DIAGNOSTIC-  Date of Exam: 6/7/2022 8:08 PM  Indication: Respiratory illness, nondiagnostic xray; R41.82-Altered mental status, unspecified; Z78.9-Other specified health status; N39.0-Urinary tract infection, site not specified  confusion. History of pyelonephritis and cystitis.  Comparison: CT the abdomen pelvis 05/23/2022, chest x-ray 06/07/2022, chest CT 05/29/2020  Technique: Serial and axial CT images of the chest were obtained. Reconstructions in the coronal and sagittal planes were performed. Automated exposure control and iterative reconstruction methods were used.  FINDINGS: Hilum and Mediastinum: There are no pathologically enlarged hilar mediastinal lymph nodes. There is cardiomegaly. There is mild coronary artery atherosclerotic calcification. There is aortic valvular calcification. There is no pericardial effusion. Thoracic aorta has normal caliber. Pulmonary arteries are enlarged.  Lung Parenchyma and Pleura: Moderate size bilateral pleural effusions. Respiratory motion limits evaluation lung parenchyma. There is mild peripheral septal thickening. There is mild bibasilar subsegmental atelectasis. The appearance is not significantly changed compared to the previous CT the abdomen pelvis.  Upper Abdomen: Large parapelvic cyst right kidney.  Soft tissues: Unremarkable.  Osseous structures: No aggressive focal lytic or sclerotic osseous lesions. Osteopenia. Compression deformity along the superior endplate of L1 is unchanged.      Impression: 1.  there are moderate bilateral pleural  effusions with bibasilar basilar subsegmental atelectasis. Bibasilar pneumonia less likely but cannot be excluded on this exam. No associated lymphadenopathy.. Pulmonary edema with pleural effusions with atelectasis favored over infection based off exam. Clinical correlation is recommended. 2.  Cardiomegaly with enlarged pulmonary arteries, correlate for pulmonary artery hypertension.    This report was finalized on 6/7/2022 8:20 PM by Delores Cid MD.      FL Video Swallow With Speech Single Contrast    Result Date: 6/8/2022  EXAMINATION: FL VIDEO SWALLOW W SPEECH SINGLE-CONTRAST-  INDICATION: dysphagia; R41.82-Altered mental status, unspecified; Z78.9-Other specified health status; N39.0-Urinary tract infection, site not specified  TECHNIQUE: 2 minutes and 12 seconds of fluoroscopic time was used for this exam. 7 associated fluoroscopic loops were saved. The patient was evaluated in the seated lateral position while taking a variety of consistencies of barium by mouth under the direction of speech pathology.  COMPARISON: NONE  FINDINGS: 2 minutes and 12 seconds of fluoroscopy prior for a modified barium swallow. Please see speech therapy report for full details and recommendations.       Impression: Fluoroscopy prior for a modified barium swallow. Please see speech therapy report for full details and recommendations.    This report was finalized on 6/8/2022 4:47 PM by Abdirashid Luna.        Results for orders placed during the hospital encounter of 05/21/22    Adult Transthoracic Echo Complete w/ Color, Spectral and Contrast if necessary per protocol    Interpretation Summary  · Left ventricular wall thickness is consistent with mild concentric hypertrophy.  · There is moderate calcification of the aortic valve.  · The right ventricular cavity is mild to moderately dilated.  · The right atrial cavity is mildly dilated.  · Left ventricular ejection fraction appears to be 61 - 65%.  · There is significant  calcification of the aortic valve with reduced excursion. However the acoustic windows are so poor that the transducer cannot align properly to get a good measurement across the aortic valve. Consider ADARSH if clinically indicated      I have reviewed the medications:  Scheduled Meds:cefuroxime, 500 mg, Oral, Q12H  insulin lispro, 0-7 Units, Subcutaneous, 4x Daily With Meals & Nightly  ipratropium-albuterol, 3 mL, Nebulization, Q4H - RT  levothyroxine, 75 mcg, Oral, Daily  memantine, 10 mg, Oral, Q12H  rivaroxaban, 10 mg, Oral, Daily  rivastigmine, 1 patch, Transdermal, Daily  sodium chloride, 10 mL, Intravenous, Q12H  [START ON 6/10/2022] tamsulosin, 0.8 mg, Oral, Daily      Continuous Infusions:sodium chloride, 20 mL/hr      PRN Meds:.•  acetaminophen **OR** acetaminophen **OR** acetaminophen  •  dextrose  •  dextrose  •  glucagon (human recombinant)  •  haloperidol lactate  •  melatonin  •  ondansetron **OR** ondansetron  •  potassium chloride  •  potassium chloride  •  QUEtiapine  •  sodium chloride  •  sodium chloride    Assessment & Plan   Assessment & Plan     Active Hospital Problems    Diagnosis  POA   • **Altered mental status, unspecified altered mental status type [R41.82]  Yes   • History of bacteremia, E.coli [Z87.898]  Unknown   • Dysphagia [R13.10]  Unknown   • Productive cough [R05.8]  Unknown   • Acute UTI [N39.0]  Yes   • Elevated troponin [R77.8]  Yes   • Chronic deep vein thrombosis (DVT) ([facility]) [I82.509]  Yes   • Dementia ([facility]) [F03.90]  Yes   • Obstructive sleep apnea syndrome [G47.33]  Yes   • Type 2 diabetes mellitus without complication ([facility]) [E11.9]  Yes   • Hypothyroidism [E03.9]  Yes   • Benign essential hypertension [I10]  Yes      Resolved Hospital Problems   No resolved problems to display.        Brief Hospital Course to date:  Marcos Acuña is a 89 y.o. male with history of hypothyroidism, DM, Hypertension, MIREILLE, chronic Left Lower Extremity DVT on chronic anticoagulation, and dementia  who was brought to the ER for AMS.  He was not waking up and has been sleeping more over the last few days.  He hasn't been using CPAP.        AMS  Encephalopathy  Dementia  - CT head without contrast - no acute findings  - would benefit from using CPAP    Previous UTI / History of E. Coli bacteremia  - ID consultation  - procalcitonin encouraging  - on oral Ceftin every 12 hours    Cough  Dysphagia  - coughing since last admission  - previous dysphagia  - respiratory panel   - CT less consistent with pneumonia  - on oral abx    Acute Hypernatremia  - present on admission  - worsening free water deficit  - with level of dementia this will likely progress  - Hospice consulted    Dehydration  - free water deficit associated with Dementia and inability to drink enough water    Sleep Apnea  - order CPAP with nasal mask  - reported to be refusing to wear it    Chronic DVT on Xarelto  - continue Xarelto    Hypothyroidism  - continue levothyroxine   - last TSH was within normal limits in May 2022    DM  - insulin SS    Called Daughter - Anbael Grace this evening  Called Spouse at 7 pm    [place], spouse in agreement with home with Hospice - she wants him comfortable    DVT prophylaxis:  Medical DVT prophylaxis orders are present.       AM-PAC 6 Clicks Score (PT): 12 (06/09/22 0800)    Disposition: I expect the patient to be discharged home with Hospice on Friday    CODE STATUS:   Code Status and Medical Interventions:   Ordered at: 06/07/22 2007     Medical Intervention Limits:    NO intubation (DNI)     Code Status (Patient has no pulse and is not breathing):    No CPR (Do Not Attempt to Resuscitate)     Medical Interventions (Patient has pulse or is breathing):    Limited Support       Seth Patel MD  06/09/22

## 2022-06-09 NOTE — PROGRESS NOTES
INFECTIOUS DISEASE Progress Note    Marcos Acuña  5/31/1931  [id number]    Admission Date: 6/7/2022      Requesting Provider: No ref. provider found  Evaluating Physician: Jose E Radford MD    Reason for Consultation: Encephalopathy/acute mental status change      History of present illness:    6/8/22: Patient is a 89 y.o. male with dementia, type 2 diabetes mellitus, obstructive sleep apnea, chronic lower extremity DVT, hypertension, hypothyroidism, and a recent bacteremic E. coli UTI who is seen today for evaluation of acute mental status changes.  I saw him initially on 5/23/2022 when he presented with an obvious UTI and sepsis with too numerous to count white blood cells on urinalysis and blood/urine cultures positive for relatively sensitive E. coli.  He was treated with a course of parenteral antibiotic therapy in the hospital and was subsequently switched to complete parenteral antibiotic therapy with 5 additional days of intramuscular ceftriaxone and discharge.  He was then switched to oral cefuroxime and remains on oral cefuroxime.  I saw him recently in telehealth follow-up at which point he appeared remarkably better.  He has a history of sleep apnea and has not been using CPAP.  He was found to be severely confused after awakening, prompting return to the emergency room last night.  He has remained afebrile and had no leukocytosis with a white blood cell count of 8.5.  His procalcitonin was normal at 1.08.  His viral respiratory panel PCR was negative.  He did have some residual leukocytosis on urinalysis with 6-12 white blood cells.  Chest CT scan revealed moderate bilateral pleural effusions with bibasilar atelectasis and he was also noted to have cardiomegaly with enlarged pulmonary arteries consistent with pulmonary artery hypertension.  Previous echocardiogram on 5/22 revealed right atrial and ventricular enlargement.  After blood cultures were obtained, he was started on intravenous vancomycin  and Zosyn.  He has remained afebrile overnight.  His acute confusional state has resolved per his caregiver and he is now back to his baseline cognitive function.  He denies headache, earache, sore throat.  He denies cough and sputum production.  He denies nausea, vomiting, and diarrhea.  He denies dysuria.    6/9/22: He has remained afebrile. Urine culture from 6/70 is negative.  Blood cultures from 6/7 are no growth so far. His caregiver indicates that he has felt better today.  He has had no nausea or vomiting.  He has had no cough or sputum production.    Past Medical History:   Diagnosis Date   • Dementia (HCC)    • Diabetes (HCC)    • H/O blood clots    • Hypertensive disorder    • Hypothyroidism    • Osteoarthritis    • Type 2 diabetes mellitus, uncontrolled        Past Surgical History:   Procedure Laterality Date   • BUNIONECTOMY     • HIP TROCHANTERIC NAILING WITH INTRAMEDULLARY HIP SCREW N/A 6/9/2020    Procedure: ADVANCED TROCHANTERIC FEMORAL NAIL (ATFN) RIGHT HIP/FEMUR;  Surgeon: Mejia Meza MD;  Location: [facility];  Service: Orthopedics;  Laterality: N/A;   • RECTAL FISTULECTOMY     • SKIN CANCER EXCISION         Family History   Problem Relation Age of Onset   • Stroke Mother    • Heart attack Father        Social History     Socioeconomic History   • Marital status:    Tobacco Use   • Smoking status: Former Smoker     Packs/day: 2.00   • Smokeless tobacco: Never Used   Vaping Use   • Vaping Use: Never used   Substance and Sexual Activity   • Alcohol use: Yes     Alcohol/week: 1.0 standard drink     Types: 1 Glasses of wine per week   • Drug use: No   • Sexual activity: Defer       Allergies   Allergen Reactions   • Contrast Dye Anaphylaxis   • Iodine Unknown - High Severity     Pt states eyes start watering.    • Other Anaphylaxis   • Prednisone & Diphenhydramine Rash         Medication:    Current Facility-Administered Medications:   •  acetaminophen (TYLENOL) tablet 650 mg, 650  mg, Oral, Q4H PRN **OR** acetaminophen (TYLENOL) 160 MG/5ML solution 650 mg, 650 mg, Oral, Q4H PRN **OR** acetaminophen (TYLENOL) suppository 650 mg, 650 mg, Rectal, Q4H PRN, Melly Sears, APRN  •  cefuroxime (CEFTIN) tablet 500 mg, 500 mg, Oral, Q12H, Jose E Radford MD, 500 mg at 06/08/22 2009  •  dextrose (D50W) (25 g/50 mL) IV injection 25 g, 25 g, Intravenous, Q15 Min PRN, Melly Sears, APRN  •  dextrose (GLUTOSE) oral gel 15 g, 15 g, Oral, Q15 Min PRN, Melly Sears, APRN  •  glucagon (human recombinant) (GLUCAGEN DIAGNOSTIC) injection 1 mg, 1 mg, Intramuscular, Q15 Min PRN, Melly Sears, APRN  •  haloperidol lactate (HALDOL) injection 0.5 mg, 0.5 mg, Intravenous, Q6H PRN, Melly Sears APRN, 0.5 mg at 06/08/22 1640  •  Insulin Lispro (humaLOG) injection 0-7 Units, 0-7 Units, Subcutaneous, 4x Daily With Meals & Nightly, Melly Sears APRN, 2 Units at 06/08/22 2009  •  ipratropium-albuterol (DUO-NEB) nebulizer solution 3 mL, 3 mL, Nebulization, Q4H - RT, Melly Sears APRN, 3 mL at 06/09/22 0318  •  levothyroxine (SYNTHROID, LEVOTHROID) tablet 75 mcg, 75 mcg, Oral, Daily, Melly Sears APRN, 75 mcg at 06/08/22 1539  •  melatonin tablet 5 mg, 5 mg, Oral, Nightly PRN, Melly Sears, APRN, 5 mg at 06/08/22 2008  •  memantine (NAMENDA) tablet 10 mg, 10 mg, Oral, Q12H, Melly Sears, APRN, 10 mg at 06/08/22 2009  •  ondansetron (ZOFRAN) tablet 4 mg, 4 mg, Oral, Q6H PRN **OR** ondansetron (ZOFRAN) injection 4 mg, 4 mg, Intravenous, Q6H PRN, Melly Sears, APRN  •  potassium chloride (KLOR-CON) packet 40 mEq, 40 mEq, Oral, PRN, Seth Patel MD  •  potassium chloride (MICRO-K) CR capsule 40 mEq, 40 mEq, Oral, PRN, Seth Patel MD, 40 mEq at 06/08/22 1539  •  QUEtiapine (SEROquel) tablet 25 mg, 25 mg, Oral, Nightly PRN, Melly Sears, APRN, 25 mg at 06/08/22 2008  •  rivaroxaban (XARELTO) tablet 10 mg, 10 mg, Oral, Daily, Melly Sears APRN, 10 mg at 06/08/22 1539  •  rivastigmine (EXELON)  9.5 MG/24HR patch 1 patch, 1 patch, Transdermal, Daily, Melly Sears, APRN, 1 patch at 22 0917  •  sodium chloride 0.9 % flush 10 mL, 10 mL, Intravenous, PRN, Melly Sears, APRN  •  sodium chloride 0.9 % flush 10 mL, 10 mL, Intravenous, Q12H, Melly Sears, APRN, 10 mL at 22  •  sodium chloride 0.9 % flush 10 mL, 10 mL, Intravenous, PRN, Melly Sears, APRN  •  tamsulosin (FLOMAX) 24 hr capsule 0.4 mg, 0.4 mg, Oral, Daily, Melly Sears, APRN, 0.4 mg at 22 1539    Antibiotics:  Anti-Infectives (From admission, onward)    Ordered     Dose/Rate Route Frequency Start Stop    22 1423  cefuroxime (CEFTIN) tablet 500 mg        Ordering Provider: Jose E Radford MD    500 mg Oral Every 12 Hours Scheduled 22 2100 22 180  vancomycin 1750 mg/500 mL 0.9% NS IVPB ([facility])        Ordering Provider: Jose E Jauregui MD    20 mg/kg × 93 kg  250 mL/hr over 2 Hours Intravenous Once 22 1803 22 2143    06/07/22 180  piperacillin-tazobactam (ZOSYN) 3.375 g in iso-osmotic dextrose 50 ml (premix)        Ordering Provider: Jose E Jauregui MD    3.375 g  over 30 Minutes Intravenous Once 22 1803 06/07/22 1954            Review of Systems:  He is demented and his review of systems is unreliable.      Physical Exam:   Vital Signs  Temp (24hrs), Av.9 °F (36.6 °C), Min:97.8 °F (36.6 °C), Max:98 °F (36.7 °C)    Temp  Min: 97.8 °F (36.6 °C)  Max: 98 °F (36.7 °C)  BP  Min: 124/73  Max: 143/64  Pulse  Min: 71  Max: 97  Resp  Min: 16  Max: 18  SpO2  Min: 93 %  Max: 100 %    GENERAL: .Sleeping and in no acute distress  HEENT: No labial ulcers  NECK: Supple   HEART: RRR; 1-2/6 systolic murmur  LUNGS: Clear to auscultation bilaterally without wheezing, rales, rhonchi. Normal respiratory effort. Nonlabored.   ABDOMEN: Soft, nontender, nondistended.  No rebound or guarding. NO mass or HSM.  EXT: 1-2+ bilateral lower extremity edema  :  Without Meek  catheter.  MSK: No focal joint swelling  SKIN: Warm and dry without cutaneous eruptions on Inspection/palpation.    NEURO: Sleeping      Laboratory Data    Results from last 7 days   Lab Units 06/08/22  0946 06/07/22  1539   WBC 10*3/mm3 6.95 8.45   HEMOGLOBIN g/dL 10.0* 10.3*   HEMATOCRIT % 29.5* 32.0*   PLATELETS 10*3/mm3 203 205     Results from last 7 days   Lab Units 06/08/22  0946   SODIUM mmol/L 143   POTASSIUM mmol/L 3.2*   CHLORIDE mmol/L 108*   CO2 mmol/L 24.0   BUN mg/dL 11   CREATININE mg/dL 0.89   GLUCOSE mg/dL 103*   CALCIUM mg/dL 8.6     Results from last 7 days   Lab Units 06/07/22  1539   ALK PHOS U/L 97   BILIRUBIN mg/dL 0.5   ALT (SGPT) U/L 15   AST (SGOT) U/L 25             Results from last 7 days   Lab Units 06/07/22  1539   LACTATE mmol/L 1.7         Results from last 7 days   Lab Units 06/08/22  0946   VANCOMYCIN RM mcg/mL 10.00     Estimated Creatinine Clearance: 59.1 mL/min (by C-G formula based on SCr of 0.89 mg/dL).      Microbiology:  No results found for: ACANTHNAEG, AFBCX, BPERTUSSISCX, BLOODCX  No results found for: BCIDPCR, CXREFLEX, CSFCX, CULTURETIS  No results found for: CULTURES, HSVCX, URCX  No results found for: EYECULTURE, GCCX, HSVCULTURE, LABHSV  No results found for: LEGIONELLA, MRSACX, MUMPSCX, MYCOPLASCX  No results found for: NOCARDIACX, STOOLCX  Urine Culture   Date Value Ref Range Status   06/07/2022 No growth  Preliminary     No results found for: VIRALCULTU, WOUNDCX        Radiology:  Imaging Results (Last 72 Hours)     Procedure Component Value Units Date/Time    FL Video Swallow With Speech Single Contrast [[id number]] Collected: 06/08/22 1556     Updated: 06/08/22 1650    Narrative:      EXAMINATION: FL VIDEO SWALLOW W SPEECH SINGLE-CONTRAST-     INDICATION: dysphagia; R41.82-Altered mental status, unspecified;  Z78.9-Other specified health status; N39.0-Urinary tract infection, site  not specified     TECHNIQUE: 2 minutes and 12 seconds of fluoroscopic time was  used for  this exam. 7 associated fluoroscopic loops were saved. The patient was  evaluated in the seated lateral position while taking a variety of  consistencies of barium by mouth under the direction of speech  pathology.     COMPARISON: NONE     FINDINGS: 2 minutes and 12 seconds of fluoroscopy prior for a modified  barium swallow. Please see speech therapy report for full details and  recommendations.          Impression:      Fluoroscopy prior for a modified barium swallow. Please see  speech therapy report for full details and recommendations.         This report was finalized on 6/8/2022 4:47 PM by Abdirashid Luna.       CT Chest Without Contrast Diagnostic [[id number]] Collected: 06/07/22 2014     Updated: 06/07/22 2023    Narrative:      CT CHEST WO CONTRAST DIAGNOSTIC-     Date of Exam: 6/7/2022 8:08 PM     Indication: Respiratory illness, nondiagnostic xray; R41.82-Altered  mental status, unspecified; Z78.9-Other specified health status;  N39.0-Urinary tract infection, site not specified  confusion. History of  pyelonephritis and cystitis.     Comparison: CT the abdomen pelvis 05/23/2022, chest x-ray 06/07/2022,  chest CT 05/29/2020     Technique: Serial and axial CT images of the chest were obtained.  Reconstructions in the coronal and sagittal planes were performed.   Automated exposure control and iterative reconstruction methods were  used.     FINDINGS:  Hilum and Mediastinum: There are no pathologically enlarged hilar  mediastinal lymph nodes. There is cardiomegaly. There is mild coronary  artery atherosclerotic calcification. There is aortic valvular  calcification. There is no pericardial effusion. Thoracic aorta has  normal caliber. Pulmonary arteries are enlarged.     Lung Parenchyma and Pleura: Moderate size bilateral pleural effusions.  Respiratory motion limits evaluation lung parenchyma. There is mild  peripheral septal thickening. There is mild bibasilar subsegmental  atelectasis. The  appearance is not significantly changed compared to the  previous CT the abdomen pelvis.     Upper Abdomen: Large parapelvic cyst right kidney.     Soft tissues: Unremarkable.     Osseous structures: No aggressive focal lytic or sclerotic osseous  lesions. Osteopenia. Compression deformity along the superior endplate  of L1 is unchanged.       Impression:      1.  there are moderate bilateral pleural effusions with bibasilar  basilar subsegmental atelectasis. Bibasilar pneumonia less likely but  cannot be excluded on this exam. No associated lymphadenopathy..  Pulmonary edema with pleural effusions with atelectasis favored over  infection based off exam. Clinical correlation is recommended.  2.  Cardiomegaly with enlarged pulmonary arteries, correlate for  pulmonary artery hypertension.           This report was finalized on 6/7/2022 8:20 PM by Delores Cid MD.       CT Head Without Contrast [[id number]] Collected: 06/07/22 1708     Updated: 06/07/22 1715    Narrative:      DATE OF EXAM: 6/7/2022 4:57 PM     PROCEDURE: CT HEAD WO CONTRAST-     INDICATIONS: AMS     COMPARISON: Head CT 1/12/2022     TECHNIQUE: Routine transaxial and coronal reconstruction images were  obtained through the head without the administration of contrast.  Automated exposure control and iterative reconstruction methods were  used.     The radiation dose reduction device was turned on for each scan per the  ALARA (As Low as Reasonably Achievable) protocol.     FINDINGS:  No acute intracranial hemorrhage. No acute large territory infarct.  Redemonstration of scattered and confluent subcortical and  periventricular white matter hypodensities which are nonspecific and can  be seen in the setting of chronic small vessel ischemic change. No  extra-axial collections. No midline shift or herniation. Mild global  cerebral volume loss. Normal size and configuration of the ventricles  commensurate with degree of cerebral volume loss.  Unremarkable  appearance of the orbits. The mastoid air cells are clear. There is  scattered mucosal thickening of the ethmoid air cells and mucosal  mucosal thickening in the left sphenoid sinus, similar to prior.        Impression:      No acute intracranial findings. Stable chronic and senescent changes as  detailed above.     This report was finalized on 6/7/2022 5:12 PM by Berhane Hendrix MD.       XR Chest 1 View [[id number]] Collected: 06/07/22 1551     Updated: 06/07/22 1557    Narrative:      DATE OF EXAM: 6/7/2022 3:40 PM     PROCEDURE: XR CHEST 1 VW-     INDICATIONS: AMS protocol     COMPARISON: Chest x-ray 5/22/2022     TECHNIQUE: Single radiographic AP view of the chest was obtained.     FINDINGS:  Mild low lung volumes with stable cardiomegaly and mildly accentuated  cardiac mediastinal silhouette. Mild increase in diffuse interstitial  prominence possibly reflective of bronchovascular crowding in the  setting of low lung volumes of chronic interstitial changes interstitial  edema could appear similarly. There are left lung base opacities favored  reflect atelectasis. No pneumothorax. No significant pleural effusion.  Previously questioned nodule in the left lower lobe is not well  appreciated on today's exam.        Impression:      Low lung volumes with mild increase in diffuse interstitial prominence  which may reflect bronchovascular crowding although a component of  interstitial edema, or possibly atypical/viral infection, cannot be  excluded. Ill-defined opacities at the left lung base are favored  reflect atelectasis.     This report was finalized on 6/7/2022 3:54 PM by Berhane Hendrix MD.           I read his radiographic images.      Echocardiogram of 5/22/2022  Interpretation Summary    · Left ventricular wall thickness is consistent with mild concentric hypertrophy.  · There is moderate calcification of the aortic valve.  · The right ventricular cavity is mild to moderately dilated.  · The  right atrial cavity is mildly dilated.  · Left ventricular ejection fraction appears to be 61 - 65%.  · There is significant calcification of the aortic valve with reduced excursion. However the acoustic windows are so poor that the transducer cannot align properly to get a good measurement across the aortic valve. Consider ADARSH if clinically indicated        Impression:   1.  Encephalopathy/altered mental status- this is most likely secondary to hypoxia related to his untreated obstructive sleep apnea.  He does not appear to have recurrent sepsis with no fever, leukocytosis, or overt new focus of infection.    2.  Dementia  3.  Obstructive sleep apnea-untreated  4.  Right heart failure/pulmonary artery hypertension-he has a dilated right ventricular cavity, dilated right atrial cavity, and dilated pulmonary arteries. This is most likely secondary to chronic nocturnal hypoxia from his untreated obstructive sleep apnea.  5.  Lower extremity edema-secondary to right heart failure  6.  E. coli bacteremia/UTI-with positive blood and urine cultures cultures on 5/22.  He has received a course of intravenous antibiotics and is now been on oral cefuroxime.  I will plan to give him at least 10 more days of oral cefuroxime.  7.  Pleural effusion/atelectasis- his chest CT scan reveals pleural effusions and atelectasis although these are fairly mild.  There is no evidence of a focal pneumonia.  8.  Chronic lower extremity DVT  9.  Type 2 diabetes mellitus        PLAN/RECOMMENDATIONS:   1.  Cefuroxime 500 mg p.o. twice daily  2.  Treatment for right heart failure and obstructive sleep apnea per Dr. Patel.    He has clinically improved.   I discussed his complex situation again with his caregiver today.  I will sign off.         Jose E Radford MD  6/9/2022  07:08 EDT

## 2022-06-09 NOTE — PLAN OF CARE
Goal Outcome Evaluation:              Outcome Evaluation: A&Ox1, lethargic all day. VSS on RA. NSR on monitor. staight cath x1. Caregiver at bedside.

## 2022-06-09 NOTE — CASE MANAGEMENT/SOCIAL WORK
Continued Stay Note  [facility]     Patient Name: Marcos Acuña  MRN: [id number]  Today's Date: 6/9/2022    Admit Date: 6/7/2022     Discharge Plan     Row Name 06/09/22 1142       Plan    Plan Home with caregivers    Patient/Family in Agreement with Plan yes  Spoke with daughter    Plan Comments Just had long discussion on GOC with daughter. They are wanting to take pt. home with caregivers and most likely Hospice. Daughter wants referral to Hospice while here to dc to home. Daughter would like MD to call pt's wife to update on condition.    Final Discharge Disposition Code 30 - still a patient               Discharge Codes    No documentation.               Expected Discharge Date and Time     Expected Discharge Date Expected Discharge Time    [place] 10, 2022             Letty León RN

## 2022-06-09 NOTE — PROGRESS NOTES
"                    Clinical Nutrition       Patient Name: Marcos Acuña  YOB: 1931  MRN: [id number]  Date of Encounter: 06/09/22 14:10 EDT  Admission date: 6/7/2022      Reason for Visit   Identified at risk by screening criteria      EMR  Reviewed   Yes    Height: Height: 170.2 cm (67\")  Weight: Weight: 94.2 kg (207 lb 10.8 oz) (06/07/22 2300)  BMI: BMI (Calculated): 32.5    Problem:    Altered mental status, unspecified altered mental status type    Benign essential hypertension    Dementia (HCC)    Chronic deep vein thrombosis (DVT) (HCC)    Hypothyroidism    Type 2 diabetes mellitus without complication (HCC)    Obstructive sleep apnea syndrome    Elevated troponin    Acute UTI    History of bacteremia, E.coli    Dysphagia    Productive cough    (6/8) MBS SLP Swallowing Diagnosis: moderate, oral dysphagia, mild-moderate, pharyngeal dysphagia.  SLP Diet Recommendation: mechanical soft with no mixed consistencies, nectar thick liquids     Reported/Observed/Food/Nutrition Related - Comments     Patient sleeping at time of visit, caregiver at bedside.  caregiver reports patient with good appetite at home, she feels patient wife does not give patient enough food to eat.  She reports patient wakes up around 11am and usually eats breakfast around 12.  His wife only offers him either scrambled eggs and water or cereal milk and water for breakfast.  Patient is not offered any other meals until dinner meal.  Usually eats only two meals a day.  Caregiver reports patient daughter has spoken with patient wife to encourage her to offer patient more meals and calories.  Caregiver asked RD to provide a list of high protein snacks to offer patient before bed-time.  Good intake during admission, ate 100% of his meals yesterday.  Too drowsy to eat today.      Reports about 5-10lb weight loss in the past 6 months.      Current Nutrition Prescription     Diet Dysphagia; IV - Mechanical Soft No Mixed Consistencies; " Nectar / Syrup Thick; Extra Sauce / Gravy; Consistent Carbohydrate  No active supplement orders      Average Intake from Charting: no intake documented     Nutrition Diagnosis     Problem Inadequate oral intake   Etiology Limited food availability    Signs/Symptoms Reported per caregiver    Status:    Actions     Follow treatment progress, Care plan reviewed, Interview for preferences, Encourage intake   Education regarding importance of nutrition, meal planning, snacks, appropriate calorie and protein needs.     Monitor Per Protocol      Juana Real RD,   Time Spent: 20min

## 2022-06-09 NOTE — CONSULTS
Diabetes Education    Patient Name:  Marcos Acuña  YOB: 1931  MRN: [id number]  Admit Date:  6/7/2022      Diabetes education consult noted, chart reviewed, noted pt condition, history of diabetes with A1c 6.2 on metformin at home, 24/7 caregivers at [facility]. Also noted family's current desire and request for hospice consult. Will defer diabetes education at this time, please reconsult for any specific need.       Electronically signed by:  Lizz Sunshine RN, [facility]  06/09/22 14:09 EDT

## 2022-06-09 NOTE — SIGNIFICANT NOTE
06/09/22 1553   SLP Deferred Reason   SLP Deferred Reason   (Attempted to f/u for dysphagia tx. Spoke to RN who reported pt unarousable. Has not been able to wake up for PO all day. Will f/u, as appropriate.)

## 2022-06-10 LAB
ANION GAP SERPL CALCULATED.3IONS-SCNC: 7 MMOL/L (ref 5–15)
BUN SERPL-MCNC: 10 MG/DL (ref 8–23)
BUN/CREAT SERPL: 14.1 (ref 7–25)
CALCIUM SPEC-SCNC: 8.3 MG/DL (ref 8.2–9.6)
CHLORIDE SERPL-SCNC: 113 MMOL/L (ref 98–107)
CO2 SERPL-SCNC: 26 MMOL/L (ref 22–29)
CREAT SERPL-MCNC: 0.71 MG/DL (ref 0.76–1.27)
DEPRECATED RDW RBC AUTO: 58.2 FL (ref 37–54)
EGFRCR SERPLBLD CKD-EPI 2021: 86.6 ML/MIN/1.73
ERYTHROCYTE [DISTWIDTH] IN BLOOD BY AUTOMATED COUNT: 18.5 % (ref 12.3–15.4)
GLUCOSE BLDC GLUCOMTR-MCNC: 115 MG/DL (ref 70–130)
GLUCOSE BLDC GLUCOMTR-MCNC: 122 MG/DL (ref 70–130)
GLUCOSE BLDC GLUCOMTR-MCNC: 137 MG/DL (ref 70–130)
GLUCOSE BLDC GLUCOMTR-MCNC: 173 MG/DL (ref 70–130)
GLUCOSE SERPL-MCNC: 104 MG/DL (ref 65–99)
HCT VFR BLD AUTO: 28.1 % (ref 37.5–51)
HGB BLD-MCNC: 9.3 G/DL (ref 13–17.7)
MAGNESIUM SERPL-MCNC: 1.8 MG/DL (ref 1.7–2.3)
MCH RBC QN AUTO: 28.4 PG (ref 26.6–33)
MCHC RBC AUTO-ENTMCNC: 33.1 G/DL (ref 31.5–35.7)
MCV RBC AUTO: 85.7 FL (ref 79–97)
NT-PROBNP SERPL-MCNC: 390.5 PG/ML (ref 0–1800)
PHOSPHATE SERPL-MCNC: 2.7 MG/DL (ref 2.5–4.5)
PLATELET # BLD AUTO: 168 10*3/MM3 (ref 140–450)
PMV BLD AUTO: 10.7 FL (ref 6–12)
POTASSIUM SERPL-SCNC: 3.7 MMOL/L (ref 3.5–5.2)
RBC # BLD AUTO: 3.28 10*6/MM3 (ref 4.14–5.8)
SODIUM SERPL-SCNC: 146 MMOL/L (ref 136–145)
WBC NRBC COR # BLD: 7.32 10*3/MM3 (ref 3.4–10.8)

## 2022-06-10 PROCEDURE — G0378 HOSPITAL OBSERVATION PER HR: HCPCS

## 2022-06-10 PROCEDURE — 82962 GLUCOSE BLOOD TEST: CPT

## 2022-06-10 PROCEDURE — 94799 UNLISTED PULMONARY SVC/PX: CPT

## 2022-06-10 PROCEDURE — 51798 US URINE CAPACITY MEASURE: CPT

## 2022-06-10 PROCEDURE — 63710000001 INSULIN LISPRO (HUMAN) PER 5 UNITS: Performed by: NURSE PRACTITIONER

## 2022-06-10 PROCEDURE — 92526 ORAL FUNCTION THERAPY: CPT

## 2022-06-10 PROCEDURE — 80048 BASIC METABOLIC PNL TOTAL CA: CPT | Performed by: HOSPITALIST

## 2022-06-10 PROCEDURE — 85027 COMPLETE CBC AUTOMATED: CPT | Performed by: HOSPITALIST

## 2022-06-10 PROCEDURE — 51702 INSERT TEMP BLADDER CATH: CPT

## 2022-06-10 PROCEDURE — 83880 ASSAY OF NATRIURETIC PEPTIDE: CPT | Performed by: HOSPITALIST

## 2022-06-10 PROCEDURE — 99224 PR SBSQ OBSERVATION CARE/DAY 15 MINUTES: CPT | Performed by: NURSE PRACTITIONER

## 2022-06-10 PROCEDURE — 83735 ASSAY OF MAGNESIUM: CPT | Performed by: HOSPITALIST

## 2022-06-10 PROCEDURE — 84100 ASSAY OF PHOSPHORUS: CPT | Performed by: HOSPITALIST

## 2022-06-10 RX ORDER — SODIUM CHLORIDE 450 MG/100ML
20 INJECTION, SOLUTION INTRAVENOUS CONTINUOUS
Status: ACTIVE | OUTPATIENT
Start: 2022-06-10 | End: 2022-06-11

## 2022-06-10 RX ADMIN — IPRATROPIUM BROMIDE AND ALBUTEROL SULFATE 3 ML: .5; 3 SOLUTION RESPIRATORY (INHALATION) at 16:25

## 2022-06-10 RX ADMIN — INSULIN LISPRO 2 UNITS: 100 INJECTION, SOLUTION INTRAVENOUS; SUBCUTANEOUS at 20:25

## 2022-06-10 RX ADMIN — LEVOTHYROXINE SODIUM 75 MCG: 0.07 TABLET ORAL at 09:31

## 2022-06-10 RX ADMIN — IPRATROPIUM BROMIDE AND ALBUTEROL SULFATE 3 ML: .5; 3 SOLUTION RESPIRATORY (INHALATION) at 12:25

## 2022-06-10 RX ADMIN — IPRATROPIUM BROMIDE AND ALBUTEROL SULFATE 3 ML: .5; 3 SOLUTION RESPIRATORY (INHALATION) at 08:08

## 2022-06-10 RX ADMIN — IPRATROPIUM BROMIDE AND ALBUTEROL SULFATE 3 ML: .5; 3 SOLUTION RESPIRATORY (INHALATION) at 03:03

## 2022-06-10 RX ADMIN — RIVASTIGMINE 1 PATCH: 9.5 PATCH TRANSDERMAL at 09:37

## 2022-06-10 RX ADMIN — Medication 10 ML: at 09:32

## 2022-06-10 RX ADMIN — CEFUROXIME AXETIL 500 MG: 250 TABLET, FILM COATED ORAL at 20:24

## 2022-06-10 RX ADMIN — QUETIAPINE FUMARATE 25 MG: 25 TABLET ORAL at 00:45

## 2022-06-10 RX ADMIN — CEFUROXIME AXETIL 500 MG: 250 TABLET, FILM COATED ORAL at 09:31

## 2022-06-10 RX ADMIN — TAMSULOSIN HYDROCHLORIDE 0.8 MG: 0.4 CAPSULE ORAL at 09:31

## 2022-06-10 RX ADMIN — SODIUM CHLORIDE 20 ML/HR: 4.5 INJECTION, SOLUTION INTRAVENOUS at 16:17

## 2022-06-10 RX ADMIN — IPRATROPIUM BROMIDE AND ALBUTEROL SULFATE 3 ML: .5; 3 SOLUTION RESPIRATORY (INHALATION) at 23:08

## 2022-06-10 RX ADMIN — MEMANTINE 10 MG: 10 TABLET ORAL at 09:31

## 2022-06-10 RX ADMIN — RIVAROXABAN 10 MG: 10 TABLET, FILM COATED ORAL at 09:32

## 2022-06-10 RX ADMIN — QUETIAPINE FUMARATE 25 MG: 25 TABLET ORAL at 20:24

## 2022-06-10 RX ADMIN — MEMANTINE 10 MG: 10 TABLET ORAL at 20:24

## 2022-06-10 NOTE — DISCHARGE PLACEMENT REQUEST
"Gail Rubio (89 y.o. Male)     Referred by Dr. ARIC Patel  PCP none listed  Dx-per Ct Scan on 1/12/2022: Moderate generalized atrophy and chronic small vessel disease  throughout the supratentorial white matter. 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 3F, S301/1       Discharge Date       Discharge Disposition       Discharge Destination                               Attending Provider: Seth Patel MD    Allergies: Contrast Dye, Iodine, Other, Prednisone & Diphenhydramine    Isolation: None   Infection: None   Code Status: No CPR   Advance Care Planning Activity    Ht: 170.2 cm (67\")   Wt: 94.2 kg (207 lb 10.8 oz)    Admission Cmt: None   Principal Problem: Altered mental status, unspecified altered mental status type [R41.82]                 Active Insurance as of 6/7/2022     Primary Coverage     Payor Plan Insurance Group Employer/Plan Group    HUMANA MEDICARE REPLACEMENT HUMANA MEDICARE REPLACEMENT [id number]     Payor Plan Address Payor Plan Phone Number Payor Plan Fax Number Effective Dates    [address] [address] [phone]  7/1/2018 - None Entered    [facility] [address]       Subscriber Name Subscriber Birth Date Member ID       GAIL RUBIO 5/31/1931 [id number]                 Emergency Contacts      (Rel.) Home Phone Work Phone Mobile Phone    Anabel Grace (Daughter) [phone] -- [phone]    MIRELA RUBIO (Son) [phone] -- [phone]    Thelma Salinas (Care Giver) [phone] -- [phone]    RAMIORWILBERT (Spouse) " [phone] -- [phone]    KWAME GOLDEN (Grandchild) [phone] -- [phone]            Emergency Contact Information     Name Relation Home Work Mobile    Anabel Grace Daughter [phone]    MIRELA RUBIO Son [phone]    SalinasThelma gomez Care Giver [phone]    WILBERT RUBIO Spouse [phone]    KWAME GOLDEN Grandchild [phone]          Insurance Information                HUMANA MEDICARE REPLACEMENT/HUMANA MEDICARE REPLACEMENT Phone: [phone]    Subscriber: Marcos Rubio Subscriber#: [id number]    Group#: [id number] Precert#: --             History & Physical      Angy Boss DO at 22 [facility]              [facility] Medicine Services  HISTORY AND PHYSICAL    Patient Name: Marcos Rubio  : 1931  MRN: [id number]  Primary Care Physician: Abdirashid Mccabe MD  Date of admission: 2022    Subjective   Subjective     Chief Complaint:  Altered mental status    HPI:  Marcos Rubio is a 89 y.o. male with medical history of hypothyroidism, T2DM, HTN, MIREILLE w/ CPAP use, chronic LLE DVT on chronic anticoagulation (Xarelto) and dementia who was brought to the ED for evaluation of altered mental status. Patient is confused, he is able to answer some of my questions and wife is present assisting with HPI. Per wife, this morning the patient was not waking up and has been sleeping more for the past few days. His glucose was 84 and he was given some sugar water by the home health nurse and glucose came up to 121 but he was still not waking up. He was moving but not opening his eyes, his nurse was able to feed him some eggs for breakfast but he still did not open his eyes. EMS was called. Patient brought to the ED for evaluation and found to have UTI. Wife also reports patient with productive cough since recent hospitalization (see summary below) and has had increased BLE edema with  erythema for the past few days.    Recent hospitalization 5/21-5/26-22 for UTI/E.coli bacteremia with similar presentation. Followed by [facility] B[facility] and was treated with Zosyn initially, switched to Invanz, and eventually rocephin 2 grams IV in hospital and then at discharge was given 1 gram rocephin IM x 5 days and then transitioned to cefuoxime 500mg oral twice daily for 2 weeks. Urine culture = pansensitive E.coli; BC positive for E.coli by [facility].    Review of Systems   Constitutional: Positive for activity change, appetite change and fatigue. Negative for chills and fever.   HENT: Positive for congestion.    Respiratory: Positive for cough and shortness of breath.    Cardiovascular: Positive for leg swelling.   Genitourinary: Positive for dysuria.   Skin: Positive for color change (erythema BLE).   Neurological: Positive for weakness.   All other systems reviewed and are negative.     All other systems reviewed and are negative.     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 Otherwise pertinent FHx was reviewed and unremarkable.     Social History:  reports that he has quit smoking. He smoked 2.00 packs per day. He has never used smokeless tobacco. He reports current  alcohol use of about 1.0 standard drink of alcohol per week. He reports that he does not use drugs.  Social History     Social History Narrative   • Not on file       Medications:  Accu-Chek FastClix Lancets, B-D SINGLE USE SWABS REGULAR, Mirabegron ER, QUEtiapine, acetaminophen, cefuroxime, cyanocobalamin, glucose blood, ipratropium, levocetirizine, levothyroxine, melatonin, memantine, metFORMIN, rivaroxaban, rivastigmine, and tamsulosin    Allergies   Allergen Reactions   • Contrast Dye Anaphylaxis   • Iodine Unknown - High Severity     Pt states eyes start watering.    • Other Anaphylaxis   • Prednisone & Diphenhydramine Rash       Objective   Objective     Vital Signs:   Temp:  [97.1 °F (36.2 °C)] 97.1 °F (36.2 °C)  Heart Rate:  [66-75] 71  Resp:  [16] 16  BP: (127-147)/(62-80) 130/64    Physical Exam  Vitals reviewed.   Constitutional:       General: He is not in acute distress.     Appearance: He is obese. He is not toxic-appearing.   HENT:      Head: Normocephalic and atraumatic.      Nose: Nose normal.      Mouth/Throat:      Mouth: Mucous membranes are moist.      Pharynx: Oropharynx is clear.   Eyes:      General: No scleral icterus.     Extraocular Movements: Extraocular movements intact.      Pupils: Pupils are equal, round, and reactive to light.   Cardiovascular:      Rate and Rhythm: Normal rate and regular rhythm.      Pulses: Normal pulses.      Heart sounds: Normal heart sounds.   Pulmonary:      Effort: Pulmonary effort is normal. No respiratory distress.      Breath sounds: Rales (scattered posterior bases) present.   Abdominal:      General: Bowel sounds are normal. There is no distension.      Palpations: Abdomen is soft.      Tenderness: There is no abdominal tenderness. There is no right CVA tenderness, left CVA tenderness or guarding.   Musculoskeletal:      Cervical back: Normal range of motion and neck supple.      Right lower le+ Pitting Edema present.      Left lower le+ Pitting  Edema present.   Skin:     General: Skin is warm.      Capillary Refill: Capillary refill takes less than 2 seconds.      Findings: Erythema (BLE, warm to touch; tender LLE) present.      Comments: Facial flushing     Neurological:      Mental Status: He is alert. He is disoriented.   Psychiatric:         Mood and Affect: Mood normal.         Behavior: Behavior normal.        Results Reviewed:  I have personally reviewed most recent indicated data and agree with findings including:  [x]  Laboratory  [x]  Radiology  [x]  EKG/Telemetry  []  Pathology  [x]  Cardiac/Vascular Studies  [x]  Old records  []  Other:    LAB RESULTS:      Lab 06/07/22  1539   WBC 8.45   HEMOGLOBIN 10.3*   HEMATOCRIT 32.0*   PLATELETS 205   NEUTROS ABS 2.03   EOS ABS 0.25   MCV 88.2   PROCALCITONIN 0.08   LACTATE 1.7         Lab 06/07/22  1539   SODIUM 146*   POTASSIUM 4.2   CHLORIDE 112*   CO2 29.0   ANION GAP 5.0   BUN 11   CREATININE 0.81   EGFR 83.2   GLUCOSE 99   CALCIUM 8.7   MAGNESIUM 2.0         Lab 06/07/22  1539   TOTAL PROTEIN 6.1   ALBUMIN 2.60*   GLOBULIN 3.5   ALT (SGPT) 15   AST (SGOT) 25   BILIRUBIN 0.5   ALK PHOS 97         Lab 06/07/22  1539   PROBNP 254.1   TROPONIN T 0.067*                 Brief Urine Lab Results  (Last result in the past 365 days)      Color   Clarity   Blood   Leuk Est   Nitrite   Protein   CREAT   Urine HCG        06/07/22 1538 Yellow   Clear   Negative   Small (1+)   Negative   Negative               Microbiology Results (last 10 days)     Procedure Component Value - Date/Time    COVID PRE-OP / PRE-PROCEDURE SCREENING ORDER (NO ISOLATION) - Swab, Nasopharynx [[id number]]  (Normal) Collected: 06/07/22 1633    Lab Status: Final result Specimen: Swab from Nasopharynx Updated: 06/07/22 1705    Narrative:      The following orders were created for panel order COVID PRE-OP / PRE-PROCEDURE SCREENING ORDER (NO ISOLATION) - Swab, Nasopharynx.  Procedure                               Abnormality         Status                      ---------                               -----------         ------                     COVID-19 and FLU A/B PCR...[[id number]]  Normal              Final result                 Please view results for these tests on the individual orders.    COVID-19 and FLU A/B PCR - Swab, Nasopharynx [[id number]]  (Normal) Collected: 06/07/22 1633    Lab Status: Final result Specimen: Swab from Nasopharynx Updated: 06/07/22 1705     COVID19 Not Detected     Influenza A PCR Not Detected     Influenza B PCR Not Detected    Narrative:      Fact sheet for providers: https://www.fda.gov/media/423164/download    Fact sheet for patients: https://www.fda.gov/media/458150/download    Test performed by PCR.          CT Head Without Contrast    Result Date: 6/7/2022  DATE OF EXAM: 6/7/2022 4:57 PM  PROCEDURE: CT HEAD WO CONTRAST-  INDICATIONS: AMS  COMPARISON: Head CT 1/12/2022  TECHNIQUE: Routine transaxial and coronal reconstruction images were obtained through the head without the administration of contrast. Automated exposure control and iterative reconstruction methods were used.  The radiation dose reduction device was turned on for each scan per the ALARA (As Low as Reasonably Achievable) protocol.  FINDINGS: No acute intracranial hemorrhage. No acute large territory infarct. Redemonstration of scattered and confluent subcortical and periventricular white matter hypodensities which are nonspecific and can be seen in the setting of chronic small vessel ischemic change. No extra-axial collections. No midline shift or herniation. Mild global cerebral volume loss. Normal size and configuration of the ventricles commensurate with degree of cerebral volume loss. Unremarkable appearance of the orbits. The mastoid air cells are clear. There is scattered mucosal thickening of the ethmoid air cells and mucosal mucosal thickening in the left sphenoid sinus, similar to prior.      Impression: No acute intracranial findings. Stable  chronic and senescent changes as detailed above.  This report was finalized on 6/7/2022 5:12 PM by Berhane Hendrix MD.      CT Chest Without Contrast Diagnostic    Result Date: 6/7/2022  CT CHEST WO CONTRAST DIAGNOSTIC-  Date of Exam: 6/7/2022 8:08 PM  Indication: Respiratory illness, nondiagnostic xray; R41.82-Altered mental status, unspecified; Z78.9-Other specified health status; N39.0-Urinary tract infection, site not specified  confusion. History of pyelonephritis and cystitis.  Comparison: CT the abdomen pelvis 05/23/2022, chest x-ray 06/07/2022, chest CT 05/29/2020  Technique: Serial and axial CT images of the chest were obtained. Reconstructions in the coronal and sagittal planes were performed. Automated exposure control and iterative reconstruction methods were used.  FINDINGS: Hilum and Mediastinum: There are no pathologically enlarged hilar mediastinal lymph nodes. There is cardiomegaly. There is mild coronary artery atherosclerotic calcification. There is aortic valvular calcification. There is no pericardial effusion. Thoracic aorta has normal caliber. Pulmonary arteries are enlarged.  Lung Parenchyma and Pleura: Moderate size bilateral pleural effusions. Respiratory motion limits evaluation lung parenchyma. There is mild peripheral septal thickening. There is mild bibasilar subsegmental atelectasis. The appearance is not significantly changed compared to the previous CT the abdomen pelvis.  Upper Abdomen: Large parapelvic cyst right kidney.  Soft tissues: Unremarkable.  Osseous structures: No aggressive focal lytic or sclerotic osseous lesions. Osteopenia. Compression deformity along the superior endplate of L1 is unchanged.      Impression: 1.  there are moderate bilateral pleural effusions with bibasilar basilar subsegmental atelectasis. Bibasilar pneumonia less likely but cannot be excluded on this exam. No associated lymphadenopathy.. Pulmonary edema with pleural effusions with atelectasis favored  over infection based off exam. Clinical correlation is recommended. 2.  Cardiomegaly with enlarged pulmonary arteries, correlate for pulmonary artery hypertension.    This report was finalized on 6/7/2022 8:20 PM by Delores Cid MD.      XR Chest 1 View    Result Date: 6/7/2022  DATE OF EXAM: 6/7/2022 3:40 PM  PROCEDURE: XR CHEST 1 VW-  INDICATIONS: AMS protocol  COMPARISON: Chest x-ray 5/22/2022  TECHNIQUE: Single radiographic AP view of the chest was obtained.  FINDINGS: Mild low lung volumes with stable cardiomegaly and mildly accentuated cardiac mediastinal silhouette. Mild increase in diffuse interstitial prominence possibly reflective of bronchovascular crowding in the setting of low lung volumes of chronic interstitial changes interstitial edema could appear similarly. There are left lung base opacities favored reflect atelectasis. No pneumothorax. No significant pleural effusion. Previously questioned nodule in the left lower lobe is not well appreciated on today's exam.      Impression: Low lung volumes with mild increase in diffuse interstitial prominence which may reflect bronchovascular crowding although a component of interstitial edema, or possibly atypical/viral infection, cannot be excluded. Ill-defined opacities at the left lung base are favored reflect atelectasis.  This report was finalized on 6/7/2022 3:54 PM by Berhane Hendrix MD.        Results for orders placed during the hospital encounter of 05/21/22    Adult Transthoracic Echo Complete w/ Color, Spectral and Contrast if necessary per protocol    Interpretation Summary  · Left ventricular wall thickness is consistent with mild concentric hypertrophy.  · There is moderate calcification of the aortic valve.  · The right ventricular cavity is mild to moderately dilated.  · The right atrial cavity is mildly dilated.  · Left ventricular ejection fraction appears to be 61 - 65%.  · There is significant calcification of the aortic valve with reduced  excursion. However the acoustic windows are so poor that the transducer cannot align properly to get a good measurement across the aortic valve. Consider ADARSH if clinically indicated      Assessment & Plan   Assessment & Plan       Altered mental status, unspecified altered mental status type    Benign essential hypertension    Dementia (HCC)    Chronic deep vein thrombosis (DVT) (HCC)    Hypothyroidism    Type 2 diabetes mellitus without complication (HCC)    Obstructive sleep apnea syndrome    Elevated troponin    Acute UTI    History of bacteremia, E.coli    Dysphagia    Productive cough    Altered mental status   Dementia  - chronic, mild increase confusion likely metabolic  - wife reports previous UTI's typically cause similar presentation  - consider neurology consult if not improving  - caregiver will be coming in to relieve the wife to assist with the patient    Acute UTI  - continue vancomycin, zosyn started in ED   - urine culture pending  - prior culture with pansensitive E.coli, was still on oral Ceftin that was started on 6/1/22 for planned 2 week course after completion of IV/IM antibiotics noted above  - bladder scan Q 4 hours  - acute urinary retention protocol    History of E.coli bacteremia  - consult infectious disease, followed w/ prior admission by Dr. Jose E Radford  - blood cultures obtained in the ED    Productive cough  Dysphagia  - has been coughing since last admission, chest xray concerning for possible atypical/viral infection, interstitial edema, favored atelectasis  - previous dysphagia with thickened liquids and small pieces of food per wife  - will have SLP evaluate to r/o aspiration  - obtain CT chest w/o contrast  -- cannot rule out pneumonia, volume overload favored w/ bilateral pleural effusions  -- lasix 20 mg IV x 1  - will check MRSA pcr, urine antigens for s.pneumo, legionella and full respiratory panel    Elevated troponin  - has previously been chronically elevated  -  initial troponin 0.067, repeat now  - EKG stable, repeat in am    Chronic DVT on Xarelto  - continue Xarelto    T2DM  - A1c 6.20 on 5/22/22  - FSBG ACHS w/ low dose SSI    Hypothyroidism  - TSH 2.730 on 5/22/22  - continue levothyroxine    MIREILLE  - BIPAP at HS      DVT prophylaxis:  Xarelto    CODE STATUS:   Medical Intervention Limits: NO intubation (DNI)  Code Status (Patient has no pulse and is not breathing): No CPR (Do Not Attempt to Resuscitate)  Medical Interventions (Patient has pulse or is breathing): Limited Support      This note has been completed as part of a split-shared workflow.     Signature: Electronically signed by ALMA ROSA Perez, 06/07/22, 8:54 PM EDT        Attending   Admission Attestation       I have seen and examined the patient, performing an independent face-to-face diagnostic evaluation with plan of care reviewed and developed with the advanced practice clinician (APC).      Brief Summary Statement:   Marcos Acuña is a 89 y.o. male with recent hospitalization for AMS and UTI presented with decrease energy and AMS. His wife is bedside and reports that he would not woke up and was fairly lethargic at home. EMS was called and was brought to the hospital. Here in the ED, she reports that he is more awake and alert and back to his baseline.     Remainder of detailed HPI is as noted by APC and has been reviewed and/or edited by me for completeness.    Attending Physical Exam:  Constitutional: Awake, elderly male resting in bed   Eyes: PERRLA, sclerae anicteric, no conjunctival injection  HENT: NCAT, mucous membranes moist  Neck: Supple, no thyromegaly, no lymphadenopathy, trachea midline  Respiratory: Clear to auscultation bilaterally, nonlabored respirations   Cardiovascular: RRR, no murmurs, rubs, or gallops,   Gastrointestinal: Positive bowel sounds, soft, nontender, nondistended  Musculoskeletal: + bilateral ankle edema,  Psychiatric:  cooperative  Neurologic: Oriented x 2 , speech  clear  Skin: No rashes    Brief Assessment/Plan :  See detailed assessment and plan developed with APC which I have reviewed and/or edited for completeness.        Admission Status: I believe that this patient meets INPATIENT status due to AMS.  I feel patient’s risk for adverse outcomes and need for care warrant INPATIENT evaluation and I predict the patient’s care encounter to likely last beyond 2 midnights.        Angy Boss DO  06/07/22                        Electronically signed by Angy Boss DO at 06/07/22 8794         Current Facility-Administered Medications   Medication Dose Route Frequency Provider Last Rate Last Admin   • acetaminophen (TYLENOL) tablet 650 mg  650 mg Oral Q4H PRN Melly Sears APRN        Or   • acetaminophen (TYLENOL) 160 MG/5ML solution 650 mg  650 mg Oral Q4H PRN Melly eSars APRN        Or   • acetaminophen (TYLENOL) suppository 650 mg  650 mg Rectal Q4H PRN Melly Sears APRN       • cefuroxime (CEFTIN) tablet 500 mg  500 mg Oral Q12H Jose E Radford MD   500 mg at 06/10/22 0931   • dextrose (D50W) (25 g/50 mL) IV injection 25 g  25 g Intravenous Q15 Min PRN Melly Sears APRN       • dextrose (GLUTOSE) oral gel 15 g  15 g Oral Q15 Min PRN Melly Sears APRN       • glucagon (human recombinant) (GLUCAGEN DIAGNOSTIC) injection 1 mg  1 mg Intramuscular Q15 Min PRN Melly Sears APRN       • haloperidol lactate (HALDOL) injection 0.5 mg  0.5 mg Intravenous Q6H PRN Melly Sears APRN   0.5 mg at 06/08/22 1640   • Insulin Lispro (humaLOG) injection 0-7 Units  0-7 Units Subcutaneous 4x Daily With Meals & Nightly Melly Sears APRN   2 Units at 06/08/22 2009   • ipratropium-albuterol (DUO-NEB) nebulizer solution 3 mL  3 mL Nebulization Q4H - RT Melly Sears APRN   3 mL at 06/10/22 1225   • levothyroxine (SYNTHROID, LEVOTHROID) tablet 75 mcg  75 mcg Oral Daily Melly Sears APRN   75 mcg at 06/10/22 0931   • melatonin tablet 5 mg  5  mg Oral Nightly PRN Melly Sears, APRN   5 mg at 22   • memantine (NAMENDA) tablet 10 mg  10 mg Oral Q12H Melly Sears, APRN   10 mg at 06/10/22 0931   • ondansetron (ZOFRAN) tablet 4 mg  4 mg Oral Q6H PRN Melly Sears, APRN        Or   • ondansetron (ZOFRAN) injection 4 mg  4 mg Intravenous Q6H PRN Melly Sears, APRN       • potassium chloride (KLOR-CON) packet 40 mEq  40 mEq Oral PRN Seth Patel MD       • potassium chloride (MICRO-K) CR capsule 40 mEq  40 mEq Oral PRN Seth Patel MD   40 mEq at 22 1539   • QUEtiapine (SEROquel) tablet 25 mg  25 mg Oral Nightly PRN Melly Sears, APRN   25 mg at 06/10/22 0045   • rivaroxaban (XARELTO) tablet 10 mg  10 mg Oral Daily Melly Sears, APRN   10 mg at 06/10/22 0932   • rivastigmine (EXELON) 9.5 MG/24HR patch 1 patch  1 patch Transdermal Daily Melly Sears APRN   1 patch at 06/10/22 0937   • sodium chloride 0.9 % flush 10 mL  10 mL Intravenous PRN Melly Sears, APRN       • sodium chloride 0.9 % flush 10 mL  10 mL Intravenous Q12H Melly Sears, APRN   10 mL at 06/10/22 0932   • sodium chloride 0.9 % flush 10 mL  10 mL Intravenous PRN Melly Sears, APRN       • tamsulosin (FLOMAX) 24 hr capsule 0.8 mg  0.8 mg Oral Daily Seth Patel MD   0.8 mg at 06/10/22 0931        Physician Progress Notes (last 72 hours)      Seth Patel MD at 22 191              [facility] Medicine Services  PROGRESS NOTE    Patient Name: Marcos Acuña  : 1931  MRN: [id number]    Date of Admission: 2022  Primary Care Physician: No primary care provider on file.    Subjective   Subjective     CC:   hypernatremia    HPI:   Caregiver in room.  High serum sodium.  Looks dehydrated on exam.  No ability to keep himself hydrated at this point.    ROS:    Unable to assess    Objective   Objective     Vital Signs:   Temp:  [97.3 °F (36.3 °C)-98.3 °F (36.8 °C)] 98.3 °F (36.8 °C)  Heart Rate:  [63-91] 65  Resp:  [16-18]  16  BP: (111-137)/(61-73) 126/63     Physical Exam:    Constitutional: sleepy, NAD, wakes easily  HENT: NCAT, dry tongue, head extended back to breathe  Respiratory: Clear to auscultation bilaterally, weak inspiratory effort  Cardiovascular: RRR, s1 and s2  Gastrointestinal: Positive bowel sounds, soft, nontender, obese abdomen  Musculoskeletal: No bilateral ankle edema  Psychiatric: flat affect, cooperative  Neurologic: Oriented x 1, generalized weakness, speech clear  Skin: dry, + skin tenting    Results Reviewed:  LAB RESULTS:      Lab 06/08/22  0946 06/07/22  1539   WBC 6.95 8.45   HEMOGLOBIN 10.0* 10.3*   HEMATOCRIT 29.5* 32.0*   PLATELETS 203 205   NEUTROS ABS 2.39 2.03   IMMATURE GRANS (ABS) 0.02  --    LYMPHS ABS 3.68*  --    MONOS ABS 0.40  --    EOS ABS 0.33 0.25   MCV 83.8 88.2   PROCALCITONIN  --  0.08   LACTATE  --  1.7         Lab 06/09/22  0655 06/08/22  0946 06/07/22  1539   SODIUM 149* 143 146*   POTASSIUM 3.6 3.2* 4.2   CHLORIDE 114* 108* 112*   CO2 28.0 24.0 29.0   ANION GAP 7.0 11.0 5.0   BUN 12 11 11   CREATININE 0.80 0.89 0.81   EGFR 83.5 80.9 83.2   GLUCOSE 82 103* 99   CALCIUM 8.5 8.6 8.7   MAGNESIUM  --   --  2.0         Lab 06/07/22  1539   TOTAL PROTEIN 6.1   ALBUMIN 2.60*   GLOBULIN 3.5   ALT (SGPT) 15   AST (SGOT) 25   BILIRUBIN 0.5   ALK PHOS 97         Lab 06/07/22  2219 06/07/22  1539   PROBNP  --  254.1   TROPONIN T 0.057* 0.067*                 Brief Urine Lab Results  (Last result in the past 365 days)      Color   Clarity   Blood   Leuk Est   Nitrite   Protein   CREAT   Urine HCG        06/07/22 1538 Yellow   Clear   Negative   Small (1+)   Negative   Negative                 Microbiology Results Abnormal     Procedure Component Value - Date/Time    Blood Culture - Blood, Hand, Left [[id number]]  (Normal) Collected: 06/07/22 1630    Lab Status: Preliminary result Specimen: Blood from Hand, Left Updated: 06/09/22 1701     Blood Culture No growth at 2 days    Blood Culture - Blood,  Hand, Right [[id number]]  (Normal) Collected: 06/07/22 1615    Lab Status: Preliminary result Specimen: Blood from Hand, Right Updated: 06/09/22 1701     Blood Culture No growth at 2 days    Urine Culture - Urine, Urine, Catheter [[id number]]  (Normal) Collected: 06/07/22 1538    Lab Status: Final result Specimen: Urine, Catheter Updated: 06/08/22 1905     Urine Culture No growth    S. Pneumo Ag Urine or CSF - Urine, Urine, Clean Catch [[id number]]  (Normal) Collected: 06/08/22 0632    Lab Status: Final result Specimen: Urine, Clean Catch Updated: 06/08/22 0953     Strep Pneumo Ag Negative    Legionella Antigen, Urine - Urine, Urine, Clean Catch [[id number]]  (Normal) Collected: 06/08/22 0632    Lab Status: Final result Specimen: Urine, Clean Catch Updated: 06/08/22 0953     LEGIONELLA ANTIGEN, URINE Negative    MRSA Screen, PCR (Inpatient) - Swab, Nares [[id number]]  (Normal) Collected: 06/08/22 0632    Lab Status: Final result Specimen: Swab from Nares Updated: 06/08/22 0918     MRSA PCR Negative    Narrative:      MRSA Negative    Respiratory Panel PCR w/COVID-19(SARS-CoV-2) FREDDIE/URI/JAYSON/PAD/COR/MAD/JAYLA In-House, NP Swab in UTM/VTM, 3-4 HR TAT - Swab, Nasopharynx [[id number]]  (Normal) Collected: 06/08/22 0632    Lab Status: Final result Specimen: Swab from Nasopharynx Updated: 06/08/22 0858     ADENOVIRUS, PCR Not Detected     Coronavirus 229E Not Detected     Coronavirus HKU1 Not Detected     Coronavirus NL63 Not Detected     Coronavirus OC43 Not Detected     COVID19 Not Detected     Human Metapneumovirus Not Detected     Human Rhinovirus/Enterovirus Not Detected     Influenza A PCR Not Detected     Influenza B PCR Not Detected     Parainfluenza Virus 1 Not Detected     Parainfluenza Virus 2 Not Detected     Parainfluenza Virus 3 Not Detected     Parainfluenza Virus 4 Not Detected     RSV, PCR Not Detected     Bordetella pertussis pcr Not Detected     Bordetella parapertussis PCR Not Detected     Chlamydophila  pneumoniae PCR Not Detected     Mycoplasma pneumo by PCR Not Detected    Narrative:      In the setting of a positive respiratory panel with a viral infection PLUS a negative procalcitonin without other underlying concern for bacterial infection, consider observing off antibiotics or discontinuation of antibiotics and continue supportive care. If the respiratory panel is positive for atypical bacterial infection (Bordetella pertussis, Chlamydophila pneumoniae, or Mycoplasma pneumoniae), consider antibiotic de-escalation to target atypical bacterial infection.    COVID PRE-OP / PRE-PROCEDURE SCREENING ORDER (NO ISOLATION) - Swab, Nasopharynx [[id number]]  (Normal) Collected: 06/07/22 1633    Lab Status: Final result Specimen: Swab from Nasopharynx Updated: 06/07/22 1705    Narrative:      The following orders were created for panel order COVID PRE-OP / PRE-PROCEDURE SCREENING ORDER (NO ISOLATION) - Swab, Nasopharynx.  Procedure                               Abnormality         Status                     ---------                               -----------         ------                     COVID-19 and FLU A/B PCR...[[id number]]  Normal              Final result                 Please view results for these tests on the individual orders.    COVID-19 and FLU A/B PCR - Swab, Nasopharynx [[id number]]  (Normal) Collected: 06/07/22 1633    Lab Status: Final result Specimen: Swab from Nasopharynx Updated: 06/07/22 1705     COVID19 Not Detected     Influenza A PCR Not Detected     Influenza B PCR Not Detected    Narrative:      Fact sheet for providers: https://www.fda.gov/media/601735/download    Fact sheet for patients: https://www.fda.gov/media/805110/download    Test performed by PCR.          CT Chest Without Contrast Diagnostic    Result Date: 6/7/2022  CT CHEST WO CONTRAST DIAGNOSTIC-  Date of Exam: 6/7/2022 8:08 PM  Indication: Respiratory illness, nondiagnostic xray; R41.82-Altered mental status, unspecified;  Z78.9-Other specified health status; N39.0-Urinary tract infection, site not specified  confusion. History of pyelonephritis and cystitis.  Comparison: CT the abdomen pelvis 05/23/2022, chest x-ray 06/07/2022, chest CT 05/29/2020  Technique: Serial and axial CT images of the chest were obtained. Reconstructions in the coronal and sagittal planes were performed. Automated exposure control and iterative reconstruction methods were used.  FINDINGS: Hilum and Mediastinum: There are no pathologically enlarged hilar mediastinal lymph nodes. There is cardiomegaly. There is mild coronary artery atherosclerotic calcification. There is aortic valvular calcification. There is no pericardial effusion. Thoracic aorta has normal caliber. Pulmonary arteries are enlarged.  Lung Parenchyma and Pleura: Moderate size bilateral pleural effusions. Respiratory motion limits evaluation lung parenchyma. There is mild peripheral septal thickening. There is mild bibasilar subsegmental atelectasis. The appearance is not significantly changed compared to the previous CT the abdomen pelvis.  Upper Abdomen: Large parapelvic cyst right kidney.  Soft tissues: Unremarkable.  Osseous structures: No aggressive focal lytic or sclerotic osseous lesions. Osteopenia. Compression deformity along the superior endplate of L1 is unchanged.      Impression: 1.  there are moderate bilateral pleural effusions with bibasilar basilar subsegmental atelectasis. Bibasilar pneumonia less likely but cannot be excluded on this exam. No associated lymphadenopathy.. Pulmonary edema with pleural effusions with atelectasis favored over infection based off exam. Clinical correlation is recommended. 2.  Cardiomegaly with enlarged pulmonary arteries, correlate for pulmonary artery hypertension.    This report was finalized on 6/7/2022 8:20 PM by Delores Cid MD.      FL Video Swallow With Speech Single Contrast    Result Date: 6/8/2022  EXAMINATION: FL VIDEO SWALLOW W SPEECH  SINGLE-CONTRAST-  INDICATION: dysphagia; R41.82-Altered mental status, unspecified; Z78.9-Other specified health status; N39.0-Urinary tract infection, site not specified  TECHNIQUE: 2 minutes and 12 seconds of fluoroscopic time was used for this exam. 7 associated fluoroscopic loops were saved. The patient was evaluated in the seated lateral position while taking a variety of consistencies of barium by mouth under the direction of speech pathology.  COMPARISON: NONE  FINDINGS: 2 minutes and 12 seconds of fluoroscopy prior for a modified barium swallow. Please see speech therapy report for full details and recommendations.       Impression: Fluoroscopy prior for a modified barium swallow. Please see speech therapy report for full details and recommendations.    This report was finalized on 6/8/2022 4:47 PM by Abdirashid Luna.        Results for orders placed during the hospital encounter of 05/21/22    Adult Transthoracic Echo Complete w/ Color, Spectral and Contrast if necessary per protocol    Interpretation Summary  · Left ventricular wall thickness is consistent with mild concentric hypertrophy.  · There is moderate calcification of the aortic valve.  · The right ventricular cavity is mild to moderately dilated.  · The right atrial cavity is mildly dilated.  · Left ventricular ejection fraction appears to be 61 - 65%.  · There is significant calcification of the aortic valve with reduced excursion. However the acoustic windows are so poor that the transducer cannot align properly to get a good measurement across the aortic valve. Consider ADARSH if clinically indicated      I have reviewed the medications:  Scheduled Meds:cefuroxime, 500 mg, Oral, Q12H  insulin lispro, 0-7 Units, Subcutaneous, 4x Daily With Meals & Nightly  ipratropium-albuterol, 3 mL, Nebulization, Q4H - RT  levothyroxine, 75 mcg, Oral, Daily  memantine, 10 mg, Oral, Q12H  rivaroxaban, 10 mg, Oral, Daily  rivastigmine, 1 patch, Transdermal,  Daily  sodium chloride, 10 mL, Intravenous, Q12H  [START ON 6/10/2022] tamsulosin, 0.8 mg, Oral, Daily      Continuous Infusions:sodium chloride, 20 mL/hr      PRN Meds:.•  acetaminophen **OR** acetaminophen **OR** acetaminophen  •  dextrose  •  dextrose  •  glucagon (human recombinant)  •  haloperidol lactate  •  melatonin  •  ondansetron **OR** ondansetron  •  potassium chloride  •  potassium chloride  •  QUEtiapine  •  sodium chloride  •  sodium chloride    Assessment & Plan   Assessment & Plan     Active Hospital Problems    Diagnosis  POA   • **Altered mental status, unspecified altered mental status type [R41.82]  Yes   • History of bacteremia, E.coli [Z87.898]  Unknown   • Dysphagia [R13.10]  Unknown   • Productive cough [R05.8]  Unknown   • Acute UTI [N39.0]  Yes   • Elevated troponin [R77.8]  Yes   • Chronic deep vein thrombosis (DVT) ([facility]) [I82.509]  Yes   • Dementia (HCC) [F03.90]  Yes   • Obstructive sleep apnea syndrome [G47.33]  Yes   • Type 2 diabetes mellitus without complication (HCC) [E11.9]  Yes   • Hypothyroidism [E03.9]  Yes   • Benign essential hypertension [I10]  Yes      Resolved Hospital Problems   No resolved problems to display.        Brief Hospital Course to date:  Marcos Acuña is a 89 y.o. male with history of hypothyroidism, DM, Hypertension, MIREILLE, chronic Left Lower Extremity DVT on chronic anticoagulation, and dementia who was brought to the ER for AMS.  He was not waking up and has been sleeping more over the last few days.  He hasn't been using CPAP.        AMS  Encephalopathy  Dementia  - CT head without contrast - no acute findings  - would benefit from using CPAP    Previous UTI / History of E. Coli bacteremia  - ID consultation  - procalcitonin encouraging  - on oral Ceftin every 12 hours    Cough  Dysphagia  - coughing since last admission  - previous dysphagia  - respiratory panel   - CT less consistent with pneumonia  - on oral abx    Acute Hypernatremia  - present on  admission  - worsening free water deficit  - with level of dementia this will likely progress  - Hospice consulted    Dehydration  - free water deficit associated with Dementia and inability to drink enough water    Sleep Apnea  - order CPAP with nasal mask  - reported to be refusing to wear it    Chronic DVT on Xarelto  - continue Xarelto    Hypothyroidism  - continue levothyroxine   - last TSH was within normal limits in May 2022    DM  - insulin SS    Called Daughter - Anabel Grace this evening  Called Spouse at 7 pm    [place], spouse in agreement with home with Hospice - she wants him comfortable    DVT prophylaxis:  Medical DVT prophylaxis orders are present.       AM-PAC 6 Clicks Score (PT): 12 (06/09/22 0800)    Disposition: I expect the patient to be discharged home with Hospice on Friday    CODE STATUS:   Code Status and Medical Interventions:   Ordered at: 06/07/22 2007     Medical Intervention Limits:    NO intubation (DNI)     Code Status (Patient has no pulse and is not breathing):    No CPR (Do Not Attempt to Resuscitate)     Medical Interventions (Patient has pulse or is breathing):    Limited Support       Seth Patel MD  06/09/22              Electronically signed by Seth Patel MD at 06/09/22 1922     Jose E Radford MD at 06/09/22 0708          INFECTIOUS DISEASE Progress Note    Marcos Acuña  5/31/1931  [id number]    Admission Date: 6/7/2022      Requesting Provider: No ref. provider found  Evaluating Physician: Jose E Radford MD    Reason for Consultation: Encephalopathy/acute mental status change      History of present illness:    6/8/22: Patient is a 89 y.o. male with dementia, type 2 diabetes mellitus, obstructive sleep apnea, chronic lower extremity DVT, hypertension, hypothyroidism, and a recent bacteremic E. coli UTI who is seen today for evaluation of acute mental status changes.  I saw him initially on 5/23/2022 when he presented with an obvious UTI and sepsis with too  numerous to count white blood cells on urinalysis and blood/urine cultures positive for relatively sensitive E. coli.  He was treated with a course of parenteral antibiotic therapy in the hospital and was subsequently switched to complete parenteral antibiotic therapy with 5 additional days of intramuscular ceftriaxone and discharge.  He was then switched to oral cefuroxime and remains on oral cefuroxime.  I saw him recently in telehealth follow-up at which point he appeared remarkably better.  He has a history of sleep apnea and has not been using CPAP.  He was found to be severely confused after awakening, prompting return to the emergency room last night.  He has remained afebrile and had no leukocytosis with a white blood cell count of 8.5.  His procalcitonin was normal at 1.08.  His viral respiratory panel PCR was negative.  He did have some residual leukocytosis on urinalysis with 6-12 white blood cells.  Chest CT scan revealed moderate bilateral pleural effusions with bibasilar atelectasis and he was also noted to have cardiomegaly with enlarged pulmonary arteries consistent with pulmonary artery hypertension.  Previous echocardiogram on 5/22 revealed right atrial and ventricular enlargement.  After blood cultures were obtained, he was started on intravenous vancomycin and Zosyn.  He has remained afebrile overnight.  His acute confusional state has resolved per his caregiver and he is now back to his baseline cognitive function.  He denies headache, earache, sore throat.  He denies cough and sputum production.  He denies nausea, vomiting, and diarrhea.  He denies dysuria.    6/9/22: He has remained afebrile. Urine culture from 6/70 is negative.  Blood cultures from 6/7 are no growth so far. His caregiver indicates that he has felt better today.  He has had no nausea or vomiting.  He has had no cough or sputum production.    Past Medical History:   Diagnosis Date   • Dementia (HCC)    • Diabetes (HCC)    • H/O  blood clots    • Hypertensive disorder    • Hypothyroidism    • Osteoarthritis    • Type 2 diabetes mellitus, uncontrolled        Past Surgical History:   Procedure Laterality Date   • BUNIONECTOMY     • HIP TROCHANTERIC NAILING WITH INTRAMEDULLARY HIP SCREW N/A 6/9/2020    Procedure: ADVANCED TROCHANTERIC FEMORAL NAIL (ATFN) RIGHT HIP/FEMUR;  Surgeon: Mejia Meza MD;  Location: [facility];  Service: Orthopedics;  Laterality: N/A;   • RECTAL FISTULECTOMY     • SKIN CANCER EXCISION         Family History   Problem Relation Age of Onset   • Stroke Mother    • Heart attack Father        Social History     Socioeconomic History   • Marital status:    Tobacco Use   • Smoking status: Former Smoker     Packs/day: 2.00   • Smokeless tobacco: Never Used   Vaping Use   • Vaping Use: Never used   Substance and Sexual Activity   • Alcohol use: Yes     Alcohol/week: 1.0 standard drink     Types: 1 Glasses of wine per week   • Drug use: No   • Sexual activity: Defer       Allergies   Allergen Reactions   • Contrast Dye Anaphylaxis   • Iodine Unknown - High Severity     Pt states eyes start watering.    • Other Anaphylaxis   • Prednisone & Diphenhydramine Rash         Medication:    Current Facility-Administered Medications:   •  acetaminophen (TYLENOL) tablet 650 mg, 650 mg, Oral, Q4H PRN **OR** acetaminophen (TYLENOL) 160 MG/5ML solution 650 mg, 650 mg, Oral, Q4H PRN **OR** acetaminophen (TYLENOL) suppository 650 mg, 650 mg, Rectal, Q4H PRN, Melly Sears, APRN  •  cefuroxime (CEFTIN) tablet 500 mg, 500 mg, Oral, Q12H, Jose E Radford MD, 500 mg at 06/08/22 2009  •  dextrose (D50W) (25 g/50 mL) IV injection 25 g, 25 g, Intravenous, Q15 Min PRN, Melly Sears, APRN  •  dextrose (GLUTOSE) oral gel 15 g, 15 g, Oral, Q15 Min PRN, Melly Sears, APRN  •  glucagon (human recombinant) (GLUCAGEN DIAGNOSTIC) injection 1 mg, 1 mg, Intramuscular, Q15 Min PRN, Melly Sears, APRN  •  haloperidol lactate (HALDOL)  injection 0.5 mg, 0.5 mg, Intravenous, Q6H PRN, Melly Sears, APRN, 0.5 mg at 06/08/22 1640  •  Insulin Lispro (humaLOG) injection 0-7 Units, 0-7 Units, Subcutaneous, 4x Daily With Meals & Nightly, Melly Sears, APRN, 2 Units at 06/08/22 2009  •  ipratropium-albuterol (DUO-NEB) nebulizer solution 3 mL, 3 mL, Nebulization, Q4H - RT, Melly Sears, APRN, 3 mL at 06/09/22 0318  •  levothyroxine (SYNTHROID, LEVOTHROID) tablet 75 mcg, 75 mcg, Oral, Daily, Melly Sears APRN, 75 mcg at 06/08/22 1539  •  melatonin tablet 5 mg, 5 mg, Oral, Nightly PRN, Melly Sears, APRN, 5 mg at 06/08/22 2008  •  memantine (NAMENDA) tablet 10 mg, 10 mg, Oral, Q12H, Melly Sears, APRN, 10 mg at 06/08/22 2009  •  ondansetron (ZOFRAN) tablet 4 mg, 4 mg, Oral, Q6H PRN **OR** ondansetron (ZOFRAN) injection 4 mg, 4 mg, Intravenous, Q6H PRN, Melly Sears, APRN  •  potassium chloride (KLOR-CON) packet 40 mEq, 40 mEq, Oral, PRN, Seth Patel MD  •  potassium chloride (MICRO-K) CR capsule 40 mEq, 40 mEq, Oral, PRN, Seth Patel MD, 40 mEq at 06/08/22 1539  •  QUEtiapine (SEROquel) tablet 25 mg, 25 mg, Oral, Nightly PRN, Melly Sears, APRN, 25 mg at 06/08/22 2008  •  rivaroxaban (XARELTO) tablet 10 mg, 10 mg, Oral, Daily, Melly Sears, APRN, 10 mg at 06/08/22 1539  •  rivastigmine (EXELON) 9.5 MG/24HR patch 1 patch, 1 patch, Transdermal, Daily, Melly Sears APRN, 1 patch at 06/08/22 0917  •  sodium chloride 0.9 % flush 10 mL, 10 mL, Intravenous, PRN, Melly Sears APRN  •  sodium chloride 0.9 % flush 10 mL, 10 mL, Intravenous, Q12H, Melly Sears APRN, 10 mL at 06/08/22 2009  •  sodium chloride 0.9 % flush 10 mL, 10 mL, Intravenous, PRN, Melly Sears APRN  •  tamsulosin (FLOMAX) 24 hr capsule 0.4 mg, 0.4 mg, Oral, Daily, Melly Sears APRN, 0.4 mg at 06/08/22 1539    Antibiotics:  Anti-Infectives (From admission, onward)    Ordered     Dose/Rate Route Frequency Start Stop    06/08/22 1423  cefuroxime (CEFTIN) tablet  500 mg        Ordering Provider: Jose E Radford MD    500 mg Oral Every 12 Hours Scheduled 22 2100 22 20522 180  vancomycin 1750 mg/500 mL 0.9% NS IVPB ([facility])        Ordering Provider: Jose E Jauregui MD    20 mg/kg × 93 kg  250 mL/hr over 2 Hours Intravenous Once 22 1803 06/07/22 2143    06/07/22 180  piperacillin-tazobactam (ZOSYN) 3.375 g in iso-osmotic dextrose 50 ml (premix)        Ordering Provider: Jose E Jauregui MD    3.375 g  over 30 Minutes Intravenous Once 22 1803 06/07/22 1954            Review of Systems:  He is demented and his review of systems is unreliable.      Physical Exam:   Vital Signs  Temp (24hrs), Av.9 °F (36.6 °C), Min:97.8 °F (36.6 °C), Max:98 °F (36.7 °C)    Temp  Min: 97.8 °F (36.6 °C)  Max: 98 °F (36.7 °C)  BP  Min: 124/73  Max: 143/64  Pulse  Min: 71  Max: 97  Resp  Min: 16  Max: 18  SpO2  Min: 93 %  Max: 100 %    GENERAL: .Sleeping and in no acute distress  HEENT: No labial ulcers  NECK: Supple   HEART: RRR; 1-2/6 systolic murmur  LUNGS: Clear to auscultation bilaterally without wheezing, rales, rhonchi. Normal respiratory effort. Nonlabored.   ABDOMEN: Soft, nontender, nondistended.  No rebound or guarding. NO mass or HSM.  EXT: 1-2+ bilateral lower extremity edema  :  Without Meek catheter.  MSK: No focal joint swelling  SKIN: Warm and dry without cutaneous eruptions on Inspection/palpation.    NEURO: Sleeping      Laboratory Data    Results from last 7 days   Lab Units 22  0946 22  1539   WBC 10*3/mm3 6.95 8.45   HEMOGLOBIN g/dL 10.0* 10.3*   HEMATOCRIT % 29.5* 32.0*   PLATELETS 10*3/mm3 203 205     Results from last 7 days   Lab Units 22  0946   SODIUM mmol/L 143   POTASSIUM mmol/L 3.2*   CHLORIDE mmol/L 108*   CO2 mmol/L 24.0   BUN mg/dL 11   CREATININE mg/dL 0.89   GLUCOSE mg/dL 103*   CALCIUM mg/dL 8.6     Results from last 7 days   Lab Units 22  1539   ALK PHOS U/L 97   BILIRUBIN mg/dL 0.5   ALT (SGPT)  U/L 15   AST (SGOT) U/L 25             Results from last 7 days   Lab Units 06/07/22  1539   LACTATE mmol/L 1.7         Results from last 7 days   Lab Units 06/08/22  0946   VANCOMYCIN RM mcg/mL 10.00     Estimated Creatinine Clearance: 59.1 mL/min (by C-G formula based on SCr of 0.89 mg/dL).      Microbiology:  No results found for: ACANTHNAEG, AFBCX, BPERTUSSISCX, BLOODCX  No results found for: BCIDPCR, CXREFLEX, CSFCX, CULTURETIS  No results found for: CULTURES, HSVCX, URCX  No results found for: EYECULTURE, GCCX, HSVCULTURE, LABHSV  No results found for: LEGIONELLA, MRSACX, MUMPSCX, MYCOPLASCX  No results found for: NOCARDIACX, STOOLCX  Urine Culture   Date Value Ref Range Status   06/07/2022 No growth  Preliminary     No results found for: VIRALCULTU, WOUNDCX        Radiology:  Imaging Results (Last 72 Hours)     Procedure Component Value Units Date/Time    FL Video Swallow With Speech Single Contrast [[id number]] Collected: 06/08/22 1556     Updated: 06/08/22 1650    Narrative:      EXAMINATION: FL VIDEO SWALLOW W SPEECH SINGLE-CONTRAST-     INDICATION: dysphagia; R41.82-Altered mental status, unspecified;  Z78.9-Other specified health status; N39.0-Urinary tract infection, site  not specified     TECHNIQUE: 2 minutes and 12 seconds of fluoroscopic time was used for  this exam. 7 associated fluoroscopic loops were saved. The patient was  evaluated in the seated lateral position while taking a variety of  consistencies of barium by mouth under the direction of speech  pathology.     COMPARISON: NONE     FINDINGS: 2 minutes and 12 seconds of fluoroscopy prior for a modified  barium swallow. Please see speech therapy report for full details and  recommendations.          Impression:      Fluoroscopy prior for a modified barium swallow. Please see  speech therapy report for full details and recommendations.         This report was finalized on 6/8/2022 4:47 PM by Abdirashid Luna.       CT Chest Without Contrast  Diagnostic [[id number]] Collected: 06/07/22 2014     Updated: 06/07/22 2023    Narrative:      CT CHEST WO CONTRAST DIAGNOSTIC-     Date of Exam: 6/7/2022 8:08 PM     Indication: Respiratory illness, nondiagnostic xray; R41.82-Altered  mental status, unspecified; Z78.9-Other specified health status;  N39.0-Urinary tract infection, site not specified  confusion. History of  pyelonephritis and cystitis.     Comparison: CT the abdomen pelvis 05/23/2022, chest x-ray 06/07/2022,  chest CT 05/29/2020     Technique: Serial and axial CT images of the chest were obtained.  Reconstructions in the coronal and sagittal planes were performed.   Automated exposure control and iterative reconstruction methods were  used.     FINDINGS:  Hilum and Mediastinum: There are no pathologically enlarged hilar  mediastinal lymph nodes. There is cardiomegaly. There is mild coronary  artery atherosclerotic calcification. There is aortic valvular  calcification. There is no pericardial effusion. Thoracic aorta has  normal caliber. Pulmonary arteries are enlarged.     Lung Parenchyma and Pleura: Moderate size bilateral pleural effusions.  Respiratory motion limits evaluation lung parenchyma. There is mild  peripheral septal thickening. There is mild bibasilar subsegmental  atelectasis. The appearance is not significantly changed compared to the  previous CT the abdomen pelvis.     Upper Abdomen: Large parapelvic cyst right kidney.     Soft tissues: Unremarkable.     Osseous structures: No aggressive focal lytic or sclerotic osseous  lesions. Osteopenia. Compression deformity along the superior endplate  of L1 is unchanged.       Impression:      1.  there are moderate bilateral pleural effusions with bibasilar  basilar subsegmental atelectasis. Bibasilar pneumonia less likely but  cannot be excluded on this exam. No associated lymphadenopathy..  Pulmonary edema with pleural effusions with atelectasis favored over  infection based off exam.  Clinical correlation is recommended.  2.  Cardiomegaly with enlarged pulmonary arteries, correlate for  pulmonary artery hypertension.           This report was finalized on 6/7/2022 8:20 PM by Delores Cid MD.       CT Head Without Contrast [[id number]] Collected: 06/07/22 1708     Updated: 06/07/22 1715    Narrative:      DATE OF EXAM: 6/7/2022 4:57 PM     PROCEDURE: CT HEAD WO CONTRAST-     INDICATIONS: AMS     COMPARISON: Head CT 1/12/2022     TECHNIQUE: Routine transaxial and coronal reconstruction images were  obtained through the head without the administration of contrast.  Automated exposure control and iterative reconstruction methods were  used.     The radiation dose reduction device was turned on for each scan per the  ALARA (As Low as Reasonably Achievable) protocol.     FINDINGS:  No acute intracranial hemorrhage. No acute large territory infarct.  Redemonstration of scattered and confluent subcortical and  periventricular white matter hypodensities which are nonspecific and can  be seen in the setting of chronic small vessel ischemic change. No  extra-axial collections. No midline shift or herniation. Mild global  cerebral volume loss. Normal size and configuration of the ventricles  commensurate with degree of cerebral volume loss. Unremarkable  appearance of the orbits. The mastoid air cells are clear. There is  scattered mucosal thickening of the ethmoid air cells and mucosal  mucosal thickening in the left sphenoid sinus, similar to prior.        Impression:      No acute intracranial findings. Stable chronic and senescent changes as  detailed above.     This report was finalized on 6/7/2022 5:12 PM by Berhane Hendrix MD.       XR Chest 1 View [[id number]] Collected: 06/07/22 1551     Updated: 06/07/22 1557    Narrative:      DATE OF EXAM: 6/7/2022 3:40 PM     PROCEDURE: XR CHEST 1 VW-     INDICATIONS: AMS protocol     COMPARISON: Chest x-ray 5/22/2022     TECHNIQUE: Single radiographic AP view of  the chest was obtained.     FINDINGS:  Mild low lung volumes with stable cardiomegaly and mildly accentuated  cardiac mediastinal silhouette. Mild increase in diffuse interstitial  prominence possibly reflective of bronchovascular crowding in the  setting of low lung volumes of chronic interstitial changes interstitial  edema could appear similarly. There are left lung base opacities favored  reflect atelectasis. No pneumothorax. No significant pleural effusion.  Previously questioned nodule in the left lower lobe is not well  appreciated on today's exam.        Impression:      Low lung volumes with mild increase in diffuse interstitial prominence  which may reflect bronchovascular crowding although a component of  interstitial edema, or possibly atypical/viral infection, cannot be  excluded. Ill-defined opacities at the left lung base are favored  reflect atelectasis.     This report was finalized on 6/7/2022 3:54 PM by Berhane Hendrix MD.           I read his radiographic images.      Echocardiogram of 5/22/2022  Interpretation Summary    · Left ventricular wall thickness is consistent with mild concentric hypertrophy.  · There is moderate calcification of the aortic valve.  · The right ventricular cavity is mild to moderately dilated.  · The right atrial cavity is mildly dilated.  · Left ventricular ejection fraction appears to be 61 - 65%.  · There is significant calcification of the aortic valve with reduced excursion. However the acoustic windows are so poor that the transducer cannot align properly to get a good measurement across the aortic valve. Consider ADARSH if clinically indicated        Impression:   1.  Encephalopathy/altered mental status- this is most likely secondary to hypoxia related to his untreated obstructive sleep apnea.  He does not appear to have recurrent sepsis with no fever, leukocytosis, or overt new focus of infection.    2.  Dementia  3.  Obstructive sleep apnea-untreated  4.  Right  heart failure/pulmonary artery hypertension-he has a dilated right ventricular cavity, dilated right atrial cavity, and dilated pulmonary arteries. This is most likely secondary to chronic nocturnal hypoxia from his untreated obstructive sleep apnea.  5.  Lower extremity edema-secondary to right heart failure  6.  E. coli bacteremia/UTI-with positive blood and urine cultures cultures on .  He has received a course of intravenous antibiotics and is now been on oral cefuroxime.  I will plan to give him at least 10 more days of oral cefuroxime.  7.  Pleural effusion/atelectasis- his chest CT scan reveals pleural effusions and atelectasis although these are fairly mild.  There is no evidence of a focal pneumonia.  8.  Chronic lower extremity DVT  9.  Type 2 diabetes mellitus        PLAN/RECOMMENDATIONS:   1.  Cefuroxime 500 mg p.o. twice daily  2.  Treatment for right heart failure and obstructive sleep apnea per Dr. Patel.    He has clinically improved.   I discussed his complex situation again with his caregiver today.  I will sign off.         Jose E Radford MD  2022  07:08 EDT                  Electronically signed by Jose E Radford MD at 22 1752     Seth Patel MD at 22 1310              [facility]  PROGRESS NOTE    Patient Name: Marcos Acuña  : 1931  MRN: [id number]    Date of Admission: 2022  Primary Care Physician: No primary care provider on file.    Subjective   Subjective     CC:   Not waking up / sleeping more    HPI:   Recently hospitalized for AMS / UTI / Bacteremia.  Started on [employer] IV abx and has caregiver in room.  He falls asleep quickly    ROS:    Gen- denies chills, denies fevers  CV- No chest pain, palpitations  Resp- No cough, dyspnea  GI- No N/V/D, abd pain    Objective   Objective     Vital Signs:   Temp:  [97.1 °F (36.2 °C)-97.9 °F (36.6 °C)] 97.9 °F (36.6 °C)  Heart Rate:  [] 75  Resp:   [16-20] 18  BP: (127-151)/(59-83) 127/59     Physical Exam:    Constitutional: sleepy, NAD, wakes easily  HENT: NCAT, dry tongue  Respiratory: Clear to auscultation bilaterally, respiratory effort normal   Cardiovascular: RRR, s1 and s2  Gastrointestinal: Positive bowel sounds, soft, nontender, obese abdomen  Musculoskeletal: No bilateral ankle edema  Psychiatric: Appropriate affect, cooperative  Neurologic: Oriented x 2, generalized weakness, speech clear  Skin: dry, + skin tenting    Results Reviewed:  LAB RESULTS:      Lab 06/08/22  0946 06/07/22  1539   WBC 6.95 8.45   HEMOGLOBIN 10.0* 10.3*   HEMATOCRIT 29.5* 32.0*   PLATELETS 203 205   NEUTROS ABS 2.39 2.03   IMMATURE GRANS (ABS) 0.02  --    LYMPHS ABS 3.68*  --    MONOS ABS 0.40  --    EOS ABS 0.33 0.25   MCV 83.8 88.2   PROCALCITONIN  --  0.08   LACTATE  --  1.7         Lab 06/08/22  0946 06/07/22  1539   SODIUM 143 146*   POTASSIUM 3.2* 4.2   CHLORIDE 108* 112*   CO2 24.0 29.0   ANION GAP 11.0 5.0   BUN 11 11   CREATININE 0.89 0.81   EGFR 80.9 83.2   GLUCOSE 103* 99   CALCIUM 8.6 8.7   MAGNESIUM  --  2.0         Lab 06/07/22  1539   TOTAL PROTEIN 6.1   ALBUMIN 2.60*   GLOBULIN 3.5   ALT (SGPT) 15   AST (SGOT) 25   BILIRUBIN 0.5   ALK PHOS 97         Lab 06/07/22  2219 06/07/22  1539   PROBNP  --  254.1   TROPONIN T 0.057* 0.067*                 Brief Urine Lab Results  (Last result in the past 365 days)      Color   Clarity   Blood   Leuk Est   Nitrite   Protein   CREAT   Urine HCG        06/07/22 1538 Yellow   Clear   Negative   Small (1+)   Negative   Negative                 Microbiology Results Abnormal     Procedure Component Value - Date/Time    Urine Culture - Urine, Urine, Catheter [[id number]]  (Normal) Collected: 06/07/22 1538    Lab Status: Preliminary result Specimen: Urine, Catheter Updated: 06/08/22 1245     Urine Culture No growth    S. Pneumo Ag Urine or CSF - Urine, Urine, Clean Catch [[id number]]  (Normal) Collected: 06/08/22 0632    Lab  Status: Final result Specimen: Urine, Clean Catch Updated: 06/08/22 0953     Strep Pneumo Ag Negative    Legionella Antigen, Urine - Urine, Urine, Clean Catch [[id number]]  (Normal) Collected: 06/08/22 0632    Lab Status: Final result Specimen: Urine, Clean Catch Updated: 06/08/22 0953     LEGIONELLA ANTIGEN, URINE Negative    MRSA Screen, PCR (Inpatient) - Swab, Nares [[id number]]  (Normal) Collected: 06/08/22 0632    Lab Status: Final result Specimen: Swab from Nares Updated: 06/08/22 0918     MRSA PCR Negative    Narrative:      MRSA Negative    Respiratory Panel PCR w/COVID-19(SARS-CoV-2) FREDDIE/URI/JAYSON/PAD/COR/MAD/JAYLA In-House, NP Swab in UTM/VTM, 3-4 HR TAT - Swab, Nasopharynx [[id number]]  (Normal) Collected: 06/08/22 0632    Lab Status: Final result Specimen: Swab from Nasopharynx Updated: 06/08/22 0858     ADENOVIRUS, PCR Not Detected     Coronavirus 229E Not Detected     Coronavirus HKU1 Not Detected     Coronavirus NL63 Not Detected     Coronavirus OC43 Not Detected     COVID19 Not Detected     Human Metapneumovirus Not Detected     Human Rhinovirus/Enterovirus Not Detected     Influenza A PCR Not Detected     Influenza B PCR Not Detected     Parainfluenza Virus 1 Not Detected     Parainfluenza Virus 2 Not Detected     Parainfluenza Virus 3 Not Detected     Parainfluenza Virus 4 Not Detected     RSV, PCR Not Detected     Bordetella pertussis pcr Not Detected     Bordetella parapertussis PCR Not Detected     Chlamydophila pneumoniae PCR Not Detected     Mycoplasma pneumo by PCR Not Detected    Narrative:      In the setting of a positive respiratory panel with a viral infection PLUS a negative procalcitonin without other underlying concern for bacterial infection, consider observing off antibiotics or discontinuation of antibiotics and continue supportive care. If the respiratory panel is positive for atypical bacterial infection (Bordetella pertussis, Chlamydophila pneumoniae, or Mycoplasma pneumoniae),  consider antibiotic de-escalation to target atypical bacterial infection.    COVID PRE-OP / PRE-PROCEDURE SCREENING ORDER (NO ISOLATION) - Swab, Nasopharynx [[id number]]  (Normal) Collected: 06/07/22 1633    Lab Status: Final result Specimen: Swab from Nasopharynx Updated: 06/07/22 1705    Narrative:      The following orders were created for panel order COVID PRE-OP / PRE-PROCEDURE SCREENING ORDER (NO ISOLATION) - Swab, Nasopharynx.  Procedure                               Abnormality         Status                     ---------                               -----------         ------                     COVID-19 and FLU A/B PCR...[[id number]]  Normal              Final result                 Please view results for these tests on the individual orders.    COVID-19 and FLU A/B PCR - Swab, Nasopharynx [[id number]]  (Normal) Collected: 06/07/22 1633    Lab Status: Final result Specimen: Swab from Nasopharynx Updated: 06/07/22 1705     COVID19 Not Detected     Influenza A PCR Not Detected     Influenza B PCR Not Detected    Narrative:      Fact sheet for providers: https://www.fda.gov/media/839237/download    Fact sheet for patients: https://www.fda.gov/media/368268/download    Test performed by PCR.          CT Head Without Contrast    Result Date: 6/7/2022  DATE OF EXAM: 6/7/2022 4:57 PM  PROCEDURE: CT HEAD WO CONTRAST-  INDICATIONS: AMS  COMPARISON: Head CT 1/12/2022  TECHNIQUE: Routine transaxial and coronal reconstruction images were obtained through the head without the administration of contrast. Automated exposure control and iterative reconstruction methods were used.  The radiation dose reduction device was turned on for each scan per the ALARA (As Low as Reasonably Achievable) protocol.  FINDINGS: No acute intracranial hemorrhage. No acute large territory infarct. Redemonstration of scattered and confluent subcortical and periventricular white matter hypodensities which are nonspecific and can be seen in the  setting of chronic small vessel ischemic change. No extra-axial collections. No midline shift or herniation. Mild global cerebral volume loss. Normal size and configuration of the ventricles commensurate with degree of cerebral volume loss. Unremarkable appearance of the orbits. The mastoid air cells are clear. There is scattered mucosal thickening of the ethmoid air cells and mucosal mucosal thickening in the left sphenoid sinus, similar to prior.      Impression: No acute intracranial findings. Stable chronic and senescent changes as detailed above.  This report was finalized on 6/7/2022 5:12 PM by Berhane Hendrix MD.      CT Chest Without Contrast Diagnostic    Result Date: 6/7/2022  CT CHEST WO CONTRAST DIAGNOSTIC-  Date of Exam: 6/7/2022 8:08 PM  Indication: Respiratory illness, nondiagnostic xray; R41.82-Altered mental status, unspecified; Z78.9-Other specified health status; N39.0-Urinary tract infection, site not specified  confusion. History of pyelonephritis and cystitis.  Comparison: CT the abdomen pelvis 05/23/2022, chest x-ray 06/07/2022, chest CT 05/29/2020  Technique: Serial and axial CT images of the chest were obtained. Reconstructions in the coronal and sagittal planes were performed. Automated exposure control and iterative reconstruction methods were used.  FINDINGS: Hilum and Mediastinum: There are no pathologically enlarged hilar mediastinal lymph nodes. There is cardiomegaly. There is mild coronary artery atherosclerotic calcification. There is aortic valvular calcification. There is no pericardial effusion. Thoracic aorta has normal caliber. Pulmonary arteries are enlarged.  Lung Parenchyma and Pleura: Moderate size bilateral pleural effusions. Respiratory motion limits evaluation lung parenchyma. There is mild peripheral septal thickening. There is mild bibasilar subsegmental atelectasis. The appearance is not significantly changed compared to the previous CT the abdomen pelvis.  Upper  Abdomen: Large parapelvic cyst right kidney.  Soft tissues: Unremarkable.  Osseous structures: No aggressive focal lytic or sclerotic osseous lesions. Osteopenia. Compression deformity along the superior endplate of L1 is unchanged.      Impression: 1.  there are moderate bilateral pleural effusions with bibasilar basilar subsegmental atelectasis. Bibasilar pneumonia less likely but cannot be excluded on this exam. No associated lymphadenopathy.. Pulmonary edema with pleural effusions with atelectasis favored over infection based off exam. Clinical correlation is recommended. 2.  Cardiomegaly with enlarged pulmonary arteries, correlate for pulmonary artery hypertension.    This report was finalized on 6/7/2022 8:20 PM by Delores Cid MD.      XR Chest 1 View    Result Date: 6/7/2022  DATE OF EXAM: 6/7/2022 3:40 PM  PROCEDURE: XR CHEST 1 VW-  INDICATIONS: AMS protocol  COMPARISON: Chest x-ray 5/22/2022  TECHNIQUE: Single radiographic AP view of the chest was obtained.  FINDINGS: Mild low lung volumes with stable cardiomegaly and mildly accentuated cardiac mediastinal silhouette. Mild increase in diffuse interstitial prominence possibly reflective of bronchovascular crowding in the setting of low lung volumes of chronic interstitial changes interstitial edema could appear similarly. There are left lung base opacities favored reflect atelectasis. No pneumothorax. No significant pleural effusion. Previously questioned nodule in the left lower lobe is not well appreciated on today's exam.      Impression: Low lung volumes with mild increase in diffuse interstitial prominence which may reflect bronchovascular crowding although a component of interstitial edema, or possibly atypical/viral infection, cannot be excluded. Ill-defined opacities at the left lung base are favored reflect atelectasis.  This report was finalized on 6/7/2022 3:54 PM by Berhane Hendrix MD.        Results for orders placed during the hospital  encounter of 05/21/22    Adult Transthoracic Echo Complete w/ Color, Spectral and Contrast if necessary per protocol    Interpretation Summary  · Left ventricular wall thickness is consistent with mild concentric hypertrophy.  · There is moderate calcification of the aortic valve.  · The right ventricular cavity is mild to moderately dilated.  · The right atrial cavity is mildly dilated.  · Left ventricular ejection fraction appears to be 61 - 65%.  · There is significant calcification of the aortic valve with reduced excursion. However the acoustic windows are so poor that the transducer cannot align properly to get a good measurement across the aortic valve. Consider ADARSH if clinically indicated      I have reviewed the medications:  Scheduled Meds:[START ON 6/10/2022] Pharmacy Consult, , Does not apply, Once  insulin lispro, 0-7 Units, Subcutaneous, 4x Daily With Meals & Nightly  ipratropium-albuterol, 3 mL, Nebulization, Q4H - RT  levothyroxine, 75 mcg, Oral, Daily  memantine, 10 mg, Oral, Q12H  piperacillin-tazobactam, 3.375 g, Intravenous, Q8H  rivaroxaban, 10 mg, Oral, Daily  rivastigmine, 1 patch, Transdermal, Daily  sodium chloride, 10 mL, Intravenous, Q12H  tamsulosin, 0.4 mg, Oral, Daily  vancomycin, 1,500 mg, Intravenous, Q24H      Continuous Infusions:Pharmacy to dose vancomycin,       PRN Meds:.acetaminophen **OR** acetaminophen **OR** acetaminophen  •  dextrose  •  dextrose  •  glucagon (human recombinant)  •  haloperidol lactate  •  melatonin  •  ondansetron **OR** ondansetron  •  Pharmacy to dose vancomycin  •  potassium chloride  •  potassium chloride  •  QUEtiapine  •  sodium chloride  •  sodium chloride    Assessment & Plan   Assessment & Plan     Active Hospital Problems    Diagnosis  POA   • **Altered mental status, unspecified altered mental status type [R41.82]  Yes   • History of bacteremia, E.coli [Z87.898]  Unknown   • Dysphagia [R13.10]  Unknown   • Productive cough [R05.8]  Unknown   •  Acute UTI [N39.0]  Yes   • Elevated troponin [R77.8]  Yes   • Chronic deep vein thrombosis (DVT) (HCC) [I82.509]  Yes   • Dementia (HCC) [F03.90]  Yes   • Obstructive sleep apnea syndrome [G47.33]  Yes   • Type 2 diabetes mellitus without complication (HCC) [E11.9]  Yes   • Hypothyroidism [E03.9]  Yes   • Benign essential hypertension [I10]  Yes      Resolved Hospital Problems   No resolved problems to display.        Brief Hospital Course to date:  Marcos Acuña is a 89 y.o. male with history of hypothyroidism, DM, Hypertension, MIREILLE, chronic Left Lower Extremity DVT on chronic anticoagulation, and dementia who was brought to the ER for AMS.  He was not waking up and has been sleeping more over the last few days.  He hasn't been using CPAP.        AMS  Encephalopathy  Dementia  - CT head without contrast - no acute findings  - would benefit from using CPAP    Previous UTI / History of E. Coli bacteremia  - consider de-escalate to IV Ceftriaxone  - ID consultation  - procalcitonin encouraging    Cough (productive)  Dysphagia  - coughing since last admission  - previous dysphagia  - respiratory panel   - CT less consistent with pneumonia  - on Vanc / Zosyn    Sleep Apnea  - order CPAP with nasal mask    Chronic DVT on Xarelto  - continue Xarelto    Hypothyroidism  - continue levothyroxine     DM  - insulin SS        DVT prophylaxis:  Medical DVT prophylaxis orders are present.       AM-PAC 6 Clicks Score (PT): 10 (06/08/22 1019)    Disposition: I expect the patient to be discharged TBD    CODE STATUS:   Code Status and Medical Interventions:   Ordered at: 06/07/22 2007     Medical Intervention Limits:    NO intubation (DNI)     Code Status (Patient has no pulse and is not breathing):    No CPR (Do Not Attempt to Resuscitate)     Medical Interventions (Patient has pulse or is breathing):    Limited Support       Seth Patel MD  06/08/22              Electronically signed by Seth Patel MD at 06/08/22 0726           Consult Notes (last 72 hours)      Jose E Radford MD at 06/08/22 1423      Consult Orders    1. Inpatient Infectious Diseases Consult [[id number]] ordered by Seth Patel MD at 06/08/22 1324               INFECTIOUS DISEASE CONSULT/INITIAL HOSPITAL VISIT    Marcos Acuña  5/31/1931  [id number]    Date of Consult: 6/8/2022    Admission Date: 6/7/2022      Requesting Provider: No ref. provider found  Evaluating Physician: Jose E Radford MD    Reason for Consultation: Encephalopathy/acute mental status change      History of present illness:    Patient is a 89 y.o. male with dementia, type 2 diabetes mellitus, obstructive sleep apnea, chronic lower extremity DVT, hypertension, hypothyroidism, and a recent bacteremic E. coli UTI who is seen today for evaluation of acute mental status changes.  I saw him initially on 5/23/2022 when he presented with an obvious UTI and sepsis with too numerous to count white blood cells on urinalysis and blood/urine cultures positive for relatively sensitive E. coli.  He was treated with a course of parenteral antibiotic therapy in the hospital and was subsequently switched to complete parenteral antibiotic therapy with 5 additional days of intramuscular ceftriaxone and discharge.  He was then switched to oral cefuroxime and remains on oral cefuroxime.  I saw him recently in telehealth follow-up at which point he appeared remarkably better.  He has a history of sleep apnea and has not been using CPAP.  He was found to be severely confused after awakening, prompting return to the emergency room last night.  He has remained afebrile and had no leukocytosis with a white blood cell count of 8.5.  His procalcitonin was normal at 1.08.  His viral respiratory panel PCR was negative.  He did have some residual leukocytosis on urinalysis with 6-12 white blood cells.  Chest CT scan revealed moderate bilateral pleural effusions with bibasilar atelectasis and he was also noted to have  cardiomegaly with enlarged pulmonary arteries consistent with pulmonary artery hypertension.  Previous echocardiogram on 5/22 revealed right atrial and ventricular enlargement.  After blood cultures were obtained, he was started on intravenous vancomycin and Zosyn.  He has remained afebrile overnight.  His acute confusional state has resolved per his caregiver and he is now back to his baseline cognitive function.  He denies headache, earache, sore throat.  He denies cough and sputum production.  He denies nausea, vomiting, and diarrhea.  He denies dysuria.    Past Medical History:   Diagnosis Date   • Dementia (HCC)    • Diabetes (HCC)    • H/O blood clots    • Hypertensive disorder    • Hypothyroidism    • Osteoarthritis    • Type 2 diabetes mellitus, uncontrolled        Past Surgical History:   Procedure Laterality Date   • BUNIONECTOMY     • HIP TROCHANTERIC NAILING WITH INTRAMEDULLARY HIP SCREW N/A 6/9/2020    Procedure: ADVANCED TROCHANTERIC FEMORAL NAIL (ATFN) RIGHT HIP/FEMUR;  Surgeon: Mejia Meza MD;  Location: [facility];  Service: Orthopedics;  Laterality: N/A;   • RECTAL FISTULECTOMY     • SKIN CANCER EXCISION         Family History   Problem Relation Age of Onset   • Stroke Mother    • Heart attack Father        Social History     Socioeconomic History   • Marital status:    Tobacco Use   • Smoking status: Former Smoker     Packs/day: 2.00   • Smokeless tobacco: Never Used   Vaping Use   • Vaping Use: Never used   Substance and Sexual Activity   • Alcohol use: Yes     Alcohol/week: 1.0 standard drink     Types: 1 Glasses of wine per week   • Drug use: No   • Sexual activity: Defer       Allergies   Allergen Reactions   • Contrast Dye Anaphylaxis   • Iodine Unknown - High Severity     Pt states eyes start watering.    • Other Anaphylaxis   • Prednisone & Diphenhydramine Rash         Medication:    Current Facility-Administered Medications:   •  acetaminophen (TYLENOL) tablet 650 mg, 650  mg, Oral, Q4H PRN **OR** acetaminophen (TYLENOL) 160 MG/5ML solution 650 mg, 650 mg, Oral, Q4H PRN **OR** acetaminophen (TYLENOL) suppository 650 mg, 650 mg, Rectal, Q4H PRN, Mlely Sears, APRN  •  cefuroxime (CEFTIN) tablet 500 mg, 500 mg, Oral, Q12H, Jose E Radford MD  •  dextrose (D50W) (25 g/50 mL) IV injection 25 g, 25 g, Intravenous, Q15 Min PRN, Melly Sears, APRN  •  dextrose (GLUTOSE) oral gel 15 g, 15 g, Oral, Q15 Min PRN, Melly Sears, APRN  •  glucagon (human recombinant) (GLUCAGEN DIAGNOSTIC) injection 1 mg, 1 mg, Intramuscular, Q15 Min PRN, Melly Sears, APRN  •  haloperidol lactate (HALDOL) injection 0.5 mg, 0.5 mg, Intravenous, Q6H PRN, Melly Sears APRN, 0.5 mg at 06/08/22 1013  •  Insulin Lispro (humaLOG) injection 0-7 Units, 0-7 Units, Subcutaneous, 4x Daily With Meals & Nightly, Melly Sears APRVALERY  •  ipratropium-albuterol (DUO-NEB) nebulizer solution 3 mL, 3 mL, Nebulization, Q4H - RT, Melly Sears APRN, 3 mL at 06/08/22 1242  •  levothyroxine (SYNTHROID, LEVOTHROID) tablet 75 mcg, 75 mcg, Oral, Daily, Melly Sears, APRN  •  melatonin tablet 5 mg, 5 mg, Oral, Nightly PRN, Melly Sears APRN, 5 mg at 06/07/22 2355  •  memantine (NAMENDA) tablet 10 mg, 10 mg, Oral, Q12H, Melly Sears APRVALERY, 10 mg at 06/07/22 2331  •  ondansetron (ZOFRAN) tablet 4 mg, 4 mg, Oral, Q6H PRN **OR** ondansetron (ZOFRAN) injection 4 mg, 4 mg, Intravenous, Q6H PRN, Melly Sears, APRN  •  potassium chloride (KLOR-CON) packet 40 mEq, 40 mEq, Oral, PRN, Seth Patel MD  •  potassium chloride (MICRO-K) CR capsule 40 mEq, 40 mEq, Oral, PRN, Seth Patel MD  •  QUEtiapine (SEROquel) tablet 25 mg, 25 mg, Oral, Nightly PRN, Melly Sears, ALMA ROSA, 25 mg at 06/07/22 5755  •  rivaroxaban (XARELTO) tablet 10 mg, 10 mg, Oral, Daily, Melly Sears, ALMA ROSA  •  rivastigmine (EXELON) 9.5 MG/24HR patch 1 patch, 1 patch, Transdermal, Daily, Melly Sears APRN, 1 patch at 06/08/22 0917  •  sodium chloride  0.9 % flush 10 mL, 10 mL, Intravenous, PRN, Melly Sears, APRN  •  sodium chloride 0.9 % flush 10 mL, 10 mL, Intravenous, Q12H, Melly Sears, APRN, 10 mL at 22 0917  •  sodium chloride 0.9 % flush 10 mL, 10 mL, Intravenous, PRN, Melly Sears, APRN  •  tamsulosin (FLOMAX) 24 hr capsule 0.4 mg, 0.4 mg, Oral, Daily, Melly Sears, APRN    Antibiotics:  Anti-Infectives (From admission, onward)    Ordered     Dose/Rate Route Frequency Start Stop    22 1423  cefuroxime (CEFTIN) tablet 500 mg        Ordering Provider: Jose E Radford MD    500 mg Oral Every 12 Hours Scheduled 22 2100 22 [id number] 180  vancomycin 1750 mg/500 mL 0.9% NS IVPB ([facility])        Ordering Provider: Jose E Jauregui MD    20 mg/kg × 93 kg  250 mL/hr over 2 Hours Intravenous Once 22 1803 22 2143    22 1801  piperacillin-tazobactam (ZOSYN) 3.375 g in iso-osmotic dextrose 50 ml (premix)        Ordering Provider: Jose E Jauregui MD    3.375 g  over 30 Minutes Intravenous Once 22 1803 22 195            Review of Systems:  He is demented and his review of systems is unreliable.      Physical Exam:   Vital Signs  Temp (24hrs), Av.5 °F (36.4 °C), Min:97.1 °F (36.2 °C), Max:97.9 °F (36.6 °C)    Temp  Min: 97.1 °F (36.2 °C)  Max: 97.9 °F (36.6 °C)  BP  Min: 127/59  Max: 151/81  Pulse  Min: 66  Max: 116  Resp  Min: 16  Max: 20  SpO2  Min: 95 %  Max: 100 %    GENERAL: Awake and alert, in no acute distress.   HEENT: Normocephalic, atraumatic.  PERRL. EOMI. No conjunctival injection. No icterus. Oropharynx clear without evidence of thrush or exudate. No evidence of peridontal disease.    NECK: Supple without nuchal rigidity.  LYMPH: No cervical, axillary or inguinal lymphadenopathy.  HEART: RRR; 1-2/6 systolic murmur  LUNGS: Clear to auscultation bilaterally without wheezing, rales, rhonchi. Normal respiratory effort. Nonlabored. N  ABDOMEN: Soft, nontender, nondistended.  No rebound or  guarding. NO mass or HSM.  EXT: 1-2+ bilateral lower extremity edema  :  Without Meek catheter.  MSK: No focal joint swelling  SKIN: Warm and dry without cutaneous eruptions on Inspection/palpation.    NEURO: Pleasantly confused.  He knows his name.  He does not recognize me or  comprehend his current situation.      Laboratory Data    Results from last 7 days   Lab Units 06/08/22  0946 06/07/22  1539   WBC 10*3/mm3 6.95 8.45   HEMOGLOBIN g/dL 10.0* 10.3*   HEMATOCRIT % 29.5* 32.0*   PLATELETS 10*3/mm3 203 205     Results from last 7 days   Lab Units 06/08/22  0946   SODIUM mmol/L 143   POTASSIUM mmol/L 3.2*   CHLORIDE mmol/L 108*   CO2 mmol/L 24.0   BUN mg/dL 11   CREATININE mg/dL 0.89   GLUCOSE mg/dL 103*   CALCIUM mg/dL 8.6     Results from last 7 days   Lab Units 06/07/22  1539   ALK PHOS U/L 97   BILIRUBIN mg/dL 0.5   ALT (SGPT) U/L 15   AST (SGOT) U/L 25             Results from last 7 days   Lab Units 06/07/22  1539   LACTATE mmol/L 1.7         Results from last 7 days   Lab Units 06/08/22  0946   VANCOMYCIN RM mcg/mL 10.00     Estimated Creatinine Clearance: 59.1 mL/min (by C-G formula based on SCr of 0.89 mg/dL).      Microbiology:  No results found for: ACANTHNAEG, AFBCX, BPERTUSSISCX, BLOODCX  No results found for: BCIDPCR, CXREFLEX, CSFCX, CULTURETIS  No results found for: CULTURES, HSVCX, URCX  No results found for: EYECULTURE, GCCX, HSVCULTURE, LABHSV  No results found for: LEGIONELLA, MRSACX, MUMPSCX, MYCOPLASCX  No results found for: NOCARDIACX, STOOLCX  Urine Culture   Date Value Ref Range Status   06/07/2022 No growth  Preliminary     No results found for: VIRALCULTU, WOUNDCX        Radiology:  Imaging Results (Last 72 Hours)     Procedure Component Value Units Date/Time    FL Video Swallow With Speech Single Contrast [[id number]] Resulted: 06/08/22 1312     Updated: 06/08/22 1348    CT Chest Without Contrast Diagnostic [[id number]] Collected: 06/07/22 2014     Updated: 06/07/22 2023    Narrative:       CT CHEST WO CONTRAST DIAGNOSTIC-     Date of Exam: 6/7/2022 8:08 PM     Indication: Respiratory illness, nondiagnostic xray; R41.82-Altered  mental status, unspecified; Z78.9-Other specified health status;  N39.0-Urinary tract infection, site not specified  confusion. History of  pyelonephritis and cystitis.     Comparison: CT the abdomen pelvis 05/23/2022, chest x-ray 06/07/2022,  chest CT 05/29/2020     Technique: Serial and axial CT images of the chest were obtained.  Reconstructions in the coronal and sagittal planes were performed.   Automated exposure control and iterative reconstruction methods were  used.     FINDINGS:  Hilum and Mediastinum: There are no pathologically enlarged hilar  mediastinal lymph nodes. There is cardiomegaly. There is mild coronary  artery atherosclerotic calcification. There is aortic valvular  calcification. There is no pericardial effusion. Thoracic aorta has  normal caliber. Pulmonary arteries are enlarged.     Lung Parenchyma and Pleura: Moderate size bilateral pleural effusions.  Respiratory motion limits evaluation lung parenchyma. There is mild  peripheral septal thickening. There is mild bibasilar subsegmental  atelectasis. The appearance is not significantly changed compared to the  previous CT the abdomen pelvis.     Upper Abdomen: Large parapelvic cyst right kidney.     Soft tissues: Unremarkable.     Osseous structures: No aggressive focal lytic or sclerotic osseous  lesions. Osteopenia. Compression deformity along the superior endplate  of L1 is unchanged.       Impression:      1.  there are moderate bilateral pleural effusions with bibasilar  basilar subsegmental atelectasis. Bibasilar pneumonia less likely but  cannot be excluded on this exam. No associated lymphadenopathy..  Pulmonary edema with pleural effusions with atelectasis favored over  infection based off exam. Clinical correlation is recommended.  2.  Cardiomegaly with enlarged pulmonary arteries,  correlate for  pulmonary artery hypertension.           This report was finalized on 6/7/2022 8:20 PM by Delores Cid MD.       CT Head Without Contrast [[id number]] Collected: 06/07/22 1708     Updated: 06/07/22 1715    Narrative:      DATE OF EXAM: 6/7/2022 4:57 PM     PROCEDURE: CT HEAD WO CONTRAST-     INDICATIONS: AMS     COMPARISON: Head CT 1/12/2022     TECHNIQUE: Routine transaxial and coronal reconstruction images were  obtained through the head without the administration of contrast.  Automated exposure control and iterative reconstruction methods were  used.     The radiation dose reduction device was turned on for each scan per the  ALARA (As Low as Reasonably Achievable) protocol.     FINDINGS:  No acute intracranial hemorrhage. No acute large territory infarct.  Redemonstration of scattered and confluent subcortical and  periventricular white matter hypodensities which are nonspecific and can  be seen in the setting of chronic small vessel ischemic change. No  extra-axial collections. No midline shift or herniation. Mild global  cerebral volume loss. Normal size and configuration of the ventricles  commensurate with degree of cerebral volume loss. Unremarkable  appearance of the orbits. The mastoid air cells are clear. There is  scattered mucosal thickening of the ethmoid air cells and mucosal  mucosal thickening in the left sphenoid sinus, similar to prior.        Impression:      No acute intracranial findings. Stable chronic and senescent changes as  detailed above.     This report was finalized on 6/7/2022 5:12 PM by Berhane Hendrix MD.       XR Chest 1 View [[id number]] Collected: 06/07/22 1551     Updated: 06/07/22 1557    Narrative:      DATE OF EXAM: 6/7/2022 3:40 PM     PROCEDURE: XR CHEST 1 VW-     INDICATIONS: AMS protocol     COMPARISON: Chest x-ray 5/22/2022     TECHNIQUE: Single radiographic AP view of the chest was obtained.     FINDINGS:  Mild low lung volumes with stable cardiomegaly and  mildly accentuated  cardiac mediastinal silhouette. Mild increase in diffuse interstitial  prominence possibly reflective of bronchovascular crowding in the  setting of low lung volumes of chronic interstitial changes interstitial  edema could appear similarly. There are left lung base opacities favored  reflect atelectasis. No pneumothorax. No significant pleural effusion.  Previously questioned nodule in the left lower lobe is not well  appreciated on today's exam.        Impression:      Low lung volumes with mild increase in diffuse interstitial prominence  which may reflect bronchovascular crowding although a component of  interstitial edema, or possibly atypical/viral infection, cannot be  excluded. Ill-defined opacities at the left lung base are favored  reflect atelectasis.     This report was finalized on 6/7/2022 3:54 PM by Berhane Hendrix MD.           I read his radiographic images.      Echocardiogram of 5/22/2022  Interpretation Summary    · Left ventricular wall thickness is consistent with mild concentric hypertrophy.  · There is moderate calcification of the aortic valve.  · The right ventricular cavity is mild to moderately dilated.  · The right atrial cavity is mildly dilated.  · Left ventricular ejection fraction appears to be 61 - 65%.  · There is significant calcification of the aortic valve with reduced excursion. However the acoustic windows are so poor that the transducer cannot align properly to get a good measurement across the aortic valve. Consider ADARSH if clinically indicated        Impression:   1.  Encephalopathy/altered mental status- this is most likely secondary to hypoxia related to his untreated obstructive sleep apnea.  He does not appear to have recurrent sepsis with no fever, leukocytosis, or overt new focus of infection.  I discussed his complex situation with Dr. Patel today.  He will treat his sleep apnea and we will discontinue his intravenous antibiotic therapy and  switch him back to oral cefuroxime.  2.  Dementia  3.  Obstructive sleep apnea-untreated  4.  Right heart failure/pulmonary artery hypertension-he has a dilated right ventricular cavity, dilated right atrial cavity, and dilated pulmonary arteries. This is most likely secondary to chronic nocturnal hypoxia from his untreated obstructive sleep apnea.  5.  Lower extremity edema-secondary to right heart failure  6.  E. coli bacteremia/UTI-with positive blood and urine cultures cultures on .  He has received a course of intravenous antibiotics and is now been on oral cefuroxime.  I will plan to give him at least 10 more days of oral cefuroxime.  7.  Pleural effusion/atelectasis- his chest CT scan reveals pleural effusions and atelectasis although these are fairly mild.  There is no evidence of a focal pneumonia.  8.  Chronic lower extremity DVT  9.  Type 2 diabetes mellitus        PLAN/RECOMMENDATIONS:   Thank you for asking us to see Marcos Domenico, I recommend the followin.  Blood cultures-pending  2.  Discontinue vancomycin  3.  Discontinue Zosyn  4.  Resume cefuroxime 500 mg p.o. twice daily  5.  Treatment for right heart failure and obstructive sleep apnea per Dr. Patel.       I discussed his complex situation with his caregiver and with Dr. Patel today.    Jose E Radford MD  2022  14:23 EDT                  Electronically signed by Jose E Radford MD at 22 3575

## 2022-06-10 NOTE — THERAPY TREATMENT NOTE
Acute Care - Speech Language Pathology   Swallow Treatment Note  [place]     Patient Name: Marcos Acuña  : 1931  MRN: [id number]  Today's Date: 6/10/2022               Admit Date: 2022    Visit Dx:     ICD-10-CM ICD-9-CM   1. Altered mental status, unspecified altered mental status type  R41.82 780.97   2. Failure of outpatient treatment  Z78.9 V49.89   3. Acute UTI  N39.0 599.0   4. 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[facility])   • Hypothyroidism   • Left leg cellulitis   • Type 2 diabetes mellitus without complication (Formerly [facility])   • Obstructive sleep apnea syndrome   • Obesity   • Elevated troponin   • Uncontrolled type 2 diabetes mellitus with hyperglycemia (Formerly [facility])   • Acute UTI   • Altered mental status, unspecified altered mental status type   • History of bacteremia, E.coli   • Dysphagia   • Productive cough     Past Medical History:   Diagnosis Date   • Dementia (Formerly [facility])    • Diabetes (Formerly [facility])    • H/O blood clots    • Hypertensive disorder    • Hypothyroidism    • Osteoarthritis    • Type 2 diabetes mellitus, uncontrolled      Past Surgical History:   Procedure Laterality Date   • BUNIONECTOMY     • HIP TROCHANTERIC NAILING WITH INTRAMEDULLARY HIP SCREW N/A 2020    Procedure: ADVANCED TROCHANTERIC FEMORAL NAIL (ATFN) RIGHT HIP/FEMUR;  Surgeon: Mejia Meza MD;  Location: [facility];  Service: Orthopedics;  Laterality: N/A;   • RECTAL FISTULECTOMY     • SKIN CANCER EXCISION         SLP Recommendation and Plan                          Anticipated Discharge Disposition (SLP): anticipate therapy at next level of care (06/10/22 1035)     Therapy Frequency (Swallow): PRN (06/10/22 1035)         Daily Summary of Progress (SLP): progress towards functional goals is fair (06/10/22 1035)               Treatment Assessment (SLP): Pt lethargic and demonstrated s/sxs aspiration. Pt's hired caregiver repoted pt has excessive phlegm that he coughs up in the AM. She fed him breakfast & reported he tolerated well w/o issues. On [facility] soft diet & nectar-thick liquids @ baseline. Provided extensive education and handout. Noted plan to d/c home w/ Hospice. (06/10/22 1035)  Plan for Continued Treatment (SLP): continue treatment per plan of care (06/10/22 1035)         Plan of Care Reviewed With: patient, caregiver  Progress: no change      SWALLOW EVALUATION (last 72 hours)     SLP Adult Swallow Evaluation     Row Name 06/10/22 1035 06/08/22 1315 06/08/22 0910             Rehab Evaluation    Document Type therapy note (daily note)  -AC evaluation  -CJ evaluation  -CJ      Subjective Information no complaints  -AC no complaints  -CJ no complaints  -CJ      Patient Observations alert;cooperative  -AC alert;cooperative  -CJ alert;cooperative  -CJ      Patient/Family/Caregiver Comments/Observations Hired caregiver present.  -AC caregiver present  -CJ no family present  -CJ      Patient Effort good  -AC good  -CJ good  -CJ      Symptoms Noted During/After Treatment -- none  -CJ none  -CJ              General Information    Patient Profile Reviewed -- yes  -CJ yes  -CJ      Pertinent History Of Current Problem -- see am eval; referred for repeat MBS  -CJ Pt adm w/ ams; has sig h/o HTN, dementia, chronic DVT, DM, hypothyroid, [place], dysphagia, hypotiuitarism. Per chart review pt seen on recent admit where someone reported pt is on a baseline diet of dysphagia LVL 4 w/ nectar at baseline. No instrumental completed during that hospitalization. Chest CT: revealed BL PE and BL bibasilar atelectasis w/ ? PNA  -CJ      Current Method of Nutrition -- NPO  -CJ NPO  -CJ      Precautions/Limitations, Vision -- WFL;for purposes of eval   -CJ WFL;for purposes of eval  -CJ      Precautions/Limitations, Hearing -- WFL;for purposes of eval  -CJ WFL;for purposes of eval  -CJ      Prior Level of Function-Communication -- unknown;other (see comments)  -CJ unknown;other (see comments)  dementia per chart  -CJ      Prior Level of Function-Swallowing -- other (see comments)  -CJ other (see comments)  baseline diet of MS w/ nectar  -CJ      Plans/Goals Discussed with -- patient  -CJ patient  -CJ      Barriers to Rehab -- cognitive status;previous functional deficit  -CJ cognitive status;previous functional deficit  -CJ      Patient's Goals for Discharge -- patient did not state  -CJ patient did not state  -CJ              Pain    Additional Documentation -- Pain Scale: FACES Pre/Post-Treatment (Group)  -CJ Pain Scale: FACES Pre/Post-Treatment (Group)  -CJ              Pain Scale: FACES Pre/Post-Treatment    Pain: FACES Scale, Pretreatment 0-->no hurt  -AC 0-->no hurt  -CJ 0-->no hurt  -CJ      Posttreatment Pain Rating 0-->no hurt  -AC 0-->no hurt  -CJ 0-->no hurt  -CJ              Oral Motor Structure and Function    Dentition Assessment -- -- natural, present and adequate  -CJ      Secretion Management -- -- WNL/WFL  -CJ      Mucosal Quality -- -- moist, healthy  -CJ              Oral Musculature and Cranial Nerve Assessment    Oral Motor General Assessment -- -- generalized oral motor weakness  -              General Eating/Swallowing Observations    Respiratory Support Currently in Use -- -- room air  -CJ      Eating/Swallowing Skills -- -- fed by SLP  -CJ      Positioning During Eating -- -- upright 90 degree;upright in chair  -CJ      Utensils Used -- -- spoon;cup;straw  -CJ      Consistencies Trialed -- -- ice chips;thin liquids;nectar/syrup-thick liquids;pureed  -CJ              Clinical Swallow Eval    Pharyngeal Phase -- -- suspected pharyngeal impairment  -CJ      Clinical Swallow Evaluation Summary -- -- Generalized oral motor weakness. Swallow  initiation is seemingly delayed per palpation, intermittently required cues from SLP. Overt s/s of aspiration w/ thin and nectar thick liquids w/ cough repsonse elicited. Per most recent CSE pt only coughed w/ thins and no overt s/s w/ nectar. Given increasd s/s concerning for aspiration safest at this time is felt to be NPO w/ plan for MBS.  -CJ              Pharyngeal Phase Concerns    Pharyngeal Phase Concerns -- -- cough  -CJ      Cough -- -- thin;nectar  -CJ              MBS/VFSS    Utensils Used -- spoon;cup;straw  -CJ --      Consistencies Trialed -- regular textures;thin liquids;nectar/syrup-thick liquids;pudding thick  -CJ --              MBS/VFSS Interpretation    Oral Prep Phase -- impaired oral phase of swallowing  -CJ --      Oral Transit Phase -- impaired  -CJ --      Oral Residue -- impaired  -CJ --      VFSS Summary -- Moderate oral phase, mild-moderate pharyngeal dysphagia. Prolonged manipulation w/ pudding/solid cracker. Pt required liquid wash to clear bolus from BOT. Prespill w/ thin and nectar thick liquids to the pyriforms. Penetration w/ thin liquids occurring before the swallow 2/2 prespill and delay resulting in silent aspiration occurring during the swallow. Cued cough ineffective to clear aspirated material. Transient penetration only w/ nectar thick liquids that cleared upon completion of swallow w/o significat vestibular residue impacting safety of swallowing fnx. Diffuse pharyngeal residue, that is able to clear w/ cued swallow or liquid wash. No other laryngeal penetration or aspiration observed across this evaluation. Pt is also s/p medication administration prior to this evaluation. Caregiver reports pt typically doesn't have any difficulty w/ solids at home when fully awake and alert. She reports today is not consistent w/ his baseline. Per this evaluation will recommend modified po diet of dysphagia level IV w/ nectar thick liquids, 1:1 assist w/ all po intake. Cues to swallow.  Check for pocketing. Alternate liquids/solids. Caregiver reports pt has been on nectar thick liquids for ~1year, may be representative of his baseline. Will f/u for diet tolerance as unsure if pt will be able to functionally participate in dysphagia tx  -CJ --              Oral Preparatory Phase    Oral Preparatory Phase -- prolonged manipulation;oral holding  -CJ --      Oral Holding -- pudding/puree;secondary to reduced lingual strength;secondary to impaired cognitive status  -CJ --      Prolonged Manipulation -- pudding/puree;regular textures;secondary to reduced lingual strength  -CJ --              Oral Transit Phase    Impaired Oral Transit Phase -- delayed initiation of bolus transit;increased A-P transit time;tongue pumping;premature spillage of liquids into pharynx  -CJ --      Delayed Initiation of bolus transit -- all consistencies tested;discoordination of lingual movement;secondary to impaired cognitive status  -CJ --      Increased A-P Transit Time -- pudding/puree;regular textures;secondary to reduced lingual control  -CJ --      Premature Spillage of Liquids into Pharynx -- thin liquids;nectar-thick liquids;secondary to reduced lingual control;secondary to impaired cognitive status  -CJ --              Oral Residue    Impaired Oral Residue -- diffuse residue throughout oral cavity  -CJ --      Diffuse Residue throughout Oral Cavity -- all consistencies tested;secondary to reduced lingual strength  -CJ --      Response to Oral Residue -- other (see comments)  cleared majority w/ liquid wash. worse residue w/ pudding/solids  -CJ --              Initiation of Pharyngeal Swallow    Initiation of Pharyngeal Swallow -- bolus in pyriform sinuses  -CJ --      Pharyngeal Phase -- impaired pharyngeal phase of swallowing  -CJ --      Penetration Before the Swallow -- thin liquids;secondary to delayed swallow initiation or mistiming;secondary to reduced back of tongue control  -CJ --      Penetration During the  Swallow -- thin liquids;nectar-thick liquids;secondary to delayed swallow initiation or mistiming;secondary to reduced vestibular closure  -CJ --      Aspiration During the Swallow -- thin liquids;secondary to delayed swallow initiation or mistiming;secondary to reduced vestibular closure  -CJ --      Response to Penetration -- deep;no response  -CJ --      Response to Aspiration -- no response, silent aspiration;response with cue only;could not clear subglottic material;weak cough/throat clear;other (see comments)  difficulty producing cough w/ cue  -CJ --      Rosenbek's Scale -- thin:;8--->level 8;nectar:;2--->level 2  -CJ --      Pharyngeal Residue -- all consistencies tested;diffuse within pharynx;secondary to reduced posterior pharyngeal wall stripping;secondary to reduced hyolaryngeal excursion  -CJ --      Response to Residue -- cleared residue with liquid wash;other (see comments)  partially cleared  -CJ --      Attempted Compensatory Maneuvers -- bolus size;bolus presentation style;additional subsequent swallow;multiple swallows;throat clear after swallow  -CJ --      Response to Attempted Compensatory Maneuvers -- did not prevent penetration;did not prevent aspiration  -CJ --              SLP Evaluation Clinical Impression    SLP Swallowing Diagnosis -- moderate;oral dysphagia;mild-moderate;pharyngeal dysphagia  -CJ suspected pharyngeal dysphagia  -CJ      Functional Impact -- risk of aspiration/pneumonia;risk of malnutrition;risk of dehydration  -CJ risk of aspiration/pneumonia;risk of malnutrition;risk of dehydration  -CJ      Rehab Potential/Prognosis, Swallowing -- good, to achieve stated therapy goals  -CJ good, to achieve stated therapy goals  -CJ      Swallow Criteria for Skilled Therapeutic Interventions Met -- demonstrates skilled criteria  -CJ demonstrates skilled criteria  -CJ              SLP Treatment Clinical Impressions    Treatment Assessment (SLP) Pt lethargic and demonstrated s/sxs  aspiration. Pt's hired caregiver repoted pt has excessive phlegm that he coughs up in the AM. She fed him breakfast & reported he tolerated well w/o issues. On [facility] soft diet & nectar-thick liquids @ baseline. Provided extensive education and handout. Noted plan to d/c home w/ Hospice.  -AC -- --      Daily Summary of Progress (SLP) progress towards functional goals is fair  - -- --      Barriers to Overall Progress (SLP) Lethargy;Cognitive status;Baseline deficits  -AC -- --      Plan for Continued Treatment (SLP) continue treatment per plan of care  - -- --      Care Plan Review evaluation/treatment results reviewed;care plan/treatment goals reviewed;risks/benefits reviewed;current/potential barriers reviewed;patient/other agree to care plan  - -- --      Care Plan Review, Other Participant(s) caregiver  - -- --              Recommendations    Therapy Frequency (Swallow) PRN  - 5 days per week  - --      Predicted Duration Therapy Intervention (Days) -- until discharge  - until discharge  -      SLP Diet Recommendation -- mechanical soft with no mixed consistencies;nectar thick liquids  - NPO;other (see comments)  until [facility]  -      Recommended Diagnostics -- -- VFSS ([facility]  -      Recommended Precautions and Strategies -- upright posture during/after eating;small bites of food and sips of liquid;multiple swallows per bite of food;multiple swallows per sip of liquid;alternate between small bites of food and sips of liquid;check mouth frequently for oral residue/pocketing;general aspiration precautions;reflux precautions;1:1 supervision;assist with feeding  - general aspiration precautions  -      Oral Care Recommendations Oral Care BID/PRN;Suction toothbrush  - Oral Care BID/PRN  - Oral Care BID/PRN  -      SLP Rec. for Method of Medication Administration -- meds whole;meds crushed;with pudding or applesauce;as tolerated  - meds via alternate route  -      Monitor for Signs of  Aspiration -- yes;notify SLP if any concerns  -CJ yes;notify SLP if any concerns  -CJ      Anticipated Discharge Disposition (SLP) anticipate therapy at next level of care  -AC unknown;anticipate therapy at next level of care  -CJ unknown;anticipate therapy at next level of care  -CJ      Equipment Issued to Patient adaptive cup  -AC -- --            User Key  (r) = Recorded By, (t) = Taken By, (c) = Cosigned By    Initials Name Effective Dates    AC Harmony Bond, MS CCC-SLP 06/16/21 -     CJ Alexia Huizar, MS CCC-SLP 06/16/21 -                 EDUCATION  The patient has been educated in the following areas:   Dysphagia (Swallowing Impairment) Oral Care/Hydration Modified Diet Instruction.        SLP GOALS     Row Name 06/10/22 1035 06/08/22 1315          Oral Nutrition/Hydration Goal 1 (SLP)    Oral Nutrition/Hydration Goal 1, SLP LTG: Pt will tolerate least restrictive level of po intake w/o overt s/s of aspiration/distress given no cues w/ 100% acc  -AC LTG: Pt will tolerate least restrictive level of po intake w/o overt s/s of aspiration/distress given no cues w/ 100% acc  -CJ     Time Frame (Oral Nutrition/Hydration Goal 1, SLP) by discharge  -AC by discharge  -CJ     Progress/Outcomes (Oral Nutrition/Hydration Goal 1, SLP) continuing progress toward goal  -AC --            Oral Nutrition/Hydration Goal 2 (SLP)    Oral Nutrition/Hydration Goal 2, SLP STG: Pt will tolerate modified po diet of soft solids and nectar thick liquids w/o overt s/s of aspiration/distress given no cues w/ 100% acc  -AC STG: Pt will tolerate modified po diet of soft solids and nectar thick liquids w/o overt s/s of aspiration/distress given no cues w/ 100% acc  -CJ     Time Frame (Oral Nutrition/Hydration Goal 2, SLP) short term goal (STG)  -AC short term goal (STG)  -CJ     Barriers (Oral Nutrition/Hydration Goal 2, SLP) Initially no overt clinical s/sxs aspiration w/ nectar-thick liquid or solids. Following solid trial, pt  exhibited coughing before liquid wash could be administered. ? r/t lethargy. Caregiver reported pt w/ frequent coughing/expectoration of phlegm in AM. D/c'd further PO trials. Provided adative cup, as caregiver reported he uses one w/ spouted lid/handle @ baseline.  -AC --     Progress/Outcomes (Oral Nutrition/Hydration Goal 2, SLP) continuing progress toward goal  -AC --            Lingual Strengthening Goal 1 (SLP)    Activity (Lingual Strengthening Goal 1, SLP) -- increase lingual tone/sensation/control/coordination/movement;increase tongue back strength  -CJ     Increase Lingual Tone/Sensation/Control/Coordination/Movement swallow trials;lingual resistance exercises  - swallow trials;lingual resistance exercises  -CJ     Increase Tongue Back Strength lingual resistance exercises;swallow trials  - lingual resistance exercises;swallow trials  -CJ     [place]/Accuracy (Lingual Strengthening Goal 1, SLP) with moderate cues (50-74% accuracy)  - with moderate cues (50-74% accuracy)  -CJ     Time Frame (Lingual Strengthening Goal 1, SLP) short term goal (STG)  -AC short term goal (STG)  -CJ     Progress/Outcomes (Lingual Strengthening Goal 1, SLP) goal no longer appropriate  -AC --            Pharyngeal Strengthening Exercise Goal 1 (SLP)    Activity (Pharyngeal Strengthening Goal 1, SLP) -- increase timing;increase superior movement of the hyolaryngeal complex;increase anterior movement of the hyolaryngeal complex;increase closure at entrance to airway/closure of airway at glottis;increase squeeze/positive pressure generation  -CJ     Increase Timing prepping - 3 second prep or suck swallow or 3-step swallow  -AC prepping - 3 second prep or suck swallow or 3-step swallow  -CJ     Increase Superior Movement of the Hyolaryngeal Complex falsetto  -AC falsetto  -CJ     Increase Anterior Movement of the Hyolaryngeal Complex chin tuck against resistance (CTAR)  -AC chin tuck against resistance (CTAR)  -CJ      Increase Closure at Entrance to Airway/Closure of Airway at Glottis breath hold exercises;hard effortful swallow  -AC breath hold exercises;hard effortful swallow  -CJ     Increase Squeeze/Positive Pressure Generation hard effortful swallow  -AC hard effortful swallow  -CJ     [place]/Accuracy (Pharyngeal Strengthening Goal 1, SLP) with moderate cues (50-74% accuracy);with maximum cues (25-49% accuracy)  -AC with moderate cues (50-74% accuracy);with maximum cues (25-49% accuracy)  -CJ     Time Frame (Pharyngeal Strengthening Goal 1, SLP) short term goal (STG)  -AC short term goal (STG)  -CJ     Progress/Outcomes (Pharyngeal Strengthening Goal 1, SLP) goal no longer appropriate  -AC --            Swallow Compensatory Strategies Goal 1 (SLP)    Activity (Swallow Compensatory Strategies/Techniques Goal 1, SLP) compensatory strategies;during meal intake;during p.o. trials;small bites;small cup sips;small straw sips;alternate food/liquid intake;extra swallow per bolus;other (see comments)  -AC compensatory strategies;during meal intake;during p.o. trials;small bites;small cup sips;small straw sips;alternate food/liquid intake;extra swallow per bolus;other (see comments)  caregiver education as well  -CJ     [place]/Accuracy (Swallow Compensatory Strategies/Techniques Goal 1, SLP) with moderate cues (50-74% accuracy)  -AC with moderate cues (50-74% accuracy)  -CJ     Time Frame (Swallow Compensatory Strategies/Techniques Goal 1, SLP) short term goal (STG)  -AC short term goal (STG)  -CJ     Barriers (Swallow Compensatory Strategies/Techniques Goal 1, SLP) Provided handout & extensive caregiver education. She stated understanding. Set up oral care/suctioning & provided instruction to caregiver. Notified RN.  -AC --     Progress/Outcomes (Swallow Compensatory Strategies/Techniques Goal 1, SLP) continuing progress toward goal  -AC --           User Key  (r) = Recorded By, (t) = Taken By, (c) = Cosigned By     Initials Name Provider Type    Harmony Alvarez MS CCC-SLP Speech and Language Pathologist    Alexia Perez MS CCC-SLP Speech and Language Pathologist                   Time Calculation:    Time Calculation- SLP     Row Name 06/10/22 1123             Time Calculation- SLP    SLP Start Time 1035  -AC      SLP Received On 06/10/22  -AC              Untimed Charges    81330-VR Treatment Swallow Minutes 54  -AC              Total Minutes    Untimed Charges Total Minutes 54  -AC       Total Minutes 54  -AC            User Key  (r) = Recorded By, (t) = Taken By, (c) = Cosigned By    Initials Name Provider Type    Harmony Alvarez MS CCC-SLP Speech and Language Pathologist                Therapy Charges for Today     Code Description Service Date Service Provider Modifiers Qty    [id number] HC ST TREATMENT SWALLOW 4 6/10/2022 Harmony Bond MS CCC-SLP GN 1        Patient was not wearing a face mask and did exhibit coughing during this therapy encounter.  Procedure performed was aerosolizing, involved close contact (within 6 feet for at least 15 minutes or longer), and involved contact with infectious secretions or specimens.  Therapist used appropriate personal protective equipment including gloves, standard procedure mask and eye protection.  Appropriate PPE was worn during the entire therapy session.  Hand hygiene was completed before and after therapy session.          MS MEGAN Dewey  6/10/2022

## 2022-06-10 NOTE — PROGRESS NOTES
Continued Stay Note  [facility]     Patient Name: Marcos Acuña  MRN: [id number]  Today's Date: 6/10/2022    Admit Date: 6/7/2022     Discharge Plan     Row Name 06/10/22 1524       Plan    Plan Home with [facility]    Plan Comments Visit made to pt, pt's caregiver present and talking on the phone with pt's spouse Sloane. Spoke with Sloane who reiterated does want to go home with hospice. Sloane stated pt owns all equipment and does not need any from hospice. Spoke with pt's daughter Anabel regarding the hospice referral. Anabel stated has been talking with Sloane and is aware of the hospice referral. Anabel is in agreement with pt having home hospice. Pt will need ambulance transportation. Spoke with Stacy with [facility], first available transport is tomorrow at 1030. Informed caregiver and pt's spouse of transportation time, as well as Dr. Patel, staff nurse and . Provided Sloane with the hospice 24 hr number to call to initiate hospice services when pt arrives home. Will continue to follow. Please call [phone] if can be of further assistance.    Row Name 06/10/22 1447       Plan    Plan Home with Hospice    Final Discharge Disposition Code 50 - home with hospice               Discharge Codes    No documentation.               Expected Discharge Date and Time     Expected Discharge Date Expected Discharge Time    [place] 10, 2022             Iona Al RN

## 2022-06-10 NOTE — PROGRESS NOTES
[facility]  PROGRESS NOTE    Patient Name: Marcos Acuña  : 1931  MRN: [id number]    Date of Admission: 2022  Primary Care Physician: No primary care provider on file.    Subjective   Subjective     CC:   hypernatremia    HPI:     Patient resting upon arrival with caregiver at the bedside.  Sodium level has improved overnight after starting one half normal saline yesterday.  Hospice services is following, planning to return home with hospice services. Family requesting F/C placement due to urinary retention and for comfort.       ROS:  Patient is sleeping.  Unable to assess.    Objective   Objective     Vital Signs:   Temp:  [97.6 °F (36.4 °C)-98.3 °F (36.8 °C)] 98.3 °F (36.8 °C)  Heart Rate:  [65-81] 75  Resp:  [16-18] 16  BP: (116-137)/(55-71) 137/64     Physical Exam:  Constitutional: sleeping, awaken easily with conversation bedrest back to sleep.  HENT: NCAT, mucous membranes dry  Respiratory: Diminished in the bases, no retraction, but shallow breathing  Cardiovascular: RRR, no murmurs, rubs, or gallops  Gastrointestinal: Positive bowel sounds, soft, nontender, nondistended  Musculoskeletal: No bilateral ankle edema  Psychiatric: Sleeping  Neurologic: Patient was sleeping, no obvious neurological deficit  Skin: Dry, skin tenting      Results Reviewed:  LAB RESULTS:      Lab 06/10/22  0340 22  0946 22  1539   WBC 7.32 6.95 8.45   HEMOGLOBIN 9.3* 10.0* 10.3*   HEMATOCRIT 28.1* 29.5* 32.0*   PLATELETS 168 203 205   NEUTROS ABS  --  2.39 2.03   IMMATURE GRANS (ABS)  --  0.02  --    LYMPHS ABS  --  3.68*  --    MONOS ABS  --  0.40  --    EOS ABS  --  0.33 0.25   MCV 85.7 83.8 88.2   PROCALCITONIN  --   --  0.08   LACTATE  --   --  1.7         Lab 06/10/22  0340 22  0655 22  0946 22  1539   SODIUM 146* 149* 143 146*   POTASSIUM 3.7 3.6 3.2* 4.2   CHLORIDE 113* 114* 108* 112*   CO2 26.0 28.0 24.0 29.0   ANION GAP 7.0 7.0 11.0 5.0    BUN 10 12 11 11   CREATININE 0.71* 0.80 0.89 0.81   EGFR 86.6 83.5 80.9 83.2   GLUCOSE 104* 82 103* 99   CALCIUM 8.3 8.5 8.6 8.7   MAGNESIUM 1.8  --   --  2.0   PHOSPHORUS 2.7  --   --   --          Lab 06/07/22  1539   TOTAL PROTEIN 6.1   ALBUMIN 2.60*   GLOBULIN 3.5   ALT (SGPT) 15   AST (SGOT) 25   BILIRUBIN 0.5   ALK PHOS 97         Lab 06/10/22  0340 06/07/22  2219 06/07/22  1539   PROBNP 390.5  --  254.1   TROPONIN T  --  0.057* 0.067*                 Brief Urine Lab Results  (Last result in the past 365 days)      Color   Clarity   Blood   Leuk Est   Nitrite   Protein   CREAT   Urine HCG        06/07/22 1538 Yellow   Clear   Negative   Small (1+)   Negative   Negative                 Microbiology Results Abnormal     Procedure Component Value - Date/Time    Blood Culture - Blood, Hand, Left [[id number]]  (Normal) Collected: 06/07/22 1630    Lab Status: Preliminary result Specimen: Blood from Hand, Left Updated: 06/09/22 1701     Blood Culture No growth at 2 days    Blood Culture - Blood, Hand, Right [[id number]]  (Normal) Collected: 06/07/22 1615    Lab Status: Preliminary result Specimen: Blood from Hand, Right Updated: 06/09/22 1701     Blood Culture No growth at 2 days    Urine Culture - Urine, Urine, Catheter [[id number]]  (Normal) Collected: 06/07/22 1538    Lab Status: Final result Specimen: Urine, Catheter Updated: 06/08/22 1905     Urine Culture No growth    S. Pneumo Ag Urine or CSF - Urine, Urine, Clean Catch [[id number]]  (Normal) Collected: 06/08/22 0632    Lab Status: Final result Specimen: Urine, Clean Catch Updated: 06/08/22 0953     Strep Pneumo Ag Negative    Legionella Antigen, Urine - Urine, Urine, Clean Catch [[id number]]  (Normal) Collected: 06/08/22 0632    Lab Status: Final result Specimen: Urine, Clean Catch Updated: 06/08/22 0953     LEGIONELLA ANTIGEN, URINE Negative    MRSA Screen, PCR (Inpatient) - Swab, Nares [[id number]]  (Normal) Collected: 06/08/22 0632    Lab Status: Final result  Specimen: Swab from Nares Updated: 06/08/22 0918     MRSA PCR Negative    Narrative:      MRSA Negative    Respiratory Panel PCR w/COVID-19(SARS-CoV-2) FREDDIE/URI/JAYSON/PAD/COR/MAD/JAYLA In-House, NP Swab in UTM/VTM, 3-4 HR TAT - Swab, Nasopharynx [[id number]]  (Normal) Collected: 06/08/22 0632    Lab Status: Final result Specimen: Swab from Nasopharynx Updated: 06/08/22 0858     ADENOVIRUS, PCR Not Detected     Coronavirus 229E Not Detected     Coronavirus HKU1 Not Detected     Coronavirus NL63 Not Detected     Coronavirus OC43 Not Detected     COVID19 Not Detected     Human Metapneumovirus Not Detected     Human Rhinovirus/Enterovirus Not Detected     Influenza A PCR Not Detected     Influenza B PCR Not Detected     Parainfluenza Virus 1 Not Detected     Parainfluenza Virus 2 Not Detected     Parainfluenza Virus 3 Not Detected     Parainfluenza Virus 4 Not Detected     RSV, PCR Not Detected     Bordetella pertussis pcr Not Detected     Bordetella parapertussis PCR Not Detected     Chlamydophila pneumoniae PCR Not Detected     Mycoplasma pneumo by PCR Not Detected    Narrative:      In the setting of a positive respiratory panel with a viral infection PLUS a negative procalcitonin without other underlying concern for bacterial infection, consider observing off antibiotics or discontinuation of antibiotics and continue supportive care. If the respiratory panel is positive for atypical bacterial infection (Bordetella pertussis, Chlamydophila pneumoniae, or Mycoplasma pneumoniae), consider antibiotic de-escalation to target atypical bacterial infection.    COVID PRE-OP / PRE-PROCEDURE SCREENING ORDER (NO ISOLATION) - Swab, Nasopharynx [[id number]]  (Normal) Collected: 06/07/22 1633    Lab Status: Final result Specimen: Swab from Nasopharynx Updated: 06/07/22 1705    Narrative:      The following orders were created for panel order COVID PRE-OP / PRE-PROCEDURE SCREENING ORDER (NO ISOLATION) - Swab, Nasopharynx.  Procedure                                Abnormality         Status                     ---------                               -----------         ------                     COVID-19 and FLU A/B PCR...[[id number]]  Normal              Final result                 Please view results for these tests on the individual orders.    COVID-19 and FLU A/B PCR - Swab, Nasopharynx [[id number]]  (Normal) Collected: 06/07/22 1633    Lab Status: Final result Specimen: Swab from Nasopharynx Updated: 06/07/22 1705     COVID19 Not Detected     Influenza A PCR Not Detected     Influenza B PCR Not Detected    Narrative:      Fact sheet for providers: https://www.fda.gov/media/021827/download    Fact sheet for patients: https://www.fda.gov/media/335720/download    Test performed by PCR.          No radiology results from the last 24 hrs    Results for orders placed during the hospital encounter of 05/21/22    Adult Transthoracic Echo Complete w/ Color, Spectral and Contrast if necessary per protocol    Interpretation Summary  · Left ventricular wall thickness is consistent with mild concentric hypertrophy.  · There is moderate calcification of the aortic valve.  · The right ventricular cavity is mild to moderately dilated.  · The right atrial cavity is mildly dilated.  · Left ventricular ejection fraction appears to be 61 - 65%.  · There is significant calcification of the aortic valve with reduced excursion. However the acoustic windows are so poor that the transducer cannot align properly to get a good measurement across the aortic valve. Consider ADARSH if clinically indicated      I have reviewed the medications:  Scheduled Meds:cefuroxime, 500 mg, Oral, Q12H  insulin lispro, 0-7 Units, Subcutaneous, 4x Daily With Meals & Nightly  ipratropium-albuterol, 3 mL, Nebulization, Q4H - RT  levothyroxine, 75 mcg, Oral, Daily  memantine, 10 mg, Oral, Q12H  rivaroxaban, 10 mg, Oral, Daily  rivastigmine, 1 patch, Transdermal, Daily  sodium chloride, 10 mL,  Intravenous, Q12H  tamsulosin, 0.8 mg, Oral, Daily      Continuous Infusions:   PRN Meds:.•  acetaminophen **OR** acetaminophen **OR** acetaminophen  •  dextrose  •  dextrose  •  glucagon (human recombinant)  •  haloperidol lactate  •  melatonin  •  ondansetron **OR** ondansetron  •  potassium chloride  •  potassium chloride  •  QUEtiapine  •  sodium chloride  •  sodium chloride    Assessment & Plan   Assessment & Plan     Active Hospital Problems    Diagnosis  POA   • **Altered mental status, unspecified altered mental status type [R41.82]  Yes   • History of bacteremia, E.coli [Z87.898]  Unknown   • Dysphagia [R13.10]  Unknown   • Productive cough [R05.8]  Unknown   • Acute UTI [N39.0]  Yes   • Elevated troponin [R77.8]  Yes   • Chronic deep vein thrombosis (DVT) (HCC) [I82.509]  Yes   • Dementia (HCC) [F03.90]  Yes   • Obstructive sleep apnea syndrome [G47.33]  Yes   • Type 2 diabetes mellitus without complication (HCC) [E11.9]  Yes   • Hypothyroidism [E03.9]  Yes   • Benign essential hypertension [I10]  Yes      Resolved Hospital Problems   No resolved problems to display.        Brief Hospital Course to date:  Marcos Acuña is a 89 y.o. male with history of hypothyroidism, DM, Hypertension, MIREILLE, chronic Left Lower Extremity DVT on chronic anticoagulation, and dementia who was brought to the ER for AMS.  He was not waking up and has been sleeping more over the last few days.  He hasn't been using CPAP.    AMS  Encephalopathy  Dementia  -CT head without contrast no acute findings.  Patient would benefit from using CPAP but he will not wear it.    Previous UTI / History of E. Coli bacteremia  -ID consulted, recommending cefuroxime 500 mg twice a day.  Pro-Jeffy within normal limits, blood cultures NGTD    Cough  Dysphagia  - Patient reports coughing since last admission.  Previous dysphagia.  Respiratory panel negative.  CT consistent with pneumonia receiving antibiotic therapy.    Acute Hypernatremia  -Present on  admission worsening free water deficit.  With a level dementia, likely to progress.  Patient received one half normal saline which has improved hyponatremia.  Hospice has been consulted.       Sleep Apnea  - order CPAP with nasal mask  - reported to be refusing to wear it    Chronic DVT on Xarelto  - continue Xarelto    Hypothyroidism  - continue levothyroxine   - last TSH was within normal limits in May 2022    DM  - insulin SS      DVT prophylaxis:  Medical DVT prophylaxis orders are present.       AM-PAC 6 Clicks Score (PT): 12 (06/09/22 0800)    Disposition: Planning to discharge patient home with hospice once transportation has been arranged.    CODE STATUS:   Code Status and Medical Interventions:   Ordered at: 06/07/22 2007     Medical Intervention Limits:    NO intubation (DNI)     Code Status (Patient has no pulse and is not breathing):    No CPR (Do Not Attempt to Resuscitate)     Medical Interventions (Patient has pulse or is breathing):    Limited Support       Raina Hitchcock, ALMA ROSA  06/10/22

## 2022-06-10 NOTE — PROGRESS NOTES
Continued Stay Note  [facility]     Patient Name: Marcos Acuña  MRN: [id number]  Today's Date: 6/9/2022    Admit Date: 6/7/2022     Discharge Plan     Row Name 06/09/22 1954       Plan    Plan Home with [facility]    Plan Comments Hospice referral received, chart reviewed. Visit made to pt, pt's pain caregiver present. Caregiver stated pt lives with spouse, caregiver called pt's spouse. Spoke with spouse Sloane regarding the hospice referral. Sloane stated is aware of the hospice referral and does want hospice, Sloane asked to continue the conversation tomorrow as was eating dinner. Noted pt's daughter Anabel is the legal guardian, will also call daughter tomorrow. Please call [phone] if can be of further assistance.               Discharge Codes    No documentation.               Expected Discharge Date and Time     Expected Discharge Date Expected Discharge Time    [place] 10, 2022             Iona Al RN

## 2022-06-10 NOTE — PLAN OF CARE
Goal Outcome Evaluation:  Plan of Care Reviewed With: patient, caregiver        Progress: no change   SLP treatment completed. Will continue to address dysphagia in tx PRN. Please see note for further details and recommendations.

## 2022-06-11 VITALS
HEIGHT: 67 IN | WEIGHT: 207.67 LBS | DIASTOLIC BLOOD PRESSURE: 61 MMHG | OXYGEN SATURATION: 93 % | HEART RATE: 74 BPM | RESPIRATION RATE: 16 BRPM | BODY MASS INDEX: 32.6 KG/M2 | TEMPERATURE: 97.7 F | SYSTOLIC BLOOD PRESSURE: 123 MMHG

## 2022-06-11 PROBLEM — R41.82 ALTERED MENTAL STATUS, UNSPECIFIED ALTERED MENTAL STATUS TYPE: Status: RESOLVED | Noted: 2022-06-07 | Resolved: 2022-06-11

## 2022-06-11 PROBLEM — N39.0 ACUTE UTI: Status: RESOLVED | Noted: 2022-05-22 | Resolved: 2022-06-11

## 2022-06-11 LAB — GLUCOSE BLDC GLUCOMTR-MCNC: 103 MG/DL (ref 70–130)

## 2022-06-11 PROCEDURE — 99217 PR OBSERVATION CARE DISCHARGE MANAGEMENT: CPT | Performed by: NURSE PRACTITIONER

## 2022-06-11 PROCEDURE — 94664 DEMO&/EVAL PT USE INHALER: CPT

## 2022-06-11 PROCEDURE — 82962 GLUCOSE BLOOD TEST: CPT

## 2022-06-11 PROCEDURE — 94799 UNLISTED PULMONARY SVC/PX: CPT

## 2022-06-11 PROCEDURE — G0378 HOSPITAL OBSERVATION PER HR: HCPCS

## 2022-06-11 RX ORDER — CEFUROXIME AXETIL 500 MG/1
500 TABLET ORAL EVERY 12 HOURS SCHEDULED
Qty: 16 TABLET | Refills: 0 | Status: SHIPPED | OUTPATIENT
Start: 2022-06-11 | End: 2022-06-19

## 2022-06-11 RX ORDER — ONDANSETRON 4 MG/1
4 TABLET, FILM COATED ORAL EVERY 6 HOURS PRN
Qty: 10 TABLET | Refills: 0 | Status: SHIPPED | OUTPATIENT
Start: 2022-06-11

## 2022-06-11 RX ADMIN — IPRATROPIUM BROMIDE AND ALBUTEROL SULFATE 3 ML: .5; 3 SOLUTION RESPIRATORY (INHALATION) at 07:41

## 2022-06-11 RX ADMIN — RIVASTIGMINE 1 PATCH: 9.5 PATCH TRANSDERMAL at 09:20

## 2022-06-11 RX ADMIN — TAMSULOSIN HYDROCHLORIDE 0.8 MG: 0.4 CAPSULE ORAL at 09:18

## 2022-06-11 RX ADMIN — RIVAROXABAN 10 MG: 10 TABLET, FILM COATED ORAL at 09:19

## 2022-06-11 RX ADMIN — CEFUROXIME AXETIL 500 MG: 250 TABLET, FILM COATED ORAL at 09:18

## 2022-06-11 RX ADMIN — MEMANTINE 10 MG: 10 TABLET ORAL at 09:18

## 2022-06-11 RX ADMIN — Medication 5 MG: at 01:59

## 2022-06-11 RX ADMIN — Medication 10 ML: at 09:19

## 2022-06-11 RX ADMIN — LEVOTHYROXINE SODIUM 75 MCG: 0.07 TABLET ORAL at 09:19

## 2022-06-11 NOTE — DISCHARGE PLACEMENT REQUEST
"Gail Rubio (89 y.o. Male)   D/C Summary            Date of Birth   05/31/1931    Social Security Number       Address   [address] [place] [id number]    Home Phone   [phone]    MRN   [id number]       Amish   None    Marital Status                               Admission Date   6/7/22    Admission Type   Emergency    Admitting Provider   Seth Patel MD    Attending Provider   Seth Patel MD    Department, Room/Bed   [facility] 3F, S301/1       Discharge Date       Discharge Disposition   Hospice/Home    Discharge Destination                               Attending Provider: Seth Patel MD    Allergies: Contrast Dye, Iodine, Other, Prednisone & Diphenhydramine    Isolation: None   Infection: None   Code Status: No CPR   Advance Care Planning Activity    Ht: 170.2 cm (67\")   Wt: 94.2 kg (207 lb 10.8 oz)    Admission Cmt: None   Principal Problem: Altered mental status, unspecified altered mental status type [R41.82]                 Active Insurance as of 6/7/2022     Primary Coverage     Payor Plan Insurance Group Employer/Plan Group    HUMANA MEDICARE REPLACEMENT HUMANA MEDICARE REPLACEMENT [id number]     Payor Plan Address Payor Plan Phone Number Payor Plan Fax Number Effective Dates    [address] [address] [phone]  7/1/2018 - None Entered    [facility] [id number]       Subscriber Name Subscriber Birth Date Member ID       GAIL RUBIO 5/31/1931 [id number]                 Emergency Contacts      (Rel.) Home Phone Work Phone Mobile Phone    Anabel Grace (Daughter) [phone] -- [phone]    RUBIO,MIRELA (Son) [phone] -- [phone]    Thelma Salinas (Care Giver) [phone] -- [phone]    RAMIROWILBERT (Spouse) [phone] -- [phone]    KWAME GOLDEN (Grandchild) [phone] -- [phone]                 Discharge Summary      Raina Hitchcock APRN at 06/11/22 0818              [facility] " Hospital Medicine Services  DISCHARGE SUMMARY    Patient Name: Marcos Acuña  : 1931  MRN: [id number]    Date of Admission: 2022  Date of Discharge:  2022  Primary Care Physician: No primary care provider on file.    Consults     Date and Time Order Name Status Description    2022  1:24 PM Inpatient Infectious Diseases Consult Completed     2022 12:34 AM Inpatient Infectious Diseases Consult Completed     2022 12:33 AM Inpatient Infectious Diseases Consult Completed           Hospital Course     Presenting Problem:   Altered mental status, unspecified altered mental status type [R41.82]    Active Hospital Problems    Diagnosis  POA   • History of bacteremia, E.coli [Z87.898]  Unknown   • Dysphagia [R13.10]  Unknown   • Productive cough [R05.8]  Unknown   • Elevated troponin [R77.8]  Yes   • Chronic deep vein thrombosis (DVT) (HCC) [I82.509]  Yes   • Dementia (HCC) [F03.90]  Yes   • Obstructive sleep apnea syndrome [G47.33]  Yes   • Type 2 diabetes mellitus without complication (HCC) [E11.9]  Yes   • Hypothyroidism [E03.9]  Yes   • Benign essential hypertension [I10]  Yes      Resolved Hospital Problems    Diagnosis Date Resolved POA   • **Altered mental status, unspecified altered mental status type [R41.82] 2022 Yes   • Acute UTI [N39.0] 2022 Yes      Hospital Course:  Marcos Aucña is a 89 y.o. male PMH hypothyroidism, DM, HTN, MIREILLE, chronic left lower extremity DVT on chronic anticoagulation and dementia presented to the ED with AMS.  Family unable to wake the patient up and he had been sleeping more over the last few days.  He has not been using his CPAP.  Patient had previous UTIs/history of E. coli bacteremia.  ID consulted recommending Ceftin 500 mg twice a day.  Pro-Jeffy within normal limits with blood cultures NGTD.  Patient also experienced acute hyponatremia secondary to worsening free water deficit.  Patient received 1 half-normal saline which improved hypernatremia.   Hospice services consulted.  Patient refuses to wear CPAP.  Meek catheter placed on 6/10/2022 due to urinary retention.       Discharge Follow Up Recommendations for labs/diagnostics:  -F/U with PCP PRN  -Hospice Services to follow     Day of Discharge     HPI:   Patient resting in bed, alert to person only.  Denies pain, nausea.    Review of Systems  Limited due to dementia with questionable reliability  Patient denies pain, nausea.      Vital Signs:   Temp:  [97.7 °F (36.5 °C)-98.3 °F (36.8 °C)] 97.7 °F (36.5 °C)  Heart Rate:  [63-83] 74  Resp:  [16-18] 16  BP: (122-142)/(57-72) 123/61     Physical Exam:  Constitutional: Sleeping, awaken easily with conversation.  HENT: NCAT, mucous membranes dry  Respiratory: Clear to auscultation bilaterally, nonlabored respirations   Cardiovascular: RRR, no murmurs, rubs, or gallops  Gastrointestinal: Positive bowel sounds, soft, nontender, nondistended  - F/C  Musculoskeletal: No bilateral ankle  Psychiatric: Flat affect, cooperative  Neurologic: Oriented to person only.   Skin: No rashes. (+) skin tenting    Plan:  AMS, encephalopathy, dementia  - CT head without contrast no acute findings.  Patient will benefit from using CPAP but he refuses.    Previous UTI/history of E. coli bacteremia  - ID consulted recommended Ceftin 500 mg twice a day.  Pro-Jeffy within normal limits, blood cultures NGTD    Cough  Dysphagia  - Patient reports cough since last admission, previous dysphagia.  Respiratory panel negative.  CT consistent with pneumonia receiving antibiotic therapy.    Acute hypernatremia  - Worsening free water deficit.  With the level dementia likely to progress.  Patient received 1 half-normal saline with improvement of hypernatremia.  Hospice has been consulted.    Sleep apnea  - CPAP ordered but patient refuses.    Chronic DVT on Xarelto-stable    Hypothyroidism  - Continue levothyroxine, TSH 5/2022 within normal limits    DM  - Continue metformin      Pertinent   and/or Most Recent Results     Results from last 7 days   Lab Units 06/10/22  0340 06/09/22  0655 06/08/22  0946 06/07/22  1539   WBC 10*3/mm3 7.32  --  6.95 8.45   HEMOGLOBIN g/dL 9.3*  --  10.0* 10.3*   HEMATOCRIT % 28.1*  --  29.5* 32.0*   PLATELETS 10*3/mm3 168  --  203 205   SODIUM mmol/L 146* 149* 143 146*   POTASSIUM mmol/L 3.7 3.6 3.2* 4.2   CHLORIDE mmol/L 113* 114* 108* 112*   CO2 mmol/L 26.0 28.0 24.0 29.0   BUN mg/dL 10 12 11 11   CREATININE mg/dL 0.71* 0.80 0.89 0.81   GLUCOSE mg/dL 104* 82 103* 99   CALCIUM mg/dL 8.3 8.5 8.6 8.7     Results from last 7 days   Lab Units 06/07/22  1539   BILIRUBIN mg/dL 0.5   ALK PHOS U/L 97   ALT (SGPT) U/L 15   AST (SGOT) U/L 25           Invalid input(s): TG, LDLCALC, LDLREALC  Results from last 7 days   Lab Units 06/10/22  0340 06/07/22  2219 06/07/22  1539   PROBNP pg/mL 390.5  --  254.1   TROPONIN T ng/mL  --  0.057* 0.067*   PROCALCITONIN ng/mL  --   --  0.08   LACTATE mmol/L  --   --  1.7       Brief Urine Lab Results  (Last result in the past 365 days)      Color   Clarity   Blood   Leuk Est   Nitrite   Protein   CREAT   Urine HCG        06/07/22 1538 Yellow   Clear   Negative   Small (1+)   Negative   Negative                 Microbiology Results Abnormal     Procedure Component Value - Date/Time    Blood Culture - Blood, Hand, Left [[id number]]  (Normal) Collected: 06/07/22 1630    Lab Status: Preliminary result Specimen: Blood from Hand, Left Updated: 06/10/22 1703     Blood Culture No growth at 3 days    Blood Culture - Blood, Hand, Right [[id number]]  (Normal) Collected: 06/07/22 1615    Lab Status: Preliminary result Specimen: Blood from Hand, Right Updated: 06/10/22 1703     Blood Culture No growth at 3 days    Urine Culture - Urine, Urine, Catheter [[id number]]  (Normal) Collected: 06/07/22 1538    Lab Status: Final result Specimen: Urine, Catheter Updated: 06/08/22 1905     Urine Culture No growth    S. Pneumo Ag Urine or CSF - Urine, Urine, Clean Catch  [[id number]]  (Normal) Collected: 06/08/22 0632    Lab Status: Final result Specimen: Urine, Clean Catch Updated: 06/08/22 0953     Strep Pneumo Ag Negative    Legionella Antigen, Urine - Urine, Urine, Clean Catch [[id number]]  (Normal) Collected: 06/08/22 0632    Lab Status: Final result Specimen: Urine, Clean Catch Updated: 06/08/22 0953     LEGIONELLA ANTIGEN, URINE Negative    MRSA Screen, PCR (Inpatient) - Swab, Nares [[id number]]  (Normal) Collected: 06/08/22 0632    Lab Status: Final result Specimen: Swab from Nares Updated: 06/08/22 0918     MRSA PCR Negative    Narrative:      MRSA Negative    Respiratory Panel PCR w/COVID-19(SARS-CoV-2) FREDDIE/URI/JAYSON/PAD/COR/MAD/JAYLA In-House, NP Swab in UTM/VTM, 3-4 HR TAT - Swab, Nasopharynx [[id number]]  (Normal) Collected: 06/08/22 0632    Lab Status: Final result Specimen: Swab from Nasopharynx Updated: 06/08/22 0858     ADENOVIRUS, PCR Not Detected     Coronavirus 229E Not Detected     Coronavirus HKU1 Not Detected     Coronavirus NL63 Not Detected     Coronavirus OC43 Not Detected     COVID19 Not Detected     Human Metapneumovirus Not Detected     Human Rhinovirus/Enterovirus Not Detected     Influenza A PCR Not Detected     Influenza B PCR Not Detected     Parainfluenza Virus 1 Not Detected     Parainfluenza Virus 2 Not Detected     Parainfluenza Virus 3 Not Detected     Parainfluenza Virus 4 Not Detected     RSV, PCR Not Detected     Bordetella pertussis pcr Not Detected     Bordetella parapertussis PCR Not Detected     Chlamydophila pneumoniae PCR Not Detected     Mycoplasma pneumo by PCR Not Detected    Narrative:      In the setting of a positive respiratory panel with a viral infection PLUS a negative procalcitonin without other underlying concern for bacterial infection, consider observing off antibiotics or discontinuation of antibiotics and continue supportive care. If the respiratory panel is positive for atypical bacterial infection (Bordetella pertussis,  Chlamydophila pneumoniae, or Mycoplasma pneumoniae), consider antibiotic de-escalation to target atypical bacterial infection.    COVID PRE-OP / PRE-PROCEDURE SCREENING ORDER (NO ISOLATION) - Swab, Nasopharynx [[id number]]  (Normal) Collected: 06/07/22 1633    Lab Status: Final result Specimen: Swab from Nasopharynx Updated: 06/07/22 1705    Narrative:      The following orders were created for panel order COVID PRE-OP / PRE-PROCEDURE SCREENING ORDER (NO ISOLATION) - Swab, Nasopharynx.  Procedure                               Abnormality         Status                     ---------                               -----------         ------                     COVID-19 and FLU A/B PCR...[[id number]]  Normal              Final result                 Please view results for these tests on the individual orders.    COVID-19 and FLU A/B PCR - Swab, Nasopharynx [[id number]]  (Normal) Collected: 06/07/22 1633    Lab Status: Final result Specimen: Swab from Nasopharynx Updated: 06/07/22 1705     COVID19 Not Detected     Influenza A PCR Not Detected     Influenza B PCR Not Detected    Narrative:      Fact sheet for providers: https://www.fda.gov/media/463715/download    Fact sheet for patients: https://www.fda.gov/media/069556/download    Test performed by PCR.          Imaging Results (All)     Procedure Component Value Units Date/Time    FL Video Swallow With Speech Single Contrast [[id number]] Collected: 06/08/22 1556     Updated: 06/08/22 1650    Narrative:      EXAMINATION: FL VIDEO SWALLOW W SPEECH SINGLE-CONTRAST-     INDICATION: dysphagia; R41.82-Altered mental status, unspecified;  Z78.9-Other specified health status; N39.0-Urinary tract infection, site  not specified     TECHNIQUE: 2 minutes and 12 seconds of fluoroscopic time was used for  this exam. 7 associated fluoroscopic loops were saved. The patient was  evaluated in the seated lateral position while taking a variety of  consistencies of barium by mouth under the  direction of speech  pathology.     COMPARISON: NONE     FINDINGS: 2 minutes and 12 seconds of fluoroscopy prior for a modified  barium swallow. Please see speech therapy report for full details and  recommendations.          Impression:      Fluoroscopy prior for a modified barium swallow. Please see  speech therapy report for full details and recommendations.         This report was finalized on 6/8/2022 4:47 PM by Abdirashid Luna.       CT Chest Without Contrast Diagnostic [[id number]] Collected: 06/07/22 2014     Updated: 06/07/22 2023    Narrative:      CT CHEST WO CONTRAST DIAGNOSTIC-     Date of Exam: 6/7/2022 8:08 PM     Indication: Respiratory illness, nondiagnostic xray; R41.82-Altered  mental status, unspecified; Z78.9-Other specified health status;  N39.0-Urinary tract infection, site not specified  confusion. History of  pyelonephritis and cystitis.     Comparison: CT the abdomen pelvis 05/23/2022, chest x-ray 06/07/2022,  chest CT 05/29/2020     Technique: Serial and axial CT images of the chest were obtained.  Reconstructions in the coronal and sagittal planes were performed.   Automated exposure control and iterative reconstruction methods were  used.     FINDINGS:  Hilum and Mediastinum: There are no pathologically enlarged hilar  mediastinal lymph nodes. There is cardiomegaly. There is mild coronary  artery atherosclerotic calcification. There is aortic valvular  calcification. There is no pericardial effusion. Thoracic aorta has  normal caliber. Pulmonary arteries are enlarged.     Lung Parenchyma and Pleura: Moderate size bilateral pleural effusions.  Respiratory motion limits evaluation lung parenchyma. There is mild  peripheral septal thickening. There is mild bibasilar subsegmental  atelectasis. The appearance is not significantly changed compared to the  previous CT the abdomen pelvis.     Upper Abdomen: Large parapelvic cyst right kidney.     Soft tissues: Unremarkable.     Osseous  structures: No aggressive focal lytic or sclerotic osseous  lesions. Osteopenia. Compression deformity along the superior endplate  of L1 is unchanged.       Impression:      1.  there are moderate bilateral pleural effusions with bibasilar  basilar subsegmental atelectasis. Bibasilar pneumonia less likely but  cannot be excluded on this exam. No associated lymphadenopathy..  Pulmonary edema with pleural effusions with atelectasis favored over  infection based off exam. Clinical correlation is recommended.  2.  Cardiomegaly with enlarged pulmonary arteries, correlate for  pulmonary artery hypertension.           This report was finalized on 6/7/2022 8:20 PM by Delores Cid MD.       CT Head Without Contrast [[id number]] Collected: 06/07/22 1708     Updated: 06/07/22 1715    Narrative:      DATE OF EXAM: 6/7/2022 4:57 PM     PROCEDURE: CT HEAD WO CONTRAST-     INDICATIONS: AMS     COMPARISON: Head CT 1/12/2022     TECHNIQUE: Routine transaxial and coronal reconstruction images were  obtained through the head without the administration of contrast.  Automated exposure control and iterative reconstruction methods were  used.     The radiation dose reduction device was turned on for each scan per the  ALARA (As Low as Reasonably Achievable) protocol.     FINDINGS:  No acute intracranial hemorrhage. No acute large territory infarct.  Redemonstration of scattered and confluent subcortical and  periventricular white matter hypodensities which are nonspecific and can  be seen in the setting of chronic small vessel ischemic change. No  extra-axial collections. No midline shift or herniation. Mild global  cerebral volume loss. Normal size and configuration of the ventricles  commensurate with degree of cerebral volume loss. Unremarkable  appearance of the orbits. The mastoid air cells are clear. There is  scattered mucosal thickening of the ethmoid air cells and mucosal  mucosal thickening in the left sphenoid sinus, similar to  prior.        Impression:      No acute intracranial findings. Stable chronic and senescent changes as  detailed above.     This report was finalized on 6/7/2022 5:12 PM by Berhane Hendrix MD.       XR Chest 1 View [[id number]] Collected: 06/07/22 1551     Updated: 06/07/22 1557    Narrative:      DATE OF EXAM: 6/7/2022 3:40 PM     PROCEDURE: XR CHEST 1 VW-     INDICATIONS: AMS protocol     COMPARISON: Chest x-ray 5/22/2022     TECHNIQUE: Single radiographic AP view of the chest was obtained.     FINDINGS:  Mild low lung volumes with stable cardiomegaly and mildly accentuated  cardiac mediastinal silhouette. Mild increase in diffuse interstitial  prominence possibly reflective of bronchovascular crowding in the  setting of low lung volumes of chronic interstitial changes interstitial  edema could appear similarly. There are left lung base opacities favored  reflect atelectasis. No pneumothorax. No significant pleural effusion.  Previously questioned nodule in the left lower lobe is not well  appreciated on today's exam.        Impression:      Low lung volumes with mild increase in diffuse interstitial prominence  which may reflect bronchovascular crowding although a component of  interstitial edema, or possibly atypical/viral infection, cannot be  excluded. Ill-defined opacities at the left lung base are favored  reflect atelectasis.     This report was finalized on 6/7/2022 3:54 PM by Berhane Hendrix MD.                     Results for orders placed during the hospital encounter of 05/21/22    Adult Transthoracic Echo Complete w/ Color, Spectral and Contrast if necessary per protocol    Interpretation Summary  · Left ventricular wall thickness is consistent with mild concentric hypertrophy.  · There is moderate calcification of the aortic valve.  · The right ventricular cavity is mild to moderately dilated.  · The right atrial cavity is mildly dilated.  · Left ventricular ejection fraction appears to be 61 -  65%.  · There is significant calcification of the aortic valve with reduced excursion. However the acoustic windows are so poor that the transducer cannot align properly to get a good measurement across the aortic valve. Consider ADARSH if clinically indicated      Pending Labs     Order Current Status    Blood Culture - Blood, Hand, Left Preliminary result    Blood Culture - Blood, Hand, Right Preliminary result        Discharge Details        Discharge Medications      New Medications      Instructions Start Date   ondansetron 4 MG tablet  Commonly known as: ZOFRAN   4 mg, Oral, Every 6 Hours PRN         Changes to Medications      Instructions Start Date   acetaminophen 325 MG tablet  Commonly known as: TYLENOL  What changed:   · reasons to take this  · additional instructions   650 mg, Oral, Every 4 Hours PRN      cefuroxime 500 MG tablet  Commonly known as: CEFTIN  What changed:   · when to take this  · additional instructions   500 mg, Oral, Every 12 Hours Scheduled         Continue These Medications      Instructions Start Date   cyanocobalamin 1000 MCG/ML injection   1,000 mcg, Intramuscular, Every 28 Days      Exelon 9.5 MG/24HR patch  Generic drug: rivastigmine   1 patch, Transdermal, Daily      ipratropium 0.06 % nasal spray  Commonly known as: ATROVENT   2 sprays, Nasal, 2 times daily      levocetirizine 5 MG tablet  Commonly known as: XYZAL   5 mg, Oral, Every Evening      levothyroxine 75 MCG tablet  Commonly known as: SYNTHROID, LEVOTHROID   75 mcg, Oral, Daily      melatonin 5 MG tablet tablet   5 mg, Oral, Nightly PRN      memantine 28 MG capsule sustained-release 24 hr extended release capsule  Commonly known as: NAMENDA XR   28 mg, Oral, Daily      metFORMIN 500 MG tablet  Commonly known as: GLUCOPHAGE   TAKE 1 TABLET TWICE DAILY WITH MEALS      Mirabegron ER 50 MG tablet sustained-release 24 hour 24 hr tablet  Commonly known as: Myrbetriq   50 mg, Oral, Daily      QUEtiapine 25 MG tablet  Commonly  known as: SEROquel   25 mg, Oral, Nightly PRN      rivaroxaban 10 MG tablet  Commonly known as: XARELTO   10 mg, Oral, Daily      tamsulosin 0.4 MG capsule 24 hr capsule  Commonly known as: FLOMAX   TAKE 1 CAPSULE EVERY DAY             Allergies   Allergen Reactions   • Contrast Dye Anaphylaxis   • Iodine Unknown - High Severity     Pt states eyes start watering.    • Other Anaphylaxis   • Prednisone & Diphenhydramine Rash         Discharge Disposition:  Hospice/Home    Discharge Diet:  Diet Order   Procedures   • Diet Dysphagia; IV - Mechanical Soft No Mixed Consistencies; Nectar / Syrup Thick; Extra Sauce / Gravy; Consistent Carbohydrate         Discharge Activity:   Activity Instructions     Activity as Tolerated              CODE STATUS:    Code Status and Medical Interventions:   Ordered at: 06/07/22 2007     Medical Intervention Limits:    NO intubation (DNI)     Code Status (Patient has no pulse and is not breathing):    No CPR (Do Not Attempt to Resuscitate)     Medical Interventions (Patient has pulse or is breathing):    Limited Support         Future Appointments   Date Time Provider Department Center   6/11/2022 10:30 AM EMS 1  [facility]   6/29/2022  1:30 PM Mitch Olson MD [facility]           Time Spent on Discharge:  35 minutes    Electronically signed by ALMA ROSA Acosta, 06/11/22, 8:18 AM EDT.      Electronically signed by Raina Hitchcock APRN at 06/11/22 0828

## 2022-06-11 NOTE — DISCHARGE INSTR - OTHER ORDERS
Please remember to call Hospice @ [phone] so that the nurse can come & complete the admission process. Thank you.

## 2022-06-11 NOTE — PROGRESS NOTES
Continued Stay Note  [facility]     Patient Name: Marcos Acuña  MRN: [id number]  Today's Date: 6/11/2022    Admit Date: 6/7/2022     Discharge Plan     Row Name 06/11/22 0853       Plan    Plan Home with [facility]    Patient/Family in Agreement with Plan yes    Final Discharge Disposition Code 50 - home with hospice    Final Note Pt [facility]. EMS DNR & PCS have been placed on the pt.'s chart. D/C Summary has been sent to Hospice Central Intake. Family has to the 24hr contact number to call Hospice when the pt arrives to initiate hospice services.               Discharge Codes    No documentation.               Expected Discharge Date and Time     Expected Discharge Date Expected Discharge Time    Jun 11, 2022             Narcisa Kaur

## 2022-06-11 NOTE — DISCHARGE SUMMARY
[facility] Medicine Services  DISCHARGE SUMMARY    Patient Name: Marcos Acuña  : 1931  MRN: [id number]    Date of Admission: 2022  Date of Discharge:  2022  Primary Care Physician: No primary care provider on file.    Consults     Date and Time Order Name Status Description    2022  1:24 PM Inpatient Infectious Diseases Consult Completed     2022 12:34 AM Inpatient Infectious Diseases Consult Completed     2022 12:33 AM Inpatient Infectious Diseases Consult Completed           Hospital Course     Presenting Problem:   Altered mental status, unspecified altered mental status type [R41.82]    Active Hospital Problems    Diagnosis  POA   • History of bacteremia, E.coli [Z87.898]  Unknown   • Dysphagia [R13.10]  Unknown   • Productive cough [R05.8]  Unknown   • Elevated troponin [R77.8]  Yes   • Chronic deep vein thrombosis (DVT) (HCC) [I82.509]  Yes   • Dementia (HCC) [F03.90]  Yes   • Obstructive sleep apnea syndrome [G47.33]  Yes   • Type 2 diabetes mellitus without complication (HCC) [E11.9]  Yes   • Hypothyroidism [E03.9]  Yes   • Benign essential hypertension [I10]  Yes      Resolved Hospital Problems    Diagnosis Date Resolved POA   • **Altered mental status, unspecified altered mental status type [R41.82] 2022 Yes   • Acute UTI [N39.0] 2022 Yes      Hospital Course:  Marcos Acuña is a 89 y.o. male PMH hypothyroidism, DM, HTN, MIREILLE, chronic left lower extremity DVT on chronic anticoagulation and dementia presented to the ED with AMS.  Family unable to wake the patient up and he had been sleeping more over the last few days.  He has not been using his CPAP.  Patient had previous UTIs/history of E. coli bacteremia.  ID consulted recommending Ceftin 500 mg twice a day.  Pro-Jeffy within normal limits with blood cultures NGTD.  Patient also experienced acute hyponatremia secondary to worsening free water deficit.  Patient received 1 half-normal saline  which improved hypernatremia.  Hospice services consulted.  Patient refuses to wear CPAP.  Meek catheter placed on 6/10/2022 due to urinary retention.       Discharge Follow Up Recommendations for labs/diagnostics:  -F/U with PCP PRN  -Hospice Services to follow     Day of Discharge     HPI:   Patient resting in bed, alert to person only.  Denies pain, nausea.    Review of Systems  Limited due to dementia with questionable reliability  Patient denies pain, nausea.      Vital Signs:   Temp:  [97.7 °F (36.5 °C)-98.3 °F (36.8 °C)] 97.7 °F (36.5 °C)  Heart Rate:  [63-83] 74  Resp:  [16-18] 16  BP: (122-142)/(57-72) 123/61     Physical Exam:  Constitutional: Sleeping, awaken easily with conversation.  HENT: NCAT, mucous membranes dry  Respiratory: Clear to auscultation bilaterally, nonlabored respirations   Cardiovascular: RRR, no murmurs, rubs, or gallops  Gastrointestinal: Positive bowel sounds, soft, nontender, nondistended  - F/C  Musculoskeletal: No bilateral ankle  Psychiatric: Flat affect, cooperative  Neurologic: Oriented to person only.   Skin: No rashes. (+) skin tenting    Plan:  AMS, encephalopathy, dementia  - CT head without contrast no acute findings.  Patient will benefit from using CPAP but he refuses.    Previous UTI/history of E. coli bacteremia  - ID consulted recommended Ceftin 500 mg twice a day.  Pro-Jeffy within normal limits, blood cultures NGTD    Cough  Dysphagia  - Patient reports cough since last admission, previous dysphagia.  Respiratory panel negative.  CT consistent with pneumonia receiving antibiotic therapy.    Acute hypernatremia  - Worsening free water deficit.  With the level dementia likely to progress.  Patient received 1 half-normal saline with improvement of hypernatremia.  Hospice has been consulted.    Sleep apnea  - CPAP ordered but patient refuses.    Chronic DVT on Xarelto-stable    Hypothyroidism  - Continue levothyroxine, TSH 5/2022 within normal limits    DM  - Continue  metformin      Pertinent  and/or Most Recent Results     Results from last 7 days   Lab Units 06/10/22  0340 06/09/22  0655 06/08/22  0946 06/07/22  1539   WBC 10*3/mm3 7.32  --  6.95 8.45   HEMOGLOBIN g/dL 9.3*  --  10.0* 10.3*   HEMATOCRIT % 28.1*  --  29.5* 32.0*   PLATELETS 10*3/mm3 168  --  203 205   SODIUM mmol/L 146* 149* 143 146*   POTASSIUM mmol/L 3.7 3.6 3.2* 4.2   CHLORIDE mmol/L 113* 114* 108* 112*   CO2 mmol/L 26.0 28.0 24.0 29.0   BUN mg/dL 10 12 11 11   CREATININE mg/dL 0.71* 0.80 0.89 0.81   GLUCOSE mg/dL 104* 82 103* 99   CALCIUM mg/dL 8.3 8.5 8.6 8.7     Results from last 7 days   Lab Units 06/07/22  1539   BILIRUBIN mg/dL 0.5   ALK PHOS U/L 97   ALT (SGPT) U/L 15   AST (SGOT) U/L 25           Invalid input(s): TG, LDLCALC, LDLREALC  Results from last 7 days   Lab Units 06/10/22  0340 06/07/22  2219 06/07/22  1539   PROBNP pg/mL 390.5  --  254.1   TROPONIN T ng/mL  --  0.057* 0.067*   PROCALCITONIN ng/mL  --   --  0.08   LACTATE mmol/L  --   --  1.7       Brief Urine Lab Results  (Last result in the past 365 days)      Color   Clarity   Blood   Leuk Est   Nitrite   Protein   CREAT   Urine HCG        06/07/22 1538 Yellow   Clear   Negative   Small (1+)   Negative   Negative                 Microbiology Results Abnormal     Procedure Component Value - Date/Time    Blood Culture - Blood, Hand, Left [[id number]]  (Normal) Collected: 06/07/22 1630    Lab Status: Preliminary result Specimen: Blood from Hand, Left Updated: 06/10/22 1703     Blood Culture No growth at 3 days    Blood Culture - Blood, Hand, Right [[id number]]  (Normal) Collected: 06/07/22 1615    Lab Status: Preliminary result Specimen: Blood from Hand, Right Updated: 06/10/22 1703     Blood Culture No growth at 3 days    Urine Culture - Urine, Urine, Catheter [[id number]]  (Normal) Collected: 06/07/22 1538    Lab Status: Final result Specimen: Urine, Catheter Updated: 06/08/22 1905     Urine Culture No growth    S. Pneumo Ag Urine or CSF -  Urine, Urine, Clean Catch [[id number]]  (Normal) Collected: 06/08/22 0632    Lab Status: Final result Specimen: Urine, Clean Catch Updated: 06/08/22 0953     Strep Pneumo Ag Negative    Legionella Antigen, Urine - Urine, Urine, Clean Catch [[id number]]  (Normal) Collected: 06/08/22 0632    Lab Status: Final result Specimen: Urine, Clean Catch Updated: 06/08/22 0953     LEGIONELLA ANTIGEN, URINE Negative    MRSA Screen, PCR (Inpatient) - Swab, Nares [[id number]]  (Normal) Collected: 06/08/22 0632    Lab Status: Final result Specimen: Swab from Nares Updated: 06/08/22 0918     MRSA PCR Negative    Narrative:      MRSA Negative    Respiratory Panel PCR w/COVID-19(SARS-CoV-2) FREDDIE/[facility]/JAYSON/PAD/[facility]/MAD/JAYLA In-House, NP Swab in UTM/VTM, 3-4 HR TAT - Swab, Nasopharynx [[id number]]  (Normal) Collected: 06/08/22 0632    Lab Status: Final result Specimen: Swab from Nasopharynx Updated: 06/08/22 0858     ADENOVIRUS, PCR Not Detected     Coronavirus 229E Not Detected     Coronavirus HKU1 Not Detected     Coronavirus NL63 Not Detected     Coronavirus OC43 Not Detected     COVID19 Not Detected     Human Metapneumovirus Not Detected     Human Rhinovirus/Enterovirus Not Detected     Influenza A PCR Not Detected     Influenza B PCR Not Detected     Parainfluenza Virus 1 Not Detected     Parainfluenza Virus 2 Not Detected     Parainfluenza Virus 3 Not Detected     Parainfluenza Virus 4 Not Detected     RSV, PCR Not Detected     Bordetella pertussis pcr Not Detected     Bordetella parapertussis PCR Not Detected     Chlamydophila pneumoniae PCR Not Detected     Mycoplasma pneumo by PCR Not Detected    Narrative:      In the setting of a positive respiratory panel with a viral infection PLUS a negative procalcitonin without other underlying concern for bacterial infection, consider observing off antibiotics or discontinuation of antibiotics and continue supportive care. If the respiratory panel is positive for atypical bacterial infection  (Bordetella pertussis, Chlamydophila pneumoniae, or Mycoplasma pneumoniae), consider antibiotic de-escalation to target atypical bacterial infection.    COVID PRE-OP / PRE-PROCEDURE SCREENING ORDER (NO ISOLATION) - Swab, Nasopharynx [[id number]]  (Normal) Collected: 06/07/22 1633    Lab Status: Final result Specimen: Swab from Nasopharynx Updated: 06/07/22 1705    Narrative:      The following orders were created for panel order COVID PRE-OP / PRE-PROCEDURE SCREENING ORDER (NO ISOLATION) - Swab, Nasopharynx.  Procedure                               Abnormality         Status                     ---------                               -----------         ------                     COVID-19 and FLU A/B PCR...[[id number]]  Normal              Final result                 Please view results for these tests on the individual orders.    COVID-19 and FLU A/B PCR - Swab, Nasopharynx [[id number]]  (Normal) Collected: 06/07/22 1633    Lab Status: Final result Specimen: Swab from Nasopharynx Updated: 06/07/22 1705     COVID19 Not Detected     Influenza A PCR Not Detected     Influenza B PCR Not Detected    Narrative:      Fact sheet for providers: https://www.fda.gov/media/144070/download    Fact sheet for patients: https://www.fda.gov/media/757195/download    Test performed by PCR.          Imaging Results (All)     Procedure Component Value Units Date/Time    FL Video Swallow With Speech Single Contrast [[id number]] Collected: 06/08/22 1556     Updated: 06/08/22 1650    Narrative:      EXAMINATION: FL VIDEO SWALLOW W SPEECH SINGLE-CONTRAST-     INDICATION: dysphagia; R41.82-Altered mental status, unspecified;  Z78.9-Other specified health status; N39.0-Urinary tract infection, site  not specified     TECHNIQUE: 2 minutes and 12 seconds of fluoroscopic time was used for  this exam. 7 associated fluoroscopic loops were saved. The patient was  evaluated in the seated lateral position while taking a variety of  consistencies of  barium by mouth under the direction of speech  pathology.     COMPARISON: NONE     FINDINGS: 2 minutes and 12 seconds of fluoroscopy prior for a modified  barium swallow. Please see speech therapy report for full details and  recommendations.          Impression:      Fluoroscopy prior for a modified barium swallow. Please see  speech therapy report for full details and recommendations.         This report was finalized on 6/8/2022 4:47 PM by Abdirashid Luna.       CT Chest Without Contrast Diagnostic [[id number]] Collected: 06/07/22 2014     Updated: 06/07/22 2023    Narrative:      CT CHEST WO CONTRAST DIAGNOSTIC-     Date of Exam: 6/7/2022 8:08 PM     Indication: Respiratory illness, nondiagnostic xray; R41.82-Altered  mental status, unspecified; Z78.9-Other specified health status;  N39.0-Urinary tract infection, site not specified  confusion. History of  pyelonephritis and cystitis.     Comparison: CT the abdomen pelvis 05/23/2022, chest x-ray 06/07/2022,  chest CT 05/29/2020     Technique: Serial and axial CT images of the chest were obtained.  Reconstructions in the coronal and sagittal planes were performed.   Automated exposure control and iterative reconstruction methods were  used.     FINDINGS:  Hilum and Mediastinum: There are no pathologically enlarged hilar  mediastinal lymph nodes. There is cardiomegaly. There is mild coronary  artery atherosclerotic calcification. There is aortic valvular  calcification. There is no pericardial effusion. Thoracic aorta has  normal caliber. Pulmonary arteries are enlarged.     Lung Parenchyma and Pleura: Moderate size bilateral pleural effusions.  Respiratory motion limits evaluation lung parenchyma. There is mild  peripheral septal thickening. There is mild bibasilar subsegmental  atelectasis. The appearance is not significantly changed compared to the  previous CT the abdomen pelvis.     Upper Abdomen: Large parapelvic cyst right kidney.     Soft tissues:  Unremarkable.     Osseous structures: No aggressive focal lytic or sclerotic osseous  lesions. Osteopenia. Compression deformity along the superior endplate  of L1 is unchanged.       Impression:      1.  there are moderate bilateral pleural effusions with bibasilar  basilar subsegmental atelectasis. Bibasilar pneumonia less likely but  cannot be excluded on this exam. No associated lymphadenopathy..  Pulmonary edema with pleural effusions with atelectasis favored over  infection based off exam. Clinical correlation is recommended.  2.  Cardiomegaly with enlarged pulmonary arteries, correlate for  pulmonary artery hypertension.           This report was finalized on 6/7/2022 8:20 PM by Delores Cid MD.       CT Head Without Contrast [[id number]] Collected: 06/07/22 1708     Updated: 06/07/22 1715    Narrative:      DATE OF EXAM: 6/7/2022 4:57 PM     PROCEDURE: CT HEAD WO CONTRAST-     INDICATIONS: AMS     COMPARISON: Head CT 1/12/2022     TECHNIQUE: Routine transaxial and coronal reconstruction images were  obtained through the head without the administration of contrast.  Automated exposure control and iterative reconstruction methods were  used.     The radiation dose reduction device was turned on for each scan per the  ALARA (As Low as Reasonably Achievable) protocol.     FINDINGS:  No acute intracranial hemorrhage. No acute large territory infarct.  Redemonstration of scattered and confluent subcortical and  periventricular white matter hypodensities which are nonspecific and can  be seen in the setting of chronic small vessel ischemic change. No  extra-axial collections. No midline shift or herniation. Mild global  cerebral volume loss. Normal size and configuration of the ventricles  commensurate with degree of cerebral volume loss. Unremarkable  appearance of the orbits. The mastoid air cells are clear. There is  scattered mucosal thickening of the ethmoid air cells and mucosal  mucosal thickening in the left  sphenoid sinus, similar to prior.        Impression:      No acute intracranial findings. Stable chronic and senescent changes as  detailed above.     This report was finalized on 6/7/2022 5:12 PM by Berhane Hendrix MD.       XR Chest 1 View [[id number]] Collected: 06/07/22 1551     Updated: 06/07/22 1557    Narrative:      DATE OF EXAM: 6/7/2022 3:40 PM     PROCEDURE: XR CHEST 1 VW-     INDICATIONS: AMS protocol     COMPARISON: Chest x-ray 5/22/2022     TECHNIQUE: Single radiographic AP view of the chest was obtained.     FINDINGS:  Mild low lung volumes with stable cardiomegaly and mildly accentuated  cardiac mediastinal silhouette. Mild increase in diffuse interstitial  prominence possibly reflective of bronchovascular crowding in the  setting of low lung volumes of chronic interstitial changes interstitial  edema could appear similarly. There are left lung base opacities favored  reflect atelectasis. No pneumothorax. No significant pleural effusion.  Previously questioned nodule in the left lower lobe is not well  appreciated on today's exam.        Impression:      Low lung volumes with mild increase in diffuse interstitial prominence  which may reflect bronchovascular crowding although a component of  interstitial edema, or possibly atypical/viral infection, cannot be  excluded. Ill-defined opacities at the left lung base are favored  reflect atelectasis.     This report was finalized on 6/7/2022 3:54 PM by Berhane Hendrix MD.                     Results for orders placed during the hospital encounter of 05/21/22    Adult Transthoracic Echo Complete w/ Color, Spectral and Contrast if necessary per protocol    Interpretation Summary  · Left ventricular wall thickness is consistent with mild concentric hypertrophy.  · There is moderate calcification of the aortic valve.  · The right ventricular cavity is mild to moderately dilated.  · The right atrial cavity is mildly dilated.  · Left ventricular ejection  fraction appears to be 61 - 65%.  · There is significant calcification of the aortic valve with reduced excursion. However the acoustic windows are so poor that the transducer cannot align properly to get a good measurement across the aortic valve. Consider ADARSH if clinically indicated      Pending Labs     Order Current Status    Blood Culture - Blood, Hand, Left Preliminary result    Blood Culture - Blood, Hand, Right Preliminary result        Discharge Details        Discharge Medications      New Medications      Instructions Start Date   ondansetron 4 MG tablet  Commonly known as: ZOFRAN   4 mg, Oral, Every 6 Hours PRN         Changes to Medications      Instructions Start Date   acetaminophen 325 MG tablet  Commonly known as: TYLENOL  What changed:   · reasons to take this  · additional instructions   650 mg, Oral, Every 4 Hours PRN      cefuroxime 500 MG tablet  Commonly known as: CEFTIN  What changed:   · when to take this  · additional instructions   500 mg, Oral, Every 12 Hours Scheduled         Continue These Medications      Instructions Start Date   cyanocobalamin 1000 MCG/ML injection   1,000 mcg, Intramuscular, Every 28 Days      Exelon 9.5 MG/24HR patch  Generic drug: rivastigmine   1 patch, Transdermal, Daily      ipratropium 0.06 % nasal spray  Commonly known as: ATROVENT   2 sprays, Nasal, 2 times daily      levocetirizine 5 MG tablet  Commonly known as: XYZAL   5 mg, Oral, Every Evening      levothyroxine 75 MCG tablet  Commonly known as: SYNTHROID, LEVOTHROID   75 mcg, Oral, Daily      melatonin 5 MG tablet tablet   5 mg, Oral, Nightly PRN      memantine 28 MG capsule sustained-release 24 hr extended release capsule  Commonly known as: NAMENDA XR   28 mg, Oral, Daily      metFORMIN 500 MG tablet  Commonly known as: GLUCOPHAGE   TAKE 1 TABLET TWICE DAILY WITH MEALS      Mirabegron ER 50 MG tablet sustained-release 24 hour 24 hr tablet  Commonly known as: Myrbetriq   50 mg, Oral, Daily       QUEtiapine 25 MG tablet  Commonly known as: SEROquel   25 mg, Oral, Nightly PRN      rivaroxaban 10 MG tablet  Commonly known as: XARELTO   10 mg, Oral, Daily      tamsulosin 0.4 MG capsule 24 hr capsule  Commonly known as: FLOMAX   TAKE 1 CAPSULE EVERY DAY             Allergies   Allergen Reactions   • Contrast Dye Anaphylaxis   • Iodine Unknown - High Severity     Pt states eyes start watering.    • Other Anaphylaxis   • Prednisone & Diphenhydramine Rash         Discharge Disposition:  Hospice/Home    Discharge Diet:  Diet Order   Procedures   • Diet Dysphagia; IV - Mechanical Soft No Mixed Consistencies; Nectar / Syrup Thick; Extra Sauce / Gravy; Consistent Carbohydrate         Discharge Activity:   Activity Instructions     Activity as Tolerated              CODE STATUS:    Code Status and Medical Interventions:   Ordered at: 06/07/22 2007     Medical Intervention Limits:    NO intubation (DNI)     Code Status (Patient has no pulse and is not breathing):    No CPR (Do Not Attempt to Resuscitate)     Medical Interventions (Patient has pulse or is breathing):    Limited Support         Future Appointments   Date Time Provider Department Center   6/11/2022 10:30 AM EMS 1  [facility]   6/29/2022  1:30 PM Mitch Olson MD [facility]  [facility]           Time Spent on Discharge:  35 minutes    Electronically signed by ALMA ROSA Acosta, 06/11/22, 8:18 AM EDT.

## 2022-06-12 LAB
BACTERIA SPEC AEROBE CULT: NORMAL
BACTERIA SPEC AEROBE CULT: NORMAL

## 2022-10-17 RX ORDER — LEVOTHYROXINE SODIUM 0.07 MG/1
75 TABLET ORAL DAILY
Qty: 90 TABLET | Refills: 0 | Status: SHIPPED | OUTPATIENT
Start: 2022-10-17

## 2023-09-09 NOTE — PLAN OF CARE
Problem: Patient Care Overview  Goal: Plan of Care Review  Outcome: Ongoing (interventions implemented as appropriate)  Flowsheets (Taken 6/12/2020 3217)  Progress: improving  Plan of Care Reviewed With: patient  Note:   Patient was restless and confused at the beginning of shift , Pt c/o pain and PRN tylenol given for pain. Patient has rested well through the night. Patient has had several incontinent episodes, bottom is red and blanchable z guard applied and wedge used to reposition pt, pt is also on waffle mattress. Dressing in place with dried drainage noted at top of dressing. IV started last night and currently still in place with antibiotics infusing.       None

## 2025-03-31 NOTE — HOME HEALTH
SN to obtain labs and collect ua cns. After examining his arms and using bp cuff to get a vain to pop up there was none. Did not obtain. Sn tried to obtain ua per steril technique in and out cath. Could not get the cath to go in but so far and couldnt get urine back, But he had only had 2 sips of water today. I left a note for Patricia the NP who was seeing the pt in the afternoon about this. I will also call the agency. Wife states they always send a nurse that does only lab draw to their apartment.    Assessed pts bottom. He has a wound that looks like sheering. It is open but healing. I will put orders in for wound care. Clean with ns. apply medihoney and 4x4 foam dressing.   SN 1W3. 64.4

## (undated) DEVICE — 2963 MEDIPORE SOFT CLOTH TAPE 3 IN X 10 YD 12 RLS/CS: Brand: 3M™ MEDIPORE™

## (undated) DEVICE — ANTIBACTERIAL UNDYED BRAIDED (POLYGLACTIN 910), SYNTHETIC ABSORBABLE SUTURE: Brand: COATED VICRYL

## (undated) DEVICE — GW FOR TROCH NAIL 3.2X400MM

## (undated) DEVICE — STERILE PVP: Brand: MEDLINE INDUSTRIES, INC.

## (undated) DEVICE — Device

## (undated) DEVICE — GOWN,REINF,POLY,ECL,PP SLV,XL: Brand: MEDLINE

## (undated) DEVICE — GLV SURG SIGNATURE ESSENTIAL PF LTX SZ8

## (undated) DEVICE — DRSNG SURESITE WNDW 4X4.5

## (undated) DEVICE — BNDG ELAS CO-FLEX SLF ADHR 6IN 5YD LF STRL

## (undated) DEVICE — DRSNG GZ CURAD XEROFORM NONADHR OVERWRAP 5X9IN

## (undated) DEVICE — 3M™ IOBAN™ 2 ANTIMICROBIAL INCISE DRAPE 6650EZ: Brand: IOBAN™ 2

## (undated) DEVICE — DRAPE,REIN 53X77,STERILE: Brand: MEDLINE

## (undated) DEVICE — BIT DRL 3FLUT QC NDL PT 4.2X145MM

## (undated) DEVICE — PAD UNDRCST SYNTH 4IN 4YD NS LF

## (undated) DEVICE — SPNG GZ WOVN 4X4IN 12PLY 10/BX STRL

## (undated) DEVICE — 4.2MM RADIOLUCENT DRILL BIT

## (undated) DEVICE — PROXIMATE RH ROTATING HEAD SKIN STAPLERS (35 WIDE) CONTAINS 35 STAINLESS STEEL STAPLES: Brand: PROXIMATE

## (undated) DEVICE — PK MAJ FX HIP 10

## (undated) DEVICE — LEGGINGS, PAIR, 29X43, STERILE: Brand: MEDLINE

## (undated) DEVICE — CVR HNDL LIGHT RIGID

## (undated) DEVICE — UNDERGLV SURG BIOGEL INDICAT PI SZ8.5 BLU

## (undated) DEVICE — C-ARM: Brand: UNBRANDED